# Patient Record
Sex: FEMALE | Race: WHITE | NOT HISPANIC OR LATINO | Employment: OTHER | URBAN - METROPOLITAN AREA
[De-identification: names, ages, dates, MRNs, and addresses within clinical notes are randomized per-mention and may not be internally consistent; named-entity substitution may affect disease eponyms.]

---

## 2017-01-09 ENCOUNTER — OFFICE VISIT (OUTPATIENT)
Dept: PODIATRY | Facility: CLINIC | Age: 82
End: 2017-01-09
Payer: MEDICARE

## 2017-01-09 VITALS — HEIGHT: 65 IN | WEIGHT: 175.69 LBS | BODY MASS INDEX: 29.27 KG/M2

## 2017-01-09 DIAGNOSIS — M25.571 CHRONIC PAIN OF BOTH ANKLES: ICD-10-CM

## 2017-01-09 DIAGNOSIS — G89.29 CHRONIC PAIN OF BOTH ANKLES: ICD-10-CM

## 2017-01-09 DIAGNOSIS — M25.572 CHRONIC PAIN OF BOTH ANKLES: ICD-10-CM

## 2017-01-09 DIAGNOSIS — M19.079 ARTHRITIS OF FOOT: Primary | ICD-10-CM

## 2017-01-09 DIAGNOSIS — M79.673 FOOT ARCH PAIN, UNSPECIFIED LATERALITY: ICD-10-CM

## 2017-01-09 PROCEDURE — 1125F AMNT PAIN NOTED PAIN PRSNT: CPT | Mod: S$GLB,,, | Performed by: PODIATRIST

## 2017-01-09 PROCEDURE — 99999 PR PBB SHADOW E&M-NEW PATIENT-LVL III: CPT | Mod: PBBFAC,,, | Performed by: PODIATRIST

## 2017-01-09 PROCEDURE — 1159F MED LIST DOCD IN RCRD: CPT | Mod: S$GLB,,, | Performed by: PODIATRIST

## 2017-01-09 PROCEDURE — 99203 OFFICE O/P NEW LOW 30 MIN: CPT | Mod: S$GLB,,, | Performed by: PODIATRIST

## 2017-01-09 PROCEDURE — 1157F ADVNC CARE PLAN IN RCRD: CPT | Mod: S$GLB,,, | Performed by: PODIATRIST

## 2017-01-09 PROCEDURE — 1160F RVW MEDS BY RX/DR IN RCRD: CPT | Mod: S$GLB,,, | Performed by: PODIATRIST

## 2017-01-09 RX ORDER — DICLOFENAC SODIUM 10 MG/G
2 GEL TOPICAL 4 TIMES DAILY
Qty: 100 G | Refills: 2 | Status: SHIPPED | OUTPATIENT
Start: 2017-01-09 | End: 2018-07-03 | Stop reason: ALTCHOICE

## 2017-01-09 RX ORDER — ZOLPIDEM TARTRATE 5 MG/1
5 TABLET ORAL NIGHTLY PRN
COMMUNITY
End: 2018-07-03 | Stop reason: ALTCHOICE

## 2017-01-09 RX ORDER — AMLODIPINE BESYLATE 5 MG/1
TABLET ORAL
COMMUNITY
Start: 2016-12-28 | End: 2018-07-03 | Stop reason: ALTCHOICE

## 2017-01-09 RX ORDER — ISOSORBIDE DINITRATE 30 MG/1
TABLET ORAL
COMMUNITY
Start: 2016-12-30 | End: 2020-02-26

## 2017-01-09 RX ORDER — ASPIRIN 81 MG/1
81 TABLET ORAL EVERY 12 HOURS
COMMUNITY

## 2017-01-09 RX ORDER — MECLIZINE HCL 12.5 MG 12.5 MG/1
TABLET ORAL
COMMUNITY
Start: 2017-01-03 | End: 2018-07-26

## 2017-01-09 NOTE — MR AVS SNAPSHOT
[facility]  [address] [place] [address]  Phone: [phone]                  Erich Encarnacion   2017 9:45 AM   Office Visit    Description:  Female : 10/2/1926   Provider:  Mark Hall DPM   Department:  [facility]           Reason for Visit     Foot Pain           Diagnoses this Visit        Comments    Arthritis of foot    -  Primary     Foot arch pain, unspecified laterality         Chronic pain of both ankles                To Do List           Future Appointments        Provider Department Dept Phone    2017 10:00 AM [facility] XR1 [facility] [phone]    2017 10:00 AM Mark Hall DPM [facility] [phone]      Goals (5 Years of Data)     None      Follow-Up and Disposition     Return in about 1 month (around 2017).    Follow-up and Disposition History       These Medications        Disp Refills Start End    diclofenac sodium 1 % Gel 100 g 2 2017     Apply 2 g topically 4 (four) times daily. - Topical    Pharmacy: [address] [place] [address] Ph #: [phone]         [facility]     [facility] On Call Nurse Care Line -  Assistance  Registered nurses in the [facility] On Call Center provide clinical advisement, health education, appointment booking, and other advisory services.  Call for this free service at [phone].             Medications           Message regarding Medications     Verify the changes and/or additions to your medication regime listed below are the same as discussed with your clinician today.  If any of these changes or additions are incorrect, please notify your healthcare provider.        START taking these NEW medications        Refills    diclofenac sodium 1 % Gel 2    Sig: Apply 2 g topically 4 (four) times daily.    Class: Normal    Route: Topical           Verify that the below list of medications is an accurate representation of the  "medications you are currently taking.  If none reported, the list may be blank. If incorrect, please contact your healthcare provider. Carry this list with you in case of emergency.           Current Medications     amlodipine (NORVASC) 5 MG tablet     aspirin (ECOTRIN) 81 MG EC tablet Take 81 mg by mouth every 12 (twelve) hours.    diclofenac sodium 1 % Gel Apply 2 g topically 4 (four) times daily.    isosorbide dinitrate (ISORDIL) 30 MG Tab     meclizine (ANTIVERT) 12.5 mg tablet     zolpidem (AMBIEN) 5 MG Tab Take 5 mg by mouth nightly as needed.           Clinical Reference Information           Vital Signs - Last Recorded  Most recent update: 1/9/2017  9:43 AM by Deana Sun MA    Ht Wt BMI          5' 5" (1.651 m) 79.7 kg (175 lb 11.3 oz) 29.24 kg/m2        Allergies as of 1/9/2017     No Known Allergies      Immunizations Administered on Date of Encounter - 1/9/2017     None      Orders Placed During Today's Visit     Future Labs/Procedures Expected by Expires    X-Ray Ankle Complete Bilateral  1/9/2017 1/9/2018    X-Ray Foot Complete Bilateral  1/9/2017 1/10/2018      "

## 2017-01-09 NOTE — PROGRESS NOTES
Subjective:      Patient ID: Erich Encarnacion is a 89 y.o. female.    Chief Complaint: Foot Pain    Deep aching, occasionally sharp pain both feet/ankles all over.  Gradual onset, worsening over past several weeks, aggravated by increased weight bearing, shoe gear, pressure.  No previous medical treatment.  OTC pain med not helping.  Denies trauma and surgery both feet.  Wearing athletic shoes.      Review of Systems   Constitution: Negative for chills, diaphoresis, fever, malaise/fatigue and night sweats.   Cardiovascular: Negative for claudication, cyanosis, leg swelling and syncope.   Skin: Negative for color change, dry skin, rash, suspicious lesions and unusual hair distribution.   Musculoskeletal: Positive for arthritis and joint pain. Negative for falls, joint swelling, muscle cramps, muscle weakness and stiffness.   Gastrointestinal: Negative for constipation, diarrhea, nausea and vomiting.   Neurological: Negative for brief paralysis, disturbances in coordination, focal weakness, numbness, paresthesias, sensory change and tremors.           Objective:      Physical Exam   Constitutional: She appears well-developed and well-nourished. She is cooperative. No distress.   Cardiovascular:   Pulses:       Popliteal pulses are 2+ on the right side, and 2+ on the left side.        Dorsalis pedis pulses are 1+ on the right side, and 1+ on the left side.        Posterior tibial pulses are 1+ on the right side, and 1+ on the left side.   Capillary refill 3 seconds all toes/distal feet, all toes/both feet warm to touch.      Negative lymphadenopathy bilateral popliteal fossa and tarsal tunnel.      Negavie lower extremity edema bilateral.     Musculoskeletal:        Right ankle: Normal. She exhibits normal range of motion, no swelling, no ecchymosis, no deformity, no laceration and normal pulse. Achilles tendon normal. Achilles tendon exhibits no pain, no defect and normal Ramirez's test results.   Generalized pain to  weight bearing particularly after rest without focal pain to palpation or nwb range of motion both feet which are without deformity or loss of function.    Otherwise, Normal angle, base, station of gait. All ten toes without clubbing, cyanosis, or signs of ischemia.  No pain to palpation bilateral lower extremities.  Range of motion, stability, muscle strength, and muscle tone normal bilateral feet and legs.     Lymphadenopathy: No inguinal adenopathy noted on the right or left side.   Negative lymphadenopathy bilateral popliteal fossa and tarsal tunnel.   Neurological: She is alert. She has normal strength. She displays no atrophy and no tremor. No sensory deficit. She exhibits normal muscle tone. She displays no seizure activity. Gait normal.   Reflex Scores:       Patellar reflexes are 2+ on the right side and 2+ on the left side.       Achilles reflexes are 2+ on the right side and 2+ on the left side.  Negative tinel sign to percussion sural, superficial peroneal, deep peroneal, saphenous, and posterior tibial nerves right and left ankles and feet.      Negative moulder sign/click bilateral all intermetatarsal spaces.     Skin: Skin is warm, dry and intact. No abrasion, no bruising, no burn, no ecchymosis, no laceration, no lesion and no rash noted. She is not diaphoretic. No cyanosis or erythema. No pallor. Nails show no clubbing.     Skin is normal age and health appropriate color, turgor, texture, and temperature bilateral lower extremities without ulceration, hyperpigmentation, discoloration, masses nodules or cords palpated.  No ecchymosis, erythema, edema, or cardinal signs of infection bilateral lower extremities.               Assessment:       Encounter Diagnoses   Name Primary?    Arthritis of foot Yes    Foot arch pain, unspecified laterality     Chronic pain of both ankles          Plan:       Erich was seen today for foot pain.    Diagnoses and all orders for this visit:    Arthritis of foot  -      X-Ray Foot Complete Bilateral; Future  -     X-Ray Ankle Complete Bilateral; Future    Foot arch pain, unspecified laterality  -     X-Ray Foot Complete Bilateral; Future  -     X-Ray Ankle Complete Bilateral; Future    Chronic pain of both ankles  -     X-Ray Foot Complete Bilateral; Future  -     X-Ray Ankle Complete Bilateral; Future    Other orders  -     diclofenac sodium 1 % Gel; Apply 2 g topically 4 (four) times daily.      I counseled the patient on her conditions, their implications and medical management.        Patient will stretch the tendo achilles complex three times daily as demonstrated in the office.  Literature was dispensed illustrating proper stretching technique.    Patient will obtain over the counter arch supports and wear them in shoes whenever possible.  Athletic shoes intended for walking or running are usually best.    The patient was advised that NSAID-type medications have two very important potential side effects: gastrointestinal irritation including hemorrhage and renal injuries. She was asked to take the medication with food and to stop if she experiences any GI upset. I asked her to call for vomiting, abdominal pain or black/bloody stools. The patient expresses understanding of these issues and questions were answered.    Discussed conservative treatment with shoes of adequate dimensions, material, and style to alleviate symptoms and delay or prevent surgical intervention.     Rx x-rays, voltaren gel.          No Follow-up on file.

## 2017-01-23 ENCOUNTER — HOSPITAL ENCOUNTER (OUTPATIENT)
Dept: RADIOLOGY | Facility: CLINIC | Age: 82
Discharge: HOME | End: 2017-01-23
Attending: PODIATRIST
Payer: MEDICARE

## 2017-01-23 DIAGNOSIS — M25.571 CHRONIC PAIN OF BOTH ANKLES: ICD-10-CM

## 2017-01-23 DIAGNOSIS — G89.29 CHRONIC PAIN OF BOTH ANKLES: ICD-10-CM

## 2017-01-23 DIAGNOSIS — M79.673 FOOT ARCH PAIN, UNSPECIFIED LATERALITY: ICD-10-CM

## 2017-01-23 DIAGNOSIS — M19.079 ARTHRITIS OF FOOT: ICD-10-CM

## 2017-01-23 DIAGNOSIS — M25.572 CHRONIC PAIN OF BOTH ANKLES: ICD-10-CM

## 2017-01-23 PROCEDURE — 73610 X-RAY EXAM OF ANKLE: CPT | Mod: 26,LT,S$GLB, | Performed by: RADIOLOGY

## 2017-01-23 PROCEDURE — 73610 X-RAY EXAM OF ANKLE: CPT | Mod: 50,TC,PO

## 2017-01-23 PROCEDURE — 73630 X-RAY EXAM OF FOOT: CPT | Mod: 26,LT,, | Performed by: RADIOLOGY

## 2017-01-23 PROCEDURE — 73630 X-RAY EXAM OF FOOT: CPT | Mod: 26,RT,, | Performed by: RADIOLOGY

## 2017-01-23 PROCEDURE — 73610 X-RAY EXAM OF ANKLE: CPT | Mod: 26,RT,S$GLB, | Performed by: RADIOLOGY

## 2017-01-23 PROCEDURE — 73630 X-RAY EXAM OF FOOT: CPT | Mod: 50,TC,PO

## 2017-02-06 ENCOUNTER — OFFICE VISIT (OUTPATIENT)
Dept: PODIATRY | Facility: CLINIC | Age: 82
End: 2017-02-06
Payer: MEDICARE

## 2017-02-06 ENCOUNTER — TELEPHONE (OUTPATIENT)
Dept: PODIATRY | Facility: CLINIC | Age: 82
End: 2017-02-06

## 2017-02-06 VITALS — WEIGHT: 175.5 LBS | HEIGHT: 65 IN | BODY MASS INDEX: 29.24 KG/M2

## 2017-02-06 DIAGNOSIS — M79.672 FOOT PAIN, LEFT: Primary | ICD-10-CM

## 2017-02-06 DIAGNOSIS — G89.29 CHRONIC PAIN OF LEFT ANKLE: ICD-10-CM

## 2017-02-06 DIAGNOSIS — M25.572 CHRONIC PAIN OF LEFT ANKLE: ICD-10-CM

## 2017-02-06 PROCEDURE — 99999 PR PBB SHADOW E&M-EST. PATIENT-LVL III: CPT | Mod: PBBFAC,,, | Performed by: PODIATRIST

## 2017-02-06 PROCEDURE — 1157F ADVNC CARE PLAN IN RCRD: CPT | Mod: S$GLB,,, | Performed by: PODIATRIST

## 2017-02-06 PROCEDURE — 1125F AMNT PAIN NOTED PAIN PRSNT: CPT | Mod: S$GLB,,, | Performed by: PODIATRIST

## 2017-02-06 PROCEDURE — 99213 OFFICE O/P EST LOW 20 MIN: CPT | Mod: S$GLB,,, | Performed by: PODIATRIST

## 2017-02-06 PROCEDURE — 1160F RVW MEDS BY RX/DR IN RCRD: CPT | Mod: S$GLB,,, | Performed by: PODIATRIST

## 2017-02-06 PROCEDURE — 1159F MED LIST DOCD IN RCRD: CPT | Mod: S$GLB,,, | Performed by: PODIATRIST

## 2017-02-06 RX ORDER — LIDOCAINE HYDROCHLORIDE 20 MG/ML
JELLY TOPICAL
Qty: 30 ML | Refills: 2 | Status: SHIPPED | OUTPATIENT
Start: 2017-02-06 | End: 2020-02-26

## 2017-02-06 NOTE — MR AVS SNAPSHOT
[facility]  [address] [place] [address]  Phone: [phone]                  Erich Encarnacion   2017 10:00 AM   Office Visit    Description:  Female : 10/2/1926   Provider:  Mark Hall DPM   Department:  [facility]           Reason for Visit     Foot Pain           Diagnoses this Visit        Comments    Foot pain, left    -  Primary     Chronic pain of left ankle                To Do List           Goals (5 Years of Data)     None      Follow-Up and Disposition     Return in about 6 weeks (around 3/20/2017) for left foot/aknle pain.       These Medications        Disp Refills Start End    lidocaine HCL 2% (XYLOCAINE) 2 % jelly 30 mL 2 2017     Apply topically as needed. - Topical (Top)    Pharmacy: [address] [place] Ph #: [phone]         [facility] On Call     [facility] On Call Nurse [facility] Line -  Assistance  Registered nurses in the [facility] On Call Center provide clinical advisement, health education, appointment booking, and other advisory services.  Call for this free service at [phone].             Medications           Message regarding Medications     Verify the changes and/or additions to your medication regime listed below are the same as discussed with your clinician today.  If any of these changes or additions are incorrect, please notify your healthcare provider.        START taking these NEW medications        Refills    lidocaine HCL 2% (XYLOCAINE) 2 % jelly 2    Sig: Apply topically as needed.    Class: Normal    Route: Topical (Top)           Verify that the below list of medications is an accurate representation of the medications you are currently taking.  If none reported, the list may be blank. If incorrect, please contact your healthcare provider. Carry this list with you in case of emergency.           Current Medications     amlodipine (NORVASC) 5 MG tablet     aspirin  "(ECOTRIN) 81 MG EC tablet Take 81 mg by mouth every 12 (twelve) hours.    diclofenac sodium 1 % Gel Apply 2 g topically 4 (four) times daily.    isosorbide dinitrate (ISORDIL) 30 MG Tab     meclizine (ANTIVERT) 12.5 mg tablet     zolpidem (AMBIEN) 5 MG Tab Take 5 mg by mouth nightly as needed.    lidocaine HCL 2% (XYLOCAINE) 2 % jelly Apply topically as needed.           Clinical Reference Information           Your Vitals Were     Height Weight BMI          5' 5" (1.651 m) 79.6 kg (175 lb 7.8 oz) 29.2 kg/m2        Allergies as of 2/6/2017     No Known Allergies      Immunizations Administered on Date of Encounter - 2/6/2017     None      Orders Placed During Today's Visit      Normal Orders This Visit    Ambulatory consult to Physical Therapy     ORTHOTIC DEVICE (DME)       Language Assistance Services     ATTENTION: Language assistance services are available, free of charge. Please call [phone].      ATENCIÓN: Si habla deb, tiene a redding disposición servicios gratuitos de asistencia lingüística. Llame al [phone].     MANDY Ý: N?u b?n nói Ti?ng Vi?t, có các d?ch v? h? tr? ngôn ng? mi?n phí dành cho b?n. G?i s? [phone].         [facility] complies with applicable Federal civil rights laws and does not discriminate on the basis of race, color, national origin, age, disability, or sex.        "

## 2017-02-06 NOTE — TELEPHONE ENCOUNTER
----- Message from Meme Mcdonald sent at 2/6/2017 10:28 AM CST -----  Contact: [facility]   Needs to [facility]   .  [address]  [address]  [address] [facility] [place] [address]  Phone: [phone] Fax: [phone]

## 2017-02-06 NOTE — PROGRESS NOTES
Subjective:      Patient ID: Erich Encarnacion is a 89 y.o. female.    Chief Complaint: Foot Pain (bilateral;right worse)    Deep aching, occasionally sharp pain both feet/ankles all over. No improvement from topical nsaid gel, stretches, shoe and activity selection.  X-rays show mild osteopenia, arthritic changes without acute injury.    Review of Systems   Constitution: Negative for chills, diaphoresis, fever, malaise/fatigue and night sweats.   Cardiovascular: Negative for claudication, cyanosis, leg swelling and syncope.   Skin: Negative for color change, dry skin, rash, suspicious lesions and unusual hair distribution.   Musculoskeletal: Positive for arthritis and joint pain. Negative for falls, joint swelling, muscle cramps, muscle weakness and stiffness.   Gastrointestinal: Negative for constipation, diarrhea, nausea and vomiting.   Neurological: Negative for brief paralysis, disturbances in coordination, focal weakness, numbness, paresthesias, sensory change and tremors.           Objective:      Physical Exam   Constitutional: She appears well-developed and well-nourished. She is cooperative. No distress.   Cardiovascular:   Pulses:       Popliteal pulses are 2+ on the right side, and 2+ on the left side.        Dorsalis pedis pulses are 1+ on the right side, and 1+ on the left side.        Posterior tibial pulses are 1+ on the right side, and 1+ on the left side.   Capillary refill 3 seconds all toes/distal feet, all toes/both feet warm to touch.      Negative lymphadenopathy bilateral popliteal fossa and tarsal tunnel.      Negavie lower extremity edema bilateral.     Musculoskeletal:        Right ankle: Normal. She exhibits normal range of motion, no swelling, no ecchymosis, no deformity, no laceration and normal pulse. Achilles tendon normal. Achilles tendon exhibits no pain, no defect and normal Ramirez's test results.   Generalized pain to weight bearing particularly after rest without focal pain to  palpation or nwb range of motion both feet which are without deformity or loss of function.    Otherwise, Normal angle, base, station of gait. All ten toes without clubbing, cyanosis, or signs of ischemia.  No pain to palpation bilateral lower extremities.  Range of motion, stability, muscle strength, and muscle tone normal bilateral feet and legs.     Lymphadenopathy: No inguinal adenopathy noted on the right or left side.   Negative lymphadenopathy bilateral popliteal fossa and tarsal tunnel.   Neurological: She is alert. She has normal strength. She displays no atrophy and no tremor. No sensory deficit. She exhibits normal muscle tone. She displays no seizure activity. Gait normal.   Reflex Scores:       Patellar reflexes are 2+ on the right side and 2+ on the left side.       Achilles reflexes are 2+ on the right side and 2+ on the left side.  Negative tinel sign to percussion sural, superficial peroneal, deep peroneal, saphenous, and posterior tibial nerves right and left ankles and feet.      Negative moulder sign/click bilateral all intermetatarsal spaces.     Skin: Skin is warm, dry and intact. No abrasion, no bruising, no burn, no ecchymosis, no laceration, no lesion and no rash noted. She is not diaphoretic. No cyanosis or erythema. No pallor. Nails show no clubbing.     Skin is normal age and health appropriate color, turgor, texture, and temperature bilateral lower extremities without ulceration, hyperpigmentation, discoloration, masses nodules or cords palpated.  No ecchymosis, erythema, edema, or cardinal signs of infection bilateral lower extremities.               Assessment:       Encounter Diagnoses   Name Primary?    Foot pain, left Yes    Chronic pain of left ankle          Plan:       Erich was seen today for foot pain.    Diagnoses and all orders for this visit:    Foot pain, left  -     Ambulatory consult to Physical Therapy  -     ORTHOTIC DEVICE (DME)    Chronic pain of left ankle  -      Ambulatory consult to Physical Therapy  -     ORTHOTIC DEVICE (DME)    Other orders  -     lidocaine HCL 2% (XYLOCAINE) 2 % jelly; Apply topically as needed.      I counseled the patient on her conditions, their implications and medical management.    Continue stretches, nsaid gel, careful selection of shoes and activity.    Rx lidocaine gel, PT, custom orthotics.    Possible MRI next visit if not improved.        Return in about 6 weeks (around 3/20/2017) for left foot/aknle pain.

## 2017-03-01 ENCOUNTER — HOSPITAL ENCOUNTER (EMERGENCY)
Facility: HOSPITAL | Age: 82
Discharge: HOME | End: 2017-03-01
Attending: EMERGENCY MEDICINE
Payer: MEDICARE

## 2017-03-01 VITALS
SYSTOLIC BLOOD PRESSURE: 134 MMHG | DIASTOLIC BLOOD PRESSURE: 70 MMHG | BODY MASS INDEX: 29.16 KG/M2 | RESPIRATION RATE: 18 BRPM | TEMPERATURE: 98 F | WEIGHT: 175 LBS | HEIGHT: 65 IN | OXYGEN SATURATION: 95 % | HEART RATE: 80 BPM

## 2017-03-01 DIAGNOSIS — J20.9 ACUTE BRONCHITIS, UNSPECIFIED ORGANISM: Primary | ICD-10-CM

## 2017-03-01 DIAGNOSIS — J40 BRONCHITIS: ICD-10-CM

## 2017-03-01 DIAGNOSIS — R05.9 COUGH: ICD-10-CM

## 2017-03-01 LAB
BILIRUB UR QL STRIP: NEGATIVE
CLARITY UR: CLEAR
COLOR UR: YELLOW
FLUAV AG SPEC QL IA: NEGATIVE
FLUBV AG SPEC QL IA: NEGATIVE
GLUCOSE UR QL STRIP: NEGATIVE
HGB UR QL STRIP: NEGATIVE
KETONES UR QL STRIP: NEGATIVE
LEUKOCYTE ESTERASE UR QL STRIP: NEGATIVE
NITRITE UR QL STRIP: NEGATIVE
PH UR STRIP: 6 [PH] (ref 5–8)
PROT UR QL STRIP: NEGATIVE
SP GR UR STRIP: <=1.005 (ref 1–1.03)
SPECIMEN SOURCE: NORMAL
URN SPEC COLLECT METH UR: ABNORMAL
UROBILINOGEN UR STRIP-ACNC: NEGATIVE EU/DL

## 2017-03-01 PROCEDURE — 63600175 PHARM REV CODE 636 W HCPCS: Performed by: EMERGENCY MEDICINE

## 2017-03-01 PROCEDURE — 25000003 PHARM REV CODE 250: Performed by: EMERGENCY MEDICINE

## 2017-03-01 PROCEDURE — 81003 URINALYSIS AUTO W/O SCOPE: CPT

## 2017-03-01 PROCEDURE — 99284 EMERGENCY DEPT VISIT MOD MDM: CPT | Mod: 25

## 2017-03-01 PROCEDURE — 87400 INFLUENZA A/B EACH AG IA: CPT | Mod: 59

## 2017-03-01 PROCEDURE — 96361 HYDRATE IV INFUSION ADD-ON: CPT

## 2017-03-01 PROCEDURE — 96374 THER/PROPH/DIAG INJ IV PUSH: CPT

## 2017-03-01 RX ORDER — IBUPROFEN 600 MG/1
600 TABLET ORAL ONCE
Status: COMPLETED | OUTPATIENT
Start: 2017-03-01 | End: 2017-03-01

## 2017-03-01 RX ORDER — SODIUM CHLORIDE 9 MG/ML
1000 INJECTION, SOLUTION INTRAVENOUS
Status: COMPLETED | OUTPATIENT
Start: 2017-03-01 | End: 2017-03-01

## 2017-03-01 RX ORDER — ONDANSETRON 2 MG/ML
4 INJECTION INTRAMUSCULAR; INTRAVENOUS
Status: COMPLETED | OUTPATIENT
Start: 2017-03-01 | End: 2017-03-01

## 2017-03-01 RX ORDER — NAPROXEN SODIUM 220 MG
220 TABLET ORAL 2 TIMES DAILY WITH MEALS
Qty: 30 TABLET | Refills: 0 | Status: SHIPPED | OUTPATIENT
Start: 2017-03-01 | End: 2018-07-03 | Stop reason: ALTCHOICE

## 2017-03-01 RX ORDER — ALBUTEROL SULFATE 90 UG/1
2 AEROSOL, METERED RESPIRATORY (INHALATION) EVERY 4 HOURS PRN
Qty: 1 INHALER | Refills: 1 | Status: SHIPPED | OUTPATIENT
Start: 2017-03-01 | End: 2018-07-03 | Stop reason: ALTCHOICE

## 2017-03-01 RX ADMIN — SODIUM CHLORIDE 1000 ML: 0.9 INJECTION, SOLUTION INTRAVENOUS at 01:03

## 2017-03-01 RX ADMIN — ONDANSETRON 4 MG: 2 INJECTION INTRAMUSCULAR; INTRAVENOUS at 01:03

## 2017-03-01 RX ADMIN — IBUPROFEN 600 MG: 600 TABLET, FILM COATED ORAL at 01:03

## 2017-03-01 NOTE — ED AVS SNAPSHOT
[facility] [address]  [place] [address]               Erich Encarnacion   3/1/2017 11:55 AM   ED    Description:  Female : 10/2/1926   Department:  [facility]           Your Care was Coordinated By:     Provider Role From To    Anshul Ramos MD Attending Provider 17 1211 --      Reason for Visit     General Illness           Diagnoses this Visit        Comments    Acute bronchitis, unspecified organism    -  Primary     Cough         Bronchitis           ED Disposition     ED Disposition Condition Comment    Discharge             To Do List           Follow-up Information     Follow up with Se Rios MD In 1 week(s).    Specialty:  Family Medicine    Contact information:    [address] [facility] [address]  [phone]         These Medications        Disp Refills Start End    naproxen sodium (ANAPROX) 220 MG tablet 30 tablet 0 3/1/2017     Take 1 tablet (220 mg total) by mouth 2 (two) times daily with meals. - Oral    Pharmacy: [email or web address] Ph #: [phone]       albuterol 90 mcg/actuation inhaler 1 Inhaler 1 3/1/2017     Inhale 2 puffs into the lungs every 4 (four) hours as needed for Wheezing. Rescue - Inhalation    Pharmacy: [email or web address] Ph #: [phone]         [facility]     [facility] -  Assistance  Registered nurses in the [facility] provide clinical advisement, health education, appointment booking, and other advisory services.  Call for this free service at [phone].             Medications           Message regarding Medications     Verify the changes and/or additions to your medication regime listed below are the same as discussed with your clinician today.  If any of these changes or additions are incorrect, please notify your  healthcare provider.        START taking these NEW medications        Refills    naproxen sodium (ANAPROX) 220 MG tablet 0    Sig: Take 1 tablet (220 mg total) by mouth 2 (two) times daily with meals.    Class: Print    Route: Oral    albuterol 90 mcg/actuation inhaler 1    Sig: Inhale 2 puffs into the lungs every 4 (four) hours as needed for Wheezing. Rescue    Class: Print    Route: Inhalation      These medications were administered today        Dose Freq    0.9%  NaCl infusion 1,000 mL ED 1 Time    Sig: Inject 1,000 mLs into the vein ED 1 Time.    Class: Normal    Route: Intravenous    ondansetron injection 4 mg 4 mg ED 1 Time    Sig: Inject 4 mg into the vein ED 1 Time.    Class: Normal    Route: Intravenous    ibuprofen tablet 600 mg 600 mg Once    Sig: Take 1 tablet (600 mg total) by mouth once.    Class: Normal    Route: Oral           Verify that the below list of medications is an accurate representation of the medications you are currently taking.  If none reported, the list may be blank. If incorrect, please contact your healthcare provider. Carry this list with you in case of emergency.           Current Medications     amlodipine (NORVASC) 5 MG tablet     aspirin (ECOTRIN) 81 MG EC tablet Take 81 mg by mouth every 12 (twelve) hours.    diclofenac sodium 1 % Gel Apply 2 g topically 4 (four) times daily.    isosorbide dinitrate (ISORDIL) 30 MG Tab     lidocaine HCL 2% (XYLOCAINE) 2 % jelly Apply topically as needed.    meclizine (ANTIVERT) 12.5 mg tablet     zolpidem (AMBIEN) 5 MG Tab Take 5 mg by mouth nightly as needed.    albuterol 90 mcg/actuation inhaler Inhale 2 puffs into the lungs every 4 (four) hours as needed for Wheezing. Rescue    naproxen sodium (ANAPROX) 220 MG tablet Take 1 tablet (220 mg total) by mouth 2 (two) times daily with meals.           Clinical Reference Information           Your Vitals Were     BP                   139/68           Allergies as of 3/1/2017     No Known  Allergies      Immunizations Administered on Date of Encounter - 3/1/2017     None      ED Micro, Lab, POCT     Start Ordered       Status Ordering Provider    03/01/17 1240 03/01/17 1240  Urinalysis  STAT      Final result     03/01/17 1240 03/01/17 1240  Influenza antigen Nasal Swab  STAT      Final result       ED Imaging Orders     Start Ordered       Status Ordering Provider    03/01/17 1240 03/01/17 1240  X-Ray Chest PA And Lateral  1 time imaging      Final result         Discharge Instructions           Acute Bronchitis  Your healthcare provider has told you that you have acute bronchitis. Bronchitis is infection or inflammation of the bronchial tubes (airways in the lungs). Normally, air moves easily in and out of the airways. Bronchitis narrows the airways, making it harder for air to flow in and out of the lungs. This causes symptoms such as shortness of breath, coughing, and wheezing. Bronchitis can be acute or chronic. Acute means the condition comes on quickly and goes away in a short time. Chronic means a condition lasts a long time and often comes back. Read on to learn more about acute bronchitis.    What causes acute bronchitis?  Acute bronchitis almost always starts as a viral respiratory infection, such as a cold or the flu. Certain factors make it more likely for a cold or flu to turn into bronchitis. These include being very young or very old or having a heart or lung problem. Cigarette smoking also makes bronchitis more likely.  When bronchitis develops, the airways become swollen. The airways may also become infected with bacteria. This is known as a secondary infection.  Diagnosing acute bronchitis  Your healthcare provider will examine you and ask about your symptoms and health history. You may also have a sputum culture to test the fluid in your lungs. Chest X-rays may be done to look for infection in the lungs.  Treating acute bronchitis  Bronchitis usually clears up as the cold or  flu goes away. You can help feel better faster by doing the following:  · Take medicine as directed. You may be told to take ibuprofen or other over-the-counter medicines. These help relieve inflammation in your bronchial tubes. Your doctor may prescribe an inhaler to help open up the bronchial tubes. Most of the time, acute bronchitis is caused by a viral infection. Antibiotics are usually not prescribed for viral infections.  · Drink plenty of fluids, such as water, juice, or warm soup. Fluids loosen mucus so that you can cough it up. This helps you breathe more easily. Fluids also prevent dehydration.  · Make sure you get plenty of rest.  · Do not smoke. Do not allow anyone else to smoke in your home.  Recovery and follow-up  Follow up with your doctor as you are told. You will likely feel better in a week or two. But a dry cough can linger beyond that time. Let your doctor know if you still have symptoms (other than a dry cough) after 2 weeks. If youre prone to getting bronchial infections, let your doctor know. And take steps to protect yourself from future infections. These steps include stopping smoking and avoiding tobacco smoke, washing your hands often, and getting a yearly flu shot.  When to call the doctor  Call the doctor if you have any of the following:  · Fever of 100.4°F (38.0°C) higher  · Symptoms that get worse, or new symptoms  · Trouble breathing  · Symptoms that dont start to improve within a week, or within 3 days of taking antibiotics   Date Last Reviewed: 8/4/2014  © 3784-4464 [employer]. [address] [place] [address], [place] [address]. All rights reserved. This information is not intended as a substitute for professional medical care. Always follow your healthcare professional's instructions.          Bronchitis, Viral (Adult)    You have a viral bronchitis. Bronchitis is inflammation and swelling of the lining of the lungs. This is often caused by an infection. Symptoms include  a dry, hacking cough that is worse at night. The cough may bring up yellow-green mucus. You may also feel short of breath or wheeze. Other symptoms may include tiredness, chest discomfort, and chills.  Bronchitis that is caused by a virus is not treated with antibiotics. Instead, medicines may be given to help relieve symptoms. Symptoms can last up to 2 weeks, although the cough may last much longer.  This illness is contagious during the first few days and is spread through the air by coughing and sneezing, or by direct contact (touching the sick person and then touching your own eyes, nose, or mouth).  Most viral illnesses resolve within 10 to 14 days with rest and simple home remedies, although they may sometimes last for several weeks.  Home care  · If symptoms are severe, rest at home for the first 2 to 3 days. When you go back to your usual activities, don't let yourself get too tired.  · Do not smoke. Also avoid being exposed to secondhand smoke.  · You may use over-the-counter medicine to control fever or pain, unless another pain medicine was prescribed. (Note: If you have chronic liver or kidney disease or have ever had a stomach ulcer or gastrointestinal bleeding, talk with your healthcare provider before using these medicines. Also talk to your provider if you are taking medicine to prevent blood clots.) Aspirin should never be given to anyone younger than 18 years of age who is ill with a viral infection or fever. It may cause severe liver or brain damage.  · Your appetite may be poor, so a light diet is fine. Avoid dehydration by drinking 6 to 8 glasses of fluids per day (such as water, soft drinks, sports drinks, juices, tea, or soup). Extra fluids will help loosen secretions in the nose and lungs.  · Over-the-counter cough, cold, and sore-throat medicines will not shorten the length of the illness, but they may help to reduce symptoms. (Note: Do not use decongestants if you have high blood  pressure.)  Follow-up care  Follow up with your healthcare provider, or as advised. If you had an X-ray or ECG (electrocardiogram), a specialist will review it. You will be notified of any new findings that may affect your care.  Note: If you are age 65 or older, or if you have a chronic lung disease or condition that affects your immune system, or you smoke, talk to your healthcare provider about having pneumococcal vaccinations and a yearly influenza vaccination (flu shot).  When to seek medical advice  Call your healthcare provider right away if any of these occur:  · Fever of 100.4°F (38°C) or higher  · Coughing up increased amounts of colored sputum  · Weakness, drowsiness, headache, facial pain, ear pain, or a stiff neck  Call 911, or get immediate medical care  Contact emergency services right away if any of these occur:  · Coughing up blood  · Worsening weakness, drowsiness, headache, or stiff neck  · Trouble breathing, wheezing, or pain with breathing  Date Last Reviewed: 9/13/2015 © 2000-2016 [employer]. [address] [place] [address]. All rights reserved. This information is not intended as a substitute for professional medical care. Always follow your healthcare professional's instructions.          Smoking Cessation     If you would like to quit smoking:   You may be eligible for free services if you are a [place] resident and started smoking cigarettes before September 1, 1988.  Call the Smoking Cessation Trust (SCT) toll free at (195) 481-5543 or (137) 809-9398.   Call 6-800-QUIT-NOW if you do not meet the above criteria.             [facility] complies with applicable Federal civil rights laws and does not discriminate on the basis of race, color, national origin, age, disability, or sex.        Language Assistance Services     ATTENTION: Language assistance services are available, free of charge. Please call [phone].      ATENCIÓN: Alex cristina  español, tiene a redding disposición servicios gratuitos de asistencia lingüística. Llame al [phone].     MANDY Ý: N?u b?n nói Ti?ng Vi?t, có các d?ch v? h? tr? ngôn ng? mi?n phí dành cho b?n. G?i s? [phone].

## 2017-03-01 NOTE — ED PROVIDER NOTES
Encounter Date: 3/1/2017    SCRIBE #1 NOTE: I, Padmini Garcia, am scribing for, and in the presence of, Dr. Ramos.       History     Chief Complaint   Patient presents with    General Illness     Chest congestion, chills.  Onset of symptoms Monday.     Review of patient's allergies indicates:  No Known Allergies  HPI Comments: 3/1/2017  12:26 PM     Chief Complaint: Cough    The patient is a 89 y.o. female with no pmhx who presents with gradual onset of productive cough with clear sputum that has been constant for 2 days. Associated symptoms include chills, sore throat, congestion and burning pain in chest and throat. Pt has been taking mucinex without any relief. Denies any runny nose, fever, n/v/d, abdominal pain, dysuria or urinary frequency. Pt states she has no appetite and has not been eating. No sick contacts. No shx.    The history is provided by the patient.     No past medical history on file.  No past surgical history on file.  No family history on file.  Social History   Substance Use Topics    Smoking status: Former Smoker     Packs/day: 1.00     Years: 20.00    Smokeless tobacco: Not on file    Alcohol use No     Review of Systems   Constitutional: Positive for appetite change (decreased) and chills. Negative for fever.   HENT: Positive for congestion and sore throat. Negative for rhinorrhea.    Respiratory: Positive for cough. Negative for shortness of breath.    Cardiovascular: Negative for chest pain.   Gastrointestinal: Negative for abdominal pain, diarrhea, nausea and vomiting.   Genitourinary: Negative for dysuria and frequency.   Musculoskeletal: Negative for back pain and myalgias.   Skin: Negative for rash.   Neurological: Negative for weakness and numbness.   Hematological: Does not bruise/bleed easily.   All other systems reviewed and are negative.      Physical Exam   Initial Vitals   BP Pulse Resp Temp SpO2   03/01/17 1114 03/01/17 1114 03/01/17 1114 03/01/17 1114 03/01/17 1114    104/64 105 20 98.2 °F (36.8 °C) 94 %     Physical Exam    Nursing note and vitals reviewed.  Constitutional: No distress.   HENT:   Head: Normocephalic and atraumatic.   Mouth/Throat: Oropharynx is clear and moist and mucous membranes are normal. No oropharyngeal exudate, posterior oropharyngeal edema or posterior oropharyngeal erythema.   Fluid behind left TM - no injection. Right TM is normal.   Eyes: EOM are normal. Pupils are equal, round, and reactive to light.   Neck: Normal range of motion. Neck supple.   Cardiovascular: Normal rate, regular rhythm, normal heart sounds and intact distal pulses. Exam reveals no gallop and no friction rub.    No murmur heard.  Pulmonary/Chest: Breath sounds normal. She has no wheezes. She has no rhonchi. She has no rales.   Upper airway congestion with coughing.   Abdominal: Soft. She exhibits no distension. There is no tenderness.   Musculoskeletal: Normal range of motion. She exhibits no edema (no lower extremity pedal edema).   Neurological: She is alert and oriented to person, place, and time. She has normal strength.   Skin: Skin is dry. No rash noted. No erythema.   Psychiatric: She has a normal mood and affect.         ED Course   Procedures  Labs Reviewed - No data to display          Medical Decision Making:   Initial Assessment:   The patient was interviewed and examined and found to be in no acute distress.  She does not appear acutely toxic.  History and physical examination most resemble her progression with a transient viral illness inducing bronchitis.  Urinalysis, influenza swab, and chest x-ray were obtained to rule out evidence of focal bacterial or viral infection.  These are found be unremarkable.   Differential Diagnosis:   DDX includes, but are not limited to, pneumonia, CNS infection, gastroneuritis, pharyngitis, pneumothorax, urinary tract infection, dehydration  ED Management:  On reassessment, after fluids, patient reported she was feeling much  better and I do think she is currently stable for discharge.  She is asked to follow-up with her doctor within the next few days to assess for symptom resolution progression to otherwise return to the emergency department for any new, concerning, or worsening symptoms.  Patient agreeable with this plan for follow-up and she was discharged in stable condition with her daughter as a .            Scribe Attestation:   Scribe #1: I performed the above scribed service and the documentation accurately describes the services I performed. I attest to the accuracy of the note.    Attending Attestation:           Physician Attestation for Scribe:  Physician Attestation Statement for Scribe #1: I, Dr. Ramos, reviewed documentation, as scribed by Padmini Garcia in my presence, and it is both accurate and complete.                 ED Course     Clinical Impression:   The primary encounter diagnosis was Acute bronchitis, unspecified organism. Diagnoses of Cough and Bronchitis were also pertinent to this visit.      Disposition:   Disposition: Discharged  Condition: Stable       Anshul Ramos MD  03/01/17 1934

## 2017-03-01 NOTE — DISCHARGE INSTRUCTIONS
Acute Bronchitis  Your healthcare provider has told you that you have acute bronchitis. Bronchitis is infection or inflammation of the bronchial tubes (airways in the lungs). Normally, air moves easily in and out of the airways. Bronchitis narrows the airways, making it harder for air to flow in and out of the lungs. This causes symptoms such as shortness of breath, coughing, and wheezing. Bronchitis can be acute or chronic. Acute means the condition comes on quickly and goes away in a short time. Chronic means a condition lasts a long time and often comes back. Read on to learn more about acute bronchitis.    What causes acute bronchitis?  Acute bronchitis almost always starts as a viral respiratory infection, such as a cold or the flu. Certain factors make it more likely for a cold or flu to turn into bronchitis. These include being very young or very old or having a heart or lung problem. Cigarette smoking also makes bronchitis more likely.  When bronchitis develops, the airways become swollen. The airways may also become infected with bacteria. This is known as a secondary infection.  Diagnosing acute bronchitis  Your healthcare provider will examine you and ask about your symptoms and health history. You may also have a sputum culture to test the fluid in your lungs. Chest X-rays may be done to look for infection in the lungs.  Treating acute bronchitis  Bronchitis usually clears up as the cold or flu goes away. You can help feel better faster by doing the following:  · Take medicine as directed. You may be told to take ibuprofen or other over-the-counter medicines. These help relieve inflammation in your bronchial tubes. Your doctor may prescribe an inhaler to help open up the bronchial tubes. Most of the time, acute bronchitis is caused by a viral infection. Antibiotics are usually not prescribed for viral infections.  · Drink plenty of fluids, such as water, juice, or warm soup. Fluids loosen mucus  so that you can cough it up. This helps you breathe more easily. Fluids also prevent dehydration.  · Make sure you get plenty of rest.  · Do not smoke. Do not allow anyone else to smoke in your home.  Recovery and follow-up  Follow up with your doctor as you are told. You will likely feel better in a week or two. But a dry cough can linger beyond that time. Let your doctor know if you still have symptoms (other than a dry cough) after 2 weeks. If youre prone to getting bronchial infections, let your doctor know. And take steps to protect yourself from future infections. These steps include stopping smoking and avoiding tobacco smoke, washing your hands often, and getting a yearly flu shot.  When to call the doctor  Call the doctor if you have any of the following:  · Fever of 100.4°F (38.0°C) higher  · Symptoms that get worse, or new symptoms  · Trouble breathing  · Symptoms that dont start to improve within a week, or within 3 days of taking antibiotics   Date Last Reviewed: 8/4/2014  © 2539-9825 [employer]. [address] [place] [address]. All rights reserved. This information is not intended as a substitute for professional medical care. Always follow your healthcare professional's instructions.          Bronchitis, Viral (Adult)    You have a viral bronchitis. Bronchitis is inflammation and swelling of the lining of the lungs. This is often caused by an infection. Symptoms include a dry, hacking cough that is worse at night. The cough may bring up yellow-green mucus. You may also feel short of breath or wheeze. Other symptoms may include tiredness, chest discomfort, and chills.  Bronchitis that is caused by a virus is not treated with antibiotics. Instead, medicines may be given to help relieve symptoms. Symptoms can last up to 2 weeks, although the cough may last much longer.  This illness is contagious during the first few days and is spread through the air by coughing and sneezing,  or by direct contact (touching the sick person and then touching your own eyes, nose, or mouth).  Most viral illnesses resolve within 10 to 14 days with rest and simple home remedies, although they may sometimes last for several weeks.  Home care  · If symptoms are severe, rest at home for the first 2 to 3 days. When you go back to your usual activities, don't let yourself get too tired.  · Do not smoke. Also avoid being exposed to secondhand smoke.  · You may use over-the-counter medicine to control fever or pain, unless another pain medicine was prescribed. (Note: If you have chronic liver or kidney disease or have ever had a stomach ulcer or gastrointestinal bleeding, talk with your healthcare provider before using these medicines. Also talk to your provider if you are taking medicine to prevent blood clots.) Aspirin should never be given to anyone younger than 18 years of age who is ill with a viral infection or fever. It may cause severe liver or brain damage.  · Your appetite may be poor, so a light diet is fine. Avoid dehydration by drinking 6 to 8 glasses of fluids per day (such as water, soft drinks, sports drinks, juices, tea, or soup). Extra fluids will help loosen secretions in the nose and lungs.  · Over-the-counter cough, cold, and sore-throat medicines will not shorten the length of the illness, but they may help to reduce symptoms. (Note: Do not use decongestants if you have high blood pressure.)  Follow-up care  Follow up with your healthcare provider, or as advised. If you had an X-ray or ECG (electrocardiogram), a specialist will review it. You will be notified of any new findings that may affect your care.  Note: If you are age 65 or older, or if you have a chronic lung disease or condition that affects your immune system, or you smoke, talk to your healthcare provider about having pneumococcal vaccinations and a yearly influenza vaccination (flu shot).  When to seek medical advice  Call your  healthcare provider right away if any of these occur:  · Fever of 100.4°F (38°C) or higher  · Coughing up increased amounts of colored sputum  · Weakness, drowsiness, headache, facial pain, ear pain, or a stiff neck  Call 911, or get immediate medical care  Contact emergency services right away if any of these occur:  · Coughing up blood  · Worsening weakness, drowsiness, headache, or stiff neck  · Trouble breathing, wheezing, or pain with breathing  Date Last Reviewed: 9/13/2015 © 2000-2016 [employer]. [address] [place] [address]. All rights reserved. This information is not intended as a substitute for professional medical care. Always follow your healthcare professional's instructions.

## 2017-03-22 ENCOUNTER — OFFICE VISIT (OUTPATIENT)
Dept: PODIATRY | Facility: CLINIC | Age: 82
End: 2017-03-22
Payer: MEDICARE

## 2017-03-22 VITALS — BODY MASS INDEX: 28.57 KG/M2 | WEIGHT: 171.5 LBS | HEIGHT: 65 IN

## 2017-03-22 DIAGNOSIS — M19.079 ARTHRITIS OF FOOT: ICD-10-CM

## 2017-03-22 DIAGNOSIS — G89.29 CHRONIC PAIN OF LEFT ANKLE: ICD-10-CM

## 2017-03-22 DIAGNOSIS — M25.572 CHRONIC PAIN OF LEFT ANKLE: ICD-10-CM

## 2017-03-22 DIAGNOSIS — M79.672 FOOT PAIN, LEFT: Primary | ICD-10-CM

## 2017-03-22 PROCEDURE — 1159F MED LIST DOCD IN RCRD: CPT | Mod: S$GLB,,, | Performed by: PODIATRIST

## 2017-03-22 PROCEDURE — 99999 PR PBB SHADOW E&M-EST. PATIENT-LVL III: CPT | Mod: PBBFAC,,, | Performed by: PODIATRIST

## 2017-03-22 PROCEDURE — 1125F AMNT PAIN NOTED PAIN PRSNT: CPT | Mod: S$GLB,,, | Performed by: PODIATRIST

## 2017-03-22 PROCEDURE — 99212 OFFICE O/P EST SF 10 MIN: CPT | Mod: S$GLB,,, | Performed by: PODIATRIST

## 2017-03-22 PROCEDURE — 1157F ADVNC CARE PLAN IN RCRD: CPT | Mod: S$GLB,,, | Performed by: PODIATRIST

## 2017-03-22 PROCEDURE — 1160F RVW MEDS BY RX/DR IN RCRD: CPT | Mod: S$GLB,,, | Performed by: PODIATRIST

## 2017-03-22 NOTE — MR AVS SNAPSHOT
[facility]  [address] [place] [address]  Phone: [phone]                  Erich Encarnacion   3/22/2017 8:30 AM   Office Visit    Description:  Female : 10/2/1926   Provider:  Mark Hall DPM   Department:  [facility]           Reason for Visit     Foot Pain           Diagnoses this Visit        Comments    Foot pain, left    -  Primary     Chronic pain of left ankle         Arthritis of foot                To Do List           Goals (5 Years of Data)     None      Follow-Up and Disposition     Return in about 6 weeks (around 5/3/2017) for arthritis chronic foot/ankle pain bilateral.    Follow-up and Disposition History      [facility]     [facility] On Call Nurse Care Line -  Assistance  Registered nurses in the [facility] On Call Center provide clinical advisement, health education, appointment booking, and other advisory services.  Call for this free service at [phone].             Medications           Message regarding Medications     Verify the changes and/or additions to your medication regime listed below are the same as discussed with your clinician today.  If any of these changes or additions are incorrect, please notify your healthcare provider.             Verify that the below list of medications is an accurate representation of the medications you are currently taking.  If none reported, the list may be blank. If incorrect, please contact your healthcare provider. Carry this list with you in case of emergency.           Current Medications     albuterol 90 mcg/actuation inhaler Inhale 2 puffs into the lungs every 4 (four) hours as needed for Wheezing. Rescue    amlodipine (NORVASC) 5 MG tablet     aspirin (ECOTRIN) 81 MG EC tablet Take 81 mg by mouth every 12 (twelve) hours.    diclofenac sodium 1 % Gel Apply 2 g topically 4 (four) times daily.    isosorbide dinitrate (ISORDIL) 30 MG Tab     lidocaine HCL 2% (XYLOCAINE) 2 % jelly Apply topically as  "needed.    meclizine (ANTIVERT) 12.5 mg tablet     naproxen sodium (ANAPROX) 220 MG tablet Take 1 tablet (220 mg total) by mouth 2 (two) times daily with meals.    zolpidem (AMBIEN) 5 MG Tab Take 5 mg by mouth nightly as needed.           Clinical Reference Information           Your Vitals Were     Height Weight BMI          5' 5" (1.651 m) 77.8 kg (171 lb 8.3 oz) 28.54 kg/m2        Allergies as of 3/22/2017     No Known Allergies      Immunizations Administered on Date of Encounter - 3/22/2017     None      Orders Placed During Today's Visit      Normal Orders This Visit    Ambulatory consult to Physical Therapy     ORTHOTIC DEVICE (DME)       [facility] Sign-Up     Activating your [facility] account is as easy as 1-2-3!     1) Visit [email or web address], select Sign Up Now, enter this activation code and your date of birth, then select Next.  [email or web address]  Expires: 5/6/2017  8:49 AM      2) Create a username and password to use when you visit [facility] in the future and select a security question in case you lose your password and select Next.    3) Enter your e-mail address and click Sign Up!    Additional Information  If you have questions, please e-mail [email or web address] or call [phone] to talk to our [facility] staff. Remember, [facility] is NOT to be used for urgent needs. For medical emergencies, dial 911.         Language Assistance Services     ATTENTION: Language assistance services are available, free of charge. Please call [phone].      ATENCIÓN: Si habla español, tiene a redding disposición servicios gratuitos de asistencia lingüística. Llame al [phone].     MANDY Ý: N?u b?n nói Ti?ng Vi?t, có các d?ch v? h? tr? ngôn ng? mi?n phí dành cho b?n. G?i s? [phone].         [facility] complies with applicable Federal civil rights laws and does not discriminate on the basis of race, color, national origin, age, disability, or sex.        "

## 2017-03-22 NOTE — PROGRESS NOTES
Subjective:      Patient ID: Erich Encarnacion is a 89 y.o. female.    Chief Complaint: Foot Pain (kavitha fot pain )    Deep aching, occasionally sharp pain both feet/ankles all over. No improvement from topical nsaid gel, stretches, shoe and activity selection.  X-rays show mild osteopenia, arthritic changes without acute injury.      Patient not able to try inserts/PT since last visit due to hospitalization.  Lidocaine gel helps symptoms nightly.    Review of Systems   Constitution: Negative for chills, diaphoresis, fever, malaise/fatigue and night sweats.   Cardiovascular: Negative for claudication, cyanosis, leg swelling and syncope.   Skin: Negative for color change, dry skin, rash, suspicious lesions and unusual hair distribution.   Musculoskeletal: Positive for arthritis and joint pain. Negative for falls, joint swelling, muscle cramps, muscle weakness and stiffness.   Gastrointestinal: Negative for constipation, diarrhea, nausea and vomiting.   Neurological: Negative for brief paralysis, disturbances in coordination, focal weakness, numbness, paresthesias, sensory change and tremors.           Objective:      Physical Exam   Constitutional: She appears well-developed and well-nourished. She is cooperative. No distress.   Cardiovascular:   Pulses:       Popliteal pulses are 2+ on the right side, and 2+ on the left side.        Dorsalis pedis pulses are 1+ on the right side, and 1+ on the left side.        Posterior tibial pulses are 1+ on the right side, and 1+ on the left side.   Capillary refill 3 seconds all toes/distal feet, all toes/both feet warm to touch.      Negative lymphadenopathy bilateral popliteal fossa and tarsal tunnel.      Negavie lower extremity edema bilateral.     Musculoskeletal:        Right ankle: Normal. She exhibits normal range of motion, no swelling, no ecchymosis, no deformity, no laceration and normal pulse. Achilles tendon normal. Achilles tendon exhibits no pain, no defect and  normal Ramirez's test results.   Generalized pain to weight bearing particularly after rest without focal pain to palpation or nwb range of motion both feet which are without deformity or loss of function.    Otherwise, Normal angle, base, station of gait. All ten toes without clubbing, cyanosis, or signs of ischemia.  No pain to palpation bilateral lower extremities.  Range of motion, stability, muscle strength, and muscle tone normal bilateral feet and legs.     Lymphadenopathy:   Negative lymphadenopathy bilateral popliteal fossa and tarsal tunnel.   Neurological: She is alert. She has normal strength. She displays no atrophy and no tremor. No sensory deficit. She exhibits normal muscle tone. She displays no seizure activity. Gait normal.   Reflex Scores:       Patellar reflexes are 2+ on the right side and 2+ on the left side.       Achilles reflexes are 2+ on the right side and 2+ on the left side.  Negative tinel sign to percussion sural, superficial peroneal, deep peroneal, saphenous, and posterior tibial nerves right and left ankles and feet.      Negative moulder sign/click bilateral all intermetatarsal spaces.     Skin: Skin is warm, dry and intact. No abrasion, no bruising, no burn, no ecchymosis, no laceration, no lesion and no rash noted. She is not diaphoretic. No cyanosis or erythema. No pallor. Nails show no clubbing.     Skin is normal age and health appropriate color, turgor, texture, and temperature bilateral lower extremities without ulceration, hyperpigmentation, discoloration, masses nodules or cords palpated.  No ecchymosis, erythema, edema, or cardinal signs of infection bilateral lower extremities.               Assessment:       Encounter Diagnoses   Name Primary?    Foot pain, left Yes    Chronic pain of left ankle     Arthritis of foot          Plan:       Erich was seen today for foot pain.    Diagnoses and all orders for this visit:    Foot pain, left  -     Ambulatory consult to  Physical Therapy  -     ORTHOTIC DEVICE (DME)    Chronic pain of left ankle  -     Ambulatory consult to Physical Therapy  -     ORTHOTIC DEVICE (DME)    Arthritis of foot  -     Ambulatory consult to Physical Therapy  -     ORTHOTIC DEVICE (DME)      I counseled the patient on her conditions, their implications and medical management.    Continue stretches, nsaid gel, lidocaine gel, careful selection of shoes and activity.    Rx PT, custom orthotics.            Return in about 6 weeks (around 5/3/2017) for arthritis chronic foot/ankle pain bilateral.

## 2017-06-22 ENCOUNTER — TELEPHONE (OUTPATIENT)
Dept: PODIATRY | Facility: CLINIC | Age: 82
End: 2017-06-22

## 2017-06-22 NOTE — TELEPHONE ENCOUNTER
Progress notes faxed over. Confirmed with   Zaira at [facility] physical therapy.

## 2017-06-22 NOTE — TELEPHONE ENCOUNTER
----- Message from Zoe Thomas sent at 6/20/2017  8:49 AM CDT -----  Contact: [facility]  Sent over progress note on 6/5 and 6/13 for Dr Hall to sign and it hasn't been faxed back yet.  Please call back at  fax .

## 2017-08-14 ENCOUNTER — OFFICE VISIT (OUTPATIENT)
Dept: PODIATRY | Facility: CLINIC | Age: 82
End: 2017-08-14
Payer: MEDICARE

## 2017-08-14 VITALS — WEIGHT: 171.31 LBS | BODY MASS INDEX: 28.54 KG/M2 | HEIGHT: 65 IN

## 2017-08-14 DIAGNOSIS — L60.0 INGROWN NAIL: Primary | ICD-10-CM

## 2017-08-14 DIAGNOSIS — M79.674 TOE PAIN, RIGHT: ICD-10-CM

## 2017-08-14 DIAGNOSIS — L03.031 PARONYCHIA, TOE, RIGHT: ICD-10-CM

## 2017-08-14 PROCEDURE — 17999 UNLISTD PX SKN MUC MEMB SUBQ: CPT | Mod: CSM,,, | Performed by: PODIATRIST

## 2017-08-14 PROCEDURE — 1159F MED LIST DOCD IN RCRD: CPT | Mod: S$GLB,,, | Performed by: PODIATRIST

## 2017-08-14 PROCEDURE — 99213 OFFICE O/P EST LOW 20 MIN: CPT | Mod: S$GLB,,, | Performed by: PODIATRIST

## 2017-08-14 PROCEDURE — 1125F AMNT PAIN NOTED PAIN PRSNT: CPT | Mod: S$GLB,,, | Performed by: PODIATRIST

## 2017-08-14 PROCEDURE — 3008F BODY MASS INDEX DOCD: CPT | Mod: S$GLB,,, | Performed by: PODIATRIST

## 2017-08-14 PROCEDURE — 99999 PR PBB SHADOW E&M-EST. PATIENT-LVL III: CPT | Mod: PBBFAC,,, | Performed by: PODIATRIST

## 2017-08-14 RX ORDER — TOBRAMYCIN 3 MG/ML
SOLUTION/ DROPS OPHTHALMIC
Qty: 5 ML | Refills: 3 | Status: SHIPPED | OUTPATIENT
Start: 2017-08-14 | End: 2018-07-26

## 2017-08-14 RX ORDER — HYDROCODONE BITARTRATE AND ACETAMINOPHEN 5; 325 MG/1; MG/1
1 TABLET ORAL EVERY 6 HOURS PRN
COMMUNITY
End: 2018-07-03 | Stop reason: ALTCHOICE

## 2017-08-14 RX ORDER — CEPHALEXIN 500 MG/1
500 CAPSULE ORAL 4 TIMES DAILY
COMMUNITY
End: 2018-07-03 | Stop reason: ALTCHOICE

## 2017-08-14 NOTE — PROGRESS NOTES
Reviewed resident note, exam and procedures were performed under my direct supervision.  Agree with note and care.  Discrepancies discussed.      noncovered foot care:    With the patient's permission, I debrided right hallux and 2nd toenails with a sterile nipper and curette, removing all offending nail and debris.  Patient tolerated the procedure well and related significant relief.    Rx tobramycin dropsbid and baid and dressings all times changing daily until completely healed.

## 2017-08-14 NOTE — PROGRESS NOTES
Subjective:      Patient ID: Erich Encarnacion is a 89 y.o. female.    Chief Complaint: Ingrown Toenail (right great toe)    Patient presents with right foot pain with ingrown nail to the right great toe. She presented to the ED a week ago with concern with the toe, x-rays negative and the ED physician sent her home on Keflex and norco to follow up here today. She relates the pain is better with the norco and keflex, however can be severe at times. She is seeking further treatment options.     Review of Systems   Constitution: Negative for chills, diaphoresis, fever, malaise/fatigue and night sweats.   Cardiovascular: Negative for claudication, cyanosis, leg swelling and syncope.   Skin: Negative for color change, dry skin, rash, suspicious lesions and unusual hair distribution.   Musculoskeletal: Positive for arthritis and joint pain. Negative for falls, joint swelling, muscle cramps, muscle weakness and stiffness.   Gastrointestinal: Negative for constipation, diarrhea, nausea and vomiting.   Neurological: Negative for brief paralysis, disturbances in coordination, focal weakness, numbness, paresthesias, sensory change and tremors.           Objective:      Physical Exam   Constitutional: She appears well-developed and well-nourished. She is cooperative. No distress.   Cardiovascular:   Pulses:       Popliteal pulses are 2+ on the right side, and 2+ on the left side.        Dorsalis pedis pulses are 1+ on the right side, and 1+ on the left side.        Posterior tibial pulses are 1+ on the right side, and 1+ on the left side.   Capillary refill 3 seconds all toes/distal feet, all toes/both feet warm to touch.      Negative lymphadenopathy bilateral popliteal fossa and tarsal tunnel.      Negavie lower extremity edema bilateral.     Musculoskeletal:        Right ankle: Normal. She exhibits normal range of motion, no swelling, no ecchymosis, no deformity, no laceration and normal pulse. Achilles tendon normal.  Achilles tendon exhibits no pain, no defect and normal Ramirez's test results.   No focal pain to palpation or nwb range of motion both feet which are without deformity or loss of function.    Otherwise, Normal angle, base, station of gait. All ten toes without clubbing, cyanosis, or signs of ischemia.  No pain to palpation bilateral lower extremities.  Range of motion, stability, muscle strength, and muscle tone normal bilateral feet and legs.     Lymphadenopathy:   Negative lymphadenopathy bilateral popliteal fossa and tarsal tunnel.   Neurological: She is alert. She has normal strength. She displays no atrophy and no tremor. No sensory deficit. She exhibits normal muscle tone. She displays no seizure activity. Gait normal.   Reflex Scores:       Patellar reflexes are 2+ on the right side and 2+ on the left side.       Achilles reflexes are 2+ on the right side and 2+ on the left side.  Negative tinel sign to percussion sural, superficial peroneal, deep peroneal, saphenous, and posterior tibial nerves right and left ankles and feet.      Negative moulder sign/click bilateral all intermetatarsal spaces.     Skin: Skin is warm, dry and intact. No abrasion, no bruising, no burn, no ecchymosis, no laceration, no lesion and no rash noted. She is not diaphoretic. No cyanosis or erythema. No pallor. Nails show no clubbing.   Both hallux nail margins of right foot with ingrown nail plate. No surrounding erythema and minimal edema is noted there is no granuloma formation noted. No malodor. There was a small amount of purulent drainage noted with debridement to the lateral border of the nail.    Otherwise skin is normal age and health appropriate color, turgor, texture, and temperature bilateral lower extremities without ulceration, hyperpigmentation, discoloration, masses nodules or cords palpated.  No ecchymosis, erythema, edema, or cardinal signs of infection bilateral lower extremities.               Assessment:        Encounter Diagnoses   Name Primary?    Ingrown nail Yes    Toe pain, right     Paronychia, toe, right          Plan:       Erich was seen today for ingrown toenail.    Diagnoses and all orders for this visit:    Ingrown nail    Toe pain, right    Paronychia, toe, right    Other orders  -     tobramycin sulfate 0.3% (TOBREX) 0.3 % ophthalmic solution; 1-2 drops topically twice daily to affected area left big toe.      I counseled the patient on her conditions, their implications and medical management.    Utilizing sterile toenail clippers I aggressively debrided the offending nail borders right hallux both borders approximately 3 mm from its edges and carried the nail plate incision down at an angle in order to wedge out the offending cryptotic portion of the nail plate. The offending border was then removed in total. There was a small amount of purulent drainage noted with debridement to the right lateral border.  No blood was drawn. Patient tolerated the procedure well and related significant relief.     Discussed with patient that if she continues to have ingrown nail to that toe in the future can consider doing a procedure to remove the cryptotic portion permanently.      Continue with antibiotics as prescribes, tobrex drops to be applied topically BID as well.     Return if symptoms worsen or fail to improve.    Ravin Larson, ANTHONY PGY-3

## 2018-04-12 ENCOUNTER — HOSPITAL ENCOUNTER (OUTPATIENT)
Dept: RADIOLOGY | Facility: CLINIC | Age: 83
Discharge: HOME | End: 2018-04-12
Attending: PODIATRIST
Payer: MEDICARE

## 2018-04-12 ENCOUNTER — OFFICE VISIT (OUTPATIENT)
Dept: PODIATRY | Facility: CLINIC | Age: 83
End: 2018-04-12
Payer: MEDICARE

## 2018-04-12 VITALS — WEIGHT: 171.94 LBS | HEIGHT: 60 IN | BODY MASS INDEX: 33.76 KG/M2

## 2018-04-12 DIAGNOSIS — M77.9 CAPSULITIS: ICD-10-CM

## 2018-04-12 DIAGNOSIS — M79.672 FOOT PAIN, BILATERAL: ICD-10-CM

## 2018-04-12 DIAGNOSIS — M79.671 FOOT PAIN, BILATERAL: ICD-10-CM

## 2018-04-12 DIAGNOSIS — M19.079 ARTHRITIS OF FOOT: ICD-10-CM

## 2018-04-12 DIAGNOSIS — M19.079 ARTHRITIS OF FOOT: Primary | ICD-10-CM

## 2018-04-12 PROCEDURE — 73630 X-RAY EXAM OF FOOT: CPT | Mod: 26,LT,S$GLB, | Performed by: RADIOLOGY

## 2018-04-12 PROCEDURE — 99213 OFFICE O/P EST LOW 20 MIN: CPT | Mod: S$GLB,,, | Performed by: PODIATRIST

## 2018-04-12 PROCEDURE — 73630 X-RAY EXAM OF FOOT: CPT | Mod: 26,RT,S$GLB, | Performed by: RADIOLOGY

## 2018-04-12 PROCEDURE — 99999 PR PBB SHADOW E&M-EST. PATIENT-LVL III: CPT | Mod: PBBFAC,,, | Performed by: PODIATRIST

## 2018-04-12 PROCEDURE — 73630 X-RAY EXAM OF FOOT: CPT | Mod: 50,TC,FY,PO

## 2018-04-12 NOTE — PROGRESS NOTES
Subjective:      Patient ID: Erich Encarnacion is a 89 y.o. female.    Chief Complaint: Foot Pain (bilateral)    Deep aching sometimes burning pain in arches and beneath the forefoot right and left.  Gradual onset, worsening over past several months, aggravated by increased weight bearing, shoe gear, pressure.  Custom orthotics have helped in the past.  OTC pain med not helping.  Denies trauma, signs of infection, and surgery both feet.    Review of Systems   Constitution: Negative for chills, diaphoresis, fever, malaise/fatigue and night sweats.   Cardiovascular: Negative for claudication, cyanosis, leg swelling and syncope.   Skin: Negative for color change, dry skin, nail changes, rash, suspicious lesions and unusual hair distribution.   Musculoskeletal: Positive for joint pain. Negative for falls, joint swelling, muscle cramps, muscle weakness and stiffness.   Gastrointestinal: Negative for constipation, diarrhea, nausea and vomiting.   Neurological: Negative for brief paralysis, disturbances in coordination, focal weakness, numbness, paresthesias, sensory change and tremors.           Objective:      Physical Exam   Constitutional: She appears well-developed and well-nourished. She is cooperative. No distress.   Cardiovascular:   Pulses:       Popliteal pulses are 2+ on the right side, and 2+ on the left side.        Dorsalis pedis pulses are 1+ on the right side, and 1+ on the left side.        Posterior tibial pulses are 1+ on the right side, and 1+ on the left side.   Capillary refill 3 seconds all toes/distal feet, all toes/both feet warm to touch.      Negative lymphadenopathy bilateral popliteal fossa and tarsal tunnel.      Negavie lower extremity edema bilateral.     Musculoskeletal:        Right ankle: Normal. She exhibits normal range of motion, no swelling, no ecchymosis, no deformity, no laceration and normal pulse. Achilles tendon normal. Achilles tendon exhibits no pain, no defect and normal  Ramirez's test results.   Generalized pain to weight bearing in the arches and deep inside metatarsal phalangeal joint areas both feet particularly after rest without focal pain to palpation or nwb range of motion both feet which are without deformity or loss of function.    Otherwise, Normal angle, base, station of gait. All ten toes without clubbing, cyanosis, or signs of ischemia.  No pain to palpation bilateral lower extremities.  Range of motion, stability, muscle strength, and muscle tone normal bilateral feet and legs.     Lymphadenopathy:   Negative lymphadenopathy bilateral popliteal fossa and tarsal tunnel.   Neurological: She is alert. She has normal strength. She displays no atrophy and no tremor. No sensory deficit. She exhibits normal muscle tone. She displays no seizure activity. Gait normal.   Reflex Scores:       Patellar reflexes are 2+ on the right side and 2+ on the left side.       Achilles reflexes are 2+ on the right side and 2+ on the left side.  Negative tinel sign to percussion sural, superficial peroneal, deep peroneal, saphenous, and posterior tibial nerves right and left ankles and feet.      Negative moulder sign/click bilateral all intermetatarsal spaces.     Skin: Skin is warm, dry and intact. No abrasion, no bruising, no burn, no ecchymosis, no laceration, no lesion and no rash noted. She is not diaphoretic. No cyanosis or erythema. No pallor. Nails show no clubbing.     Skin is normal age and health appropriate color, turgor, texture, and temperature bilateral lower extremities without ulceration, hyperpigmentation, discoloration, masses nodules or cords palpated.  No ecchymosis, erythema, edema, or cardinal signs of infection bilateral lower extremities.               Assessment:       Encounter Diagnoses   Name Primary?    Arthritis of foot Yes    Capsulitis     Foot pain, bilateral          Plan:       Erich was seen today for foot pain.    Diagnoses and all orders for this  visit:    Arthritis of foot  -     X-Ray Foot Complete Bilateral; Future    Capsulitis  -     X-Ray Foot Complete Bilateral; Future    Foot pain, bilateral  -     X-Ray Foot Complete Bilateral; Future      I counseled the patient on her conditions, their implications and medical management.        Patient will stretch the tendo achilles complex three times daily as demonstrated in the office.  Literature was dispensed illustrating proper stretching technique.    Patient will obtain over the counter arch supports and wear them in shoes whenever possible.  Athletic shoes intended for walking or running are usually best.    The patient was advised that NSAID-type medications have two very important potential side effects: gastrointestinal irritation including hemorrhage and renal injuries. She was asked to take the medication with food and to stop if she experiences any GI upset. I asked her to call for vomiting, abdominal pain or black/bloody stools. The patient expresses understanding of these issues and questions were answered.    Discussed conservative treatment with shoes of adequate dimensions, material, and style to alleviate symptoms and delay or prevent surgical intervention.    Rx xrays, custom orthotics.          No Follow-up on file.

## 2018-05-14 RX ORDER — CETIRIZINE HYDROCHLORIDE 10 MG/1
10 TABLET ORAL DAILY
COMMUNITY
End: 2018-07-03 | Stop reason: ALTCHOICE

## 2018-05-14 RX ORDER — MAGNESIUM 250 MG
1 TABLET ORAL DAILY
COMMUNITY

## 2018-05-14 RX ORDER — LEVOTHYROXINE SODIUM 50 UG/1
0.5 TABLET ORAL DAILY
COMMUNITY
Start: 2018-01-26 | End: 2018-07-03 | Stop reason: ALTCHOICE

## 2018-05-15 ENCOUNTER — OFFICE VISIT (OUTPATIENT)
Dept: ORTHOPEDICS | Facility: CLINIC | Age: 83
End: 2018-05-15
Payer: MEDICARE

## 2018-05-15 VITALS — BODY MASS INDEX: 33.57 KG/M2 | WEIGHT: 171 LBS | HEIGHT: 60 IN

## 2018-05-15 DIAGNOSIS — M19.072 PRIMARY OSTEOARTHRITIS OF LEFT ANKLE: Primary | ICD-10-CM

## 2018-05-15 PROCEDURE — 99214 OFFICE O/P EST MOD 30 MIN: CPT | Mod: 25,,, | Performed by: ORTHOPAEDIC SURGERY

## 2018-05-15 PROCEDURE — 20605 DRAIN/INJ JOINT/BURSA W/O US: CPT | Mod: LT,,, | Performed by: ORTHOPAEDIC SURGERY

## 2018-05-15 RX ORDER — TRIAMCINOLONE ACETONIDE 40 MG/ML
40 INJECTION, SUSPENSION INTRA-ARTICULAR; INTRAMUSCULAR
Status: DISCONTINUED | OUTPATIENT
Start: 2018-05-15 | End: 2018-05-15 | Stop reason: HOSPADM

## 2018-05-15 RX ADMIN — TRIAMCINOLONE ACETONIDE 40 MG: 40 INJECTION, SUSPENSION INTRA-ARTICULAR; INTRAMUSCULAR at 02:05

## 2018-05-15 NOTE — PROGRESS NOTES
Subjective:       Chief Complaint    Chief Complaint   Patient presents with    Initial Visit     Bilateral Foot pain for about 1 year. Patient had xrays on 04/12/2018, Showed Multiple hammertoe deformities are present bilaterally.  Moderate dorsal and plantar heel spurs are present bilaterally.  The bones are osteopenic.  Vascular calcifications are noted. She states there has been no injury.        ALBERTO Encarnacion is a 89 y.o.  female who presents With left ankle pain. Greater than one year      Past History  History reviewed. No pertinent past medical history.  Past Surgical History:   Procedure Laterality Date    APPENDECTOMY      BACK SURGERY  1975    HYSTERECTOMY       Social History     Social History    Marital status:      Spouse name: N/A    Number of children: N/A    Years of education: N/A     Occupational History    Not on file.     Social History Main Topics    Smoking status: Former Smoker     Packs/day: 1.00     Years: 20.00    Smokeless tobacco: Never Used    Alcohol use No    Drug use: No    Sexual activity: Not on file     Other Topics Concern    Not on file     Social History Narrative    No narrative on file         Medications  Current Outpatient Prescriptions   Medication Sig    albuterol 90 mcg/actuation inhaler Inhale 2 puffs into the lungs every 4 (four) hours as needed for Wheezing. Rescue    amlodipine (NORVASC) 5 MG tablet     aspirin (ECOTRIN) 81 MG EC tablet Take 81 mg by mouth every 12 (twelve) hours.    aspirin-acetaminophen-caffeine 250-250-65 mg (EXCEDRIN MIGRAINE) 250-250-65 mg per tablet Take 1 tablet by mouth every 6 (six) hours as needed.    cephALEXin (KEFLEX) 500 MG capsule Take 500 mg by mouth 4 (four) times daily.    cetirizine (ZYRTEC) 10 MG tablet Take 10 mg by mouth once daily.    diclofenac sodium 1 % Gel Apply 2 g topically 4 (four) times daily.    hydrocodone-acetaminophen 5-325mg (NORCO) 5-325 mg per tablet Take 1 tablet by mouth  every 6 (six) hours as needed for Pain.    isosorbide dinitrate (ISORDIL) 30 MG Tab     levothyroxine (SYNTHROID) 50 MCG tablet Take 0.5 tablets by mouth once daily.    lidocaine HCL 2% (XYLOCAINE) 2 % jelly Apply topically as needed.    magnesium 250 mg Tab Take 1 tablet by mouth once daily.    meclizine (ANTIVERT) 12.5 mg tablet     naproxen sodium (ANAPROX) 220 MG tablet Take 1 tablet (220 mg total) by mouth 2 (two) times daily with meals.    tobramycin sulfate 0.3% (TOBREX) 0.3 % ophthalmic solution 1-2 drops topically twice daily to affected area left big toe.    vitamin A-vitamin C-vit E-min Tab Take 1 tablet by mouth once daily.    zolpidem (AMBIEN) 5 MG Tab Take 5 mg by mouth nightly as needed.     No current facility-administered medications for this visit.        Allergies  Review of patient's allergies indicates:  No Known Allergies      Review of Systems     Constitutional: Negative    HENT: Negative  Eyes: Negative  Respiratory: Negative  Cardiovascular: Negative  Musculoskeletal: HPI  Skin: Negative  Neurological: Negative  Hematological: Negative  Endocrine: Negative      Physical Exam    There were no vitals filed for this visit.  Physical Examination:Left ankle exam reveals swelling and tenderness anteromedially and some discomfort across the front of the ankle as well. All tendons appear to be working. Range of motion comparable to the opposite side.     Skin-  General appearance -  well appearing, and in no distress  Mental status - awake  Neck - supple  Chest -  symmetric air entry  Heart - normal rate   Abdomen - soft      Assessment/Plan   Primary osteoarthritis of left ankle  -     Intermediate Joint Aspiration/Injection        Left ankle joint injected with steroid.    This note was dictated using voice recognition software and may contain grammatical errors.

## 2018-05-15 NOTE — PROCEDURES
Intermediate Joint Aspiration/Injection  Date/Time: 5/15/2018 2:29 PM  Performed by: AMBROSE REYNOLDS JR  Authorized by: AMBROSE REYNOLDS JR     Consent Done?:  Yes (Verbal)  Indications:  Pain and joint swelling  Site marked: The procedure site was marked    Timeout: Prior to procedure the correct patient, procedure, and site was verified      Location:  Ankle  Site:  L ankle (joint)  Prep: Patient was prepped and draped in usual sterile fashion    Ultrasonic Guidance for needle placement: No  Needle size:  25 G  Approach:  Anterolateral  Medications:  40 mg triamcinolone acetonide 40 mg/mL  Patient tolerance:  Patient tolerated the procedure well with no immediate complications

## 2018-07-03 ENCOUNTER — OFFICE VISIT (OUTPATIENT)
Dept: ORTHOPEDICS | Facility: CLINIC | Age: 83
End: 2018-07-03
Payer: MEDICARE

## 2018-07-03 VITALS
DIASTOLIC BLOOD PRESSURE: 81 MMHG | HEIGHT: 60 IN | HEART RATE: 92 BPM | SYSTOLIC BLOOD PRESSURE: 143 MMHG | BODY MASS INDEX: 33.57 KG/M2 | WEIGHT: 171 LBS

## 2018-07-03 DIAGNOSIS — M76.822 POSTERIOR TIBIAL TENDINITIS, LEFT: Primary | ICD-10-CM

## 2018-07-03 PROCEDURE — 99213 OFFICE O/P EST LOW 20 MIN: CPT | Mod: ,,, | Performed by: ORTHOPAEDIC SURGERY

## 2018-07-03 RX ORDER — ZOLPIDEM TARTRATE 10 MG/1
10 TABLET ORAL NIGHTLY
Status: ON HOLD | COMMUNITY
Start: 2018-05-30 | End: 2021-10-20 | Stop reason: SDUPTHER

## 2018-07-11 NOTE — PROGRESS NOTES
Subjective:       Chief Complaint    Chief Complaint   Patient presents with    Left Ankle - Pain, Follow-up     Left ankle joint injected with steroid on 05/15/25096. Lasted for 3 weeks.  Patient states that the inner part of the left ankle is now hurting.        ALBERTO Encarnacion is a 89 y.o.  female who presents Pain on the medial aspect of her foot. Appears to have posterior tibial tendinitis.      Past History  History reviewed. No pertinent past medical history.  Past Surgical History:   Procedure Laterality Date    APPENDECTOMY      BACK SURGERY  1975    HYSTERECTOMY       Social History     Social History    Marital status:      Spouse name: N/A    Number of children: N/A    Years of education: N/A     Occupational History    Not on file.     Social History Main Topics    Smoking status: Former Smoker     Packs/day: 1.00     Years: 20.00    Smokeless tobacco: Never Used    Alcohol use No    Drug use: No    Sexual activity: Not on file     Other Topics Concern    Not on file     Social History Narrative    No narrative on file         Medications  Current Outpatient Prescriptions   Medication Sig    aspirin (ECOTRIN) 81 MG EC tablet Take 81 mg by mouth every 12 (twelve) hours.    aspirin-acetaminophen-caffeine 250-250-65 mg (EXCEDRIN MIGRAINE) 250-250-65 mg per tablet Take 1 tablet by mouth every 6 (six) hours as needed.    isosorbide dinitrate (ISORDIL) 30 MG Tab     lidocaine HCL 2% (XYLOCAINE) 2 % jelly Apply topically as needed.    magnesium 250 mg Tab Take 1 tablet by mouth once daily.    meclizine (ANTIVERT) 12.5 mg tablet     tobramycin sulfate 0.3% (TOBREX) 0.3 % ophthalmic solution 1-2 drops topically twice daily to affected area left big toe.    vitamin A-vitamin C-vit E-min Tab Take 1 tablet by mouth once daily.    zolpidem (AMBIEN) 10 mg Tab      No current facility-administered medications for this visit.        Allergies  Review of patient's allergies  indicates:  No Known Allergies      Review of Systems     Constitutional: Negative    HENT: Negative  Eyes: Negative  Respiratory: Negative  Cardiovascular: Negative  Musculoskeletal: HPI  Skin: Negative  Neurological: Negative  Hematological: Negative  Endocrine: Negative      Physical Exam    Vitals:    07/03/18 1346   BP: (!) 143/81   Pulse: 92     Physical Examination:Left ankle has reasonably good range of motion with tenderness over the posterior tibial tendon. There is no redness. Resisted inversion and plantar flexion is painful. The pain is localized over the posterior tibial tendon region.     Skin-clear  General appearance -  well appearing, and in no distress  Mental status - awake  Neck - supple  Chest -  symmetric air entry  Heart - normal rate   Abdomen - soft      Assessment/Plan   Posterior tibial tendinitis, left      Fracture boot recommended from [facility]. Follow-up in 6 weeks      This note was dictated using voice recognition software and may contain grammatical errors.

## 2018-07-26 ENCOUNTER — OFFICE VISIT (OUTPATIENT)
Dept: DERMATOLOGY | Facility: CLINIC | Age: 83
End: 2018-07-26
Payer: MEDICARE

## 2018-07-26 VITALS — BODY MASS INDEX: 33.57 KG/M2 | HEIGHT: 60 IN | WEIGHT: 171 LBS

## 2018-07-26 DIAGNOSIS — L82.1 SEBORRHEIC KERATOSES: ICD-10-CM

## 2018-07-26 DIAGNOSIS — D18.01 CHERRY ANGIOMA: ICD-10-CM

## 2018-07-26 DIAGNOSIS — L57.0 ACTINIC KERATOSES: ICD-10-CM

## 2018-07-26 DIAGNOSIS — D48.9 NEOPLASM OF UNCERTAIN BEHAVIOR: Primary | ICD-10-CM

## 2018-07-26 DIAGNOSIS — L81.4 SOLAR LENTIGO: ICD-10-CM

## 2018-07-26 PROCEDURE — 11100 PR BIOPSY OF SKIN LESION: CPT | Mod: 59,S$GLB,, | Performed by: DERMATOLOGY

## 2018-07-26 PROCEDURE — 88305 TISSUE EXAM BY PATHOLOGIST: CPT | Mod: 59 | Performed by: PATHOLOGY

## 2018-07-26 PROCEDURE — 99999 PR PBB SHADOW E&M-EST. PATIENT-LVL III: CPT | Mod: PBBFAC,,, | Performed by: DERMATOLOGY

## 2018-07-26 PROCEDURE — 99202 OFFICE O/P NEW SF 15 MIN: CPT | Mod: 25,S$GLB,, | Performed by: DERMATOLOGY

## 2018-07-26 PROCEDURE — 17000 DESTRUCT PREMALG LESION: CPT | Mod: S$GLB,,, | Performed by: DERMATOLOGY

## 2018-07-26 PROCEDURE — 17003 DESTRUCT PREMALG LES 2-14: CPT | Mod: S$GLB,,, | Performed by: DERMATOLOGY

## 2018-07-26 PROCEDURE — 11101 PR BIOPSY, EACH ADDED LESION: CPT | Mod: S$GLB,,, | Performed by: DERMATOLOGY

## 2018-07-26 RX ORDER — BUSPIRONE HYDROCHLORIDE 10 MG/1
10 TABLET ORAL 3 TIMES DAILY
Status: ON HOLD | COMMUNITY
End: 2021-10-17

## 2018-07-26 RX ORDER — LEVOTHYROXINE SODIUM 50 UG/1
50 TABLET ORAL DAILY
COMMUNITY
End: 2019-05-22

## 2018-07-26 RX ORDER — CEPHRADINE 500 MG
CAPSULE ORAL
Status: ON HOLD | COMMUNITY
End: 2021-10-17

## 2018-07-26 NOTE — PROGRESS NOTES
Subjective:       Patient ID:  Erich Encarnacion is a 89 y.o. female who presents for   Chief Complaint   Patient presents with    Lesion     nose    Spot     L arm     Initial visit  No h/o skin cancer  Lesion non healing on nose, keeps picking at it        Spot  - Initial  Affected locations: left arm  Duration: months.  Signs / symptoms: redness  Severity: mild  Timing: constant  Aggravated by: nothing  Relieving factors/Treatments tried: nothing    Lesion  - Initial  Affected locations: nose  Duration: years.  Signs / symptoms: dryness  Severity: mild  Timing: constant  Aggravated by: nothing  Treatments tried: face cream.  Improvement on treatment: no relief        Review of Systems   Constitutional: Negative for fever, chills, weight loss, weight gain, fatigue, night sweats and malaise.   Skin: Negative for daily sunscreen use, activity-related sunscreen use (avoid sun exposure) and wears hat.   Hematologic/Lymphatic: Does not bruise/bleed easily.        Objective:    Physical Exam   Constitutional: She appears well-developed and well-nourished. No distress.   Neurological: She is alert and oriented to person, place, and time. She is not disoriented.   Psychiatric: She has a normal mood and affect.   Skin:   Areas Examined (abnormalities noted in diagram):   Scalp / Hair Palpated and Inspected  Head / Face Inspection Performed  Neck Inspection Performed  Chest / Axilla Inspection Performed  Abdomen Inspection Performed  Back Inspection Performed  RUE Inspected  LUE Inspection Performed  Nails and Digits Inspection Performed                   Diagram Legend     Erythematous scaling macule/papule c/w actinic keratosis       Vascular papule c/w angioma      Pigmented verrucoid papule/plaque c/w seborrheic keratosis      Yellow umbilicated papule c/w sebaceous hyperplasia      Irregularly shaped tan macule c/w lentigo     1-2 mm smooth white papules consistent with Milia      Movable subcutaneous cyst with  punctum c/w epidermal inclusion cyst      Subcutaneous movable cyst c/w pilar cyst      Firm pink to brown papule c/w dermatofibroma      Pedunculated fleshy papule(s) c/w skin tag(s)      Evenly pigmented macule c/w junctional nevus     Mildly variegated pigmented, slightly irregular-bordered macule c/w mildly atypical nevus      Flesh colored to evenly pigmented papule c/w intradermal nevus       Pink pearly papule/plaque c/w basal cell carcinoma      Erythematous hyperkeratotic cursted plaque c/w SCC      Surgical scar with no sign of skin cancer recurrence      Open and closed comedones      Inflammatory papules and pustules      Verrucoid papule consistent consistent with wart     Erythematous eczematous patches and plaques     Dystrophic onycholytic nail with subungual debris c/w onychomycosis     Umbilicated papule    Erythematous-base heme-crusted tan verrucoid plaque consistent with inflamed seborrheic keratosis     Erythematous Silvery Scaling Plaque c/w Psoriasis     See annotation              Assessment / Plan:      Pathology Orders:     Normal Orders This Visit    Tissue Specimen To Pathology, Dermatology     Questions:    Directional Terms:  Other(comment)    Clinical information:  1/2- R distal dorsal nose, hyperkeratotic pink papule, r/o SCC 2/2 left forearm, pink plaque, sup BCC vs other    Specific Site:  1/2- R distal dorsal nose, 2/2 left forearm,        Neoplasm of uncertain behavior  -     Tissue Specimen To Pathology, Dermatology  Shave biopsy procedure note:    Shave biopsy performed after verbal consent including risk of infection, scar, recurrence, need for additional treatment of site. Area prepped with alcohol, anesthetized with approximately 1.0cc of 1% lidocaine with epinephrine. Lesional tissue shaved with razor blade. Hemostasis achieved with application of aluminum chloride followed by hyfrecation. No complications. Dressing applied. Wound care explained.    Actinic  keratoses  Cryosurgery Procedure Note    Verbal consent from the patient is obtained and the patient is aware of the precancerous quality and need for treatment of these lesions. Liquid nitrogen cryosurgery is applied to the 2 actinic keratoses, as detailed in the physical exam, to produce a freeze injury. The patient is aware that blisters may form and is instructed on wound care with gentle cleansing and use of vaseline ointment to keep moist until healed. The patient is supplied a handout on cryosurgery and is instructed to call if lesions do not completely resolve.    Seborrheic keratoses  These are benign inherited growths without a malignant potential. Reassurance given to patient. No treatment is necessary.     Solar lentigo  This is a benign hyperpigmented sun induced lesion. Daily sun protection will reduce the number of new lesions. Treatment of these benign lesions are considered cosmetic.    Cherry angioma  This is a benign vascular lesion. Reassurance given. No treatment required.     Patient instructed in importance in daily sun protection of at least spf 30. Sun avoidance and topical protection discussed.   Recommend Elta MD (physician office) COTZ sensitive (available online) for daily use on face and neck.  Patient encouraged to wear hat for all outdoor exposure.   Also discussed sun protective clothing.               Follow-up in about 1 year (around 7/26/2019).

## 2018-07-26 NOTE — PATIENT INSTRUCTIONS
Shave Biopsy Wound Care    Your doctor has performed a shave biopsy today.  A band aid and vaseline ointment has been placed over the site.  This should remain in place for 24 hours.  It is recommended that you keep the area dry for the first 24 hours.  After 24 hours, you may remove the band aid and wash the area with warm soap and water and apply Vaseline jelly.  Many patients prefer to use Neosporin or Bacitracin ointment.  This is acceptable; however, know that you can develop an allergy to this medication even if you have used it safely for years.  It is important to keep the area moist.  Letting it dry out and get air slows healing time, and will worsen the scar.  Band aid is optional after first 24 hours.      If you notice increasing redness, tenderness, pain, or yellow drainage at the biopsy site, please notify your doctor.  These are signs of an infection.    If your biopsy site is bleeding, apply firm pressure for 15 minutes straight.  Repeat for another 15 minutes, if it is still bleeding.   If the surgical site continues to bleed, then please contact your doctor.       [facility]  [facility]  [address]  [place] [address]  Dept: [phone]       CRYOSURGERY      Your doctor has used a method called cryosurgery to treat your skin condition. Cryosurgery refers to the use of very cold substances to treat a variety of skin conditions such as warts, pre-skin cancers, molluscum contagiosum, sun spots, and several benign growths. The substance we use in cryosurgery is liquid nitrogen and is so cold (-195 degrees Celsius) that is burns when administered.     Following treatment in the office, the skin may immediately burn and become red. You may find the area around the lesion is affected as well. It is sometimes necessary to treat not only the lesion, but a small area of the surrounding normal skin to achieve a good response.     A blister, and even a blood filled blister,  may form after treatment.   This is a normal response. If the blister is painful, it is acceptable to sterilize a needle and with rubbing alcohol and gently pop the blister. It is important that you gently wash the area with soap and warm water as the blister fluid may contain wart virus if a wart was treated. Do no remove the roof of the blister.     The area treated can take anywhere from 1-3 weeks to heal. Healing time depends on the kind skin lesion treated, the location, and how aggressively the lesion was treated. It is recommended that the areas treated are covered with Vaseline or bacitracin ointment and a band-aid. If a band-aid is not practical, just ointment applied several times per day will do. Keeping these areas moist will speed the healing time.    Treatment with liquid nitrogen can leave a scar. In dark skin, it may be a light or dark scar, in light skin it may be a white or pink scar. These will generally fade with time, but may never go away completely.     If you have any concerns after your treatment, please feel free to call the office.         [facility]  [facility]  [address] [place] [address]  Dept: [phone]

## 2018-08-06 ENCOUNTER — TELEPHONE (OUTPATIENT)
Dept: DERMATOLOGY | Facility: CLINIC | Age: 83
End: 2018-08-06

## 2018-08-07 ENCOUNTER — TELEPHONE (OUTPATIENT)
Dept: DERMATOLOGY | Facility: CLINIC | Age: 83
End: 2018-08-07

## 2018-08-07 DIAGNOSIS — D09.9 SQUAMOUS CELL CARCINOMA IN SITU: Primary | ICD-10-CM

## 2018-08-07 RX ORDER — IMIQUIMOD 12.5 MG/.25G
CREAM TOPICAL
Qty: 24 PACKET | Refills: 0 | Status: SHIPPED | OUTPATIENT
Start: 2018-08-08 | End: 2018-10-09

## 2018-08-07 NOTE — TELEPHONE ENCOUNTER
Spoke with patient's daughter in regards to the imiquimoid cream, wanted to discuss with mother and they have agreed to start this.    Patient is using the [facility] on [address]

## 2018-08-07 NOTE — TELEPHONE ENCOUNTER
----- Message from Morgan Ga sent at 8/7/2018  8:23 AM CDT -----  Type: Needs Medical Advice    Who Called:  Patient  Best Call Back Number: [phone]  Additional Information: Calling in regards to medication Imiquiod

## 2018-08-08 NOTE — TELEPHONE ENCOUNTER
Called to patient to see if had questions regarding new medication as had previously written instructions during results. Patient stated could not get meds from [facility] as the instructions were unclear.    Call placed to [facility] (conflicting instructions escribed to include 3x's a week and 5 x's week.  Per result note and instructions clarified to apply 5 x's week at bedtime.     Follow up appt scheduled for 8 weeks from today. For follow up.

## 2018-08-15 ENCOUNTER — OFFICE VISIT (OUTPATIENT)
Dept: ORTHOPEDICS | Facility: CLINIC | Age: 83
End: 2018-08-15
Payer: MEDICARE

## 2018-08-15 VITALS — BODY MASS INDEX: 33.57 KG/M2 | WEIGHT: 171 LBS | HEIGHT: 60 IN

## 2018-08-15 DIAGNOSIS — M17.11 PRIMARY OSTEOARTHRITIS OF RIGHT KNEE: Primary | ICD-10-CM

## 2018-08-15 PROCEDURE — 99213 OFFICE O/P EST LOW 20 MIN: CPT | Mod: ,,, | Performed by: ORTHOPAEDIC SURGERY

## 2018-08-15 PROCEDURE — 73560 X-RAY EXAM OF KNEE 1 OR 2: CPT | Mod: FY,RT,, | Performed by: ORTHOPAEDIC SURGERY

## 2018-08-15 PROCEDURE — 73565 X-RAY EXAM OF KNEES: CPT | Mod: FY,,, | Performed by: ORTHOPAEDIC SURGERY

## 2018-08-15 NOTE — PROGRESS NOTES
Subjective:       Chief Complaint    Chief Complaint   Patient presents with    Foot Pain     Pt states that after her last appointment which was 7/3/18, approximately 8 days later, she fell in bathroom, hitting the floor and injuring L foot, ankle, and bilateral knees. Rates pain as 8/10 at this time. Utilizing NSAIDS for pain control. Minimally effective. Has not had any imaging done since injury.        ALBERTO Encarnacion is a 89 y.o.  female who presents Complaints of bilateral knee pain, worse on the right. She has complaints of swelling in the lower extremities and dizziness. She's been referred to neurology and ENT and vascular surgeon for these complaints.      Past History  History reviewed. No pertinent past medical history.  Past Surgical History:   Procedure Laterality Date    APPENDECTOMY      BACK SURGERY  1975    HYSTERECTOMY       Social History     Socioeconomic History    Marital status:      Spouse name: Not on file    Number of children: Not on file    Years of education: Not on file    Highest education level: Not on file   Social Needs    Financial resource strain: Not on file    Food insecurity - worry: Not on file    Food insecurity - inability: Not on file    Transportation needs - medical: Not on file    Transportation needs - non-medical: Not on file   Occupational History    Not on file   Tobacco Use    Smoking status: Former Smoker     Packs/day: 1.00     Years: 20.00     Pack years: 20.00    Smokeless tobacco: Never Used   Substance and Sexual Activity    Alcohol use: No    Drug use: No    Sexual activity: Not on file   Other Topics Concern    Are you pregnant or think you may be? Not Asked    Breast-feeding Not Asked   Social History Narrative    Not on file         Medications  Current Outpatient Medications   Medication Sig    aspirin (ECOTRIN) 81 MG EC tablet Take 81 mg by mouth every 12 (twelve) hours.    aspirin-acetaminophen-caffeine 250-250-65  mg (EXCEDRIN MIGRAINE) 250-250-65 mg per tablet Take 1 tablet by mouth.    busPIRone (BUSPAR) 10 MG tablet Take 10 mg by mouth 3 (three) times daily.    cholecalciferol, vitamin D3, 10,000 unit Cap Take by mouth.    imiquimod (ALDARA) 5 % cream Apply topically 3 (three) times a week. Apply a thin film to nose and forearm 5 times weekly at bedtime for 6 weeks (Patient taking differently: Apply topically. Apply a thin film to nose and forearm 5 times weekly at bedtime for 6 weeks)    isosorbide dinitrate (ISORDIL) 30 MG Tab     levothyroxine (SYNTHROID) 50 MCG tablet Take 50 mcg by mouth once daily.    lidocaine HCL 2% (XYLOCAINE) 2 % jelly Apply topically as needed.    magnesium 250 mg Tab Take 1 tablet by mouth once daily.    vit A/vit C/vit E/zinc/copper (PRESERVISION AREDS ORAL) Take by mouth.    zolpidem (AMBIEN) 10 mg Tab      No current facility-administered medications for this visit.        Allergies  Review of patient's allergies indicates:  No Known Allergies      Review of Systems     Constitutional: Negative    HENT: Negative  Eyes: Negative  Respiratory: Negative  Cardiovascular: Negative  Musculoskeletal: HPI  Skin: Negative  Neurological: Negative  Hematological: Negative  Endocrine: Negative      Physical Exam    There were no vitals filed for this visit.  Physical Examination: right knee range of motion 0-110. Tender medial joint line, patellofemoral crepitance. Mild varus.     Skin- clear  General appearance -  well appearing, and in no distress  Mental status - awake  Neck - supple  Chest -  symmetric air entry  Heart - normal rate   Abdomen - soft      Assessment/Plan   Primary osteoarthritis of right knee  -     X-Ray Knee 3 View Right      She is referred to the vascular surgeon, Dr. Godwin to a nephrologist of her choice. We've offered her Supartz injections bilaterally. She will advise.      This note was dictated using voice recognition software and may contain grammatical  errors.

## 2018-08-21 ENCOUNTER — TELEPHONE (OUTPATIENT)
Dept: DERMATOLOGY | Facility: CLINIC | Age: 83
End: 2018-08-21

## 2018-08-21 NOTE — TELEPHONE ENCOUNTER
Spoke to pt's daughter. Informed robust reaction is expected when treating with Imiquimod cream. Ok to take a break, then resume to 3xs a week. Verbalized understanding. Pt scheduled for f/u 10/2/2018.

## 2018-08-21 NOTE — TELEPHONE ENCOUNTER
----- Message from Marlin Burgos sent at 8/21/2018  4:08 PM CDT -----  Contact: Daughter Court   Daughter Court need to speak with a nurse regarding patient wounds looking horrible. Please call back at  [phone].

## 2018-09-04 ENCOUNTER — TELEPHONE (OUTPATIENT)
Dept: OTOLARYNGOLOGY | Facility: CLINIC | Age: 83
End: 2018-09-04

## 2018-09-13 ENCOUNTER — TELEPHONE (OUTPATIENT)
Dept: AUDIOLOGY | Facility: CLINIC | Age: 83
End: 2018-09-13

## 2018-09-13 NOTE — TELEPHONE ENCOUNTER
----- Message from Joi Carrillo sent at 9/13/2018 11:31 AM CDT -----  Contact: Daughter  Type:  Same Day Appointment Request    Caller is requesting a same day appointment.  Caller declined first available appointment listed below.      Name of Caller: Court Pringle   When is the first available appointment?  9/24/18  Symptoms:  Vertigo  Best Call Back Number: [phone]  Additional Information:

## 2018-10-09 ENCOUNTER — OFFICE VISIT (OUTPATIENT)
Dept: DERMATOLOGY | Facility: CLINIC | Age: 83
End: 2018-10-09
Payer: MEDICARE

## 2018-10-09 DIAGNOSIS — L72.0 MILIA: ICD-10-CM

## 2018-10-09 DIAGNOSIS — D09.9 SQUAMOUS CELL CARCINOMA IN SITU: Primary | ICD-10-CM

## 2018-10-09 PROCEDURE — 1101F PT FALLS ASSESS-DOCD LE1/YR: CPT | Mod: CPTII,,, | Performed by: DERMATOLOGY

## 2018-10-09 PROCEDURE — 99212 OFFICE O/P EST SF 10 MIN: CPT | Mod: PBBFAC,PO | Performed by: DERMATOLOGY

## 2018-10-09 PROCEDURE — 99212 OFFICE O/P EST SF 10 MIN: CPT | Mod: S$PBB,,, | Performed by: DERMATOLOGY

## 2018-10-09 PROCEDURE — 99999 PR PBB SHADOW E&M-EST. PATIENT-LVL II: CPT | Mod: PBBFAC,,, | Performed by: DERMATOLOGY

## 2018-10-09 NOTE — PROGRESS NOTES
Subjective:       Patient ID:  Erich Encarnacion is a 89 y.o. female who presents for   Chief Complaint   Patient presents with    Follow-up     aldara cream     Initial visit on 7/26/2018  bx of lesion on R idstal dorsal nose and L forearm, SCCIS, treated with imiquimod with robust reaction  Healed nicely  New c/o bump on left cheek, deep, picks at lesion        Review of Systems   Constitutional: Negative for fever, chills and fatigue.   Skin: Negative for daily sunscreen use, activity-related sunscreen use and wears hat.   Hematologic/Lymphatic: Does not bruise/bleed easily.        Objective:    Physical Exam   Constitutional: She appears well-developed and well-nourished. No distress.   Neurological: She is alert and oriented to person, place, and time. She is not disoriented.   Psychiatric: She has a normal mood and affect.   Skin:   Areas Examined (abnormalities noted in diagram):   Head / Face Inspection Performed  LUE Inspection Performed                   Diagram Legend     Erythematous scaling macule/papule c/w actinic keratosis       Vascular papule c/w angioma      Pigmented verrucoid papule/plaque c/w seborrheic keratosis      Yellow umbilicated papule c/w sebaceous hyperplasia      Irregularly shaped tan macule c/w lentigo     1-2 mm smooth white papules consistent with Milia      Movable subcutaneous cyst with punctum c/w epidermal inclusion cyst      Subcutaneous movable cyst c/w pilar cyst      Firm pink to brown papule c/w dermatofibroma      Pedunculated fleshy papule(s) c/w skin tag(s)      Evenly pigmented macule c/w junctional nevus     Mildly variegated pigmented, slightly irregular-bordered macule c/w mildly atypical nevus      Flesh colored to evenly pigmented papule c/w intradermal nevus       Pink pearly papule/plaque c/w basal cell carcinoma      Erythematous hyperkeratotic cursted plaque c/w SCC      Surgical scar with no sign of skin cancer recurrence      Open and closed comedones       Inflammatory papules and pustules      Verrucoid papule consistent consistent with wart     Erythematous eczematous patches and plaques     Dystrophic onycholytic nail with subungual debris c/w onychomycosis     Umbilicated papule    Erythematous-base heme-crusted tan verrucoid plaque consistent with inflamed seborrheic keratosis     Erythematous Silvery Scaling Plaque c/w Psoriasis     See annotation      Assessment / Plan:        Squamous cell carcinoma in situ x 2  Post imiquimod with clinical resolution    Milia  Left cheek, reassurance             Follow-up in about 1 year (around 10/9/2019), or if symptoms worsen or fail to improve.

## 2018-10-19 ENCOUNTER — TELEPHONE (OUTPATIENT)
Dept: ORTHOPEDICS | Facility: CLINIC | Age: 83
End: 2018-10-19

## 2018-10-25 NOTE — TELEPHONE ENCOUNTER
[facility] PATIENT -     Approval received.  Auth# [id number] good 11/2/18-12/2/18.    Please schedule RIGHT KNEE SUPARTZ VISITS.  Thanks

## 2018-10-26 NOTE — TELEPHONE ENCOUNTER
Left message to call back for scheduling for Right Knee Supartz injections. Auth# [id number] good 11/2/18-12/2/18.

## 2018-11-13 ENCOUNTER — OFFICE VISIT (OUTPATIENT)
Dept: ORTHOPEDICS | Facility: CLINIC | Age: 83
End: 2018-11-13
Payer: MEDICARE

## 2018-11-13 VITALS — WEIGHT: 171 LBS | HEIGHT: 60 IN | BODY MASS INDEX: 33.57 KG/M2

## 2018-11-13 DIAGNOSIS — M17.11 PRIMARY OSTEOARTHRITIS OF RIGHT KNEE: Primary | ICD-10-CM

## 2018-11-13 PROCEDURE — 20610 DRAIN/INJ JOINT/BURSA W/O US: CPT | Mod: RT,,, | Performed by: ORTHOPAEDIC SURGERY

## 2018-11-13 PROCEDURE — 99499 UNLISTED E&M SERVICE: CPT | Mod: ,,, | Performed by: ORTHOPAEDIC SURGERY

## 2018-11-20 ENCOUNTER — OFFICE VISIT (OUTPATIENT)
Dept: ORTHOPEDICS | Facility: CLINIC | Age: 83
End: 2018-11-20
Payer: MEDICARE

## 2018-11-20 VITALS — BODY MASS INDEX: 33.57 KG/M2 | WEIGHT: 171 LBS | HEIGHT: 60 IN

## 2018-11-20 DIAGNOSIS — M17.11 PRIMARY OSTEOARTHRITIS OF RIGHT KNEE: Primary | ICD-10-CM

## 2018-11-20 PROCEDURE — 20610 DRAIN/INJ JOINT/BURSA W/O US: CPT | Mod: RT,,, | Performed by: ORTHOPAEDIC SURGERY

## 2018-11-20 PROCEDURE — 99499 UNLISTED E&M SERVICE: CPT | Mod: ,,, | Performed by: ORTHOPAEDIC SURGERY

## 2018-11-20 NOTE — PROCEDURES
Large Joint Aspiration/Injection: R knee  Date/Time: 11/20/2018 3:24 PM  Performed by: Mir Lara Jr., MD  Authorized by: Mir Lara Jr., MD     Consent Done?:  Yes (Verbal)  Indications:  Pain  Procedure site marked: Yes    Timeout: Prior to procedure the correct patient, procedure, and site was verified      Location:  Knee  Site:  R knee (Joint)  Prep: Patient was prepped and draped in usual sterile fashion    Ultrasonic Guidance for needle placement: No  Needle size:  22 G  Approach:  Lateral  Medications:  25 mg sodium hyaluronate (supartz) 10 mg/mL  Patient tolerance:  Patient tolerated the procedure well with no immediate complications

## 2018-11-24 NOTE — PROCEDURES
Large Joint Aspiration/Injection: R knee  Date/Time: 11/13/2018 5:44 PM  Performed by: Mir Lara Jr., MD  Authorized by: Mir Lara Jr., MD     Consent Done?:  Yes (Verbal)  Indications:  Pain  Procedure site marked: Yes    Timeout: Prior to procedure the correct patient, procedure, and site was verified      Location:  Knee  Site:  R knee (joint)  Prep: Patient was prepped and draped in usual sterile fashion    Ultrasonic Guidance for needle placement: No  Needle size:  22 G  Approach:  Lateral  Medications:  25 mg sodium hyaluronate (supartz) 10 mg/mL  Patient tolerance:  Patient tolerated the procedure well with no immediate complications

## 2018-11-27 ENCOUNTER — OFFICE VISIT (OUTPATIENT)
Dept: ORTHOPEDICS | Facility: CLINIC | Age: 83
End: 2018-11-27
Payer: MEDICARE

## 2018-11-27 VITALS — WEIGHT: 171 LBS | BODY MASS INDEX: 33.57 KG/M2 | HEIGHT: 60 IN

## 2018-11-27 DIAGNOSIS — M17.11 PRIMARY OSTEOARTHRITIS OF RIGHT KNEE: Primary | ICD-10-CM

## 2018-11-27 PROCEDURE — 20610 DRAIN/INJ JOINT/BURSA W/O US: CPT | Mod: RT,,, | Performed by: ORTHOPAEDIC SURGERY

## 2018-11-27 PROCEDURE — 99499 UNLISTED E&M SERVICE: CPT | Mod: ,,, | Performed by: ORTHOPAEDIC SURGERY

## 2018-11-27 NOTE — PROCEDURES
Large Joint Aspiration/Injection: R knee  Date/Time: 11/27/2018 3:06 PM  Performed by: Mir Lara Jr., MD  Authorized by: Mir Lara Jr., MD     Consent Done?:  Yes (Verbal)  Indications:  Pain  Procedure site marked: Yes    Timeout: Prior to procedure the correct patient, procedure, and site was verified      Location:  Knee  Site:  R knee (joint)  Prep: Patient was prepped and draped in usual sterile fashion    Ultrasonic Guidance for needle placement: No  Needle size:  22 G  Approach:  Lateral  Medications:  25 mg sodium hyaluronate (supartz) 10 mg/mL  Patient tolerance:  Patient tolerated the procedure well with no immediate complications

## 2019-02-11 ENCOUNTER — OFFICE VISIT (OUTPATIENT)
Dept: ORTHOPEDICS | Facility: CLINIC | Age: 84
End: 2019-02-11
Payer: MEDICARE

## 2019-02-11 VITALS — HEIGHT: 60 IN | BODY MASS INDEX: 33.57 KG/M2 | WEIGHT: 171 LBS

## 2019-02-11 DIAGNOSIS — M17.11 PRIMARY OSTEOARTHRITIS OF RIGHT KNEE: Primary | ICD-10-CM

## 2019-02-11 PROCEDURE — 1101F PT FALLS ASSESS-DOCD LE1/YR: CPT | Mod: ,,, | Performed by: ORTHOPAEDIC SURGERY

## 2019-02-11 PROCEDURE — 73560 X-RAY EXAM OF KNEE 1 OR 2: CPT | Mod: FY,RT,, | Performed by: ORTHOPAEDIC SURGERY

## 2019-02-11 PROCEDURE — 1101F PR PT FALLS ASSESS DOC 0-1 FALLS W/OUT INJ PAST YR: ICD-10-PCS | Mod: ,,, | Performed by: ORTHOPAEDIC SURGERY

## 2019-02-11 PROCEDURE — 20610 LARGE JOINT ASPIRATION/INJECTION: R KNEE: ICD-10-PCS | Mod: RT,,, | Performed by: ORTHOPAEDIC SURGERY

## 2019-02-11 PROCEDURE — 20610 DRAIN/INJ JOINT/BURSA W/O US: CPT | Mod: RT,,, | Performed by: ORTHOPAEDIC SURGERY

## 2019-02-11 PROCEDURE — 99213 PR OFFICE/OUTPT VISIT, EST, LEVL III, 20-29 MIN: ICD-10-PCS | Mod: 25,,, | Performed by: ORTHOPAEDIC SURGERY

## 2019-02-11 PROCEDURE — 99213 OFFICE O/P EST LOW 20 MIN: CPT | Mod: 25,,, | Performed by: ORTHOPAEDIC SURGERY

## 2019-02-11 PROCEDURE — 73560 PR  X-RAY KNEE 1 OR 2 VIEW: ICD-10-PCS | Mod: FY,RT,, | Performed by: ORTHOPAEDIC SURGERY

## 2019-02-11 RX ORDER — MECLIZINE HCL 12.5 MG 12.5 MG/1
12.5 TABLET ORAL DAILY PRN
COMMUNITY
Start: 2019-01-16

## 2019-02-11 RX ORDER — TROSPIUM CHLORIDE 20 MG/1
TABLET, FILM COATED ORAL
COMMUNITY
Start: 2019-01-17 | End: 2019-05-22

## 2019-02-11 RX ORDER — DICLOFENAC SODIUM 10 MG/G
2 GEL TOPICAL
COMMUNITY
Start: 2018-12-27 | End: 2019-05-22

## 2019-02-11 RX ORDER — TRIAMCINOLONE ACETONIDE 40 MG/ML
40 INJECTION, SUSPENSION INTRA-ARTICULAR; INTRAMUSCULAR
Status: DISCONTINUED | OUTPATIENT
Start: 2019-02-11 | End: 2019-02-11 | Stop reason: HOSPADM

## 2019-02-11 RX ADMIN — TRIAMCINOLONE ACETONIDE 40 MG: 40 INJECTION, SUSPENSION INTRA-ARTICULAR; INTRAMUSCULAR at 01:02

## 2019-02-11 NOTE — PROGRESS NOTES
History reviewed. No pertinent past medical history.    Past Surgical History:   Procedure Laterality Date    APPENDECTOMY      BACK SURGERY  1975    HYSTERECTOMY         Current Outpatient Medications   Medication Sig    aspirin (ECOTRIN) 81 MG EC tablet Take 81 mg by mouth every 12 (twelve) hours.    aspirin-acetaminophen-caffeine 250-250-65 mg (EXCEDRIN MIGRAINE) 250-250-65 mg per tablet Take 1 tablet by mouth.    busPIRone (BUSPAR) 10 MG tablet Take 10 mg by mouth 3 (three) times daily.    cholecalciferol, vitamin D3, 10,000 unit Cap Take by mouth.    diclofenac sodium (VOLTAREN) 1 % Gel Apply 2 g topically.    isosorbide dinitrate (ISORDIL) 30 MG Tab     levothyroxine (SYNTHROID) 50 MCG tablet Take 50 mcg by mouth once daily.    lidocaine HCL 2% (XYLOCAINE) 2 % jelly Apply topically as needed.    magnesium 250 mg Tab Take 1 tablet by mouth once daily.    meclizine (ANTIVERT) 12.5 mg tablet     trospium (SANCTURA) 20 mg Tab tablet     vit A/vit C/vit E/zinc/copper (PRESERVISION AREDS ORAL) Take by mouth.    zolpidem (AMBIEN) 10 mg Tab      No current facility-administered medications for this visit.        Review of patient's allergies indicates:  No Known Allergies    Family History   Problem Relation Age of Onset    Melanoma Neg Hx     Psoriasis Neg Hx     Lupus Neg Hx     Eczema Neg Hx        Social History     Socioeconomic History    Marital status:      Spouse name: Not on file    Number of children: Not on file    Years of education: Not on file    Highest education level: Not on file   Social Needs    Financial resource strain: Not on file    Food insecurity - worry: Not on file    Food insecurity - inability: Not on file    Transportation needs - medical: Not on file    Transportation needs - non-medical: Not on file   Occupational History    Not on file   Tobacco Use    Smoking status: Former Smoker     Packs/day: 1.00     Years: 20.00     Pack years: 20.00     Smokeless tobacco: Never Used   Substance and Sexual Activity    Alcohol use: No    Drug use: No    Sexual activity: Not on file   Other Topics Concern    Are you pregnant or think you may be? Not Asked    Breast-feeding Not Asked   Social History Narrative    Not on file       Chief Complaint:   Chief Complaint   Patient presents with    Right Knee - Pain, Swelling     Patient has seen dr. Lara in the past had Supartz inj to the right knee completed on 11/27/2018. She states they did not help the pain. She is here today with pain and swelling in the medial compartment of the right knee.        Consulting Physician: No ref. provider found    History of Present Illness:    This is a 89 y.o. year old female who complains of atient is here for follow-up of her right knee she's had Supartz injections in the past she has had no relief recently and has had increased pain and swelling      ROS    Examination:    Vital Signs:    Vitals:    02/11/19 1258 02/11/19 1301   Weight: 77.6 kg (171 lb) 77.6 kg (171 lb)   Height: 5' (1.524 m) 5' (1.524 m)   PainSc:    8   PainLoc:  Knee       This a well-developed, well nourished patient in no acute distress.    Alert and oriented and cooperative to examination.       Physical Exam: right knee-patient is ambulating with antalgic gaitpatient has a slight effusion of the right knee she has some medial joint line pain she has no instability    Imaging:  AP and lateral x-ray of the right knee show some mild degenerative changes patient does have a pretty good joint space     Assessment: ynovitis early arthritis right knee    Plan:  We'll aspirate the knee injected with Kenalog patient does not appear to be a good candidate for total knee replaced      DISCLAIMER: This note may have been dictated using voice recognition software and may contain grammatical errors.     NOTE: Consult report sent to referring provider via [facility].

## 2019-02-11 NOTE — PROCEDURES
Large Joint Aspiration/Injection: R knee  Date/Time: 2/11/2019 1:25 PM  Performed by: Kenneth Nevarez MD  Authorized by: Kenneth Neavrez MD     Consent Done?:  Yes (Verbal)  Indications:  Pain and joint swelling  Procedure site marked: Yes    Timeout: Prior to procedure the correct patient, procedure, and site was verified      Location:  Knee  Site:  R knee  Prep: Patient was prepped and draped in usual sterile fashion    Needle size:  22 G  Approach:  Lateral  Medications:  40 mg triamcinolone acetonide 40 mg/mL  Patient tolerance:  Patient tolerated the procedure well with no immediate complications

## 2019-05-07 ENCOUNTER — TELEPHONE (OUTPATIENT)
Dept: ORTHOPEDICS | Facility: CLINIC | Age: 84
End: 2019-05-07

## 2019-05-09 ENCOUNTER — TELEPHONE (OUTPATIENT)
Dept: ORTHOPEDICS | Facility: CLINIC | Age: 84
End: 2019-05-09

## 2019-05-17 NOTE — TELEPHONE ENCOUNTER
Please Schedule Patient for Right Knee Supartz Will Buy and Bill    Auth # [id number]       Valid 05/10/2019-06/28/2019

## 2019-05-22 ENCOUNTER — OFFICE VISIT (OUTPATIENT)
Dept: ORTHOPEDICS | Facility: CLINIC | Age: 84
End: 2019-05-22
Payer: MEDICARE

## 2019-05-22 ENCOUNTER — TELEPHONE (OUTPATIENT)
Dept: ORTHOPEDICS | Facility: CLINIC | Age: 84
End: 2019-05-22

## 2019-05-22 VITALS — HEART RATE: 81 BPM | WEIGHT: 171 LBS | BODY MASS INDEX: 33.57 KG/M2 | HEIGHT: 60 IN

## 2019-05-22 DIAGNOSIS — M17.11 PRIMARY OSTEOARTHRITIS OF RIGHT KNEE: Primary | ICD-10-CM

## 2019-05-22 PROCEDURE — 20610 DRAIN/INJ JOINT/BURSA W/O US: CPT | Mod: RT,,, | Performed by: ORTHOPAEDIC SURGERY

## 2019-05-22 PROCEDURE — 99499 NO LOS: ICD-10-PCS | Mod: ,,, | Performed by: ORTHOPAEDIC SURGERY

## 2019-05-22 PROCEDURE — 20610 LARGE JOINT ASPIRATION/INJECTION: R KNEE: ICD-10-PCS | Mod: RT,,, | Performed by: ORTHOPAEDIC SURGERY

## 2019-05-22 PROCEDURE — 99499 UNLISTED E&M SERVICE: CPT | Mod: ,,, | Performed by: ORTHOPAEDIC SURGERY

## 2019-05-22 RX ORDER — TOLTERODINE 4 MG/1
CAPSULE, EXTENDED RELEASE ORAL
Refills: 5 | Status: ON HOLD | COMMUNITY
Start: 2019-04-11 | End: 2021-10-17

## 2019-05-22 RX ORDER — AMLODIPINE BESYLATE 2.5 MG/1
TABLET ORAL
Refills: 0 | COMMUNITY
Start: 2019-05-03 | End: 2020-02-26

## 2019-05-22 RX ORDER — TOPIRAMATE 25 MG/1
25 TABLET ORAL DAILY
Refills: 0 | COMMUNITY
Start: 2019-03-28

## 2019-05-22 RX ORDER — LEVOTHYROXINE SODIUM 25 UG/1
TABLET ORAL
Refills: 0 | COMMUNITY
Start: 2019-04-18 | End: 2020-02-26

## 2019-05-22 NOTE — PROGRESS NOTES
History reviewed. No pertinent past medical history.    Past Surgical History:   Procedure Laterality Date    APPENDECTOMY      BACK SURGERY  1975    HYSTERECTOMY         Current Outpatient Medications   Medication Sig    amLODIPine (NORVASC) 2.5 MG tablet     aspirin (ECOTRIN) 81 MG EC tablet Take 81 mg by mouth every 12 (twelve) hours.    aspirin-acetaminophen-caffeine 250-250-65 mg (EXCEDRIN MIGRAINE) 250-250-65 mg per tablet Take 1 tablet by mouth.    busPIRone (BUSPAR) 10 MG tablet Take 10 mg by mouth 3 (three) times daily.    cholecalciferol, vitamin D3, 10,000 unit Cap Take by mouth.    isosorbide dinitrate (ISORDIL) 30 MG Tab     levothyroxine (SYNTHROID) 25 MCG tablet     lidocaine HCL 2% (XYLOCAINE) 2 % jelly Apply topically as needed.    magnesium 250 mg Tab Take 1 tablet by mouth once daily.    meclizine (ANTIVERT) 12.5 mg tablet     tolterodine (DETROL LA) 4 MG 24 hr capsule     topiramate (TOPAMAX) 25 MG tablet     vit A/vit C/vit E/zinc/copper (PRESERVISION AREDS ORAL) Take by mouth.    zolpidem (AMBIEN) 10 mg Tab      No current facility-administered medications for this visit.        Review of patient's allergies indicates:  No Known Allergies    Family History   Problem Relation Age of Onset    Melanoma Neg Hx     Psoriasis Neg Hx     Lupus Neg Hx     Eczema Neg Hx        Social History     Socioeconomic History    Marital status:      Spouse name: Not on file    Number of children: Not on file    Years of education: Not on file    Highest education level: Not on file   Occupational History    Not on file   Social Needs    Financial resource strain: Not on file    Food insecurity:     Worry: Not on file     Inability: Not on file    Transportation needs:     Medical: Not on file     Non-medical: Not on file   Tobacco Use    Smoking status: Former Smoker     Packs/day: 1.00     Years: 20.00     Pack years: 20.00    Smokeless tobacco: Never Used   Substance and  Sexual Activity    Alcohol use: No    Drug use: No    Sexual activity: Not on file   Lifestyle    Physical activity:     Days per week: Not on file     Minutes per session: Not on file    Stress: Not on file   Relationships    Social connections:     Talks on phone: Not on file     Gets together: Not on file     Attends Caodaism service: Not on file     Active member of club or organization: Not on file     Attends meetings of clubs or organizations: Not on file     Relationship status: Not on file   Other Topics Concern    Are you pregnant or think you may be? Not Asked    Breast-feeding Not Asked   Social History Narrative    Not on file       Chief Complaint:   Chief Complaint   Patient presents with    Right Knee - Injections, Follow-up     Supartz #1 Right Knee       Consulting Physician: No ref. provider found    History of Present Illness:    This is a 89 y.o. year old female who complains of Patient with a history of osteoarthritis of the right knee is here for her Supartz injections of the right kneeatient also has a history of chronic left knee pain has been seen for osteoarthritis in the past patient has had x-rays performed of her left knee in August 2018 which showed severe osteoarthritis of left knee      ROS    Examination:    Vital Signs:    Vitals:    05/22/19 1045   Pulse: 81   Weight: 77.6 kg (171 lb)   Height: 5' (1.524 m)   PainSc:   8   PainLoc: Knee       This a well-developed, well nourished patient in no acute distress.    Alert and oriented and cooperative to examination.       Physical Exam: right knee-o effusion or cellulitis    Left knee-patient has a mild effusion and some crepitance and pain on range of motion  Imaging:  No x-rays today previous x-rays in August 2018 show severe osteoarthritis of left kne    Assessment: steoarthritis of the right knee    Plan:  Will inject the Supartz today will schedule her for Supartz injections to both knees on return      DISCLAIMER:  This note may have been dictated using voice recognition software and may contain grammatical errors.     NOTE: Consult report sent to referring provider via [employer].

## 2019-05-22 NOTE — PROCEDURES
Large Joint Aspiration/Injection: R knee  Date/Time: 5/22/2019 11:10 AM  Performed by: Kenneth Nevarez MD  Authorized by: Kenneth Nevarez MD     Consent Done?:  Yes (Verbal)  Indications:  Pain and joint swelling  Procedure site marked: Yes    Timeout: Prior to procedure the correct patient, procedure, and site was verified      Location:  Knee  Site:  R knee  Prep: Patient was prepped and draped in usual sterile fashion    Needle size:  22 G  Approach:  Lateral  Medications:  25 mg sodium hyaluronate (supartz) 10 mg/mL  Patient tolerance:  Patient tolerated the procedure well with no immediate complications

## 2019-05-29 ENCOUNTER — OFFICE VISIT (OUTPATIENT)
Dept: ORTHOPEDICS | Facility: CLINIC | Age: 84
End: 2019-05-29
Payer: MEDICARE

## 2019-05-29 VITALS — BODY MASS INDEX: 33.57 KG/M2 | HEIGHT: 60 IN | WEIGHT: 171 LBS

## 2019-05-29 DIAGNOSIS — M17.11 PRIMARY OSTEOARTHRITIS OF RIGHT KNEE: Primary | ICD-10-CM

## 2019-05-29 PROCEDURE — 20610 LARGE JOINT ASPIRATION/INJECTION: R KNEE: ICD-10-PCS | Mod: RT,,, | Performed by: ORTHOPAEDIC SURGERY

## 2019-05-29 PROCEDURE — 99499 UNLISTED E&M SERVICE: CPT | Mod: ,,, | Performed by: ORTHOPAEDIC SURGERY

## 2019-05-29 PROCEDURE — 20610 DRAIN/INJ JOINT/BURSA W/O US: CPT | Mod: RT,,, | Performed by: ORTHOPAEDIC SURGERY

## 2019-05-29 PROCEDURE — 99499 NO LOS: ICD-10-PCS | Mod: ,,, | Performed by: ORTHOPAEDIC SURGERY

## 2019-05-29 NOTE — PROGRESS NOTES
History reviewed. No pertinent past medical history.    Past Surgical History:   Procedure Laterality Date    APPENDECTOMY      BACK SURGERY  1975    HYSTERECTOMY         Current Outpatient Medications   Medication Sig    amLODIPine (NORVASC) 2.5 MG tablet     aspirin (ECOTRIN) 81 MG EC tablet Take 81 mg by mouth every 12 (twelve) hours.    aspirin-acetaminophen-caffeine 250-250-65 mg (EXCEDRIN MIGRAINE) 250-250-65 mg per tablet Take 1 tablet by mouth.    busPIRone (BUSPAR) 10 MG tablet Take 10 mg by mouth 3 (three) times daily.    cholecalciferol, vitamin D3, 10,000 unit Cap Take by mouth.    isosorbide dinitrate (ISORDIL) 30 MG Tab     levothyroxine (SYNTHROID) 25 MCG tablet     lidocaine HCL 2% (XYLOCAINE) 2 % jelly Apply topically as needed.    magnesium 250 mg Tab Take 1 tablet by mouth once daily.    meclizine (ANTIVERT) 12.5 mg tablet     tolterodine (DETROL LA) 4 MG 24 hr capsule     topiramate (TOPAMAX) 25 MG tablet     vit A/vit C/vit E/zinc/copper (PRESERVISION AREDS ORAL) Take by mouth.    zolpidem (AMBIEN) 10 mg Tab      No current facility-administered medications for this visit.        Review of patient's allergies indicates:  No Known Allergies    Family History   Problem Relation Age of Onset    Melanoma Neg Hx     Psoriasis Neg Hx     Lupus Neg Hx     Eczema Neg Hx        Social History     Socioeconomic History    Marital status:      Spouse name: Not on file    Number of children: Not on file    Years of education: Not on file    Highest education level: Not on file   Occupational History    Not on file   Social Needs    Financial resource strain: Not on file    Food insecurity:     Worry: Not on file     Inability: Not on file    Transportation needs:     Medical: Not on file     Non-medical: Not on file   Tobacco Use    Smoking status: Former Smoker     Packs/day: 1.00     Years: 20.00     Pack years: 20.00    Smokeless tobacco: Never Used   Substance and  Sexual Activity    Alcohol use: No    Drug use: No    Sexual activity: Not on file   Lifestyle    Physical activity:     Days per week: Not on file     Minutes per session: Not on file    Stress: Not on file   Relationships    Social connections:     Talks on phone: Not on file     Gets together: Not on file     Attends Gnosticism service: Not on file     Active member of club or organization: Not on file     Attends meetings of clubs or organizations: Not on file     Relationship status: Not on file   Other Topics Concern    Are you pregnant or think you may be? Not Asked    Breast-feeding Not Asked   Social History Narrative    Not on file       Chief Complaint:   Chief Complaint   Patient presents with    Right Knee - Injections     #2 Supartz right knee       Consulting Physician: No ref. provider found    History of Present Illness:    This is a 89 y.o. year old female who complains of patient here for her second Supartz injection to the right knee for osteoarthritis      ROS    Examination:    Vital Signs:    Vitals:    05/29/19 1419   Weight: 77.6 kg (171 lb)   Height: 5' (1.524 m)   PainSc:   8       This a well-developed, well nourished patient in no acute distress.    Alert and oriented and cooperative to examination.       Physical Exam: right knee-no effusion or cellulitis    Imaging:  No x-ray today     Assessment: osteoarthritis right knee    Plan:  Will inject, to Supartz      DISCLAIMER: This note may have been dictated using voice recognition software and may contain grammatical errors.     NOTE: Consult report sent to referring provider via [employer] [facility].

## 2019-05-29 NOTE — PROCEDURES
Large Joint Aspiration/Injection: R knee  Date/Time: 5/29/2019 2:41 PM  Performed by: Kenneth Nevarez MD  Authorized by: Kenneth Nevarez MD     Consent Done?:  Yes (Verbal)  Indications:  Pain and joint swelling  Procedure site marked: Yes    Timeout: Prior to procedure the correct patient, procedure, and site was verified      Location:  Knee  Site:  R knee  Prep: Patient was prepped and draped in usual sterile fashion    Needle size:  22 G  Approach:  Lateral  Medications:  25 mg sodium hyaluronate (supartz) 10 mg/mL  Patient tolerance:  Patient tolerated the procedure well with no immediate complications

## 2019-06-05 ENCOUNTER — OFFICE VISIT (OUTPATIENT)
Dept: ORTHOPEDICS | Facility: CLINIC | Age: 84
End: 2019-06-05
Payer: MEDICARE

## 2019-06-05 VITALS — BODY MASS INDEX: 33.57 KG/M2 | HEIGHT: 60 IN | WEIGHT: 171 LBS

## 2019-06-05 DIAGNOSIS — M17.11 PRIMARY OSTEOARTHRITIS OF RIGHT KNEE: Primary | ICD-10-CM

## 2019-06-05 DIAGNOSIS — M17.12 PRIMARY OSTEOARTHRITIS OF LEFT KNEE: ICD-10-CM

## 2019-06-05 PROCEDURE — 99499 UNLISTED E&M SERVICE: CPT | Mod: ,,, | Performed by: ORTHOPAEDIC SURGERY

## 2019-06-05 PROCEDURE — 20610 DRAIN/INJ JOINT/BURSA W/O US: CPT | Mod: 50,,, | Performed by: ORTHOPAEDIC SURGERY

## 2019-06-05 PROCEDURE — 20610 LARGE JOINT ASPIRATION/INJECTION: L KNEE, R KNEE: ICD-10-PCS | Mod: 50,,, | Performed by: ORTHOPAEDIC SURGERY

## 2019-06-05 PROCEDURE — 99499 NO LOS: ICD-10-PCS | Mod: ,,, | Performed by: ORTHOPAEDIC SURGERY

## 2019-06-05 NOTE — PROGRESS NOTES
History reviewed. No pertinent past medical history.    Past Surgical History:   Procedure Laterality Date    APPENDECTOMY      BACK SURGERY  1975    HYSTERECTOMY         Current Outpatient Medications   Medication Sig    amLODIPine (NORVASC) 2.5 MG tablet     aspirin (ECOTRIN) 81 MG EC tablet Take 81 mg by mouth every 12 (twelve) hours.    aspirin-acetaminophen-caffeine 250-250-65 mg (EXCEDRIN MIGRAINE) 250-250-65 mg per tablet Take 1 tablet by mouth.    busPIRone (BUSPAR) 10 MG tablet Take 10 mg by mouth 3 (three) times daily.    cholecalciferol, vitamin D3, 10,000 unit Cap Take by mouth.    isosorbide dinitrate (ISORDIL) 30 MG Tab     levothyroxine (SYNTHROID) 25 MCG tablet     lidocaine HCL 2% (XYLOCAINE) 2 % jelly Apply topically as needed.    magnesium 250 mg Tab Take 1 tablet by mouth once daily.    meclizine (ANTIVERT) 12.5 mg tablet     tolterodine (DETROL LA) 4 MG 24 hr capsule     topiramate (TOPAMAX) 25 MG tablet     vit A/vit C/vit E/zinc/copper (PRESERVISION AREDS ORAL) Take by mouth.    zolpidem (AMBIEN) 10 mg Tab      No current facility-administered medications for this visit.        Review of patient's allergies indicates:  No Known Allergies    Family History   Problem Relation Age of Onset    Melanoma Neg Hx     Psoriasis Neg Hx     Lupus Neg Hx     Eczema Neg Hx        Social History     Socioeconomic History    Marital status:      Spouse name: Not on file    Number of children: Not on file    Years of education: Not on file    Highest education level: Not on file   Occupational History    Not on file   Social Needs    Financial resource strain: Not on file    Food insecurity:     Worry: Not on file     Inability: Not on file    Transportation needs:     Medical: Not on file     Non-medical: Not on file   Tobacco Use    Smoking status: Former Smoker     Packs/day: 1.00     Years: 20.00     Pack years: 20.00    Smokeless tobacco: Never Used   Substance and  Sexual Activity    Alcohol use: No    Drug use: No    Sexual activity: Not on file   Lifestyle    Physical activity:     Days per week: Not on file     Minutes per session: Not on file    Stress: Not on file   Relationships    Social connections:     Talks on phone: Not on file     Gets together: Not on file     Attends Yarsani service: Not on file     Active member of club or organization: Not on file     Attends meetings of clubs or organizations: Not on file     Relationship status: Not on file   Other Topics Concern    Are you pregnant or think you may be? Not Asked    Breast-feeding Not Asked   Social History Narrative    Not on file       Chief Complaint:   Chief Complaint   Patient presents with    Right Knee - Injections     #3 Supartz right knee    Left Knee - Injections     #1 Supartz left knee       Consulting Physician: No ref. provider found    History of Present Illness:    This is a 89 y.o. year old female who complains of patient has a history of bilateral osteoarthritis of the knees she is here for Supartz injections to both knees      ROS    Examination:    Vital Signs:    Vitals:    06/05/19 1407   Weight: 77.6 kg (171 lb)   Height: 5' (1.524 m)   PainSc:   8       This a well-developed, well nourished patient in no acute distress.    Alert and oriented and cooperative to examination.       Physical Exam: left and right knees-no effusions or cellulitis    Imaging:  No x-rays today     Assessment: severe osteoarthritis left and right knees    Plan:  Will inject both knees are Supartz      DISCLAIMER: This note may have been dictated using voice recognition software and may contain grammatical errors.     NOTE: Consult report sent to referring provider via [employer].

## 2019-06-05 NOTE — PROCEDURES
Large Joint Aspiration/Injection: L knee, R knee  Date/Time: 6/5/2019 2:29 PM  Performed by: Kenneth Nevarez MD  Authorized by: Kenneth Nevarez MD     Consent Done?:  Yes (Verbal)  Indications:  Pain and joint swelling  Procedure site marked: Yes    Timeout: Prior to procedure the correct patient, procedure, and site was verified      Location:  Knee  Site:  L knee and R knee  Prep: Patient was prepped and draped in usual sterile fashion    Needle size:  22 G  Approach:  Lateral  Medications:  25 mg sodium hyaluronate (supartz) 10 mg/mL; 25 mg sodium hyaluronate (supartz) 10 mg/mL  Patient tolerance:  Patient tolerated the procedure well with no immediate complications

## 2019-06-12 ENCOUNTER — OFFICE VISIT (OUTPATIENT)
Dept: ORTHOPEDICS | Facility: CLINIC | Age: 84
End: 2019-06-12
Payer: MEDICARE

## 2019-06-12 VITALS — BODY MASS INDEX: 33.57 KG/M2 | HEIGHT: 60 IN | WEIGHT: 171 LBS | HEART RATE: 86 BPM

## 2019-06-12 DIAGNOSIS — M17.12 PRIMARY OSTEOARTHRITIS OF LEFT KNEE: Primary | ICD-10-CM

## 2019-06-12 PROCEDURE — 99499 NO LOS: ICD-10-PCS | Mod: ,,, | Performed by: ORTHOPAEDIC SURGERY

## 2019-06-12 PROCEDURE — 20610 DRAIN/INJ JOINT/BURSA W/O US: CPT | Mod: LT,,, | Performed by: ORTHOPAEDIC SURGERY

## 2019-06-12 PROCEDURE — 99499 UNLISTED E&M SERVICE: CPT | Mod: ,,, | Performed by: ORTHOPAEDIC SURGERY

## 2019-06-12 PROCEDURE — 20610 LARGE JOINT ASPIRATION/INJECTION: L KNEE: ICD-10-PCS | Mod: LT,,, | Performed by: ORTHOPAEDIC SURGERY

## 2019-06-12 NOTE — PROCEDURES
Large Joint Aspiration/Injection: L knee  Date/Time: 6/12/2019 2:27 PM  Performed by: Kenneth Nevarez MD  Authorized by: Kenneth Nevarez MD     Consent Done?:  Yes (Verbal)  Indications:  Pain and joint swelling  Procedure site marked: Yes    Timeout: Prior to procedure the correct patient, procedure, and site was verified      Location:  Knee  Site:  L knee  Prep: Patient was prepped and draped in usual sterile fashion    Needle size:  22 G  Approach:  Lateral  Medications:  25 mg sodium hyaluronate (supartz) 10 mg/mL  Patient tolerance:  Patient tolerated the procedure well with no immediate complications

## 2019-06-12 NOTE — PROGRESS NOTES
History reviewed. No pertinent past medical history.    Past Surgical History:   Procedure Laterality Date    APPENDECTOMY      BACK SURGERY  1975    HYSTERECTOMY         Current Outpatient Medications   Medication Sig    amLODIPine (NORVASC) 2.5 MG tablet     aspirin (ECOTRIN) 81 MG EC tablet Take 81 mg by mouth every 12 (twelve) hours.    aspirin-acetaminophen-caffeine 250-250-65 mg (EXCEDRIN MIGRAINE) 250-250-65 mg per tablet Take 1 tablet by mouth.    busPIRone (BUSPAR) 10 MG tablet Take 10 mg by mouth 3 (three) times daily.    cholecalciferol, vitamin D3, 10,000 unit Cap Take by mouth.    isosorbide dinitrate (ISORDIL) 30 MG Tab     levothyroxine (SYNTHROID) 25 MCG tablet     lidocaine HCL 2% (XYLOCAINE) 2 % jelly Apply topically as needed.    magnesium 250 mg Tab Take 1 tablet by mouth once daily.    meclizine (ANTIVERT) 12.5 mg tablet     tolterodine (DETROL LA) 4 MG 24 hr capsule     topiramate (TOPAMAX) 25 MG tablet     vit A/vit C/vit E/zinc/copper (PRESERVISION AREDS ORAL) Take by mouth.    zolpidem (AMBIEN) 10 mg Tab      No current facility-administered medications for this visit.        Review of patient's allergies indicates:  No Known Allergies    Family History   Problem Relation Age of Onset    Melanoma Neg Hx     Psoriasis Neg Hx     Lupus Neg Hx     Eczema Neg Hx        Social History     Socioeconomic History    Marital status:      Spouse name: Not on file    Number of children: Not on file    Years of education: Not on file    Highest education level: Not on file   Occupational History    Not on file   Social Needs    Financial resource strain: Not on file    Food insecurity:     Worry: Not on file     Inability: Not on file    Transportation needs:     Medical: Not on file     Non-medical: Not on file   Tobacco Use    Smoking status: Former Smoker     Packs/day: 1.00     Years: 20.00     Pack years: 20.00    Smokeless tobacco: Never Used   Substance and  Sexual Activity    Alcohol use: No    Drug use: No    Sexual activity: Not on file   Lifestyle    Physical activity:     Days per week: Not on file     Minutes per session: Not on file    Stress: Not on file   Relationships    Social connections:     Talks on phone: Not on file     Gets together: Not on file     Attends Denominational service: Not on file     Active member of club or organization: Not on file     Attends meetings of clubs or organizations: Not on file     Relationship status: Not on file   Other Topics Concern    Are you pregnant or think you may be? Not Asked    Breast-feeding Not Asked   Social History Narrative    Not on file       Chief Complaint:   Chief Complaint   Patient presents with    Left Knee - Pain, Injections     Supartz #2 Left Knee        Consulting Physician: No ref. provider found    History of Present Illness:    This is a 89 y.o. year old female who complains of atient returns today for #2 Supartz injections of the left knee      ROS    Examination:    Vital Signs:    Vitals:    06/12/19 1417   Pulse: 86   Weight: 77.6 kg (171 lb)   Height: 5' (1.524 m)   PainSc:   8   PainLoc: Knee       This a well-developed, well nourished patient in no acute distress.    Alert and oriented and cooperative to examination.       Physical Exam: left  Knee-no cellulitis or effusion    Imaging:  No x-ray     Assessment: steoarthritis of left knee    Plan:  Will inject Supartz the left knee      DISCLAIMER: This note may have been dictated using voice recognition software and may contain grammatical errors.     NOTE: Consult report sent to referring provider via [employer].

## 2019-06-19 ENCOUNTER — OFFICE VISIT (OUTPATIENT)
Dept: ORTHOPEDICS | Facility: CLINIC | Age: 84
End: 2019-06-19
Payer: MEDICARE

## 2019-06-19 VITALS — HEIGHT: 60 IN | WEIGHT: 171 LBS | BODY MASS INDEX: 33.57 KG/M2

## 2019-06-19 DIAGNOSIS — M17.12 PRIMARY OSTEOARTHRITIS OF LEFT KNEE: Primary | ICD-10-CM

## 2019-06-19 PROCEDURE — 99499 UNLISTED E&M SERVICE: CPT | Mod: ,,, | Performed by: ORTHOPAEDIC SURGERY

## 2019-06-19 PROCEDURE — 20610 LARGE JOINT ASPIRATION/INJECTION: L KNEE: ICD-10-PCS | Mod: LT,,, | Performed by: ORTHOPAEDIC SURGERY

## 2019-06-19 PROCEDURE — 20610 DRAIN/INJ JOINT/BURSA W/O US: CPT | Mod: LT,,, | Performed by: ORTHOPAEDIC SURGERY

## 2019-06-19 PROCEDURE — 99499 NO LOS: ICD-10-PCS | Mod: ,,, | Performed by: ORTHOPAEDIC SURGERY

## 2019-06-19 NOTE — PROGRESS NOTES
History reviewed. No pertinent past medical history.    Past Surgical History:   Procedure Laterality Date    APPENDECTOMY      BACK SURGERY  1975    HYSTERECTOMY         Current Outpatient Medications   Medication Sig    amLODIPine (NORVASC) 2.5 MG tablet     aspirin (ECOTRIN) 81 MG EC tablet Take 81 mg by mouth every 12 (twelve) hours.    aspirin-acetaminophen-caffeine 250-250-65 mg (EXCEDRIN MIGRAINE) 250-250-65 mg per tablet Take 1 tablet by mouth.    busPIRone (BUSPAR) 10 MG tablet Take 10 mg by mouth 3 (three) times daily.    cholecalciferol, vitamin D3, 10,000 unit Cap Take by mouth.    isosorbide dinitrate (ISORDIL) 30 MG Tab     levothyroxine (SYNTHROID) 25 MCG tablet     lidocaine HCL 2% (XYLOCAINE) 2 % jelly Apply topically as needed.    magnesium 250 mg Tab Take 1 tablet by mouth once daily.    meclizine (ANTIVERT) 12.5 mg tablet     tolterodine (DETROL LA) 4 MG 24 hr capsule     topiramate (TOPAMAX) 25 MG tablet     vit A/vit C/vit E/zinc/copper (PRESERVISION AREDS ORAL) Take by mouth.    zolpidem (AMBIEN) 10 mg Tab      No current facility-administered medications for this visit.        Review of patient's allergies indicates:  No Known Allergies    Family History   Problem Relation Age of Onset    Melanoma Neg Hx     Psoriasis Neg Hx     Lupus Neg Hx     Eczema Neg Hx        Social History     Socioeconomic History    Marital status:      Spouse name: Not on file    Number of children: Not on file    Years of education: Not on file    Highest education level: Not on file   Occupational History    Not on file   Social Needs    Financial resource strain: Not on file    Food insecurity:     Worry: Not on file     Inability: Not on file    Transportation needs:     Medical: Not on file     Non-medical: Not on file   Tobacco Use    Smoking status: Former Smoker     Packs/day: 1.00     Years: 20.00     Pack years: 20.00    Smokeless tobacco: Never Used   Substance and  Sexual Activity    Alcohol use: No    Drug use: No    Sexual activity: Not on file   Lifestyle    Physical activity:     Days per week: Not on file     Minutes per session: Not on file    Stress: Not on file   Relationships    Social connections:     Talks on phone: Not on file     Gets together: Not on file     Attends Confucianism service: Not on file     Active member of club or organization: Not on file     Attends meetings of clubs or organizations: Not on file     Relationship status: Not on file   Other Topics Concern    Are you pregnant or think you may be? Not Asked    Breast-feeding Not Asked   Social History Narrative    Not on file       Chief Complaint:   Chief Complaint   Patient presents with    Left Knee - Injections     Supartz #3 Left Knee        Consulting Physician: No ref. provider found    History of Present Illness:    This is a 89 y.o. year old female who complains of Patient has a history of osteoarthritis left knee is here for Supartz injections of the left knee      ROS    Examination:    Vital Signs:    Vitals:    06/19/19 1448   Weight: 77.6 kg (171 lb)   Height: 5' (1.524 m)   PainSc:   8   PainLoc: Knee       This a well-developed, well nourished patient in no acute distress.    Alert and oriented and cooperative to examination.       Physical Exam: left knee-no effusion or cellulitis    Imaging:   No x-rays today     Assessment: osteoarthritis left knee    Plan:  Inject #3 Supartz      DISCLAIMER: This note may have been dictated using voice recognition software and may contain grammatical errors.     NOTE: Consult report sent to referring provider via [employer].

## 2019-06-19 NOTE — PROCEDURES
Large Joint Aspiration/Injection: L knee  Date/Time: 6/19/2019 2:57 PM  Performed by: Kenneth Nevarez MD  Authorized by: Kenneth Nevarez MD     Consent Done?:  Yes (Verbal)  Indications:  Pain and joint swelling  Procedure site marked: Yes    Timeout: Prior to procedure the correct patient, procedure, and site was verified      Location:  Knee  Site:  L knee  Prep: Patient was prepped and draped in usual sterile fashion    Needle size:  22 G  Approach:  Lateral  Medications:  25 mg sodium hyaluronate (supartz) 10 mg/mL  Patient tolerance:  Patient tolerated the procedure well with no immediate complications

## 2019-08-20 DIAGNOSIS — M17.11 PRIMARY OSTEOARTHRITIS OF RIGHT KNEE: Primary | ICD-10-CM

## 2019-08-21 ENCOUNTER — HOSPITAL ENCOUNTER (OUTPATIENT)
Dept: RADIOLOGY | Facility: HOSPITAL | Age: 84
Discharge: HOME | End: 2019-08-21
Attending: ORTHOPAEDIC SURGERY
Payer: MEDICARE

## 2019-08-21 ENCOUNTER — OFFICE VISIT (OUTPATIENT)
Dept: ORTHOPEDICS | Facility: CLINIC | Age: 84
End: 2019-08-21
Payer: MEDICARE

## 2019-08-21 VITALS
HEART RATE: 80 BPM | HEIGHT: 60 IN | WEIGHT: 171 LBS | BODY MASS INDEX: 33.57 KG/M2 | DIASTOLIC BLOOD PRESSURE: 67 MMHG | SYSTOLIC BLOOD PRESSURE: 140 MMHG

## 2019-08-21 DIAGNOSIS — M17.11 PRIMARY OSTEOARTHRITIS OF RIGHT KNEE: ICD-10-CM

## 2019-08-21 DIAGNOSIS — M17.11 PRIMARY OSTEOARTHRITIS OF RIGHT KNEE: Primary | ICD-10-CM

## 2019-08-21 PROCEDURE — 99999 PR PBB SHADOW E&M-EST. PATIENT-LVL III: CPT | Mod: PBBFAC,,, | Performed by: ORTHOPAEDIC SURGERY

## 2019-08-21 PROCEDURE — 20610 DRAIN/INJ JOINT/BURSA W/O US: CPT | Mod: RT,S$GLB,, | Performed by: ORTHOPAEDIC SURGERY

## 2019-08-21 PROCEDURE — 1101F PT FALLS ASSESS-DOCD LE1/YR: CPT | Mod: CPTII,S$GLB,, | Performed by: ORTHOPAEDIC SURGERY

## 2019-08-21 PROCEDURE — 99213 OFFICE O/P EST LOW 20 MIN: CPT | Mod: 25,S$GLB,, | Performed by: ORTHOPAEDIC SURGERY

## 2019-08-21 PROCEDURE — 99213 PR OFFICE/OUTPT VISIT, EST, LEVL III, 20-29 MIN: ICD-10-PCS | Mod: 25,S$GLB,, | Performed by: ORTHOPAEDIC SURGERY

## 2019-08-21 PROCEDURE — 1101F PR PT FALLS ASSESS DOC 0-1 FALLS W/OUT INJ PAST YR: ICD-10-PCS | Mod: CPTII,S$GLB,, | Performed by: ORTHOPAEDIC SURGERY

## 2019-08-21 PROCEDURE — 73560 X-RAY EXAM OF KNEE 1 OR 2: CPT | Mod: TC,PN,RT

## 2019-08-21 PROCEDURE — 99999 PR PBB SHADOW E&M-EST. PATIENT-LVL III: ICD-10-PCS | Mod: PBBFAC,,, | Performed by: ORTHOPAEDIC SURGERY

## 2019-08-21 PROCEDURE — 20610 LARGE JOINT ASPIRATION/INJECTION: R KNEE: ICD-10-PCS | Mod: RT,S$GLB,, | Performed by: ORTHOPAEDIC SURGERY

## 2019-08-21 PROCEDURE — 73560 XR KNEE 1 OR 2 VIEW RIGHT: ICD-10-PCS | Mod: 26,RT,, | Performed by: RADIOLOGY

## 2019-08-21 PROCEDURE — 73560 X-RAY EXAM OF KNEE 1 OR 2: CPT | Mod: 26,RT,, | Performed by: RADIOLOGY

## 2019-08-21 RX ORDER — TRIAMCINOLONE ACETONIDE 40 MG/ML
40 INJECTION, SUSPENSION INTRA-ARTICULAR; INTRAMUSCULAR
Status: DISCONTINUED | OUTPATIENT
Start: 2019-08-21 | End: 2019-08-21 | Stop reason: HOSPADM

## 2019-08-21 RX ADMIN — TRIAMCINOLONE ACETONIDE 40 MG: 40 INJECTION, SUSPENSION INTRA-ARTICULAR; INTRAMUSCULAR at 11:08

## 2019-08-21 NOTE — PROGRESS NOTES
History reviewed. No pertinent past medical history.    Past Surgical History:   Procedure Laterality Date    APPENDECTOMY      BACK SURGERY  1975    HYSTERECTOMY         Current Outpatient Medications   Medication Sig    amLODIPine (NORVASC) 2.5 MG tablet     aspirin (ECOTRIN) 81 MG EC tablet Take 81 mg by mouth every 12 (twelve) hours.    aspirin-acetaminophen-caffeine 250-250-65 mg (EXCEDRIN MIGRAINE) 250-250-65 mg per tablet Take 1 tablet by mouth.    busPIRone (BUSPAR) 10 MG tablet Take 10 mg by mouth 3 (three) times daily.    cholecalciferol, vitamin D3, 10,000 unit Cap Take by mouth.    isosorbide dinitrate (ISORDIL) 30 MG Tab     levothyroxine (SYNTHROID) 25 MCG tablet     lidocaine HCL 2% (XYLOCAINE) 2 % jelly Apply topically as needed.    magnesium 250 mg Tab Take 1 tablet by mouth once daily.    meclizine (ANTIVERT) 12.5 mg tablet     tolterodine (DETROL LA) 4 MG 24 hr capsule     topiramate (TOPAMAX) 25 MG tablet     vit A/vit C/vit E/zinc/copper (PRESERVISION AREDS ORAL) Take by mouth.    zolpidem (AMBIEN) 10 mg Tab      No current facility-administered medications for this visit.        Review of patient's allergies indicates:  No Known Allergies    Family History   Problem Relation Age of Onset    Melanoma Neg Hx     Psoriasis Neg Hx     Lupus Neg Hx     Eczema Neg Hx        Social History     Socioeconomic History    Marital status:      Spouse name: Not on file    Number of children: Not on file    Years of education: Not on file    Highest education level: Not on file   Occupational History    Not on file   Social Needs    Financial resource strain: Not on file    Food insecurity:     Worry: Not on file     Inability: Not on file    Transportation needs:     Medical: Not on file     Non-medical: Not on file   Tobacco Use    Smoking status: Former Smoker     Packs/day: 1.00     Years: 20.00     Pack years: 20.00    Smokeless tobacco: Never Used   Substance and  Sexual Activity    Alcohol use: No    Drug use: No    Sexual activity: Not on file   Lifestyle    Physical activity:     Days per week: Not on file     Minutes per session: Not on file    Stress: Not on file   Relationships    Social connections:     Talks on phone: Not on file     Gets together: Not on file     Attends Jainism service: Not on file     Active member of club or organization: Not on file     Attends meetings of clubs or organizations: Not on file     Relationship status: Not on file   Other Topics Concern    Are you pregnant or think you may be? Not Asked    Breast-feeding Not Asked   Social History Narrative    Not on file       Chief Complaint:   Chief Complaint   Patient presents with    Right Knee - Pain     Right knee pain. Hx primary osteo. R. Knee / completed supartz inj. 3/3 on 6/19/19 right knee, no relief. Pt. Reports pl 10/10. Pain worse on left side of knee cap. Increased pain w/ mvmnt & getting up.        Consulting Physician: Self, Aaareferral    History of Present Illness:    This is a 89 y.o. year old female who complains of patient has a history of osteoarthritis of the right knee she has had Supartz injections about 2 months ago with no relief she states her pain is 10/10      ROS    Examination:    Vital Signs:    Vitals:    08/21/19 1055   BP: (!) 140/67   Pulse: 80   Weight: 77.6 kg (171 lb)   Height: 5' (1.524 m)   PainSc: 10-Worst pain ever   PainLoc: Knee       This a well-developed, well nourished patient in no acute distress.    Alert and oriented and cooperative to examination.       Physical Exam:  Right knee-patient has mostly medial knee pain she has a slight effusion has no warmth or cellulitis    Imaging:  Her x-rays of the right knee shows moderate arthritic changes throughout the knee no acute fractures     Assessment:  Osteoarthritis of the right knee    Plan:  Patient did not have a good response with the Supartz will go ahead and give her and Kenalog  injection today patient is not a good surgical candidate for knee replacement      DISCLAIMER: This note may have been dictated using voice recognition software and may contain grammatical errors.     NOTE: Consult report sent to referring provider via [email or web address] E[employer].

## 2019-08-21 NOTE — PROCEDURES
Large Joint Aspiration/Injection: R knee  Date/Time: 8/21/2019 11:12 AM  Performed by: Kenneth Nevarez MD  Authorized by: Kenneth Nevarez MD     Consent Done?:  Yes (Verbal)  Indications:  Pain and joint swelling  Procedure site marked: Yes    Timeout: Prior to procedure the correct patient, procedure, and site was verified    Anesthesia    Anesthetic: lidocaine 1% without epinephrine    Location:  Knee  Site:  R knee  Prep: Patient was prepped and draped in usual sterile fashion    Needle size:  22 G  Approach:  Lateral  Medications:  40 mg triamcinolone acetonide 40 mg/mL  Patient tolerance:  Patient tolerated the procedure well with no immediate complications

## 2019-12-05 DIAGNOSIS — G57.61 LESION OF RIGHT PLANTAR NERVE: Primary | ICD-10-CM

## 2020-01-08 ENCOUNTER — HOSPITAL ENCOUNTER (OUTPATIENT)
Dept: RADIOLOGY | Facility: HOSPITAL | Age: 85
Discharge: HOME | End: 2020-01-08
Attending: ORTHOPAEDIC SURGERY
Payer: MEDICARE

## 2020-01-08 DIAGNOSIS — G57.61 LESION OF RIGHT PLANTAR NERVE: ICD-10-CM

## 2020-01-08 PROCEDURE — 73718 MRI LOWER EXTREMITY W/O DYE: CPT | Mod: TC,PO,RT

## 2020-01-24 ENCOUNTER — HOSPITAL ENCOUNTER (EMERGENCY)
Facility: HOSPITAL | Age: 85
Discharge: HOME | End: 2020-01-24
Attending: EMERGENCY MEDICINE
Payer: MEDICARE

## 2020-01-24 ENCOUNTER — CLINICAL SUPPORT (OUTPATIENT)
Dept: URGENT CARE | Facility: CLINIC | Age: 85
End: 2020-01-24
Payer: MEDICARE

## 2020-01-24 VITALS
TEMPERATURE: 99 F | WEIGHT: 168 LBS | OXYGEN SATURATION: 97 % | SYSTOLIC BLOOD PRESSURE: 153 MMHG | RESPIRATION RATE: 18 BRPM | HEART RATE: 77 BPM | DIASTOLIC BLOOD PRESSURE: 73 MMHG | HEIGHT: 64 IN | BODY MASS INDEX: 28.68 KG/M2

## 2020-01-24 VITALS
OXYGEN SATURATION: 98 % | WEIGHT: 168 LBS | DIASTOLIC BLOOD PRESSURE: 78 MMHG | RESPIRATION RATE: 28 BRPM | TEMPERATURE: 98 F | BODY MASS INDEX: 32.81 KG/M2 | SYSTOLIC BLOOD PRESSURE: 144 MMHG | HEART RATE: 74 BPM

## 2020-01-24 DIAGNOSIS — R52 PAIN: ICD-10-CM

## 2020-01-24 DIAGNOSIS — M54.9 BACK PAIN, UNSPECIFIED BACK LOCATION, UNSPECIFIED BACK PAIN LATERALITY, UNSPECIFIED CHRONICITY: ICD-10-CM
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
## 2020-01-24 DIAGNOSIS — M54.2 ACUTE NECK PAIN: Primary | ICD-10-CM
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
## 2020-01-24 DIAGNOSIS — R07.9 CHEST PAIN: ICD-10-CM

## 2020-01-24 DIAGNOSIS — S09.8XXA BLUNT HEAD TRAUMA, INITIAL ENCOUNTER: ICD-10-CM

## 2020-01-24 PROCEDURE — 99204 PR OFFICE/OUTPT VISIT, NEW, LEVL IV, 45-59 MIN: ICD-10-PCS | Mod: S$GLB,,, | Performed by: NURSE PRACTITIONER

## 2020-01-24 PROCEDURE — 99204 OFFICE O/P NEW MOD 45 MIN: CPT | Mod: S$GLB,,, | Performed by: NURSE PRACTITIONER

## 2020-01-24 PROCEDURE — 99284 EMERGENCY DEPT VISIT MOD MDM: CPT | Mod: 25

## 2020-01-24 RX ORDER — ASPIRIN 325 MG
325 TABLET ORAL
Status: DISCONTINUED | OUTPATIENT
Start: 2020-01-24 | End: 2020-01-24 | Stop reason: HOSPADM

## 2020-01-24 RX ORDER — METHOCARBAMOL 500 MG/1
500 TABLET, FILM COATED ORAL 3 TIMES DAILY
Qty: 15 TABLET | Refills: 0 | Status: SHIPPED | OUTPATIENT
Start: 2020-01-24 | End: 2020-01-29

## 2020-01-24 NOTE — PROGRESS NOTES
Subjective:       Patient ID: Erich Encarnacion is a 89 y.o. female.    Vitals:  weight is 76.2 kg (168 lb). Her temperature is 98.4 °F (36.9 °C). Her blood pressure is 144/78 (abnormal) and her pulse is 74. Her respiration is 28 (abnormal) and oxygen saturation is 98%.     Chief Complaint: Fall    Neck Pain: Michelle complains of neck pain, shoulder pain, and back pain. Reports that she fell early this morning 0500. Explains that she woke up to go to the bathroom, walked to the bathroom and fell landing on her back and hit her head. Is unsure of losing consciousness with the head injury. Does not recall tripping or slipping; unsure of cause of fall. Lives alone. Got up independently by lifting self up with arms and knees utilizing the sink for support. Guarded posture and complains of bilateral shoulder pain right greater than the left; rates 9 out of 10 intense constant aching pain that worsens with upper extremity movement. Lives alone, daughter explains that she should use assistive device to walk but patient refuses. Denies any recent medication changes. Admits that she forgets to take her pills sometimes; does not use a pill organizer. No recent illness. Onset of symptoms 8 hours ago, unchanged since that time. Current symptoms are stiffness and pain in the head, shoulders, neck, and back. Patient denies numbness and paresthesia. Denies chest pain. Denies headache.Denies use of heat/cold application; no use of OTC ibuprofen or Tylenol. Has an appointment with primary care provider next month.     Fall   Incident onset: last night  Fall occurred: of the [facility]  She fell from a height of 1 to 2 ft. She landed on hard floor. There was no blood loss. The point of impact was the head, left shoulder and right shoulder (back ). The pain is present in the head (back and bilateral shoulders ). The pain is at a severity of 9/10. The pain is moderate. Pertinent negatives include no abdominal pain, bowel incontinence,  fever, hematuria, nausea, numbness or vomiting. She has tried nothing for the symptoms. The treatment provided no relief.       Constitution: Negative for activity change, appetite change, chills, fatigue and fever.   Eyes:        Denies visual changes   Gastrointestinal: Negative for abdominal pain, nausea, vomiting and bowel incontinence.   Genitourinary: Negative for dysuria, urgency, bladder incontinence and hematuria.   Musculoskeletal: Positive for pain, trauma and back pain. Negative for muscle cramps and history of spine disorder.   Skin: Positive for bruising. Negative for color change, rash, wound, abrasion, laceration and erythema.        Bruise to occiput   Neurological: Negative for coordination disturbances, disorientation, altered mental status, numbness and tingling.   Psychiatric/Behavioral: Negative for altered mental status, disorientation, confusion and agitation.       Objective:      Physical Exam   Constitutional: She is oriented to person, place, and time. She appears well-developed and well-nourished. She is cooperative.  Non-toxic appearance. She does not appear ill. No distress.   HENT:   Head: Normocephalic and atraumatic. Head is without abrasion, without contusion and without laceration.   Right Ear: Hearing, tympanic membrane, external ear and ear canal normal. No hemotympanum.   Left Ear: Hearing, tympanic membrane, external ear and ear canal normal. No hemotympanum.   Nose: Nose normal. No mucosal edema, rhinorrhea or nasal deformity. No epistaxis. Right sinus exhibits no maxillary sinus tenderness and no frontal sinus tenderness. Left sinus exhibits no maxillary sinus tenderness and no frontal sinus tenderness.   Mouth/Throat: Uvula is midline, oropharynx is clear and moist and mucous membranes are normal. No trismus in the jaw. Normal dentition. No uvula swelling. No posterior oropharyngeal erythema.   Eyes: Pupils are equal, round, and reactive to light. Conjunctivae, EOM and lids  are normal. Right eye exhibits no discharge. Left eye exhibits no discharge. No scleral icterus.   Neck: Trachea normal, normal range of motion, full passive range of motion without pain and phonation normal. Neck supple. No spinous process tenderness and no muscular tenderness present. No neck rigidity. No tracheal deviation present.   Cardiovascular: Normal rate, regular rhythm, normal heart sounds, intact distal pulses and normal pulses.   Pulmonary/Chest: Effort normal. No respiratory distress. She has no wheezes. She exhibits no tenderness.   Abdominal: Soft. Normal appearance and bowel sounds are normal. She exhibits no distension, no pulsatile midline mass and no mass. There is no tenderness.   Musculoskeletal: Normal range of motion. She exhibits tenderness. She exhibits no edema or deformity.   Tenderness with palpation of cervical and thoracic spine. Passive ROM of neck limited related to pain. Muscle spasms to trapezius.     Neurological: She is alert and oriented to person, place, and time. She has normal strength. No cranial nerve deficit or sensory deficit. She exhibits normal muscle tone. She displays no seizure activity. Coordination normal. GCS eye subscore is 4. GCS verbal subscore is 5. GCS motor subscore is 6.   Skin: Skin is warm, dry, intact, not diaphoretic and not pale. Capillary refill takes less than 2 seconds. abrasion, burn, bruising, erythema and ecchymosis  Psychiatric: She has a normal mood and affect. Her speech is normal and behavior is normal. Judgment and thought content normal. Cognition and memory are normal.   Nursing note and vitals reviewed.        Assessment:       1. Acute neck pain    2. Back pain, unspecified back location, unspecified back pain laterality, unspecified chronicity    3. Fall, initial encounter    4. Blunt head trauma, initial encounter        Plan:    Discussed that the head injury needs further imaging. Patient and patient's daughter verbalized  understanding. Daughter requests to drive patient to [facility] for further evaluation.      Acute neck pain    Back pain, unspecified back location, unspecified back pain laterality, unspecified chronicity    Fall, initial encounter    Blunt head trauma, initial encounter

## 2020-01-24 NOTE — ED NOTES
Admit to ER 23, C/o falling while going to bathroom last night. C/o pain to back of head and back, between shoulder blades

## 2020-01-25 NOTE — DISCHARGE INSTRUCTIONS
Please note, we have offered to complete a much broader evaluation including laboratory evaluation, EKG, and we would consider further diagnostic imaging, but at this time, as your initial imaging is negative we understand that he would like to leave against medical advice.  Should you decide to return or have any worsening symptoms please come back so that we can complete a more comprehensive evaluation and identify any occult injury or illness

## 2020-01-25 NOTE — ED PROVIDER NOTES
Encounter Date: 1/24/2020       History     Chief Complaint   Patient presents with    Fall     HPI     Seen and evaluated.  Presented after a fall.  Patient reports that she retaining goes to the bathroom multiple times at night secondary to having to void.  During 1 of those episodes she had a mechanical fall in the hallway.  She reports bilateral shoulder pain, as well as neck pain and thoracic spine pain. This was an acute exacerbation, symptoms are moderate persistent ongoing.      Review of patient's allergies indicates:  No Known Allergies  Past Medical History:   Diagnosis Date    Macula lutea degeneration      Past Surgical History:   Procedure Laterality Date    APPENDECTOMY      BACK SURGERY  1975    HYSTERECTOMY       Family History   Problem Relation Age of Onset    Melanoma Neg Hx     Psoriasis Neg Hx     Lupus Neg Hx     Eczema Neg Hx      Social History     Tobacco Use    Smoking status: Former Smoker     Packs/day: 1.00     Years: 20.00     Pack years: 20.00    Smokeless tobacco: Never Used   Substance Use Topics    Alcohol use: No    Drug use: No     Review of Systems   Constitutional: Negative for fever.   Genitourinary: Positive for frequency.   Musculoskeletal: Positive for arthralgias and myalgias.   Neurological: Negative for syncope and weakness.   All other systems reviewed and are negative.      Physical Exam     Initial Vitals [01/24/20 1451]   BP Pulse Resp Temp SpO2   (!) 190/86 72 20 98.1 °F (36.7 °C) 96 %      MAP       --         Physical Exam    Nursing note and vitals reviewed.  Constitutional: Vital signs are normal. She appears well-developed and well-nourished.   HENT:   Head: Normocephalic.   small discoloration to occiput   Neck: Neck supple.   Tender to palpation   Cardiovascular: Normal rate and regular rhythm.   Abdominal: Soft. Normal appearance.   Musculoskeletal: Normal range of motion.   Neurological: She is alert and oriented to person, place, and time.    Skin: Skin is warm and dry.   Psychiatric: She has a normal mood and affect.         ED Course   Procedures  Labs Reviewed   CBC W/ AUTO DIFFERENTIAL   COMPREHENSIVE METABOLIC PANEL   TROPONIN I   B-TYPE NATRIURETIC PEPTIDE          Imaging Results          X-Ray Thoracic Spine AP Lateral (Final result)  Result time 01/24/20 17:14:23    Final result by Cyrus Bejarano MD (01/24/20 17:14:23)                 Impression:      1. Mild multilevel thoracic degenerative disc disease.  No acute findings.      Electronically signed by: Cyrus Bejarano MD  Date:    01/24/2020  Time:    17:14             Narrative:    EXAMINATION:  XR THORACIC SPINE AP LATERAL    CLINICAL HISTORY:  .  Pain, unspecified, fall    COMPARISON:  None.    FINDINGS:  Three views demonstrate no evidence of thoracic fracture or subluxation.  No osseous destructive lesion is identified.  There is mild multilevel ventral osteophyte formation consistent with degenerative disc disease.                               X-Ray Shoulder Trauma Bilateral (Final result)  Result time 01/24/20 17:13:14    Final result by Cyrus Bejarano MD (01/24/20 17:13:14)                 Impression:      1. Chronic findings as described.  No acute radiographic abnormalities are identified.      Electronically signed by: Cyrus Bejarano MD  Date:    01/24/2020  Time:    17:13             Narrative:    EXAMINATION:  XR SHOULDER TRAUMA 3 VIEW BILATERAL    CLINICAL HISTORY:  Fall injury,    COMPARISON:  None.    FINDINGS:  Three views of both shoulders are submitted.    On the right, there is no evidence of fracture or dislocation.  No osseous destructive lesion is identified.  Minor arthritic changes noted at the acromioclavicular joint.    On the left, there is mild flattening of the superolateral aspect of the humeral head, however this is similar in appearance to March 2019 chest radiograph.  No definite acute fracture or dislocation is identified.  There is mild  arthritic change at the acromioclavicular joint.                               CT Cervical Spine Without Contrast (Final result)  Result time 01/24/20 16:44:58    Final result by Mehnaz Lin MD (01/24/20 16:44:58)                 Impression:      Degenerative changes of the cervical spine without evidence of fracture      Electronically signed by: Mehnaz Lin MD  Date:    01/24/2020  Time:    16:44             Narrative:      CMS MANDATED QUALITY DATA - CT RADIATION - 436    All CT scans at this facility utilize dose modulation, iterative reconstruction, and/or weight based dosing when appropriate to reduce radiation dose to as low as reasonably achievable.    EXAMINATION:  CT CERVICAL SPINE WITHOUT CONTRAST    CLINICAL HISTORY:  pain after fall;    TECHNIQUE:  Cervical spine CT without IV contrast obtained with coronal and sagittal reformations.    COMPARISON:  04/24/2015    FINDINGS:  The cervical spine is in satisfactory alignment.  The vertebral bodies are of normal height.  There is disc space narrowing at C5-6 and C6-7.  There is no fracture or subluxation.  The facet joints are aligned.  The odontoid process is intact and lateral masses of C1 are symmetrical.    There is multilevel foraminal narrowing secondary to facet hypertrophy.  There is no central canal stenosis.  There is no epidural fluid collection.  The paraspinous soft tissues are normal.  The lung apices are clear.                               CT Head Without Contrast (Final result)  Result time 01/24/20 16:42:39    Final result by Mehnaz Lin MD (01/24/20 16:42:39)                 Impression:      1. No acute intracranial abnormality.    2. Chronic small vessel ischemic changes.      Electronically signed by: Mehnaz Lin MD  Date:    01/24/2020  Time:    16:42             Narrative:      CMS MANDATED QUALITY DATA - CT RADIATION - 436    All CT scans at this facility utilize dose modulation, iterative reconstruction,  and/or weight based dosing when appropriate to reduce radiation dose to as low as reasonably achievable.    EXAMINATION:  CT HEAD WITHOUT CONTRAST    CLINICAL HISTORY:  fall;    TECHNIQUE:  Head CT without IV contrast. All CT scans at this facility use dose modulation, iterative reconstruction, and/or weight based dosing when appropriate to reduce radiation dose to as low as reasonably achievable.    COMPARISON:  03/29/2019    FINDINGS:  No acute intracranial hemorrhage, midline shift, or mass effect.    Diffuse cerebral and cerebellar atrophy is evident. Mild periventricular and subcortical white matter low-attenuation suggests chronic small vessel ischemic changes.    The ventricles and cisterns are maintained.    Calvarium is intact, and visualized sinuses are clear.                                 Medical Decision Making:   Initial Assessment:   Seen and evaluated.  Initially offered larger workup, the patient was adamant she did not want laboratory evaluation.  She also refused ECG.  Radiographs of the shoulders and spine and head were negative.  Provided with muscle relaxer.  Told patient she does have radiographic evidence of arthritis.  Ultimately encouraged patient to follow up should assistance be persistent.                                 Clinical Impression:       ICD-10-CM ICD-9-CM   1. Fall, initial encounter W19.XXXA E888.9   2. Pain R52 780.96   3. Chest pain R07.9 786.50                             Armando Gabriel Jr., MD  01/24/20 1948

## 2020-02-26 ENCOUNTER — OFFICE VISIT (OUTPATIENT)
Dept: PODIATRY | Facility: CLINIC | Age: 85
End: 2020-02-26
Payer: MEDICARE

## 2020-02-26 VITALS
SYSTOLIC BLOOD PRESSURE: 134 MMHG | HEIGHT: 64 IN | DIASTOLIC BLOOD PRESSURE: 88 MMHG | WEIGHT: 168 LBS | HEART RATE: 75 BPM | BODY MASS INDEX: 28.68 KG/M2

## 2020-02-26 DIAGNOSIS — M79.674 TOE PAIN, RIGHT: ICD-10-CM

## 2020-02-26 DIAGNOSIS — L03.031 CELLULITIS OF TOE OF RIGHT FOOT: ICD-10-CM

## 2020-02-26 DIAGNOSIS — L60.0 INGROWN TOENAIL OF RIGHT FOOT: Primary | ICD-10-CM

## 2020-02-26 PROCEDURE — 1159F MED LIST DOCD IN RCRD: CPT | Mod: S$GLB,,, | Performed by: PODIATRIST

## 2020-02-26 PROCEDURE — 99203 OFFICE O/P NEW LOW 30 MIN: CPT | Mod: 25,S$GLB,, | Performed by: PODIATRIST

## 2020-02-26 PROCEDURE — 1125F AMNT PAIN NOTED PAIN PRSNT: CPT | Mod: S$GLB,,, | Performed by: PODIATRIST

## 2020-02-26 PROCEDURE — 1101F PT FALLS ASSESS-DOCD LE1/YR: CPT | Mod: S$GLB,,, | Performed by: PODIATRIST

## 2020-02-26 PROCEDURE — 1159F PR MEDICATION LIST DOCUMENTED IN MEDICAL RECORD: ICD-10-PCS | Mod: S$GLB,,, | Performed by: PODIATRIST

## 2020-02-26 PROCEDURE — 99203 PR OFFICE/OUTPT VISIT, NEW, LEVL III, 30-44 MIN: ICD-10-PCS | Mod: 25,S$GLB,, | Performed by: PODIATRIST

## 2020-02-26 PROCEDURE — 1101F PR PT FALLS ASSESS DOC 0-1 FALLS W/OUT INJ PAST YR: ICD-10-PCS | Mod: S$GLB,,, | Performed by: PODIATRIST

## 2020-02-26 PROCEDURE — 11730 AVULSION NAIL PLATE SIMPLE 1: CPT | Mod: T5,S$GLB,, | Performed by: PODIATRIST

## 2020-02-26 PROCEDURE — 11730 NAIL REMOVAL: ICD-10-PCS | Mod: T5,S$GLB,, | Performed by: PODIATRIST

## 2020-02-26 PROCEDURE — 1125F PR PAIN SEVERITY QUANTIFIED, PAIN PRESENT: ICD-10-PCS | Mod: S$GLB,,, | Performed by: PODIATRIST

## 2020-02-26 RX ORDER — AMOXICILLIN 500 MG/1
500 CAPSULE ORAL EVERY 12 HOURS
Qty: 14 CAPSULE | Refills: 0 | Status: SHIPPED | OUTPATIENT
Start: 2020-02-26 | End: 2020-03-04

## 2020-02-26 RX ORDER — AMLODIPINE BESYLATE 2.5 MG/1
2.5 TABLET ORAL DAILY
COMMUNITY
Start: 2019-07-19

## 2020-02-26 RX ORDER — ISOSORBIDE DINITRATE 30 MG/1
30 TABLET ORAL DAILY
COMMUNITY

## 2020-02-26 RX ORDER — LEVOTHYROXINE SODIUM 25 UG/1
25 TABLET ORAL EVERY MORNING
COMMUNITY

## 2020-02-26 RX ORDER — OXYBUTYNIN CHLORIDE 10 MG/1
TABLET, EXTENDED RELEASE ORAL
Status: ON HOLD | COMMUNITY
Start: 2020-02-06 | End: 2021-10-17

## 2020-02-26 RX ORDER — TROSPIUM CHLORIDE 20 MG/1
TABLET, FILM COATED ORAL
Status: ON HOLD | COMMUNITY
Start: 2019-07-11 | End: 2021-10-17

## 2020-02-26 RX ORDER — LIDOCAINE HYDROCHLORIDE 20 MG/ML
1 JELLY TOPICAL DAILY PRN
Status: ON HOLD | COMMUNITY
Start: 2017-02-06 | End: 2021-10-20 | Stop reason: HOSPADM

## 2020-02-26 NOTE — PROGRESS NOTES
[address] [facility] [address] [place] [address]  Phone: [phone]   [phone]    Patient's PCP:Se Rios MD  Referring Provider: No ref. provider found    Subjective:      Chief Complaint:: Ingrown Toenail (right first toe lateral)    ALBERTO Encarnacion is a 89 y.o. female who presents with a complaint of  Ingrown toenail right first  lasting for a month. Onset of the symptoms was had pedicure done 1-15-20.  Current symptoms include pain and redness.  Aggravating factors are to touch closed toe shoe. Symptoms have gotten better , less redness.Treatment to date have included pedicure, neosporin, iodine. Patients rates pain 10/10 on pain scale..        Vitals:    02/26/20 1550   BP: 134/88   Pulse: 75     Shoe Size: 9-10    Past Surgical History:   Procedure Laterality Date    APPENDECTOMY      BACK SURGERY  1975    HYSTERECTOMY       Past Medical History:   Diagnosis Date    Macula lutea degeneration      Family History   Problem Relation Age of Onset    Melanoma Neg Hx     Psoriasis Neg Hx     Lupus Neg Hx     Eczema Neg Hx         Social History:   Marital Status:   Alcohol History:  reports that she does not drink alcohol.  Tobacco History:  reports that she has quit smoking. She has a 20.00 pack-year smoking history. She has never used smokeless tobacco.  Drug History:  reports that she does not use drugs.    Review of patient's allergies indicates:  No Known Allergies    Current Outpatient Medications   Medication Sig Dispense Refill    amLODIPine (NORVASC) 2.5 MG tablet 1 tablet      lidocaine HCL 2% (XYLOCAINE) 2 % jelly Apply topically daily as needed.      trospium (SANCTURA) 20 mg Tab tablet trospium 20 mg tablet      amoxicillin (AMOXIL) 500 MG capsule Take 1 capsule (500 mg total) by mouth every 12 (twelve) hours. for 7 days 14 capsule 0    aspirin (ECOTRIN) 81 MG EC tablet Take 81 mg by mouth every 12 (twelve) hours.      aspirin-acetaminophen-caffeine  250-250-65 mg (EXCEDRIN MIGRAINE) 250-250-65 mg per tablet Take 1 tablet by mouth.      busPIRone (BUSPAR) 10 MG tablet Take 10 mg by mouth 3 (three) times daily.      cholecalciferol, vitamin D3, 10,000 unit Cap Take by mouth.      isosorbide dinitrate (ISORDIL) 30 MG Tab 1 tablet      levothyroxine (SYNTHROID) 25 MCG tablet 1 tablet on an empty stomach in the morning      magnesium 250 mg Tab Take 1 tablet by mouth once daily.      meclizine (ANTIVERT) 12.5 mg tablet       oxybutynin (DITROPAN-XL) 10 MG 24 hr tablet TAKE 1 TABLET BY MOUTH ONCE DAILY FOR 90 DAYS      tolterodine (DETROL LA) 4 MG 24 hr capsule   5    topiramate (TOPAMAX) 25 MG tablet   0    vit A/vit C/vit E/zinc/copper (PRESERVISION AREDS ORAL) Take by mouth.      zolpidem (AMBIEN) 10 mg Tab        No current facility-administered medications for this visit.        Review of Systems      Objective:        Physical Exam:   Foot Exam  Physical Exam     Dermatological:  The toenail is incurvated, Lateral border of the Right hallux.    mild erythema noted to the affected area.     Absent paronychia.     Absent abscess.      Skin is normal age and health appropriate color, turgor, texture, and temperature bilateral lower extremities without ulceration, hyperpigmentation, discoloration, masses nodules or cords palpated.  No ecchymosis, erythema, edema, or cardinal signs of infection bilateral lower extremities.    Adequate joint range of motion without pain, limitation, nor crepitation Bilateral feet and ankle joints. Muscle strength is 5/5 in all groups bilaterally.     Protective sensation intact by 10 g monofilament all ten toes/both feet.  Pain sensation intact bilateral feet and legs.    DP and PT pulses 1/4 bilateral, capillary refill 3 seconds all toes/distal feet, all toes/both feet warm to touch.   Negavie lower extremity edema bilateral.  Imaging:            Assessment:       1. Ingrown toenail of right foot    2. Toe pain, right     3. Cellulitis of toe of right foot      Plan:   Ingrown toenail of right foot  -     amoxicillin (AMOXIL) 500 MG capsule; Take 1 capsule (500 mg total) by mouth every 12 (twelve) hours. for 7 days  Dispense: 14 capsule; Refill: 0    Toe pain, right  -     amoxicillin (AMOXIL) 500 MG capsule; Take 1 capsule (500 mg total) by mouth every 12 (twelve) hours. for 7 days  Dispense: 14 capsule; Refill: 0    Cellulitis of toe of right foot  -     amoxicillin (AMOXIL) 500 MG capsule; Take 1 capsule (500 mg total) by mouth every 12 (twelve) hours. for 7 days  Dispense: 14 capsule; Refill: 0    Other orders  -     Nail Removal      Follow up in about 2 weeks (around 3/11/2020) for f/up avulsion right 1st lateral border.    Nail Removal  Date/Time: 2/26/2020 3:20 PM  Performed by: Ana Hernandez DPM  Authorized by: Ana Hernandez DPM     Consent Done?:  Yes (Written)    Location:  Right foot  Location detail:  Right big toe  Anesthesia:  Local infiltration  Local anesthetic: lidocaine 1% without epinephrine  Preparation:  Skin prepped with alcohol    Amount removed:  Partial  Wedge excision of skin of nail fold: No    Nail bed sutured?: No    Nail matrix removed:  None  Removed nail replaced and anchored: No    Dressing applied:  4x4, antibiotic ointment and gauze roll  Patient tolerance:  Patient tolerated the procedure well with no immediate complications     Partial removal of right great toenail, lateral border.       Counseling:     I provided patient education verbally regarding:   Patient diagnosis, treatment options, as well as alternatives, risks, and benefits.     This note was created using Dragon voice recognition software that occasionally misinterpreted phrases or words.       Ingrown toenail treatment options of no treatment, avulsion of nail border under local with regrowth of nail, chemical matrixectomy for attempted permanent correction of the problem. Patient was educated about daily dressing changes,  soaks, and medications following removal of the nail.

## 2020-02-26 NOTE — PATIENT INSTRUCTIONS

## 2020-03-11 ENCOUNTER — OFFICE VISIT (OUTPATIENT)
Dept: PODIATRY | Facility: CLINIC | Age: 85
End: 2020-03-11
Payer: MEDICARE

## 2020-03-11 VITALS
HEIGHT: 64 IN | HEART RATE: 62 BPM | WEIGHT: 168 LBS | DIASTOLIC BLOOD PRESSURE: 89 MMHG | BODY MASS INDEX: 28.68 KG/M2 | SYSTOLIC BLOOD PRESSURE: 109 MMHG | RESPIRATION RATE: 16 BRPM

## 2020-03-11 DIAGNOSIS — M79.674 TOE PAIN, RIGHT: ICD-10-CM

## 2020-03-11 DIAGNOSIS — L60.0 INGROWN TOENAIL OF RIGHT FOOT: ICD-10-CM

## 2020-03-11 DIAGNOSIS — M79.671 FOOT PAIN, BILATERAL: Primary | ICD-10-CM

## 2020-03-11 DIAGNOSIS — M79.672 FOOT PAIN, BILATERAL: Primary | ICD-10-CM

## 2020-03-11 DIAGNOSIS — M19.079 ARTHRITIS OF FOOT: ICD-10-CM

## 2020-03-11 PROCEDURE — 99213 PR OFFICE/OUTPT VISIT, EST, LEVL III, 20-29 MIN: ICD-10-PCS | Mod: S$GLB,,, | Performed by: PODIATRIST

## 2020-03-11 PROCEDURE — 3288F PR FALLS RISK ASSESSMENT DOCUMENTED: ICD-10-PCS | Mod: S$GLB,,, | Performed by: PODIATRIST

## 2020-03-11 PROCEDURE — 3288F FALL RISK ASSESSMENT DOCD: CPT | Mod: S$GLB,,, | Performed by: PODIATRIST

## 2020-03-11 PROCEDURE — 1100F PTFALLS ASSESS-DOCD GE2>/YR: CPT | Mod: S$GLB,,, | Performed by: PODIATRIST

## 2020-03-11 PROCEDURE — 1100F PR PT FALLS ASSESS DOC 2+ FALLS/FALL W/INJURY/YR: ICD-10-PCS | Mod: S$GLB,,, | Performed by: PODIATRIST

## 2020-03-11 PROCEDURE — 99213 OFFICE O/P EST LOW 20 MIN: CPT | Mod: S$GLB,,, | Performed by: PODIATRIST

## 2020-03-11 PROCEDURE — 1159F PR MEDICATION LIST DOCUMENTED IN MEDICAL RECORD: ICD-10-PCS | Mod: S$GLB,,, | Performed by: PODIATRIST

## 2020-03-11 PROCEDURE — 1125F AMNT PAIN NOTED PAIN PRSNT: CPT | Mod: S$GLB,,, | Performed by: PODIATRIST

## 2020-03-11 PROCEDURE — 1159F MED LIST DOCD IN RCRD: CPT | Mod: S$GLB,,, | Performed by: PODIATRIST

## 2020-03-11 PROCEDURE — 1125F PR PAIN SEVERITY QUANTIFIED, PAIN PRESENT: ICD-10-PCS | Mod: S$GLB,,, | Performed by: PODIATRIST

## 2020-03-11 NOTE — PROGRESS NOTES
[address] [facility] [address]  [place] [address]  Phone: [phone]   [phone]    Patient's PCP:Se Rios MD  Referring Provider: No ref. provider found    Subjective:      Chief Complaint:: Follow-up (avulsion right first lateral border)    ALBERTO Encarnacion is a 89 y.o. female who returns to clinic today for follow-up of avulsion right first nail lateral border preformed 02/26/2020. Patients doing well rates pain 3/10 on pain scale. She is also interested in seeing if she qualifies for nail trimming to be covered by her insurance due to being legally blind and unable to clip them herself.    Vitals:    03/11/20 1043   BP: 109/89   Pulse: 62   Resp: 16     Shoe Size:     Past Surgical History:   Procedure Laterality Date    APPENDECTOMY      BACK SURGERY  1975    HYSTERECTOMY       Past Medical History:   Diagnosis Date    Macula lutea degeneration      Family History   Problem Relation Age of Onset    Melanoma Neg Hx     Psoriasis Neg Hx     Lupus Neg Hx     Eczema Neg Hx         Social History:   Marital Status:   Alcohol History:  reports that she does not drink alcohol.  Tobacco History:  reports that she has quit smoking. She has a 20.00 pack-year smoking history. She has never used smokeless tobacco.  Drug History:  reports that she does not use drugs.    Review of patient's allergies indicates:  No Known Allergies    Current Outpatient Medications   Medication Sig Dispense Refill    amLODIPine (NORVASC) 2.5 MG tablet 1 tablet      aspirin (ECOTRIN) 81 MG EC tablet Take 81 mg by mouth every 12 (twelve) hours.      aspirin-acetaminophen-caffeine 250-250-65 mg (EXCEDRIN MIGRAINE) 250-250-65 mg per tablet Take 1 tablet by mouth.      busPIRone (BUSPAR) 10 MG tablet Take 10 mg by mouth 3 (three) times daily.      cholecalciferol, vitamin D3, 10,000 unit Cap Take by mouth.      isosorbide dinitrate (ISORDIL) 30 MG Tab 1 tablet      levothyroxine (SYNTHROID) 25 MCG  tablet 1 tablet on an empty stomach in the morning      lidocaine HCL 2% (XYLOCAINE) 2 % jelly Apply topically daily as needed.      magnesium 250 mg Tab Take 1 tablet by mouth once daily.      meclizine (ANTIVERT) 12.5 mg tablet       oxybutynin (DITROPAN-XL) 10 MG 24 hr tablet TAKE 1 TABLET BY MOUTH ONCE DAILY FOR 90 DAYS      tolterodine (DETROL LA) 4 MG 24 hr capsule   5    topiramate (TOPAMAX) 25 MG tablet   0    trospium (SANCTURA) 20 mg Tab tablet trospium 20 mg tablet      vit A/vit C/vit E/zinc/copper (PRESERVISION AREDS ORAL) Take by mouth.      zolpidem (AMBIEN) 10 mg Tab        No current facility-administered medications for this visit.        Review of Systems      Objective:        Physical Exam:   Foot Exam  Physical Exam     Removal of previous Ingrown of right 1st toe lateral border healed.  No signs of infection.    Toenails 1st, 2nd, 3rd, 4th, 5th  bilateral are hypertrophic, dystrophic, discolored tanish brown with tan, gray crumbly subungual debris. Tender to distal nail plate pressure, without periungual skin abnormality of each.     Skin is normal age and health appropriate color, turgor, texture, and temperature bilateral lower extremities without ulceration, hyperpigmentation, discoloration, masses nodules or cords palpated.  No ecchymosis, erythema, edema, or cardinal signs of infection bilateral lower extremities.     Adequate joint range of motion without pain, limitation, nor crepitation Bilateral feet and ankle joints. Muscle strength is 5/5 in all groups bilaterally.     Protective sensation intact by 10 g monofilament all ten toes/both feet.  Pain sensation intact bilateral feet and legs.     DP and PT pulses 1/4 bilateral, capillary refill 3 seconds all toes/distal feet, all toes/both feet warm to touch.   Negavie lower extremity edema bilateral.  Imaging:            Assessment:       1. Foot pain, bilateral    2. Toe pain, right    3. Arthritis of foot    4. Ingrown  toenail of right foot      Plan:   Foot pain, bilateral    Toe pain, right    Arthritis of foot    Ingrown toenail of right foot      Follow up if symptoms worsen or fail to improve.    Procedures - None    Counseling:     I provided patient education verbally regarding:   Patient diagnosis, treatment options, as well as alternatives, risks, and benefits.     This note was created using Dragon voice recognition software that occasionally misinterpreted phrases or words.     Ingrown has healed.  Patient is allowed to continue to soak  feet in Epson salts.

## 2020-07-16 DIAGNOSIS — M25.562 CHRONIC PAIN OF BOTH KNEES: Primary | ICD-10-CM

## 2020-07-16 DIAGNOSIS — G89.29 CHRONIC PAIN OF BOTH KNEES: Primary | ICD-10-CM

## 2020-07-16 DIAGNOSIS — M25.561 CHRONIC PAIN OF BOTH KNEES: Primary | ICD-10-CM

## 2020-07-17 ENCOUNTER — OFFICE VISIT (OUTPATIENT)
Dept: ORTHOPEDICS | Facility: CLINIC | Age: 85
End: 2020-07-17
Payer: MEDICARE

## 2020-07-17 ENCOUNTER — HOSPITAL ENCOUNTER (OUTPATIENT)
Dept: RADIOLOGY | Facility: HOSPITAL | Age: 85
Discharge: HOME | End: 2020-07-17
Attending: ORTHOPAEDIC SURGERY
Payer: MEDICARE

## 2020-07-17 VITALS — HEIGHT: 64 IN | WEIGHT: 168 LBS | TEMPERATURE: 98 F | RESPIRATION RATE: 18 BRPM | BODY MASS INDEX: 28.68 KG/M2

## 2020-07-17 DIAGNOSIS — G89.29 CHRONIC PAIN OF BOTH KNEES: ICD-10-CM

## 2020-07-17 DIAGNOSIS — M25.562 CHRONIC PAIN OF BOTH KNEES: ICD-10-CM

## 2020-07-17 DIAGNOSIS — M25.561 CHRONIC PAIN OF BOTH KNEES: ICD-10-CM

## 2020-07-17 DIAGNOSIS — M17.11 PRIMARY OSTEOARTHRITIS OF RIGHT KNEE: ICD-10-CM

## 2020-07-17 DIAGNOSIS — M17.12 PRIMARY OSTEOARTHRITIS OF LEFT KNEE: Primary | ICD-10-CM

## 2020-07-17 PROCEDURE — 1125F PR PAIN SEVERITY QUANTIFIED, PAIN PRESENT: ICD-10-PCS | Mod: S$GLB,,, | Performed by: ORTHOPAEDIC SURGERY

## 2020-07-17 PROCEDURE — 3288F FALL RISK ASSESSMENT DOCD: CPT | Mod: CPTII,S$GLB,, | Performed by: ORTHOPAEDIC SURGERY

## 2020-07-17 PROCEDURE — 1125F AMNT PAIN NOTED PAIN PRSNT: CPT | Mod: S$GLB,,, | Performed by: ORTHOPAEDIC SURGERY

## 2020-07-17 PROCEDURE — 99213 OFFICE O/P EST LOW 20 MIN: CPT | Mod: 25,S$GLB,, | Performed by: ORTHOPAEDIC SURGERY

## 2020-07-17 PROCEDURE — 1159F MED LIST DOCD IN RCRD: CPT | Mod: S$GLB,,, | Performed by: ORTHOPAEDIC SURGERY

## 2020-07-17 PROCEDURE — 20610 LARGE JOINT ASPIRATION/INJECTION: BILATERAL KNEE: ICD-10-PCS | Mod: 50,S$GLB,, | Performed by: ORTHOPAEDIC SURGERY

## 2020-07-17 PROCEDURE — 1159F PR MEDICATION LIST DOCUMENTED IN MEDICAL RECORD: ICD-10-PCS | Mod: S$GLB,,, | Performed by: ORTHOPAEDIC SURGERY

## 2020-07-17 PROCEDURE — 73560 XR KNEE 1 OR 2 VIEW BILATERAL: ICD-10-PCS | Mod: 26,50,, | Performed by: RADIOLOGY

## 2020-07-17 PROCEDURE — 1100F PR PT FALLS ASSESS DOC 2+ FALLS/FALL W/INJURY/YR: ICD-10-PCS | Mod: CPTII,S$GLB,, | Performed by: ORTHOPAEDIC SURGERY

## 2020-07-17 PROCEDURE — 99999 PR PBB SHADOW E&M-EST. PATIENT-LVL III: CPT | Mod: PBBFAC,,, | Performed by: ORTHOPAEDIC SURGERY

## 2020-07-17 PROCEDURE — 20610 DRAIN/INJ JOINT/BURSA W/O US: CPT | Mod: 50,S$GLB,, | Performed by: ORTHOPAEDIC SURGERY

## 2020-07-17 PROCEDURE — 3288F PR FALLS RISK ASSESSMENT DOCUMENTED: ICD-10-PCS | Mod: CPTII,S$GLB,, | Performed by: ORTHOPAEDIC SURGERY

## 2020-07-17 PROCEDURE — 73560 X-RAY EXAM OF KNEE 1 OR 2: CPT | Mod: 26,50,, | Performed by: RADIOLOGY

## 2020-07-17 PROCEDURE — 99213 PR OFFICE/OUTPT VISIT, EST, LEVL III, 20-29 MIN: ICD-10-PCS | Mod: 25,S$GLB,, | Performed by: ORTHOPAEDIC SURGERY

## 2020-07-17 PROCEDURE — 73560 X-RAY EXAM OF KNEE 1 OR 2: CPT | Mod: TC,50,PN

## 2020-07-17 PROCEDURE — 1100F PTFALLS ASSESS-DOCD GE2>/YR: CPT | Mod: CPTII,S$GLB,, | Performed by: ORTHOPAEDIC SURGERY

## 2020-07-17 PROCEDURE — 99999 PR PBB SHADOW E&M-EST. PATIENT-LVL III: ICD-10-PCS | Mod: PBBFAC,,, | Performed by: ORTHOPAEDIC SURGERY

## 2020-07-17 RX ORDER — TRIAMCINOLONE ACETONIDE 40 MG/ML
60 INJECTION, SUSPENSION INTRA-ARTICULAR; INTRAMUSCULAR
Status: DISCONTINUED | OUTPATIENT
Start: 2020-07-17 | End: 2020-07-17 | Stop reason: HOSPADM

## 2020-07-17 RX ADMIN — TRIAMCINOLONE ACETONIDE 60 MG: 40 INJECTION, SUSPENSION INTRA-ARTICULAR; INTRAMUSCULAR at 11:07

## 2020-07-17 NOTE — PROCEDURES
Large Joint Aspiration/Injection: bilateral knee    Date/Time: 7/17/2020 11:15 AM  Performed by: Kenneth Nevarez MD  Authorized by: Kenneth Nevarez MD     Indications:  Pain  Local anesthetic:  Lidocaine 1% without epinephrine    Details:  Needle Size:  20 G  Approach:  Lateral  Location:  Knee  Laterality:  Bilateral  Site:  Bilateral knee  Medications (Right):  60 mg triamcinolone acetonide 40 mg/mL  Medications (Left):  60 mg triamcinolone acetonide 40 mg/mL

## 2020-07-17 NOTE — PROGRESS NOTES
7/17/2020    Past Medical History:   Diagnosis Date    Macula lutea degeneration        Past Surgical History:   Procedure Laterality Date    APPENDECTOMY      BACK SURGERY  1975    HYSTERECTOMY         Current Outpatient Medications   Medication Sig    amLODIPine (NORVASC) 2.5 MG tablet 1 tablet    aspirin (ECOTRIN) 81 MG EC tablet Take 81 mg by mouth every 12 (twelve) hours.    aspirin-acetaminophen-caffeine 250-250-65 mg (EXCEDRIN MIGRAINE) 250-250-65 mg per tablet Take 1 tablet by mouth.    busPIRone (BUSPAR) 10 MG tablet Take 10 mg by mouth 3 (three) times daily.    cholecalciferol, vitamin D3, 10,000 unit Cap Take by mouth.    isosorbide dinitrate (ISORDIL) 30 MG Tab 1 tablet    levothyroxine (SYNTHROID) 25 MCG tablet 1 tablet on an empty stomach in the morning    lidocaine HCL 2% (XYLOCAINE) 2 % jelly Apply topically daily as needed.    magnesium 250 mg Tab Take 1 tablet by mouth once daily.    meclizine (ANTIVERT) 12.5 mg tablet     oxybutynin (DITROPAN-XL) 10 MG 24 hr tablet TAKE 1 TABLET BY MOUTH ONCE DAILY FOR 90 DAYS    tolterodine (DETROL LA) 4 MG 24 hr capsule     topiramate (TOPAMAX) 25 MG tablet     trospium (SANCTURA) 20 mg Tab tablet trospium 20 mg tablet    vit A/vit C/vit E/zinc/copper (PRESERVISION AREDS ORAL) Take by mouth.    zolpidem (AMBIEN) 10 mg Tab      No current facility-administered medications for this visit.        Review of patient's allergies indicates:  No Known Allergies    Family History   Problem Relation Age of Onset    Melanoma Neg Hx     Psoriasis Neg Hx     Lupus Neg Hx     Eczema Neg Hx        Social History     Socioeconomic History    Marital status:      Spouse name: Not on file    Number of children: Not on file    Years of education: Not on file    Highest education level: Not on file   Occupational History    Not on file   Social Needs    Financial resource strain: Not on file    Food insecurity     Worry: Not on file      Inability: Not on file    Transportation needs     Medical: Not on file     Non-medical: Not on file   Tobacco Use    Smoking status: Former Smoker     Packs/day: 1.00     Years: 20.00     Pack years: 20.00    Smokeless tobacco: Never Used   Substance and Sexual Activity    Alcohol use: No    Drug use: No    Sexual activity: Not on file   Lifestyle    Physical activity     Days per week: Not on file     Minutes per session: Not on file    Stress: Not on file   Relationships    Social connections     Talks on phone: Not on file     Gets together: Not on file     Attends Confucianist service: Not on file     Active member of club or organization: Not on file     Attends meetings of clubs or organizations: Not on file     Relationship status: Not on file   Other Topics Concern    Are you pregnant or think you may be? Not Asked    Breast-feeding Not Asked   Social History Narrative    Not on file       Chief Complaint:   Chief Complaint   Patient presents with    Right Knee - Pain, Osteoarthritis, Follow-up     Kenalog Inj to right knee almost 1 yr ago on 08/21/19; effective. Did not have great results with Supartz. Right knee pain greater than left. Pain increased w/ ambulation. Reports falling about 9 mo ago on her back, which did not aggravate knee pain.     Left Knee - Pain, Osteoarthritis, Follow-up     Does report having pain in her left knee as well, which increased with ambulation. However, the majority of her pain is in her right knee today. Difficulty rising from sitting position to standing due to increased pain in her knees.          History of present illness:    This is a 89 y.o. year old female who complains of patient has a history of osteoarthritis of both knees she has had relief with injections about a year ago she has increasing pain she wants to have both of her knees injected    Review of Systems:    Constitution: Denies chills, fever, and sweats.  HENT: Denies headaches or blurry  "vision.  Cardiovascular: Denies chest pain or irregular heart beat.  Respiratory: Denies cough or shortness of breath.  Gastrointestinal: Denies abdominal pain, nausea, or vomiting.  Musculoskeletal:  Denies muscle cramps.  Neurological: Denies dizziness or focal weakness.  Psychiatric/Behavioral: Normal mental status.  Hematologic/Lymphatic: Denies bleeding problem or easy bruising/bleeding.  Skin: Denies rash or suspicious lesions.    Examination:    Vital Signs:    Vitals:    07/17/20 1055   Resp: 18   Temp: 97.7 °F (36.5 °C)   Weight: 76.2 kg (168 lb)   Height: 5' 4" (1.626 m)   PainSc:   9   PainLoc: Knee       Body mass index is 28.84 kg/m².    This a well-developed, well nourished patient in no acute distress.    Alert and oriented x 3 and cooperative to examination.       Physical Exam:  Left knee-generalized discomfort on range of motion no effusion no warmth the cellulitis      Right knee-generalized discomfort on range of motion no effusion or warmth or cellulitis    Imaging:  X-rays show moderate degenerative arthritis of both knees no active lesions       Assessment: Primary osteoarthritis of left knee    Primary osteoarthritis of right knee        Plan:  We will inject both knees with Kenalog      DISCLAIMER: This note may have been dictated using voice recognition software and may contain grammatical errors.     NOTE: Consult report sent to referring provider via EPIC EMR.    "

## 2020-10-16 ENCOUNTER — HOSPITAL ENCOUNTER (OUTPATIENT)
Dept: RADIOLOGY | Facility: HOSPITAL | Age: 85
Discharge: HOME | End: 2020-10-16
Attending: NURSE PRACTITIONER
Payer: MEDICARE

## 2020-10-16 DIAGNOSIS — R61 SWEATING INCREASE: ICD-10-CM

## 2020-10-16 DIAGNOSIS — R61 SWEATING INCREASE: Primary | ICD-10-CM

## 2020-10-16 PROCEDURE — 71046 X-RAY EXAM CHEST 2 VIEWS: CPT | Mod: TC,PO
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
## 2020-11-02 ENCOUNTER — HOSPITAL ENCOUNTER (OUTPATIENT)
Dept: RADIOLOGY | Facility: HOSPITAL | Age: 85
Discharge: HOME | End: 2020-11-02
Attending: NURSE PRACTITIONER
Payer: MEDICARE
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
## 2020-11-02 PROCEDURE — 73030 X-RAY EXAM OF SHOULDER: CPT | Mod: TC,PO,LT

## 2020-11-02 PROCEDURE — 73502 X-RAY EXAM HIP UNI 2-3 VIEWS: CPT | Mod: TC,PO,LT

## 2020-12-18 ENCOUNTER — HOSPITAL ENCOUNTER (OUTPATIENT)
Dept: RADIOLOGY | Facility: HOSPITAL | Age: 85
Discharge: HOME | End: 2020-12-18
Attending: ORTHOPAEDIC SURGERY
Payer: MEDICARE

## 2020-12-18 ENCOUNTER — OFFICE VISIT (OUTPATIENT)
Dept: ORTHOPEDICS | Facility: CLINIC | Age: 85
End: 2020-12-18
Payer: MEDICARE

## 2020-12-18 VITALS — BODY MASS INDEX: 28.68 KG/M2 | WEIGHT: 168 LBS | HEIGHT: 64 IN

## 2020-12-18 DIAGNOSIS — M17.12 PRIMARY OSTEOARTHRITIS OF LEFT KNEE: Primary | ICD-10-CM

## 2020-12-18 DIAGNOSIS — M17.11 PRIMARY OSTEOARTHRITIS OF RIGHT KNEE: ICD-10-CM

## 2020-12-18 DIAGNOSIS — M17.12 PRIMARY OSTEOARTHRITIS OF LEFT KNEE: ICD-10-CM

## 2020-12-18 DIAGNOSIS — M25.572 LEFT ANKLE PAIN, UNSPECIFIED CHRONICITY: Primary | ICD-10-CM

## 2020-12-18 DIAGNOSIS — M79.672 BILATERAL FOOT PAIN: ICD-10-CM

## 2020-12-18 DIAGNOSIS — M25.572 LEFT ANKLE PAIN, UNSPECIFIED CHRONICITY: ICD-10-CM

## 2020-12-18 DIAGNOSIS — M79.671 BILATERAL FOOT PAIN: ICD-10-CM

## 2020-12-18 PROCEDURE — 1159F MED LIST DOCD IN RCRD: CPT | Mod: S$GLB,,, | Performed by: ORTHOPAEDIC SURGERY

## 2020-12-18 PROCEDURE — 1159F PR MEDICATION LIST DOCUMENTED IN MEDICAL RECORD: ICD-10-PCS | Mod: S$GLB,,, | Performed by: ORTHOPAEDIC SURGERY

## 2020-12-18 PROCEDURE — 20610 DRAIN/INJ JOINT/BURSA W/O US: CPT | Mod: 50,S$GLB,, | Performed by: ORTHOPAEDIC SURGERY

## 2020-12-18 PROCEDURE — 3288F FALL RISK ASSESSMENT DOCD: CPT | Mod: CPTII,S$GLB,, | Performed by: ORTHOPAEDIC SURGERY

## 2020-12-18 PROCEDURE — 73560 X-RAY EXAM OF KNEE 1 OR 2: CPT | Mod: TC,50,PN

## 2020-12-18 PROCEDURE — 1125F PR PAIN SEVERITY QUANTIFIED, PAIN PRESENT: ICD-10-PCS | Mod: S$GLB,,, | Performed by: ORTHOPAEDIC SURGERY

## 2020-12-18 PROCEDURE — 1101F PR PT FALLS ASSESS DOC 0-1 FALLS W/OUT INJ PAST YR: ICD-10-PCS | Mod: CPTII,S$GLB,, | Performed by: ORTHOPAEDIC SURGERY

## 2020-12-18 PROCEDURE — 3288F PR FALLS RISK ASSESSMENT DOCUMENTED: ICD-10-PCS | Mod: CPTII,S$GLB,, | Performed by: ORTHOPAEDIC SURGERY

## 2020-12-18 PROCEDURE — 73610 X-RAY EXAM OF ANKLE: CPT | Mod: 26,LT,, | Performed by: RADIOLOGY

## 2020-12-18 PROCEDURE — 20610 LARGE JOINT ASPIRATION/INJECTION: BILATERAL KNEE: ICD-10-PCS | Mod: 50,S$GLB,, | Performed by: ORTHOPAEDIC SURGERY

## 2020-12-18 PROCEDURE — 99213 OFFICE O/P EST LOW 20 MIN: CPT | Mod: 25,S$GLB,, | Performed by: ORTHOPAEDIC SURGERY

## 2020-12-18 PROCEDURE — 73610 XR ANKLE COMPLETE 3 VIEW LEFT: ICD-10-PCS | Mod: 26,LT,, | Performed by: RADIOLOGY

## 2020-12-18 PROCEDURE — 73610 X-RAY EXAM OF ANKLE: CPT | Mod: TC,PN,LT

## 2020-12-18 PROCEDURE — 73630 X-RAY EXAM OF FOOT: CPT | Mod: TC,50,PN

## 2020-12-18 PROCEDURE — 99999 PR PBB SHADOW E&M-EST. PATIENT-LVL III: CPT | Mod: PBBFAC,,, | Performed by: ORTHOPAEDIC SURGERY

## 2020-12-18 PROCEDURE — 73630 XR FOOT COMPLETE 3 VIEW BILATERAL: ICD-10-PCS | Mod: 26,50,, | Performed by: RADIOLOGY

## 2020-12-18 PROCEDURE — 73560 XR KNEE 1 OR 2 VIEW BILATERAL: ICD-10-PCS | Mod: 26,50,, | Performed by: RADIOLOGY

## 2020-12-18 PROCEDURE — 99999 PR PBB SHADOW E&M-EST. PATIENT-LVL III: ICD-10-PCS | Mod: PBBFAC,,, | Performed by: ORTHOPAEDIC SURGERY

## 2020-12-18 PROCEDURE — 73630 X-RAY EXAM OF FOOT: CPT | Mod: 26,50,, | Performed by: RADIOLOGY

## 2020-12-18 PROCEDURE — 99213 PR OFFICE/OUTPT VISIT, EST, LEVL III, 20-29 MIN: ICD-10-PCS | Mod: 25,S$GLB,, | Performed by: ORTHOPAEDIC SURGERY

## 2020-12-18 PROCEDURE — 1125F AMNT PAIN NOTED PAIN PRSNT: CPT | Mod: S$GLB,,, | Performed by: ORTHOPAEDIC SURGERY

## 2020-12-18 PROCEDURE — 1101F PT FALLS ASSESS-DOCD LE1/YR: CPT | Mod: CPTII,S$GLB,, | Performed by: ORTHOPAEDIC SURGERY

## 2020-12-18 PROCEDURE — 73560 X-RAY EXAM OF KNEE 1 OR 2: CPT | Mod: 26,50,, | Performed by: RADIOLOGY

## 2020-12-18 RX ORDER — TRIAMCINOLONE ACETONIDE 40 MG/ML
60 INJECTION, SUSPENSION INTRA-ARTICULAR; INTRAMUSCULAR
Status: DISCONTINUED | OUTPATIENT
Start: 2020-12-18 | End: 2020-12-18 | Stop reason: HOSPADM

## 2020-12-18 RX ADMIN — TRIAMCINOLONE ACETONIDE 60 MG: 40 INJECTION, SUSPENSION INTRA-ARTICULAR; INTRAMUSCULAR at 12:12

## 2020-12-18 NOTE — PROGRESS NOTES
12/18/2020    Past Medical History:   Diagnosis Date    Macula lutea degeneration        Past Surgical History:   Procedure Laterality Date    APPENDECTOMY      BACK SURGERY  1975    HYSTERECTOMY         Current Outpatient Medications   Medication Sig    amLODIPine (NORVASC) 2.5 MG tablet 1 tablet    aspirin (ECOTRIN) 81 MG EC tablet Take 81 mg by mouth every 12 (twelve) hours.    aspirin-acetaminophen-caffeine 250-250-65 mg (EXCEDRIN MIGRAINE) 250-250-65 mg per tablet Take 1 tablet by mouth.    busPIRone (BUSPAR) 10 MG tablet Take 10 mg by mouth 3 (three) times daily.    cholecalciferol, vitamin D3, 10,000 unit Cap Take by mouth.    isosorbide dinitrate (ISORDIL) 30 MG Tab 1 tablet    levothyroxine (SYNTHROID) 25 MCG tablet 1 tablet on an empty stomach in the morning    lidocaine HCL 2% (XYLOCAINE) 2 % jelly Apply topically daily as needed.    magnesium 250 mg Tab Take 1 tablet by mouth once daily.    meclizine (ANTIVERT) 12.5 mg tablet     oxybutynin (DITROPAN-XL) 10 MG 24 hr tablet TAKE 1 TABLET BY MOUTH ONCE DAILY FOR 90 DAYS    tolterodine (DETROL LA) 4 MG 24 hr capsule     topiramate (TOPAMAX) 25 MG tablet     trospium (SANCTURA) 20 mg Tab tablet trospium 20 mg tablet    vit A/vit C/vit E/zinc/copper (PRESERVISION AREDS ORAL) Take by mouth.    zolpidem (AMBIEN) 10 mg Tab      No current facility-administered medications for this visit.        Review of patient's allergies indicates:  No Known Allergies    Family History   Problem Relation Age of Onset    Melanoma Neg Hx     Psoriasis Neg Hx     Lupus Neg Hx     Eczema Neg Hx        Social History     Socioeconomic History    Marital status:      Spouse name: Not on file    Number of children: Not on file    Years of education: Not on file    Highest education level: Not on file   Occupational History    Not on file   Social Needs    Financial resource strain: Not on file    Food insecurity     Worry: Not on file      Inability: Not on file    Transportation needs     Medical: Not on file     Non-medical: Not on file   Tobacco Use    Smoking status: Former Smoker     Packs/day: 1.00     Years: 20.00     Pack years: 20.00    Smokeless tobacco: Never Used   Substance and Sexual Activity    Alcohol use: No    Drug use: No    Sexual activity: Not on file   Lifestyle    Physical activity     Days per week: Not on file     Minutes per session: Not on file    Stress: Not on file   Relationships    Social connections     Talks on phone: Not on file     Gets together: Not on file     Attends Muslim service: Not on file     Active member of club or organization: Not on file     Attends meetings of clubs or organizations: Not on file     Relationship status: Not on file   Other Topics Concern    Are you pregnant or think you may be? Not Asked    Breast-feeding Not Asked   Social History Narrative    Not on file       Chief Complaint:   Chief Complaint   Patient presents with    Right Foot - Pain     Reports constant swelling and pain to heel of feet and medial ankles. Pt states she is flat footed, and believes this contributes to her pain. Chronic pain for years. Denies injuries or surgeries to knees or feet/ankle.     Right Knee - Pain     Reports chronic knee pain for years. PL 10/10 today. Pain increased when transitioning from sitting to standing position and ambulation. Kenalog injections effective for only about 2 weeks. Right knee pain > than left.      Left Knee - Pain    Left Ankle - Pain     Reports left medial ankle pain for about 1 year now. Pain increased with ambulation. Also reports swelling in ankle as well. She has not tried taking anything for pain relief, and denies any surgeries or injury to ankle in the past.          History of present illness:    This is a 89 y.o. year old female who complains of complains of bilateral knee pain left ankle pain and right heel pain no history of any injury    Review  "of Systems:    Constitution: Denies chills, fever, and sweats.  HENT: Denies headaches or blurry vision.  Cardiovascular: Denies chest pain or irregular heart beat.  Respiratory: Denies cough or shortness of breath.  Gastrointestinal: Denies abdominal pain, nausea, or vomiting.  Musculoskeletal:  Denies muscle cramps.  Neurological: Denies dizziness or focal weakness.  Psychiatric/Behavioral: Normal mental status.  Hematologic/Lymphatic: Denies bleeding problem or easy bruising/bleeding.  Skin: Denies rash or suspicious lesions.    Examination:    Vital Signs:    Vitals:    12/18/20 1234   Weight: 76.2 kg (168 lb)   Height: 5' 4" (1.626 m)   PainSc: 10-Worst pain ever   PainLoc: Knee       Body mass index is 28.84 kg/m².    This a well-developed, well nourished patient in no acute distress.    Alert and oriented x 3 and cooperative to examination.       Physical Exam:  Left knee-no effusion or warmth the cellulitis good range of motion      Right knee-slight effusion no warmth the cellulitis good range of motion generalized discomfort      Left ankle-no swelling or deformity good range of motion no crepitance on range of motion      Right heel-generalized pain mostly over the medial aspect of the heel no pain over the plantar aspect    Imaging:  X-rays of the left right knee show some mild degenerative changes no active acute processes x-rays of the left ankle show no significant evidence of any arthritis in the ankle no bony changes x-ray of the right heel shows a large bony spur posteriorly near the Achilles insertion no active lesions or fractures       Assessment: Primary osteoarthritis of left knee    Primary osteoarthritis of right knee        Plan:  We will inject both knees with Kenalog today      DISCLAIMER: This note may have been dictated using voice recognition software and may contain grammatical errors.     NOTE: Consult report sent to referring provider via EPIC EMR.    "

## 2020-12-18 NOTE — PROCEDURES
Large Joint Aspiration/Injection: bilateral knee    Date/Time: 12/18/2020 12:30 PM  Performed by: Kenneth Nevarez MD  Authorized by: Kenneth Nevarez MD     Indications:  Arthritis and pain  Local anesthetic:  Lidocaine 1% without epinephrine    Details:  Needle Size:  20 G  Approach:  Lateral  Location:  Knee  Laterality:  Bilateral  Site:  Bilateral knee  Medications (Right):  60 mg triamcinolone acetonide 40 mg/mL  Medications (Left):  60 mg triamcinolone acetonide 40 mg/mL

## 2021-03-23 ENCOUNTER — OFFICE VISIT (OUTPATIENT)
Dept: PODIATRY | Facility: CLINIC | Age: 86
End: 2021-03-23
Payer: MEDICARE

## 2021-03-23 VITALS
HEIGHT: 64 IN | DIASTOLIC BLOOD PRESSURE: 77 MMHG | WEIGHT: 177 LBS | SYSTOLIC BLOOD PRESSURE: 128 MMHG | BODY MASS INDEX: 30.22 KG/M2 | HEART RATE: 82 BPM

## 2021-03-23 DIAGNOSIS — M19.079 INFLAMMATION OF FOOT JOINT: ICD-10-CM

## 2021-03-23 DIAGNOSIS — M77.41 METATARSALGIA OF BOTH FEET: ICD-10-CM

## 2021-03-23 DIAGNOSIS — M19.079 ARTHRITIS OF FOOT: Primary | ICD-10-CM

## 2021-03-23 DIAGNOSIS — M77.42 METATARSALGIA OF BOTH FEET: ICD-10-CM

## 2021-03-23 DIAGNOSIS — M89.8X7 EXOSTOSIS OF BONE OF ANKLE: ICD-10-CM

## 2021-03-23 PROCEDURE — 1159F MED LIST DOCD IN RCRD: CPT | Mod: S$GLB,,, | Performed by: PODIATRIST

## 2021-03-23 PROCEDURE — 1101F PR PT FALLS ASSESS DOC 0-1 FALLS W/OUT INJ PAST YR: ICD-10-PCS | Mod: CPTII,S$GLB,, | Performed by: PODIATRIST

## 2021-03-23 PROCEDURE — 99214 PR OFFICE/OUTPT VISIT, EST, LEVL IV, 30-39 MIN: ICD-10-PCS | Mod: S$GLB,,, | Performed by: PODIATRIST

## 2021-03-23 PROCEDURE — 1101F PT FALLS ASSESS-DOCD LE1/YR: CPT | Mod: CPTII,S$GLB,, | Performed by: PODIATRIST

## 2021-03-23 PROCEDURE — 3288F FALL RISK ASSESSMENT DOCD: CPT | Mod: CPTII,S$GLB,, | Performed by: PODIATRIST

## 2021-03-23 PROCEDURE — 99214 OFFICE O/P EST MOD 30 MIN: CPT | Mod: S$GLB,,, | Performed by: PODIATRIST

## 2021-03-23 PROCEDURE — 1159F PR MEDICATION LIST DOCUMENTED IN MEDICAL RECORD: ICD-10-PCS | Mod: S$GLB,,, | Performed by: PODIATRIST

## 2021-03-23 PROCEDURE — 3288F PR FALLS RISK ASSESSMENT DOCUMENTED: ICD-10-PCS | Mod: CPTII,S$GLB,, | Performed by: PODIATRIST

## 2021-05-18 DIAGNOSIS — H05.20 OCULAR PROPTOSIS: Primary | ICD-10-CM

## 2021-05-24 ENCOUNTER — HOSPITAL ENCOUNTER (OUTPATIENT)
Dept: RADIOLOGY | Facility: HOSPITAL | Age: 86
Discharge: HOME | End: 2021-05-24
Attending: OPTOMETRIST
Payer: MEDICARE

## 2021-05-24 DIAGNOSIS — H05.20 OCULAR PROPTOSIS: ICD-10-CM

## 2021-05-24 PROCEDURE — 70480 CT ORBIT/EAR/FOSSA W/O DYE: CPT | Mod: TC,PO

## 2021-06-30 DIAGNOSIS — M25.532 LEFT WRIST PAIN: Primary | ICD-10-CM

## 2021-07-01 ENCOUNTER — OFFICE VISIT (OUTPATIENT)
Dept: ORTHOPEDICS | Facility: CLINIC | Age: 86
End: 2021-07-01
Payer: MEDICARE

## 2021-07-01 ENCOUNTER — HOSPITAL ENCOUNTER (OUTPATIENT)
Dept: RADIOLOGY | Facility: HOSPITAL | Age: 86
Discharge: HOME | End: 2021-07-01
Attending: ORTHOPAEDIC SURGERY
Payer: MEDICARE

## 2021-07-01 DIAGNOSIS — M65.4 DE QUERVAIN'S TENOSYNOVITIS, LEFT: Primary | ICD-10-CM

## 2021-07-01 DIAGNOSIS — M25.532 LEFT WRIST PAIN: ICD-10-CM

## 2021-07-01 PROCEDURE — 73110 XR WRIST COMPLETE 3 VIEWS LEFT: ICD-10-PCS | Mod: 26,LT,, | Performed by: RADIOLOGY

## 2021-07-01 PROCEDURE — 3288F FALL RISK ASSESSMENT DOCD: CPT | Mod: CPTII,S$GLB,, | Performed by: ORTHOPAEDIC SURGERY

## 2021-07-01 PROCEDURE — 3288F PR FALLS RISK ASSESSMENT DOCUMENTED: ICD-10-PCS | Mod: CPTII,S$GLB,, | Performed by: ORTHOPAEDIC SURGERY

## 2021-07-01 PROCEDURE — 1159F PR MEDICATION LIST DOCUMENTED IN MEDICAL RECORD: ICD-10-PCS | Mod: S$GLB,,, | Performed by: ORTHOPAEDIC SURGERY

## 2021-07-01 PROCEDURE — 1100F PTFALLS ASSESS-DOCD GE2>/YR: CPT | Mod: CPTII,S$GLB,, | Performed by: ORTHOPAEDIC SURGERY

## 2021-07-01 PROCEDURE — 73110 X-RAY EXAM OF WRIST: CPT | Mod: 26,LT,, | Performed by: RADIOLOGY

## 2021-07-01 PROCEDURE — 99999 PR PBB SHADOW E&M-EST. PATIENT-LVL III: ICD-10-PCS | Mod: PBBFAC,,, | Performed by: ORTHOPAEDIC SURGERY

## 2021-07-01 PROCEDURE — 73110 X-RAY EXAM OF WRIST: CPT | Mod: TC,PN,LT

## 2021-07-01 PROCEDURE — 1125F AMNT PAIN NOTED PAIN PRSNT: CPT | Mod: S$GLB,,, | Performed by: ORTHOPAEDIC SURGERY

## 2021-07-01 PROCEDURE — 99203 OFFICE O/P NEW LOW 30 MIN: CPT | Mod: 25,S$GLB,, | Performed by: ORTHOPAEDIC SURGERY

## 2021-07-01 PROCEDURE — 1159F MED LIST DOCD IN RCRD: CPT | Mod: S$GLB,,, | Performed by: ORTHOPAEDIC SURGERY

## 2021-07-01 PROCEDURE — 20550 NJX 1 TENDON SHEATH/LIGAMENT: CPT | Mod: LT,S$GLB,, | Performed by: ORTHOPAEDIC SURGERY

## 2021-07-01 PROCEDURE — 99999 PR PBB SHADOW E&M-EST. PATIENT-LVL III: CPT | Mod: PBBFAC,,, | Performed by: ORTHOPAEDIC SURGERY

## 2021-07-01 PROCEDURE — 1125F PR PAIN SEVERITY QUANTIFIED, PAIN PRESENT: ICD-10-PCS | Mod: S$GLB,,, | Performed by: ORTHOPAEDIC SURGERY

## 2021-07-01 PROCEDURE — 1100F PR PT FALLS ASSESS DOC 2+ FALLS/FALL W/INJURY/YR: ICD-10-PCS | Mod: CPTII,S$GLB,, | Performed by: ORTHOPAEDIC SURGERY

## 2021-07-01 PROCEDURE — 99203 PR OFFICE/OUTPT VISIT, NEW, LEVL III, 30-44 MIN: ICD-10-PCS | Mod: 25,S$GLB,, | Performed by: ORTHOPAEDIC SURGERY

## 2021-07-01 PROCEDURE — 20550 TENDON SHEATH: ICD-10-PCS | Mod: LT,S$GLB,, | Performed by: ORTHOPAEDIC SURGERY

## 2021-07-01 RX ORDER — TRIAMCINOLONE ACETONIDE 40 MG/ML
40 INJECTION, SUSPENSION INTRA-ARTICULAR; INTRAMUSCULAR
Status: DISCONTINUED | OUTPATIENT
Start: 2021-07-01 | End: 2021-07-01 | Stop reason: HOSPADM

## 2021-07-01 RX ADMIN — TRIAMCINOLONE ACETONIDE 40 MG: 40 INJECTION, SUSPENSION INTRA-ARTICULAR; INTRAMUSCULAR at 08:07

## 2021-10-17 ENCOUNTER — HOSPITAL ENCOUNTER (INPATIENT)
Facility: HOSPITAL | Age: 86
LOS: 3 days | Discharge: HOME HEALTH | DRG: 690 | End: 2021-10-20
Attending: EMERGENCY MEDICINE | Admitting: HOSPITALIST
Payer: MEDICARE

## 2021-10-17 DIAGNOSIS — B02.8 HERPES ZOSTER WITH COMPLICATION: ICD-10-CM

## 2021-10-17 DIAGNOSIS — I48.91 NEW ONSET A-FIB: ICD-10-CM

## 2021-10-17 DIAGNOSIS — R79.89 ELEVATED TROPONIN: ICD-10-CM

## 2021-10-17 DIAGNOSIS — R53.81 DEBILITY: ICD-10-CM

## 2021-10-17 DIAGNOSIS — R53.1 WEAKNESS: ICD-10-CM

## 2021-10-17 DIAGNOSIS — R07.9 CHEST PAIN: ICD-10-CM

## 2021-10-17 DIAGNOSIS — N30.01 ACUTE CYSTITIS WITH HEMATURIA: Primary | ICD-10-CM

## 2021-10-17 PROBLEM — E87.6 HYPOKALEMIA: Status: ACTIVE | Noted: 2021-10-17

## 2021-10-17 PROBLEM — G43.909 MIGRAINE: Status: ACTIVE | Noted: 2021-10-17

## 2021-10-17 PROBLEM — I10 ESSENTIAL HYPERTENSION: Status: ACTIVE | Noted: 2021-10-17

## 2021-10-17 PROBLEM — H35.30 MACULAR DEGENERATION: Status: ACTIVE | Noted: 2021-10-17

## 2021-10-17 PROBLEM — B02.9 HERPES ZOSTER WITHOUT COMPLICATION: Status: ACTIVE | Noted: 2021-10-17

## 2021-10-17 PROBLEM — R40.0 DROWSINESS: Status: ACTIVE | Noted: 2021-10-17

## 2021-10-17 PROBLEM — N28.89 LEFT KIDNEY MASS: Status: ACTIVE | Noted: 2021-10-17

## 2021-10-17 PROBLEM — H54.8 LEGALLY BLIND: Status: ACTIVE | Noted: 2021-10-17

## 2021-10-17 PROBLEM — R54 ADVANCED AGE: Status: ACTIVE | Noted: 2021-10-17

## 2021-10-17 LAB
ALBUMIN SERPL BCP-MCNC: 3.7 G/DL (ref 3.5–5.2)
ALP SERPL-CCNC: 67 U/L (ref 55–135)
ALT SERPL W/O P-5'-P-CCNC: 14 U/L (ref 10–44)
ANION GAP SERPL CALC-SCNC: 8 MMOL/L (ref 8–16)
AST SERPL-CCNC: 23 U/L (ref 10–40)
BACTERIA #/AREA URNS HPF: ABNORMAL /HPF
BASOPHILS # BLD AUTO: 0.02 K/UL (ref 0–0.2)
BASOPHILS NFR BLD: 0.3 % (ref 0–1.9)
BILIRUB SERPL-MCNC: 1.1 MG/DL (ref 0.1–1)
BILIRUB UR QL STRIP: NEGATIVE
BNP SERPL-MCNC: 309 PG/ML (ref 0–99)
BUN SERPL-MCNC: 16 MG/DL (ref 10–30)
CALCIUM SERPL-MCNC: 8.7 MG/DL (ref 8.7–10.5)
CHLORIDE SERPL-SCNC: 106 MMOL/L (ref 95–110)
CK SERPL-CCNC: 173 U/L (ref 20–180)
CLARITY UR: ABNORMAL
CO2 SERPL-SCNC: 20 MMOL/L (ref 23–29)
COLOR UR: YELLOW
CREAT SERPL-MCNC: 0.8 MG/DL (ref 0.5–1.4)
CREAT SERPL-MCNC: 0.8 MG/DL (ref 0.5–1.4)
DIFFERENTIAL METHOD: ABNORMAL
EOSINOPHIL # BLD AUTO: 0 K/UL (ref 0–0.5)
EOSINOPHIL NFR BLD: 0 % (ref 0–8)
ERYTHROCYTE [DISTWIDTH] IN BLOOD BY AUTOMATED COUNT: 13.2 % (ref 11.5–14.5)
EST. GFR  (AFRICAN AMERICAN): >60 ML/MIN/1.73 M^2
EST. GFR  (NON AFRICAN AMERICAN): >60 ML/MIN/1.73 M^2
GLUCOSE SERPL-MCNC: 150 MG/DL (ref 70–110)
GLUCOSE UR QL STRIP: NEGATIVE
HCT VFR BLD AUTO: 36.4 % (ref 37–48.5)
HGB BLD-MCNC: 12.6 G/DL (ref 12–16)
HGB UR QL STRIP: ABNORMAL
HYALINE CASTS #/AREA URNS LPF: 9 /LPF
IMM GRANULOCYTES # BLD AUTO: 0.08 K/UL (ref 0–0.04)
IMM GRANULOCYTES NFR BLD AUTO: 1.1 % (ref 0–0.5)
KETONES UR QL STRIP: ABNORMAL
LACTATE SERPL-SCNC: 1 MMOL/L (ref 0.5–1.9)
LEUKOCYTE ESTERASE UR QL STRIP: ABNORMAL
LIPASE SERPL-CCNC: 28 U/L (ref 4–60)
LYMPHOCYTES # BLD AUTO: 0.5 K/UL (ref 1–4.8)
LYMPHOCYTES NFR BLD: 6.8 % (ref 18–48)
MAGNESIUM SERPL-MCNC: 2 MG/DL (ref 1.6–2.6)
MCH RBC QN AUTO: 29.9 PG (ref 27–31)
MCHC RBC AUTO-ENTMCNC: 34.6 G/DL (ref 32–36)
MCV RBC AUTO: 86 FL (ref 82–98)
MICROSCOPIC COMMENT: ABNORMAL
MONOCYTES # BLD AUTO: 0.3 K/UL (ref 0.3–1)
MONOCYTES NFR BLD: 4.1 % (ref 4–15)
NEUTROPHILS # BLD AUTO: 6.2 K/UL (ref 1.8–7.7)
NEUTROPHILS NFR BLD: 87.7 % (ref 38–73)
NITRITE UR QL STRIP: POSITIVE
NRBC BLD-RTO: 0 /100 WBC
PH UR STRIP: 8 [PH] (ref 5–8)
PHOSPHATE SERPL-MCNC: 2.2 MG/DL (ref 2.7–4.5)
PLATELET # BLD AUTO: 136 K/UL (ref 150–450)
PMV BLD AUTO: 10.1 FL (ref 9.2–12.9)
POTASSIUM SERPL-SCNC: 3 MMOL/L (ref 3.5–5.1)
PROCALCITONIN SERPL IA-MCNC: <0.05 NG/ML (ref 0–0.5)
PROT SERPL-MCNC: 6.9 G/DL (ref 6–8.4)
PROT UR QL STRIP: ABNORMAL
RBC # BLD AUTO: 4.22 M/UL (ref 4–5.4)
RBC #/AREA URNS HPF: 2 /HPF (ref 0–4)
SAMPLE: NORMAL
SARS-COV-2 RDRP RESP QL NAA+PROBE: NEGATIVE
SODIUM SERPL-SCNC: 134 MMOL/L (ref 136–145)
SP GR UR STRIP: 1.01 (ref 1–1.03)
SQUAMOUS #/AREA URNS HPF: 2 /HPF
TROPONIN I SERPL DL<=0.01 NG/ML-MCNC: 0.04 NG/ML
URN SPEC COLLECT METH UR: ABNORMAL
UROBILINOGEN UR STRIP-ACNC: NEGATIVE EU/DL
WBC # BLD AUTO: 7.06 K/UL (ref 3.9–12.7)
WBC #/AREA URNS HPF: 1 /HPF (ref 0–5)

## 2021-10-17 PROCEDURE — 85025 COMPLETE CBC W/AUTO DIFF WBC: CPT | Performed by: NURSE PRACTITIONER

## 2021-10-17 PROCEDURE — 84100 ASSAY OF PHOSPHORUS: CPT | Performed by: NURSE PRACTITIONER

## 2021-10-17 PROCEDURE — 93010 EKG 12-LEAD: ICD-10-PCS | Mod: ,,, | Performed by: INTERNAL MEDICINE

## 2021-10-17 PROCEDURE — 83880 ASSAY OF NATRIURETIC PEPTIDE: CPT | Performed by: NURSE PRACTITIONER

## 2021-10-17 PROCEDURE — 93005 ELECTROCARDIOGRAM TRACING: CPT | Performed by: INTERNAL MEDICINE

## 2021-10-17 PROCEDURE — 80053 COMPREHEN METABOLIC PANEL: CPT | Performed by: NURSE PRACTITIONER

## 2021-10-17 PROCEDURE — 25500020 PHARM REV CODE 255: Performed by: EMERGENCY MEDICINE

## 2021-10-17 PROCEDURE — 36415 COLL VENOUS BLD VENIPUNCTURE: CPT | Performed by: NURSE PRACTITIONER

## 2021-10-17 PROCEDURE — 87086 URINE CULTURE/COLONY COUNT: CPT | Performed by: NURSE PRACTITIONER

## 2021-10-17 PROCEDURE — 99285 EMERGENCY DEPT VISIT HI MDM: CPT | Mod: 25

## 2021-10-17 PROCEDURE — 84484 ASSAY OF TROPONIN QUANT: CPT | Performed by: NURSE PRACTITIONER

## 2021-10-17 PROCEDURE — U0002 COVID-19 LAB TEST NON-CDC: HCPCS | Performed by: NURSE PRACTITIONER

## 2021-10-17 PROCEDURE — 25000003 PHARM REV CODE 250: Performed by: HOSPITALIST

## 2021-10-17 PROCEDURE — 12000002 HC ACUTE/MED SURGE SEMI-PRIVATE ROOM

## 2021-10-17 PROCEDURE — 63600175 PHARM REV CODE 636 W HCPCS: Performed by: HOSPITALIST

## 2021-10-17 PROCEDURE — 84145 PROCALCITONIN (PCT): CPT | Performed by: NURSE PRACTITIONER

## 2021-10-17 PROCEDURE — 63600175 PHARM REV CODE 636 W HCPCS: Performed by: NURSE PRACTITIONER

## 2021-10-17 PROCEDURE — 83690 ASSAY OF LIPASE: CPT | Performed by: NURSE PRACTITIONER

## 2021-10-17 PROCEDURE — 87040 BLOOD CULTURE FOR BACTERIA: CPT | Performed by: NURSE PRACTITIONER

## 2021-10-17 PROCEDURE — 87186 SC STD MICRODIL/AGAR DIL: CPT | Performed by: NURSE PRACTITIONER

## 2021-10-17 PROCEDURE — 93010 ELECTROCARDIOGRAM REPORT: CPT | Mod: ,,, | Performed by: INTERNAL MEDICINE

## 2021-10-17 PROCEDURE — 83735 ASSAY OF MAGNESIUM: CPT | Performed by: NURSE PRACTITIONER

## 2021-10-17 PROCEDURE — 83605 ASSAY OF LACTIC ACID: CPT | Performed by: NURSE PRACTITIONER

## 2021-10-17 PROCEDURE — 81001 URINALYSIS AUTO W/SCOPE: CPT | Performed by: NURSE PRACTITIONER

## 2021-10-17 PROCEDURE — 25000003 PHARM REV CODE 250: Performed by: NURSE PRACTITIONER

## 2021-10-17 PROCEDURE — 82550 ASSAY OF CK (CPK): CPT | Performed by: NURSE PRACTITIONER

## 2021-10-17 PROCEDURE — 87077 CULTURE AEROBIC IDENTIFY: CPT | Performed by: NURSE PRACTITIONER

## 2021-10-17 RX ORDER — ONDANSETRON 2 MG/ML
4 INJECTION INTRAMUSCULAR; INTRAVENOUS EVERY 8 HOURS PRN
Status: DISCONTINUED | OUTPATIENT
Start: 2021-10-17 | End: 2021-10-20 | Stop reason: HOSPADM

## 2021-10-17 RX ORDER — ASPIRIN 81 MG/1
81 TABLET ORAL EVERY 12 HOURS
Status: DISCONTINUED | OUTPATIENT
Start: 2021-10-17 | End: 2021-10-20 | Stop reason: HOSPADM

## 2021-10-17 RX ORDER — POTASSIUM CHLORIDE 7.45 MG/ML
20 INJECTION INTRAVENOUS
Status: DISCONTINUED | OUTPATIENT
Start: 2021-10-17 | End: 2021-10-20 | Stop reason: HOSPADM

## 2021-10-17 RX ORDER — ASPIRIN 325 MG
325 TABLET ORAL
Status: COMPLETED | OUTPATIENT
Start: 2021-10-17 | End: 2021-10-17

## 2021-10-17 RX ORDER — NALOXONE HCL 0.4 MG/ML
0.02 VIAL (ML) INJECTION
Status: DISCONTINUED | OUTPATIENT
Start: 2021-10-17 | End: 2021-10-20 | Stop reason: HOSPADM

## 2021-10-17 RX ORDER — ACETAMINOPHEN 500 MG
1000 TABLET ORAL
Status: COMPLETED | OUTPATIENT
Start: 2021-10-17 | End: 2021-10-17

## 2021-10-17 RX ORDER — ACETAMINOPHEN 325 MG/1
650 TABLET ORAL EVERY 4 HOURS PRN
Status: DISCONTINUED | OUTPATIENT
Start: 2021-10-17 | End: 2021-10-20 | Stop reason: HOSPADM

## 2021-10-17 RX ORDER — MAGNESIUM SULFATE HEPTAHYDRATE 40 MG/ML
4 INJECTION, SOLUTION INTRAVENOUS
Status: DISCONTINUED | OUTPATIENT
Start: 2021-10-17 | End: 2021-10-20 | Stop reason: HOSPADM

## 2021-10-17 RX ORDER — POTASSIUM CHLORIDE 20 MEQ/1
40 TABLET, EXTENDED RELEASE ORAL
Status: DISCONTINUED | OUTPATIENT
Start: 2021-10-17 | End: 2021-10-20 | Stop reason: HOSPADM

## 2021-10-17 RX ORDER — POLYETHYLENE GLYCOL 3350 17 G/17G
17 POWDER, FOR SOLUTION ORAL DAILY
Status: DISCONTINUED | OUTPATIENT
Start: 2021-10-18 | End: 2021-10-20 | Stop reason: HOSPADM

## 2021-10-17 RX ORDER — LEVOTHYROXINE SODIUM 25 UG/1
25 TABLET ORAL EVERY MORNING
Status: DISCONTINUED | OUTPATIENT
Start: 2021-10-18 | End: 2021-10-20 | Stop reason: HOSPADM

## 2021-10-17 RX ORDER — HYDROCODONE BITARTRATE AND ACETAMINOPHEN 5; 325 MG/1; MG/1
1 TABLET ORAL EVERY 6 HOURS PRN
Status: DISCONTINUED | OUTPATIENT
Start: 2021-10-17 | End: 2021-10-19

## 2021-10-17 RX ORDER — POTASSIUM CHLORIDE 20 MEQ/1
20 TABLET, EXTENDED RELEASE ORAL
Status: DISCONTINUED | OUTPATIENT
Start: 2021-10-17 | End: 2021-10-20 | Stop reason: HOSPADM

## 2021-10-17 RX ORDER — MAGNESIUM SULFATE HEPTAHYDRATE 40 MG/ML
2 INJECTION, SOLUTION INTRAVENOUS
Status: DISCONTINUED | OUTPATIENT
Start: 2021-10-17 | End: 2021-10-20 | Stop reason: HOSPADM

## 2021-10-17 RX ORDER — ISOSORBIDE DINITRATE 10 MG/1
30 TABLET ORAL DAILY
Status: DISCONTINUED | OUTPATIENT
Start: 2021-10-18 | End: 2021-10-20 | Stop reason: HOSPADM

## 2021-10-17 RX ORDER — TOPIRAMATE 25 MG/1
25 TABLET ORAL DAILY
Status: DISCONTINUED | OUTPATIENT
Start: 2021-10-18 | End: 2021-10-20 | Stop reason: HOSPADM

## 2021-10-17 RX ORDER — POTASSIUM CHLORIDE 7.45 MG/ML
40 INJECTION INTRAVENOUS
Status: DISCONTINUED | OUTPATIENT
Start: 2021-10-17 | End: 2021-10-20 | Stop reason: HOSPADM

## 2021-10-17 RX ORDER — LANOLIN ALCOHOL/MO/W.PET/CERES
800 CREAM (GRAM) TOPICAL
Status: DISCONTINUED | OUTPATIENT
Start: 2021-10-17 | End: 2021-10-20 | Stop reason: HOSPADM

## 2021-10-17 RX ORDER — IBUPROFEN 200 MG
16 TABLET ORAL
Status: DISCONTINUED | OUTPATIENT
Start: 2021-10-17 | End: 2021-10-19

## 2021-10-17 RX ORDER — GABAPENTIN 100 MG/1
100 CAPSULE ORAL 2 TIMES DAILY
Status: ON HOLD | COMMUNITY
End: 2021-10-17

## 2021-10-17 RX ORDER — SODIUM CHLORIDE 9 MG/ML
INJECTION, SOLUTION INTRAVENOUS CONTINUOUS
Status: DISCONTINUED | OUTPATIENT
Start: 2021-10-17 | End: 2021-10-19

## 2021-10-17 RX ORDER — IBUPROFEN 200 MG
24 TABLET ORAL
Status: DISCONTINUED | OUTPATIENT
Start: 2021-10-17 | End: 2021-10-19

## 2021-10-17 RX ORDER — MAGNESIUM SULFATE 1 G/100ML
1 INJECTION INTRAVENOUS
Status: DISCONTINUED | OUTPATIENT
Start: 2021-10-17 | End: 2021-10-20 | Stop reason: HOSPADM

## 2021-10-17 RX ORDER — AMLODIPINE BESYLATE 2.5 MG/1
2.5 TABLET ORAL DAILY
Status: DISCONTINUED | OUTPATIENT
Start: 2021-10-18 | End: 2021-10-20 | Stop reason: HOSPADM

## 2021-10-17 RX ORDER — GLUCAGON 1 MG
1 KIT INJECTION
Status: DISCONTINUED | OUTPATIENT
Start: 2021-10-17 | End: 2021-10-19

## 2021-10-17 RX ORDER — ENOXAPARIN SODIUM 100 MG/ML
40 INJECTION SUBCUTANEOUS EVERY 24 HOURS
Status: DISCONTINUED | OUTPATIENT
Start: 2021-10-17 | End: 2021-10-20 | Stop reason: HOSPADM

## 2021-10-17 RX ORDER — SODIUM CHLORIDE 0.9 % (FLUSH) 0.9 %
10 SYRINGE (ML) INJECTION EVERY 12 HOURS PRN
Status: DISCONTINUED | OUTPATIENT
Start: 2021-10-17 | End: 2021-10-20 | Stop reason: HOSPADM

## 2021-10-17 RX ORDER — ACYCLOVIR 400 MG/1
400 TABLET ORAL 2 TIMES DAILY
Status: ON HOLD | COMMUNITY
End: 2021-10-17

## 2021-10-17 RX ORDER — TALC
6 POWDER (GRAM) TOPICAL NIGHTLY PRN
Status: DISCONTINUED | OUTPATIENT
Start: 2021-10-17 | End: 2021-10-19

## 2021-10-17 RX ADMIN — ENOXAPARIN SODIUM 40 MG: 40 INJECTION SUBCUTANEOUS at 09:10

## 2021-10-17 RX ADMIN — SODIUM CHLORIDE: 0.9 INJECTION, SOLUTION INTRAVENOUS at 03:10

## 2021-10-17 RX ADMIN — MELATONIN 6 MG: at 10:10

## 2021-10-17 RX ADMIN — POTASSIUM BICARBONATE 50 MEQ: 977.5 TABLET, EFFERVESCENT ORAL at 09:10

## 2021-10-17 RX ADMIN — IOHEXOL 100 ML: 350 INJECTION, SOLUTION INTRAVENOUS at 04:10

## 2021-10-17 RX ADMIN — CEFTRIAXONE 1 G: 1 INJECTION, SOLUTION INTRAVENOUS at 04:10

## 2021-10-17 RX ADMIN — ASPIRIN 81 MG: 81 TABLET, COATED ORAL at 09:10

## 2021-10-17 RX ADMIN — ASPIRIN 325 MG ORAL TABLET 325 MG: 325 PILL ORAL at 04:10

## 2021-10-17 RX ADMIN — ACETAMINOPHEN 1000 MG: 500 TABLET, FILM COATED ORAL at 03:10

## 2021-10-18 ENCOUNTER — CLINICAL SUPPORT (OUTPATIENT)
Dept: CARDIOLOGY | Facility: HOSPITAL | Age: 86
DRG: 690 | End: 2021-10-18
Attending: HOSPITALIST
Payer: MEDICARE

## 2021-10-18 VITALS — WEIGHT: 174 LBS | HEIGHT: 65 IN | BODY MASS INDEX: 28.99 KG/M2

## 2021-10-18 LAB
ALBUMIN SERPL BCP-MCNC: 3.2 G/DL (ref 3.5–5.2)
ALP SERPL-CCNC: 51 U/L (ref 55–135)
ALT SERPL W/O P-5'-P-CCNC: 18 U/L (ref 10–44)
ANION GAP SERPL CALC-SCNC: 8 MMOL/L (ref 8–16)
AORTIC ROOT ANNULUS: 3.46 CM
AORTIC VALVE CUSP SEPERATION: 2.11 CM
AST SERPL-CCNC: 30 U/L (ref 10–40)
AV INDEX (PROSTH): 0.47
AV MEAN GRADIENT: 8 MMHG
AV PEAK GRADIENT: 12 MMHG
AV VALVE AREA: 1.51 CM2
AV VELOCITY RATIO: 45.84
BASOPHILS # BLD AUTO: 0.02 K/UL (ref 0–0.2)
BASOPHILS NFR BLD: 0.3 % (ref 0–1.9)
BILIRUB SERPL-MCNC: 1 MG/DL (ref 0.1–1)
BSA FOR ECHO PROCEDURE: 1.9 M2
BUN SERPL-MCNC: 12 MG/DL (ref 10–30)
CALCIUM SERPL-MCNC: 8 MG/DL (ref 8.7–10.5)
CHLORIDE SERPL-SCNC: 103 MMOL/L (ref 95–110)
CO2 SERPL-SCNC: 21 MMOL/L (ref 23–29)
CREAT SERPL-MCNC: 0.8 MG/DL (ref 0.5–1.4)
CV ECHO LV RWT: 0.53 CM
DIFFERENTIAL METHOD: ABNORMAL
DOP CALC AO PEAK VEL: 1.76 M/S
DOP CALC AO VTI: 31.43 CM
DOP CALC LVOT AREA: 3.2 CM2
DOP CALC LVOT DIAMETER: 2.03 CM
DOP CALC LVOT PEAK VEL: 80.67 M/S
DOP CALC LVOT STROKE VOLUME: 47.59 CM3
DOP CALCLVOT PEAK VEL VTI: 14.71 CM
E WAVE DECELERATION TIME: 288.53 MSEC
E/A RATIO: 0.58
E/E' RATIO: 13.6 M/S
ECHO LV POSTERIOR WALL: 1.14 CM (ref 0.6–1.1)
EJECTION FRACTION: 65 %
EOSINOPHIL # BLD AUTO: 0 K/UL (ref 0–0.5)
EOSINOPHIL NFR BLD: 0.2 % (ref 0–8)
ERYTHROCYTE [DISTWIDTH] IN BLOOD BY AUTOMATED COUNT: 13.2 % (ref 11.5–14.5)
EST. GFR  (AFRICAN AMERICAN): >60 ML/MIN/1.73 M^2
EST. GFR  (NON AFRICAN AMERICAN): >60 ML/MIN/1.73 M^2
FRACTIONAL SHORTENING: 28 % (ref 28–44)
GLUCOSE SERPL-MCNC: 167 MG/DL (ref 70–110)
HCT VFR BLD AUTO: 36.5 % (ref 37–48.5)
HGB BLD-MCNC: 12.6 G/DL (ref 12–16)
IMM GRANULOCYTES # BLD AUTO: 0.13 K/UL (ref 0–0.04)
IMM GRANULOCYTES NFR BLD AUTO: 2.1 % (ref 0–0.5)
INTERVENTRICULAR SEPTUM: 1.31 CM (ref 0.6–1.1)
IVRT: 145.04 MSEC
LEFT INTERNAL DIMENSION IN SYSTOLE: 3.11 CM (ref 2.1–4)
LEFT VENTRICLE MASS INDEX: 103 G/M2
LEFT VENTRICULAR INTERNAL DIMENSION IN DIASTOLE: 4.32 CM (ref 3.5–6)
LEFT VENTRICULAR MASS: 191.68 G
LV LATERAL E/E' RATIO: 13.6 M/S
LV SEPTAL E/E' RATIO: 13.6 M/S
LYMPHOCYTES # BLD AUTO: 0.5 K/UL (ref 1–4.8)
LYMPHOCYTES NFR BLD: 8.8 % (ref 18–48)
MAGNESIUM SERPL-MCNC: 1.8 MG/DL (ref 1.6–2.6)
MCH RBC QN AUTO: 29.8 PG (ref 27–31)
MCHC RBC AUTO-ENTMCNC: 34.5 G/DL (ref 32–36)
MCV RBC AUTO: 86 FL (ref 82–98)
MONOCYTES # BLD AUTO: 0.2 K/UL (ref 0.3–1)
MONOCYTES NFR BLD: 2.8 % (ref 4–15)
MV PEAK A VEL: 1.17 M/S
MV PEAK E VEL: 0.68 M/S
NEUTROPHILS # BLD AUTO: 5.2 K/UL (ref 1.8–7.7)
NEUTROPHILS NFR BLD: 85.8 % (ref 38–73)
NRBC BLD-RTO: 0 /100 WBC
PHOSPHATE SERPL-MCNC: 1.7 MG/DL (ref 2.7–4.5)
PISA TR MAX VEL: 2.29 M/S
PLATELET # BLD AUTO: 125 K/UL (ref 150–450)
PMV BLD AUTO: 10.5 FL (ref 9.2–12.9)
POTASSIUM SERPL-SCNC: 2.8 MMOL/L (ref 3.5–5.1)
POTASSIUM SERPL-SCNC: 3.6 MMOL/L (ref 3.5–5.1)
PROCALCITONIN SERPL IA-MCNC: <0.05 NG/ML (ref 0–0.5)
PROT SERPL-MCNC: 6 G/DL (ref 6–8.4)
PV PEAK VELOCITY: 113.67 CM/S
RBC # BLD AUTO: 4.23 M/UL (ref 4–5.4)
RIGHT VENTRICULAR END-DIASTOLIC DIMENSION: 365 CM
SODIUM SERPL-SCNC: 132 MMOL/L (ref 136–145)
TDI LATERAL: 0.05 M/S
TDI SEPTAL: 0.05 M/S
TDI: 0.05 M/S
TR MAX PG: 21 MMHG
TROPONIN I SERPL DL<=0.01 NG/ML-MCNC: 0.07 NG/ML
WBC # BLD AUTO: 6.11 K/UL (ref 3.9–12.7)

## 2021-10-18 PROCEDURE — 84484 ASSAY OF TROPONIN QUANT: CPT | Performed by: HOSPITALIST

## 2021-10-18 PROCEDURE — 63600175 PHARM REV CODE 636 W HCPCS: Performed by: HOSPITALIST

## 2021-10-18 PROCEDURE — 85025 COMPLETE CBC W/AUTO DIFF WBC: CPT | Performed by: HOSPITALIST

## 2021-10-18 PROCEDURE — 12000002 HC ACUTE/MED SURGE SEMI-PRIVATE ROOM

## 2021-10-18 PROCEDURE — 84145 PROCALCITONIN (PCT): CPT | Performed by: HOSPITALIST

## 2021-10-18 PROCEDURE — 94761 N-INVAS EAR/PLS OXIMETRY MLT: CPT

## 2021-10-18 PROCEDURE — 27000221 HC OXYGEN, UP TO 24 HOURS

## 2021-10-18 PROCEDURE — 93306 TTE W/DOPPLER COMPLETE: CPT

## 2021-10-18 PROCEDURE — 83735 ASSAY OF MAGNESIUM: CPT | Performed by: HOSPITALIST

## 2021-10-18 PROCEDURE — 84132 ASSAY OF SERUM POTASSIUM: CPT | Performed by: STUDENT IN AN ORGANIZED HEALTH CARE EDUCATION/TRAINING PROGRAM

## 2021-10-18 PROCEDURE — 84100 ASSAY OF PHOSPHORUS: CPT | Performed by: HOSPITALIST

## 2021-10-18 PROCEDURE — 25000003 PHARM REV CODE 250: Performed by: HOSPITALIST

## 2021-10-18 PROCEDURE — 99900031 HC PATIENT EDUCATION (STAT)

## 2021-10-18 PROCEDURE — 99900035 HC TECH TIME PER 15 MIN (STAT)

## 2021-10-18 PROCEDURE — 93306 TTE W/DOPPLER COMPLETE: CPT | Mod: 26,,, | Performed by: INTERNAL MEDICINE

## 2021-10-18 PROCEDURE — 93306 ECHO (CUPID ONLY): ICD-10-PCS | Mod: 26,,, | Performed by: INTERNAL MEDICINE

## 2021-10-18 PROCEDURE — 25000003 PHARM REV CODE 250: Performed by: STUDENT IN AN ORGANIZED HEALTH CARE EDUCATION/TRAINING PROGRAM

## 2021-10-18 PROCEDURE — 36415 COLL VENOUS BLD VENIPUNCTURE: CPT | Performed by: HOSPITALIST

## 2021-10-18 PROCEDURE — 36415 COLL VENOUS BLD VENIPUNCTURE: CPT | Performed by: STUDENT IN AN ORGANIZED HEALTH CARE EDUCATION/TRAINING PROGRAM

## 2021-10-18 PROCEDURE — 80053 COMPREHEN METABOLIC PANEL: CPT | Performed by: HOSPITALIST

## 2021-10-18 RX ORDER — HYDRALAZINE HYDROCHLORIDE 20 MG/ML
10 INJECTION INTRAMUSCULAR; INTRAVENOUS EVERY 6 HOURS PRN
Status: DISCONTINUED | OUTPATIENT
Start: 2021-10-18 | End: 2021-10-18

## 2021-10-18 RX ORDER — HYDRALAZINE HYDROCHLORIDE 25 MG/1
50 TABLET, FILM COATED ORAL EVERY 8 HOURS
Status: DISCONTINUED | OUTPATIENT
Start: 2021-10-18 | End: 2021-10-20 | Stop reason: HOSPADM

## 2021-10-18 RX ORDER — LABETALOL HYDROCHLORIDE 5 MG/ML
10 INJECTION, SOLUTION INTRAVENOUS EVERY 4 HOURS PRN
Status: DISCONTINUED | OUTPATIENT
Start: 2021-10-18 | End: 2021-10-20 | Stop reason: HOSPADM

## 2021-10-18 RX ADMIN — AMLODIPINE BESYLATE 2.5 MG: 2.5 TABLET ORAL at 08:10

## 2021-10-18 RX ADMIN — ISOSORBIDE DINITRATE 30 MG: 10 TABLET ORAL at 08:10

## 2021-10-18 RX ADMIN — ASPIRIN 81 MG: 81 TABLET, COATED ORAL at 09:10

## 2021-10-18 RX ADMIN — HYDRALAZINE HYDROCHLORIDE 50 MG: 25 TABLET, FILM COATED ORAL at 09:10

## 2021-10-18 RX ADMIN — LEVOTHYROXINE SODIUM 25 MCG: 25 TABLET ORAL at 07:10

## 2021-10-18 RX ADMIN — ENOXAPARIN SODIUM 40 MG: 40 INJECTION SUBCUTANEOUS at 04:10

## 2021-10-18 RX ADMIN — SODIUM CHLORIDE: 0.9 INJECTION, SOLUTION INTRAVENOUS at 12:10

## 2021-10-18 RX ADMIN — POLYETHYLENE GLYCOL 3350 17 G: 17 POWDER, FOR SOLUTION ORAL at 08:10

## 2021-10-18 RX ADMIN — TOPIRAMATE 25 MG: 25 TABLET, FILM COATED ORAL at 08:10

## 2021-10-18 RX ADMIN — CEFTRIAXONE SODIUM 1 G: 1 INJECTION, POWDER, FOR SOLUTION INTRAMUSCULAR; INTRAVENOUS at 04:10

## 2021-10-18 RX ADMIN — HYDRALAZINE HYDROCHLORIDE 50 MG: 25 TABLET, FILM COATED ORAL at 11:10

## 2021-10-18 RX ADMIN — ASPIRIN 81 MG: 81 TABLET, COATED ORAL at 08:10

## 2021-10-18 RX ADMIN — SODIUM CHLORIDE: 0.9 INJECTION, SOLUTION INTRAVENOUS at 09:10

## 2021-10-18 RX ADMIN — POTASSIUM CHLORIDE 40 MEQ: 1500 TABLET, EXTENDED RELEASE ORAL at 07:10

## 2021-10-19 PROBLEM — R53.81 DEBILITY: Status: ACTIVE | Noted: 2021-10-19

## 2021-10-19 LAB
ALBUMIN SERPL BCP-MCNC: 3 G/DL (ref 3.5–5.2)
ALP SERPL-CCNC: 56 U/L (ref 55–135)
ALT SERPL W/O P-5'-P-CCNC: 19 U/L (ref 10–44)
ANION GAP SERPL CALC-SCNC: 10 MMOL/L (ref 8–16)
AST SERPL-CCNC: 31 U/L (ref 10–40)
BACTERIA UR CULT: ABNORMAL
BASOPHILS NFR BLD: 0 % (ref 0–1.9)
BILIRUB SERPL-MCNC: 0.7 MG/DL (ref 0.1–1)
BUN SERPL-MCNC: 13 MG/DL (ref 10–30)
CALCIUM SERPL-MCNC: 8.1 MG/DL (ref 8.7–10.5)
CHLORIDE SERPL-SCNC: 103 MMOL/L (ref 95–110)
CO2 SERPL-SCNC: 17 MMOL/L (ref 23–29)
CREAT SERPL-MCNC: 0.8 MG/DL (ref 0.5–1.4)
DIFFERENTIAL METHOD: ABNORMAL
EOSINOPHIL NFR BLD: 0 % (ref 0–8)
ERYTHROCYTE [DISTWIDTH] IN BLOOD BY AUTOMATED COUNT: 13.2 % (ref 11.5–14.5)
EST. GFR  (AFRICAN AMERICAN): >60 ML/MIN/1.73 M^2
EST. GFR  (NON AFRICAN AMERICAN): >60 ML/MIN/1.73 M^2
GLUCOSE SERPL-MCNC: 184 MG/DL (ref 70–110)
HCT VFR BLD AUTO: 36.8 % (ref 37–48.5)
HGB BLD-MCNC: 12.9 G/DL (ref 12–16)
IMM GRANULOCYTES # BLD AUTO: ABNORMAL K/UL (ref 0–0.04)
IMM GRANULOCYTES NFR BLD AUTO: ABNORMAL % (ref 0–0.5)
LYMPHOCYTES NFR BLD: 11 % (ref 18–48)
MAGNESIUM SERPL-MCNC: 1.8 MG/DL (ref 1.6–2.6)
MCH RBC QN AUTO: 30.1 PG (ref 27–31)
MCHC RBC AUTO-ENTMCNC: 35.1 G/DL (ref 32–36)
MCV RBC AUTO: 86 FL (ref 82–98)
METAMYELOCYTES NFR BLD MANUAL: 1 %
MONOCYTES NFR BLD: 1 % (ref 4–15)
NEUTROPHILS NFR BLD: 68 % (ref 38–73)
NEUTS BAND NFR BLD MANUAL: 19 %
NRBC BLD-RTO: 0 /100 WBC
PHOSPHATE SERPL-MCNC: 2 MG/DL (ref 2.7–4.5)
PLATELET # BLD AUTO: 113 K/UL (ref 150–450)
PLATELET BLD QL SMEAR: ABNORMAL
PMV BLD AUTO: 11.2 FL (ref 9.2–12.9)
POIKILOCYTOSIS BLD QL SMEAR: SLIGHT
POLYCHROMASIA BLD QL SMEAR: ABNORMAL
POTASSIUM SERPL-SCNC: 2.6 MMOL/L (ref 3.5–5.1)
POTASSIUM SERPL-SCNC: 3.6 MMOL/L (ref 3.5–5.1)
PROT SERPL-MCNC: 5.7 G/DL (ref 6–8.4)
RBC # BLD AUTO: 4.29 M/UL (ref 4–5.4)
SODIUM SERPL-SCNC: 130 MMOL/L (ref 136–145)
WBC # BLD AUTO: 6.96 K/UL (ref 3.9–12.7)
WBC TOXIC VACUOLES BLD QL SMEAR: PRESENT

## 2021-10-19 PROCEDURE — 36415 COLL VENOUS BLD VENIPUNCTURE: CPT | Performed by: HOSPITALIST

## 2021-10-19 PROCEDURE — 83735 ASSAY OF MAGNESIUM: CPT | Performed by: HOSPITALIST

## 2021-10-19 PROCEDURE — 85007 BL SMEAR W/DIFF WBC COUNT: CPT | Performed by: HOSPITALIST

## 2021-10-19 PROCEDURE — 12000002 HC ACUTE/MED SURGE SEMI-PRIVATE ROOM

## 2021-10-19 PROCEDURE — 63600175 PHARM REV CODE 636 W HCPCS: Performed by: INTERNAL MEDICINE

## 2021-10-19 PROCEDURE — 25000003 PHARM REV CODE 250: Performed by: INTERNAL MEDICINE

## 2021-10-19 PROCEDURE — 97535 SELF CARE MNGMENT TRAINING: CPT

## 2021-10-19 PROCEDURE — 85027 COMPLETE CBC AUTOMATED: CPT | Performed by: HOSPITALIST

## 2021-10-19 PROCEDURE — 84132 ASSAY OF SERUM POTASSIUM: CPT | Performed by: NURSE PRACTITIONER

## 2021-10-19 PROCEDURE — 80053 COMPREHEN METABOLIC PANEL: CPT | Performed by: HOSPITALIST

## 2021-10-19 PROCEDURE — 97530 THERAPEUTIC ACTIVITIES: CPT

## 2021-10-19 PROCEDURE — 25000003 PHARM REV CODE 250: Performed by: STUDENT IN AN ORGANIZED HEALTH CARE EDUCATION/TRAINING PROGRAM

## 2021-10-19 PROCEDURE — 63600175 PHARM REV CODE 636 W HCPCS: Performed by: HOSPITALIST

## 2021-10-19 PROCEDURE — 84100 ASSAY OF PHOSPHORUS: CPT | Performed by: HOSPITALIST

## 2021-10-19 PROCEDURE — 25000003 PHARM REV CODE 250: Performed by: HOSPITALIST

## 2021-10-19 PROCEDURE — 36415 COLL VENOUS BLD VENIPUNCTURE: CPT | Performed by: NURSE PRACTITIONER

## 2021-10-19 PROCEDURE — 97166 OT EVAL MOD COMPLEX 45 MIN: CPT

## 2021-10-19 RX ORDER — POTASSIUM CHLORIDE 20 MEQ/1
20 TABLET, EXTENDED RELEASE ORAL 2 TIMES DAILY
Status: DISCONTINUED | OUTPATIENT
Start: 2021-10-19 | End: 2021-10-20 | Stop reason: HOSPADM

## 2021-10-19 RX ORDER — FAMOTIDINE 20 MG/1
20 TABLET, FILM COATED ORAL DAILY
Status: DISCONTINUED | OUTPATIENT
Start: 2021-10-19 | End: 2021-10-20 | Stop reason: HOSPADM

## 2021-10-19 RX ORDER — NITROFURANTOIN 25; 75 MG/1; MG/1
100 CAPSULE ORAL EVERY 12 HOURS
Status: DISCONTINUED | OUTPATIENT
Start: 2021-10-19 | End: 2021-10-20 | Stop reason: HOSPADM

## 2021-10-19 RX ADMIN — HYDRALAZINE HYDROCHLORIDE 50 MG: 25 TABLET, FILM COATED ORAL at 05:10

## 2021-10-19 RX ADMIN — FAMOTIDINE 20 MG: 20 TABLET ORAL at 01:10

## 2021-10-19 RX ADMIN — ASPIRIN 81 MG: 81 TABLET, COATED ORAL at 08:10

## 2021-10-19 RX ADMIN — POTASSIUM CHLORIDE 40 MEQ: 1500 TABLET, EXTENDED RELEASE ORAL at 06:10

## 2021-10-19 RX ADMIN — LACTOBACILLUS TAB 4 TABLET: TAB at 04:10

## 2021-10-19 RX ADMIN — LEVOTHYROXINE SODIUM 25 MCG: 25 TABLET ORAL at 07:10

## 2021-10-19 RX ADMIN — TOPIRAMATE 25 MG: 25 TABLET, FILM COATED ORAL at 08:10

## 2021-10-19 RX ADMIN — NITROFURANTOIN (MONOHYDRATE/MACROCRYSTALS) 100 MG: 75; 25 CAPSULE ORAL at 08:10

## 2021-10-19 RX ADMIN — NITROFURANTOIN (MONOHYDRATE/MACROCRYSTALS) 100 MG: 75; 25 CAPSULE ORAL at 11:10

## 2021-10-19 RX ADMIN — LABETALOL HYDROCHLORIDE 10 MG: 5 INJECTION, SOLUTION INTRAVENOUS at 04:10

## 2021-10-19 RX ADMIN — POLYETHYLENE GLYCOL 3350 17 G: 17 POWDER, FOR SOLUTION ORAL at 08:10

## 2021-10-19 RX ADMIN — AMLODIPINE BESYLATE 2.5 MG: 2.5 TABLET ORAL at 08:10

## 2021-10-19 RX ADMIN — HYDRALAZINE HYDROCHLORIDE 50 MG: 25 TABLET, FILM COATED ORAL at 10:10

## 2021-10-19 RX ADMIN — PIPERACILLIN AND TAZOBACTAM 3.38 G: 3; .375 INJECTION, POWDER, LYOPHILIZED, FOR SOLUTION INTRAVENOUS; PARENTERAL at 09:10

## 2021-10-19 RX ADMIN — HYDRALAZINE HYDROCHLORIDE 50 MG: 25 TABLET, FILM COATED ORAL at 01:10

## 2021-10-19 RX ADMIN — SODIUM CHLORIDE: 0.9 INJECTION, SOLUTION INTRAVENOUS at 05:10

## 2021-10-19 RX ADMIN — ISOSORBIDE DINITRATE 30 MG: 10 TABLET ORAL at 08:10

## 2021-10-19 RX ADMIN — ENOXAPARIN SODIUM 40 MG: 40 INJECTION SUBCUTANEOUS at 04:10

## 2021-10-19 RX ADMIN — POTASSIUM CHLORIDE 20 MEQ: 1500 TABLET, EXTENDED RELEASE ORAL at 08:10

## 2021-10-20 VITALS
SYSTOLIC BLOOD PRESSURE: 143 MMHG | BODY MASS INDEX: 29.12 KG/M2 | HEART RATE: 98 BPM | HEIGHT: 65 IN | TEMPERATURE: 98 F | OXYGEN SATURATION: 92 % | RESPIRATION RATE: 18 BRPM | WEIGHT: 174.81 LBS | DIASTOLIC BLOOD PRESSURE: 72 MMHG

## 2021-10-20 PROBLEM — R79.89 ELEVATED TROPONIN: Status: RESOLVED | Noted: 2021-10-17 | Resolved: 2021-10-20

## 2021-10-20 PROBLEM — E87.6 HYPOKALEMIA: Status: RESOLVED | Noted: 2021-10-17 | Resolved: 2021-10-20

## 2021-10-20 PROBLEM — N30.01 ACUTE CYSTITIS WITH HEMATURIA: Status: RESOLVED | Noted: 2021-10-17 | Resolved: 2021-10-20

## 2021-10-20 PROBLEM — R53.81 DEBILITY: Status: RESOLVED | Noted: 2021-10-19 | Resolved: 2021-10-20

## 2021-10-20 LAB
ALBUMIN SERPL BCP-MCNC: 3 G/DL (ref 3.5–5.2)
ALP SERPL-CCNC: 54 U/L (ref 55–135)
ALT SERPL W/O P-5'-P-CCNC: 22 U/L (ref 10–44)
ANION GAP SERPL CALC-SCNC: 9 MMOL/L (ref 8–16)
AST SERPL-CCNC: 35 U/L (ref 10–40)
BASOPHILS # BLD AUTO: 0.02 K/UL (ref 0–0.2)
BASOPHILS NFR BLD: 0.4 % (ref 0–1.9)
BILIRUB SERPL-MCNC: 0.8 MG/DL (ref 0.1–1)
BUN SERPL-MCNC: 20 MG/DL (ref 10–30)
CALCIUM SERPL-MCNC: 8.6 MG/DL (ref 8.7–10.5)
CHLORIDE SERPL-SCNC: 106 MMOL/L (ref 95–110)
CO2 SERPL-SCNC: 20 MMOL/L (ref 23–29)
CREAT SERPL-MCNC: 0.9 MG/DL (ref 0.5–1.4)
DIFFERENTIAL METHOD: ABNORMAL
EOSINOPHIL # BLD AUTO: 0 K/UL (ref 0–0.5)
EOSINOPHIL NFR BLD: 0 % (ref 0–8)
ERYTHROCYTE [DISTWIDTH] IN BLOOD BY AUTOMATED COUNT: 13.6 % (ref 11.5–14.5)
EST. GFR  (AFRICAN AMERICAN): >60 ML/MIN/1.73 M^2
EST. GFR  (NON AFRICAN AMERICAN): 54.5 ML/MIN/1.73 M^2
GLUCOSE SERPL-MCNC: 144 MG/DL (ref 70–110)
HCT VFR BLD AUTO: 35.8 % (ref 37–48.5)
HGB BLD-MCNC: 12.5 G/DL (ref 12–16)
IMM GRANULOCYTES # BLD AUTO: 0.11 K/UL (ref 0–0.04)
IMM GRANULOCYTES NFR BLD AUTO: 2.2 % (ref 0–0.5)
LYMPHOCYTES # BLD AUTO: 0.4 K/UL (ref 1–4.8)
LYMPHOCYTES NFR BLD: 8.7 % (ref 18–48)
MCH RBC QN AUTO: 29.6 PG (ref 27–31)
MCHC RBC AUTO-ENTMCNC: 34.9 G/DL (ref 32–36)
MCV RBC AUTO: 85 FL (ref 82–98)
MONOCYTES # BLD AUTO: 0.2 K/UL (ref 0.3–1)
MONOCYTES NFR BLD: 3.4 % (ref 4–15)
NEUTROPHILS # BLD AUTO: 4.2 K/UL (ref 1.8–7.7)
NEUTROPHILS NFR BLD: 85.3 % (ref 38–73)
NRBC BLD-RTO: 0 /100 WBC
PLATELET # BLD AUTO: 115 K/UL (ref 150–450)
PMV BLD AUTO: 11.3 FL (ref 9.2–12.9)
POTASSIUM SERPL-SCNC: 3.3 MMOL/L (ref 3.5–5.1)
PROT SERPL-MCNC: 6 G/DL (ref 6–8.4)
RBC # BLD AUTO: 4.22 M/UL (ref 4–5.4)
SODIUM SERPL-SCNC: 135 MMOL/L (ref 136–145)
WBC # BLD AUTO: 4.96 K/UL (ref 3.9–12.7)

## 2021-10-20 PROCEDURE — 80053 COMPREHEN METABOLIC PANEL: CPT | Performed by: HOSPITALIST

## 2021-10-20 PROCEDURE — 92610 EVALUATE SWALLOWING FUNCTION: CPT

## 2021-10-20 PROCEDURE — 97116 GAIT TRAINING THERAPY: CPT

## 2021-10-20 PROCEDURE — 85025 COMPLETE CBC W/AUTO DIFF WBC: CPT | Performed by: HOSPITALIST

## 2021-10-20 PROCEDURE — 25000003 PHARM REV CODE 250: Performed by: STUDENT IN AN ORGANIZED HEALTH CARE EDUCATION/TRAINING PROGRAM

## 2021-10-20 PROCEDURE — 25000003 PHARM REV CODE 250: Performed by: HOSPITALIST

## 2021-10-20 PROCEDURE — 97161 PT EVAL LOW COMPLEX 20 MIN: CPT

## 2021-10-20 PROCEDURE — 25000003 PHARM REV CODE 250: Performed by: INTERNAL MEDICINE

## 2021-10-20 PROCEDURE — 36415 COLL VENOUS BLD VENIPUNCTURE: CPT | Performed by: HOSPITALIST

## 2021-10-20 PROCEDURE — 97535 SELF CARE MNGMENT TRAINING: CPT

## 2021-10-20 RX ORDER — ZOLPIDEM TARTRATE 10 MG/1
10 TABLET ORAL NIGHTLY PRN
Start: 2021-10-20

## 2021-10-20 RX ORDER — NITROFURANTOIN 25; 75 MG/1; MG/1
100 CAPSULE ORAL EVERY 12 HOURS
Qty: 14 CAPSULE | Refills: 0 | Status: SHIPPED | OUTPATIENT
Start: 2021-10-20 | End: 2021-10-27

## 2021-10-20 RX ADMIN — TOPIRAMATE 25 MG: 25 TABLET, FILM COATED ORAL at 08:10

## 2021-10-20 RX ADMIN — HYDRALAZINE HYDROCHLORIDE 50 MG: 25 TABLET, FILM COATED ORAL at 05:10

## 2021-10-20 RX ADMIN — NITROFURANTOIN (MONOHYDRATE/MACROCRYSTALS) 100 MG: 75; 25 CAPSULE ORAL at 08:10

## 2021-10-20 RX ADMIN — FAMOTIDINE 20 MG: 20 TABLET ORAL at 09:10

## 2021-10-20 RX ADMIN — LACTOBACILLUS TAB 4 TABLET: TAB at 12:10

## 2021-10-20 RX ADMIN — ISOSORBIDE DINITRATE 30 MG: 10 TABLET ORAL at 08:10

## 2021-10-20 RX ADMIN — HYDRALAZINE HYDROCHLORIDE 50 MG: 25 TABLET, FILM COATED ORAL at 02:10

## 2021-10-20 RX ADMIN — LEVOTHYROXINE SODIUM 25 MCG: 25 TABLET ORAL at 07:10

## 2021-10-20 RX ADMIN — POLYETHYLENE GLYCOL 3350 17 G: 17 POWDER, FOR SOLUTION ORAL at 08:10

## 2021-10-20 RX ADMIN — AMLODIPINE BESYLATE 2.5 MG: 2.5 TABLET ORAL at 08:10

## 2021-10-20 RX ADMIN — LACTOBACILLUS TAB 4 TABLET: TAB at 08:10

## 2021-10-20 RX ADMIN — ASPIRIN 81 MG: 81 TABLET, COATED ORAL at 08:10

## 2021-10-20 RX ADMIN — POTASSIUM CHLORIDE 20 MEQ: 1500 TABLET, EXTENDED RELEASE ORAL at 08:10

## 2021-10-22 LAB
BACTERIA BLD CULT: NORMAL
BACTERIA BLD CULT: NORMAL

## 2021-10-29 ENCOUNTER — HOSPITAL ENCOUNTER (EMERGENCY)
Facility: HOSPITAL | Age: 86
Discharge: HOME | End: 2021-10-29
Attending: EMERGENCY MEDICINE
Payer: MEDICARE

## 2021-10-29 VITALS
WEIGHT: 170 LBS | SYSTOLIC BLOOD PRESSURE: 127 MMHG | RESPIRATION RATE: 18 BRPM | HEIGHT: 65 IN | TEMPERATURE: 98 F | HEART RATE: 78 BPM | OXYGEN SATURATION: 96 % | BODY MASS INDEX: 28.32 KG/M2 | DIASTOLIC BLOOD PRESSURE: 70 MMHG

## 2021-10-29 DIAGNOSIS — G44.319 ACUTE POST-TRAUMATIC HEADACHE, NOT INTRACTABLE: ICD-10-CM

## 2021-10-29 DIAGNOSIS — M54.2 NECK PAIN: Primary | ICD-10-CM

## 2021-10-29 PROCEDURE — 25000003 PHARM REV CODE 250: Performed by: EMERGENCY MEDICINE

## 2021-10-29 PROCEDURE — 99285 EMERGENCY DEPT VISIT HI MDM: CPT | Mod: 25

## 2021-10-29 RX ORDER — METHOCARBAMOL 500 MG/1
1000 TABLET, FILM COATED ORAL 4 TIMES DAILY
Qty: 40 TABLET | Refills: 0 | Status: SHIPPED | OUTPATIENT
Start: 2021-10-29 | End: 2021-11-03

## 2021-10-29 RX ORDER — METHOCARBAMOL 500 MG/1
1000 TABLET, FILM COATED ORAL
Status: COMPLETED | OUTPATIENT
Start: 2021-10-29 | End: 2021-10-29

## 2021-10-29 RX ORDER — IPRATROPIUM BROMIDE AND ALBUTEROL SULFATE 2.5; .5 MG/3ML; MG/3ML
3 SOLUTION RESPIRATORY (INHALATION) EVERY 4 HOURS PRN
COMMUNITY

## 2021-10-29 RX ORDER — POLYETHYLENE GLYCOL 3350 17 G/17G
17 POWDER, FOR SOLUTION ORAL DAILY
COMMUNITY

## 2021-10-29 RX ORDER — HYDROCODONE BITARTRATE AND ACETAMINOPHEN 5; 325 MG/1; MG/1
1 TABLET ORAL
Status: COMPLETED | OUTPATIENT
Start: 2021-10-29 | End: 2021-10-29

## 2021-10-29 RX ORDER — HYDROCODONE BITARTRATE AND ACETAMINOPHEN 5; 325 MG/1; MG/1
1 TABLET ORAL EVERY 4 HOURS PRN
Qty: 18 TABLET | Refills: 0 | Status: SHIPPED | OUTPATIENT
Start: 2021-10-29

## 2021-10-29 RX ORDER — NITROFURANTOIN 25; 75 MG/1; MG/1
CAPSULE ORAL
COMMUNITY
Start: 2021-10-21 | End: 2024-01-29

## 2021-10-29 RX ADMIN — METHOCARBAMOL 1000 MG: 500 TABLET ORAL at 08:10

## 2021-10-29 RX ADMIN — HYDROCODONE BITARTRATE AND ACETAMINOPHEN 1 TABLET: 5; 325 TABLET ORAL at 08:10

## 2021-11-29 ENCOUNTER — HOSPITAL ENCOUNTER (EMERGENCY)
Facility: HOSPITAL | Age: 86
Discharge: HOME | End: 2021-11-29
Attending: EMERGENCY MEDICINE
Payer: MEDICARE

## 2021-11-29 VITALS
TEMPERATURE: 98 F | OXYGEN SATURATION: 97 % | WEIGHT: 165.38 LBS | RESPIRATION RATE: 18 BRPM | HEART RATE: 84 BPM | DIASTOLIC BLOOD PRESSURE: 79 MMHG | BODY MASS INDEX: 27.51 KG/M2 | SYSTOLIC BLOOD PRESSURE: 165 MMHG

## 2021-11-29 DIAGNOSIS — S61.315A LACERATION OF LEFT RING FINGER WITHOUT FOREIGN BODY WITH DAMAGE TO NAIL, INITIAL ENCOUNTER: ICD-10-CM

## 2021-11-29 DIAGNOSIS — S67.10XA CRUSHING INJURY OF DISTAL FINGER, INITIAL ENCOUNTER: Primary | ICD-10-CM

## 2021-11-29 LAB
BACTERIA #/AREA URNS HPF: ABNORMAL /HPF
BILIRUB UR QL STRIP: NEGATIVE
CLARITY UR: ABNORMAL
COLOR UR: COLORLESS
GLUCOSE UR QL STRIP: NEGATIVE
HGB UR QL STRIP: NEGATIVE
HYALINE CASTS #/AREA URNS LPF: 3 /LPF
KETONES UR QL STRIP: NEGATIVE
LEUKOCYTE ESTERASE UR QL STRIP: ABNORMAL
MICROSCOPIC COMMENT: ABNORMAL
NITRITE UR QL STRIP: POSITIVE
PH UR STRIP: 7 [PH] (ref 5–8)
PROT UR QL STRIP: NEGATIVE
RBC #/AREA URNS HPF: 0 /HPF (ref 0–4)
SP GR UR STRIP: 1 (ref 1–1.03)
SQUAMOUS #/AREA URNS HPF: 3 /HPF
URN SPEC COLLECT METH UR: ABNORMAL
UROBILINOGEN UR STRIP-ACNC: NEGATIVE EU/DL
WBC #/AREA URNS HPF: 14 /HPF (ref 0–5)

## 2021-11-29 PROCEDURE — 90714 TD VACC NO PRESV 7 YRS+ IM: CPT | Performed by: EMERGENCY MEDICINE

## 2021-11-29 PROCEDURE — 87186 SC STD MICRODIL/AGAR DIL: CPT | Performed by: EMERGENCY MEDICINE

## 2021-11-29 PROCEDURE — 12001 RPR S/N/AX/GEN/TRNK 2.5CM/<: CPT

## 2021-11-29 PROCEDURE — 90471 IMMUNIZATION ADMIN: CPT | Performed by: EMERGENCY MEDICINE

## 2021-11-29 PROCEDURE — 87086 URINE CULTURE/COLONY COUNT: CPT | Performed by: EMERGENCY MEDICINE

## 2021-11-29 PROCEDURE — 81001 URINALYSIS AUTO W/SCOPE: CPT | Performed by: EMERGENCY MEDICINE

## 2021-11-29 PROCEDURE — 87077 CULTURE AEROBIC IDENTIFY: CPT | Performed by: EMERGENCY MEDICINE

## 2021-11-29 PROCEDURE — 63600175 PHARM REV CODE 636 W HCPCS: Performed by: EMERGENCY MEDICINE

## 2021-11-29 PROCEDURE — 99284 EMERGENCY DEPT VISIT MOD MDM: CPT

## 2021-11-29 PROCEDURE — 25000003 PHARM REV CODE 250: Performed by: EMERGENCY MEDICINE

## 2021-11-29 RX ORDER — CYPROHEPTADINE HYDROCHLORIDE 4 MG/1
TABLET ORAL
COMMUNITY
Start: 2021-11-22

## 2021-11-29 RX ORDER — CEPHALEXIN 500 MG/1
500 CAPSULE ORAL EVERY 8 HOURS
Qty: 21 CAPSULE | Refills: 0 | Status: SHIPPED | OUTPATIENT
Start: 2021-11-29 | End: 2021-12-06

## 2021-11-29 RX ORDER — LIDOCAINE HYDROCHLORIDE 10 MG/ML
5 INJECTION, SOLUTION EPIDURAL; INFILTRATION; INTRACAUDAL; PERINEURAL
Status: COMPLETED | OUTPATIENT
Start: 2021-11-29 | End: 2021-11-29

## 2021-11-29 RX ORDER — CIPROFLOXACIN 500 MG/1
TABLET ORAL
COMMUNITY
Start: 2021-11-22 | End: 2021-12-02

## 2021-11-29 RX ADMIN — TETANUS AND DIPHTHERIA TOXOIDS ADSORBED 0.5 ML: 2; 2 INJECTION INTRAMUSCULAR at 07:11

## 2021-11-29 RX ADMIN — LIDOCAINE HYDROCHLORIDE 50 MG: 10 INJECTION, SOLUTION EPIDURAL; INFILTRATION; INTRACAUDAL; PERINEURAL at 07:11

## 2021-12-01 LAB — BACTERIA UR CULT: ABNORMAL

## 2021-12-08 ENCOUNTER — HOSPITAL ENCOUNTER (EMERGENCY)
Facility: HOSPITAL | Age: 86
Discharge: HOME | End: 2021-12-08
Attending: EMERGENCY MEDICINE
Payer: MEDICARE

## 2021-12-08 VITALS
BODY MASS INDEX: 26.99 KG/M2 | HEIGHT: 65 IN | DIASTOLIC BLOOD PRESSURE: 69 MMHG | RESPIRATION RATE: 16 BRPM | HEART RATE: 87 BPM | WEIGHT: 162 LBS | SYSTOLIC BLOOD PRESSURE: 123 MMHG | OXYGEN SATURATION: 95 % | TEMPERATURE: 98 F

## 2021-12-08 DIAGNOSIS — R06.89 DIFFICULTY BREATHING: Primary | ICD-10-CM

## 2021-12-08 PROCEDURE — 99283 EMERGENCY DEPT VISIT LOW MDM: CPT

## 2021-12-10 ENCOUNTER — HOSPITAL ENCOUNTER (OUTPATIENT)
Dept: RADIOLOGY | Facility: HOSPITAL | Age: 86
Discharge: HOME | End: 2021-12-10
Attending: NURSE PRACTITIONER
Payer: MEDICARE

## 2021-12-10 DIAGNOSIS — S49.92XA INJURY OF LEFT SHOULDER, INITIAL ENCOUNTER: ICD-10-CM

## 2021-12-10 DIAGNOSIS — R09.89 CHEST CONGESTION: ICD-10-CM

## 2021-12-10 DIAGNOSIS — R22.32 LUMP OF SKIN OF UPPER EXTREMITY, LEFT: ICD-10-CM

## 2021-12-10 DIAGNOSIS — S49.92XA INJURY OF LEFT SHOULDER, INITIAL ENCOUNTER: Primary | ICD-10-CM

## 2021-12-10 PROCEDURE — 76882 US LMTD JT/FCL EVL NVASC XTR: CPT | Mod: TC,LT

## 2021-12-10 PROCEDURE — 71046 X-RAY EXAM CHEST 2 VIEWS: CPT | Mod: TC

## 2021-12-14 ENCOUNTER — HOSPITAL ENCOUNTER (OUTPATIENT)
Dept: RADIOLOGY | Facility: HOSPITAL | Age: 86
Discharge: HOME | End: 2021-12-14
Attending: NURSE PRACTITIONER
Payer: MEDICARE

## 2021-12-14 DIAGNOSIS — R22.30 LOCALIZED SWELLING ON HAND: ICD-10-CM

## 2021-12-14 DIAGNOSIS — S60.222D: ICD-10-CM

## 2021-12-14 DIAGNOSIS — S56.416A: Primary | ICD-10-CM

## 2021-12-14 DIAGNOSIS — S56.416A: ICD-10-CM

## 2021-12-14 PROCEDURE — 73140 X-RAY EXAM OF FINGER(S): CPT | Mod: TC,PO,LT,59

## 2021-12-14 PROCEDURE — 73130 X-RAY EXAM OF HAND: CPT | Mod: TC,PO,LT

## 2021-12-29 ENCOUNTER — HOSPITAL ENCOUNTER (OUTPATIENT)
Dept: RADIOLOGY | Facility: HOSPITAL | Age: 86
Discharge: HOME | End: 2021-12-29
Attending: PHYSICIAN ASSISTANT
Payer: MEDICARE

## 2021-12-29 ENCOUNTER — OFFICE VISIT (OUTPATIENT)
Dept: ORTHOPEDICS | Facility: CLINIC | Age: 86
End: 2021-12-29
Payer: MEDICARE

## 2021-12-29 VITALS — HEIGHT: 65 IN | WEIGHT: 162 LBS | BODY MASS INDEX: 26.99 KG/M2

## 2021-12-29 DIAGNOSIS — S62.639A CLOSED FRACTURE OF TUFT OF DISTAL PHALANX OF FINGER: Primary | ICD-10-CM

## 2021-12-29 DIAGNOSIS — T14.8XXA FRACTURE: ICD-10-CM

## 2021-12-29 DIAGNOSIS — T14.8XXA FRACTURE: Primary | ICD-10-CM

## 2021-12-29 PROCEDURE — 99999 PR PBB SHADOW E&M-EST. PATIENT-LVL III: CPT | Mod: PBBFAC,,, | Performed by: PHYSICIAN ASSISTANT

## 2021-12-29 PROCEDURE — 1100F PTFALLS ASSESS-DOCD GE2>/YR: CPT | Mod: CPTII,S$GLB,, | Performed by: PHYSICIAN ASSISTANT

## 2021-12-29 PROCEDURE — 1159F MED LIST DOCD IN RCRD: CPT | Mod: CPTII,S$GLB,, | Performed by: PHYSICIAN ASSISTANT

## 2021-12-29 PROCEDURE — 99999 PR PBB SHADOW E&M-EST. PATIENT-LVL III: ICD-10-PCS | Mod: PBBFAC,,, | Performed by: PHYSICIAN ASSISTANT

## 2021-12-29 PROCEDURE — 99213 PR OFFICE/OUTPT VISIT, EST, LEVL III, 20-29 MIN: ICD-10-PCS | Mod: S$GLB,,, | Performed by: PHYSICIAN ASSISTANT

## 2021-12-29 PROCEDURE — 99213 OFFICE O/P EST LOW 20 MIN: CPT | Mod: S$GLB,,, | Performed by: PHYSICIAN ASSISTANT

## 2021-12-29 PROCEDURE — 1100F PR PT FALLS ASSESS DOC 2+ FALLS/FALL W/INJURY/YR: ICD-10-PCS | Mod: CPTII,S$GLB,, | Performed by: PHYSICIAN ASSISTANT

## 2021-12-29 PROCEDURE — 3288F FALL RISK ASSESSMENT DOCD: CPT | Mod: CPTII,S$GLB,, | Performed by: PHYSICIAN ASSISTANT

## 2021-12-29 PROCEDURE — 73140 XR FINGER 2 OR MORE VIEWS LEFT: ICD-10-PCS | Mod: 26,LT,, | Performed by: RADIOLOGY

## 2021-12-29 PROCEDURE — 1159F PR MEDICATION LIST DOCUMENTED IN MEDICAL RECORD: ICD-10-PCS | Mod: CPTII,S$GLB,, | Performed by: PHYSICIAN ASSISTANT

## 2021-12-29 PROCEDURE — 73140 X-RAY EXAM OF FINGER(S): CPT | Mod: 26,LT,, | Performed by: RADIOLOGY

## 2021-12-29 PROCEDURE — 73140 X-RAY EXAM OF FINGER(S): CPT | Mod: TC,PN,LT

## 2021-12-29 PROCEDURE — 3288F PR FALLS RISK ASSESSMENT DOCUMENTED: ICD-10-PCS | Mod: CPTII,S$GLB,, | Performed by: PHYSICIAN ASSISTANT

## 2021-12-29 PROCEDURE — 1125F AMNT PAIN NOTED PAIN PRSNT: CPT | Mod: CPTII,S$GLB,, | Performed by: PHYSICIAN ASSISTANT

## 2021-12-29 PROCEDURE — 1125F PR PAIN SEVERITY QUANTIFIED, PAIN PRESENT: ICD-10-PCS | Mod: CPTII,S$GLB,, | Performed by: PHYSICIAN ASSISTANT

## 2022-02-09 ENCOUNTER — HOSPITAL ENCOUNTER (OUTPATIENT)
Dept: RADIOLOGY | Facility: HOSPITAL | Age: 87
Discharge: HOME | End: 2022-02-09
Attending: ORTHOPAEDIC SURGERY
Payer: MEDICARE

## 2022-02-09 ENCOUNTER — OFFICE VISIT (OUTPATIENT)
Dept: ORTHOPEDICS | Facility: CLINIC | Age: 87
End: 2022-02-09
Payer: MEDICARE

## 2022-02-09 VITALS — BODY MASS INDEX: 27 KG/M2 | WEIGHT: 162.06 LBS | HEIGHT: 65 IN

## 2022-02-09 DIAGNOSIS — M25.561 PAIN IN BOTH KNEES, UNSPECIFIED CHRONICITY: ICD-10-CM

## 2022-02-09 DIAGNOSIS — M25.562 PAIN IN BOTH KNEES, UNSPECIFIED CHRONICITY: Primary | ICD-10-CM

## 2022-02-09 DIAGNOSIS — M25.561 PAIN IN BOTH KNEES, UNSPECIFIED CHRONICITY: Primary | ICD-10-CM

## 2022-02-09 DIAGNOSIS — G89.29 CHRONIC BILATERAL LOW BACK PAIN WITHOUT SCIATICA: ICD-10-CM

## 2022-02-09 DIAGNOSIS — M25.562 PAIN IN BOTH KNEES, UNSPECIFIED CHRONICITY: ICD-10-CM

## 2022-02-09 DIAGNOSIS — M17.12 PRIMARY OSTEOARTHRITIS OF LEFT KNEE: Primary | ICD-10-CM

## 2022-02-09 DIAGNOSIS — M54.50 CHRONIC BILATERAL LOW BACK PAIN WITHOUT SCIATICA: ICD-10-CM

## 2022-02-09 DIAGNOSIS — M17.11 PRIMARY OSTEOARTHRITIS OF RIGHT KNEE: ICD-10-CM

## 2022-02-09 PROCEDURE — 99213 OFFICE O/P EST LOW 20 MIN: CPT | Mod: S$GLB,,, | Performed by: ORTHOPAEDIC SURGERY

## 2022-02-09 PROCEDURE — 3288F PR FALLS RISK ASSESSMENT DOCUMENTED: ICD-10-PCS | Mod: CPTII,S$GLB,, | Performed by: ORTHOPAEDIC SURGERY

## 2022-02-09 PROCEDURE — 73560 XR KNEE 1 OR 2 VIEW BILATERAL: ICD-10-PCS | Mod: 26,50,, | Performed by: RADIOLOGY

## 2022-02-09 PROCEDURE — 99213 PR OFFICE/OUTPT VISIT, EST, LEVL III, 20-29 MIN: ICD-10-PCS | Mod: S$GLB,,, | Performed by: ORTHOPAEDIC SURGERY

## 2022-02-09 PROCEDURE — 3288F FALL RISK ASSESSMENT DOCD: CPT | Mod: CPTII,S$GLB,, | Performed by: ORTHOPAEDIC SURGERY

## 2022-02-09 PROCEDURE — 1101F PT FALLS ASSESS-DOCD LE1/YR: CPT | Mod: CPTII,S$GLB,, | Performed by: ORTHOPAEDIC SURGERY

## 2022-02-09 PROCEDURE — 1125F AMNT PAIN NOTED PAIN PRSNT: CPT | Mod: CPTII,S$GLB,, | Performed by: ORTHOPAEDIC SURGERY

## 2022-02-09 PROCEDURE — 1159F PR MEDICATION LIST DOCUMENTED IN MEDICAL RECORD: ICD-10-PCS | Mod: CPTII,S$GLB,, | Performed by: ORTHOPAEDIC SURGERY

## 2022-02-09 PROCEDURE — 73560 X-RAY EXAM OF KNEE 1 OR 2: CPT | Mod: 26,50,, | Performed by: RADIOLOGY

## 2022-02-09 PROCEDURE — 73560 X-RAY EXAM OF KNEE 1 OR 2: CPT | Mod: TC,50,PN

## 2022-02-09 PROCEDURE — 99999 PR PBB SHADOW E&M-EST. PATIENT-LVL III: CPT | Mod: PBBFAC,,, | Performed by: ORTHOPAEDIC SURGERY

## 2022-02-09 PROCEDURE — 1159F MED LIST DOCD IN RCRD: CPT | Mod: CPTII,S$GLB,, | Performed by: ORTHOPAEDIC SURGERY

## 2022-02-09 PROCEDURE — 99999 PR PBB SHADOW E&M-EST. PATIENT-LVL III: ICD-10-PCS | Mod: PBBFAC,,, | Performed by: ORTHOPAEDIC SURGERY

## 2022-02-09 PROCEDURE — 1101F PR PT FALLS ASSESS DOC 0-1 FALLS W/OUT INJ PAST YR: ICD-10-PCS | Mod: CPTII,S$GLB,, | Performed by: ORTHOPAEDIC SURGERY

## 2022-02-09 PROCEDURE — 1125F PR PAIN SEVERITY QUANTIFIED, PAIN PRESENT: ICD-10-PCS | Mod: CPTII,S$GLB,, | Performed by: ORTHOPAEDIC SURGERY

## 2022-02-09 NOTE — PROGRESS NOTES
2/9/2022    Past Medical History:   Diagnosis Date    Macula lutea degeneration        Past Surgical History:   Procedure Laterality Date    APPENDECTOMY      BACK SURGERY  1975    HYSTERECTOMY         Current Outpatient Medications   Medication Sig    albuterol-ipratropium (DUO-NEB) 2.5 mg-0.5 mg/3 mL nebulizer solution Take 3 mLs by nebulization every 4 (four) hours as needed. rescue    amLODIPine (NORVASC) 2.5 MG tablet Take 2.5 mg by mouth once daily.     aspirin (ECOTRIN) 81 MG EC tablet Take 81 mg by mouth every 12 (twelve) hours.    cyproheptadine (PERIACTIN) 4 mg tablet 1 tablet    HYDROcodone-acetaminophen (NORCO) 5-325 mg per tablet Take 1 tablet by mouth every 4 (four) hours as needed for Pain.    isosorbide dinitrate (ISORDIL) 30 MG Tab Take 30 mg by mouth once daily.     levothyroxine (SYNTHROID) 25 MCG tablet Take 25 mcg by mouth every morning.     magnesium 250 mg Tab Take 1 tablet by mouth once daily.    meclizine (ANTIVERT) 12.5 mg tablet Take 12.5 mg by mouth daily as needed.     nitrofurantoin, macrocrystal-monohydrate, (MACROBID) 100 MG capsule 1 capsule at bedtime with food    polyethylene glycol (GLYCOLAX) 17 gram/dose powder Take 17 g by mouth Daily.    topiramate (TOPAMAX) 25 MG tablet Take 25 mg by mouth once daily.     vit A/vit C/vit E/zinc/copper (PRESERVISION AREDS ORAL) Take 1 tablet by mouth once daily.     zolpidem (AMBIEN) 10 mg Tab Take 1 tablet (10 mg total) by mouth nightly as needed (insomnia).     No current facility-administered medications for this visit.       Review of patient's allergies indicates:  No Known Allergies    Family History   Problem Relation Age of Onset    Melanoma Neg Hx     Psoriasis Neg Hx     Lupus Neg Hx     Eczema Neg Hx        Social History     Socioeconomic History    Marital status:    Tobacco Use    Smoking status: Former Smoker     Packs/day: 1.00     Years: 20.00     Pack years: 20.00    Smokeless tobacco: Never  "Used   Substance and Sexual Activity    Alcohol use: No    Drug use: No       Chief Complaint:   Chief Complaint   Patient presents with    Left Knee - Pain, Osteoarthritis     Primary OA of Bilateral knees. Kenalog injections to bilateral knees on 12/18/2020.       Right Knee - Pain, Osteoarthritis     Primary OA of Bilateral knees. Kenalog injections to bilateral knees on 12/18/2020.            History of present illness:    This is a 89 y.o. year old female who complains of patient is being seen today for complaints of lower back buttocks pain and pain radiating into both thighs posteriorly    Review of Systems:    Constitution: Denies chills, fever, and sweats.  HENT: Denies headaches or blurry vision.  Cardiovascular: Denies chest pain or irregular heart beat.  Respiratory: Denies cough or shortness of breath.  Gastrointestinal: Denies abdominal pain, nausea, or vomiting.  Musculoskeletal:  Denies muscle cramps.  Neurological: Denies dizziness or focal weakness.  Psychiatric/Behavioral: Normal mental status.  Hematologic/Lymphatic: Denies bleeding problem or easy bruising/bleeding.  Skin: Denies rash or suspicious lesions.    Examination:    Vital Signs:    Vitals:    02/09/22 1020   Weight: 73.5 kg (162 lb 0.6 oz)   Height: 5' 5" (1.651 m)   PainSc:   9   PainLoc: Knee       Body mass index is 26.96 kg/m².    This a well-developed, well nourished patient in no acute distress.    Alert and oriented x 3 and cooperative to examination.       Physical Exam:  Left knee-no effusions good range of motion no pain all of her pain appears to be posterior in the popliteal area patient has no edema in the leg negative Homans        Right knee-no effusions good range of motion no pain anteriorly pain in the posterior popliteal area she has no edema has negative Homans in both legs         Back-patient has mostly lower back pain buttocks pain posterior thigh pain pain when she stands up her straight leg raise is " negative bilaterally she is grossly neurologically intact          Imaging:  Patient has some mild to moderate chronic arthritic changes of both knees no active lesions       Assessment: Primary osteoarthritis of left knee    Primary osteoarthritis of right knee        Plan:  I am going to send the patient to physical therapy for conditioning of her lower back and legs she has some minor degenerative changes in her left and right knees      DISCLAIMER: This note may have been dictated using voice recognition software and may contain grammatical errors.     NOTE: Consult report sent to referring provider via [employer] EMR.

## 2022-06-02 DIAGNOSIS — M25.572 LEFT ANKLE PAIN, UNSPECIFIED CHRONICITY: Primary | ICD-10-CM

## 2022-06-03 ENCOUNTER — HOSPITAL ENCOUNTER (OUTPATIENT)
Dept: RADIOLOGY | Facility: HOSPITAL | Age: 87
Discharge: HOME | End: 2022-06-03
Attending: ORTHOPAEDIC SURGERY
Payer: MEDICARE

## 2022-06-03 ENCOUNTER — OFFICE VISIT (OUTPATIENT)
Dept: ORTHOPEDICS | Facility: CLINIC | Age: 87
End: 2022-06-03
Payer: MEDICARE

## 2022-06-03 VITALS — WEIGHT: 162.06 LBS | HEIGHT: 65 IN | BODY MASS INDEX: 27 KG/M2

## 2022-06-03 DIAGNOSIS — M25.572 LEFT ANKLE PAIN, UNSPECIFIED CHRONICITY: ICD-10-CM

## 2022-06-03 DIAGNOSIS — S93.492A HIGH ANKLE SPRAIN OF LEFT LOWER EXTREMITY, INITIAL ENCOUNTER: Primary | ICD-10-CM

## 2022-06-03 DIAGNOSIS — M65.4 DE QUERVAIN'S TENOSYNOVITIS, LEFT: ICD-10-CM

## 2022-06-03 PROCEDURE — 20605 INTERMEDIATE JOINT ASPIRATION/INJECTION: L RADIOCARPAL: ICD-10-PCS | Mod: LT,S$GLB,, | Performed by: ORTHOPAEDIC SURGERY

## 2022-06-03 PROCEDURE — 1125F AMNT PAIN NOTED PAIN PRSNT: CPT | Mod: CPTII,S$GLB,, | Performed by: ORTHOPAEDIC SURGERY

## 2022-06-03 PROCEDURE — 1125F PR PAIN SEVERITY QUANTIFIED, PAIN PRESENT: ICD-10-PCS | Mod: CPTII,S$GLB,, | Performed by: ORTHOPAEDIC SURGERY

## 2022-06-03 PROCEDURE — 3288F FALL RISK ASSESSMENT DOCD: CPT | Mod: CPTII,S$GLB,, | Performed by: ORTHOPAEDIC SURGERY

## 2022-06-03 PROCEDURE — 3288F PR FALLS RISK ASSESSMENT DOCUMENTED: ICD-10-PCS | Mod: CPTII,S$GLB,, | Performed by: ORTHOPAEDIC SURGERY

## 2022-06-03 PROCEDURE — 73610 XR ANKLE COMPLETE 3 VIEW LEFT: ICD-10-PCS | Mod: 26,LT,, | Performed by: RADIOLOGY

## 2022-06-03 PROCEDURE — 73610 X-RAY EXAM OF ANKLE: CPT | Mod: TC,PN,LT

## 2022-06-03 PROCEDURE — 73610 X-RAY EXAM OF ANKLE: CPT | Mod: 26,LT,, | Performed by: RADIOLOGY

## 2022-06-03 PROCEDURE — 99213 PR OFFICE/OUTPT VISIT, EST, LEVL III, 20-29 MIN: ICD-10-PCS | Mod: 25,S$GLB,, | Performed by: ORTHOPAEDIC SURGERY

## 2022-06-03 PROCEDURE — 99999 PR PBB SHADOW E&M-EST. PATIENT-LVL III: ICD-10-PCS | Mod: PBBFAC,,, | Performed by: ORTHOPAEDIC SURGERY

## 2022-06-03 PROCEDURE — 1101F PR PT FALLS ASSESS DOC 0-1 FALLS W/OUT INJ PAST YR: ICD-10-PCS | Mod: CPTII,S$GLB,, | Performed by: ORTHOPAEDIC SURGERY

## 2022-06-03 PROCEDURE — 1159F MED LIST DOCD IN RCRD: CPT | Mod: CPTII,S$GLB,, | Performed by: ORTHOPAEDIC SURGERY

## 2022-06-03 PROCEDURE — 20605 DRAIN/INJ JOINT/BURSA W/O US: CPT | Mod: LT,S$GLB,, | Performed by: ORTHOPAEDIC SURGERY

## 2022-06-03 PROCEDURE — 99999 PR PBB SHADOW E&M-EST. PATIENT-LVL III: CPT | Mod: PBBFAC,,, | Performed by: ORTHOPAEDIC SURGERY

## 2022-06-03 PROCEDURE — 1101F PT FALLS ASSESS-DOCD LE1/YR: CPT | Mod: CPTII,S$GLB,, | Performed by: ORTHOPAEDIC SURGERY

## 2022-06-03 PROCEDURE — 1159F PR MEDICATION LIST DOCUMENTED IN MEDICAL RECORD: ICD-10-PCS | Mod: CPTII,S$GLB,, | Performed by: ORTHOPAEDIC SURGERY

## 2022-06-03 PROCEDURE — 99213 OFFICE O/P EST LOW 20 MIN: CPT | Mod: 25,S$GLB,, | Performed by: ORTHOPAEDIC SURGERY

## 2022-06-03 RX ORDER — TRIAMCINOLONE ACETONIDE 40 MG/ML
40 INJECTION, SUSPENSION INTRA-ARTICULAR; INTRAMUSCULAR
Status: SHIPPED | OUTPATIENT
Start: 2022-06-03

## 2022-06-03 RX ADMIN — TRIAMCINOLONE ACETONIDE 40 MG: 40 INJECTION, SUSPENSION INTRA-ARTICULAR; INTRAMUSCULAR at 10:06

## 2022-06-03 NOTE — PROGRESS NOTES
6/3/2022    Past Medical History:   Diagnosis Date    Macula lutea degeneration        Past Surgical History:   Procedure Laterality Date    APPENDECTOMY      BACK SURGERY  1975    HYSTERECTOMY         Current Outpatient Medications   Medication Sig    albuterol-ipratropium (DUO-NEB) 2.5 mg-0.5 mg/3 mL nebulizer solution Take 3 mLs by nebulization every 4 (four) hours as needed. rescue    amLODIPine (NORVASC) 2.5 MG tablet Take 2.5 mg by mouth once daily.     aspirin (ECOTRIN) 81 MG EC tablet Take 81 mg by mouth every 12 (twelve) hours.    cyproheptadine (PERIACTIN) 4 mg tablet 1 tablet    HYDROcodone-acetaminophen (NORCO) 5-325 mg per tablet Take 1 tablet by mouth every 4 (four) hours as needed for Pain.    isosorbide dinitrate (ISORDIL) 30 MG Tab Take 30 mg by mouth once daily.     levothyroxine (SYNTHROID) 25 MCG tablet Take 25 mcg by mouth every morning.     magnesium 250 mg Tab Take 1 tablet by mouth once daily.    meclizine (ANTIVERT) 12.5 mg tablet Take 12.5 mg by mouth daily as needed.     nitrofurantoin, macrocrystal-monohydrate, (MACROBID) 100 MG capsule 1 capsule at bedtime with food    polyethylene glycol (GLYCOLAX) 17 gram/dose powder Take 17 g by mouth Daily.    topiramate (TOPAMAX) 25 MG tablet Take 25 mg by mouth once daily.     vit A/vit C/vit E/zinc/copper (PRESERVISION AREDS ORAL) Take 1 tablet by mouth once daily.     zolpidem (AMBIEN) 10 mg Tab Take 1 tablet (10 mg total) by mouth nightly as needed (insomnia).     No current facility-administered medications for this visit.       Review of patient's allergies indicates:  No Known Allergies    Family History   Problem Relation Age of Onset    Melanoma Neg Hx     Psoriasis Neg Hx     Lupus Neg Hx     Eczema Neg Hx        Social History     Socioeconomic History    Marital status:    Tobacco Use    Smoking status: Former Smoker     Packs/day: 1.00     Years: 20.00     Pack years: 20.00    Smokeless tobacco: Never  "Used   Substance and Sexual Activity    Alcohol use: No    Drug use: No       Chief Complaint:   Chief Complaint   Patient presents with    Left Ankle - Pain, Follow-up     DOI: Around 05/13 3 weeks ago patient had a fall injuring her Left ankle when going on the patio fell down a steep step down from the house to the porch. She states she has pain and swelling since then.          History of present illness:    This is a 89 y.o. year old female who complains of patient is being seen today complaining of left ankle pain proximally 3 weeks ago she had a fall injuring her left ankle fell in the past he go on a steep step.  Patient has also been complaining of left wrist pain she had no recent fall    Review of Systems:    Constitution: Denies chills, fever, and sweats.  HENT: Denies headaches or blurry vision.  Cardiovascular: Denies chest pain or irregular heart beat.  Respiratory: Denies cough or shortness of breath.  Gastrointestinal: Denies abdominal pain, nausea, or vomiting.  Musculoskeletal:  Denies muscle cramps.  Neurological: Denies dizziness or focal weakness.  Psychiatric/Behavioral: Normal mental status.  Hematologic/Lymphatic: Denies bleeding problem or easy bruising/bleeding.  Skin: Denies rash or suspicious lesions.    Examination:    Vital Signs:    Vitals:    06/03/22 1054   Weight: 73.5 kg (162 lb 0.6 oz)   Height: 5' 5" (1.651 m)   PainSc:   9   PainLoc: Ankle       Body mass index is 26.96 kg/m².    This a well-developed, well nourished patient in no acute distress.    Alert and oriented x 3 and cooperative to examination.       Physical Exam:  Left ankle-patient has some mild swelling in the lower ankle no bruising or contusions or deformity she moves the ankle well she has tenderness in the syndesmotic area of the distal tibia and fibula        Left wrist-patient has a positive Finkelstein test over the radial styloid area    Imaging:  X-ray of the left ankle show some mild degenerative " change ankle no evidence of any significant soft tissue swelling no widening of the mortise       Assessment: High ankle sprain of left lower extremity, initial encounter    De Quervain's tenosynovitis, left        Plan:  I am going to go ahead and treat her in a midcalf walking boot will get elastic support to her lower leg and ankle      DISCLAIMER: This note may have been dictated using voice recognition software and may contain grammatical errors.     NOTE: Consult report sent to referring provider via [employer] EMR.

## 2022-06-03 NOTE — PROCEDURES
Intermediate Joint Aspiration/Injection: L radiocarpal    Date/Time: 6/3/2022 10:30 AM  Performed by: Kenneth Nevarez MD  Authorized by: Kenneth Nevarez MD     Indications:  Pain    Location:  Wrist  Site:  L radiocarpal  Needle size:  20 G  Approach:  Dorsal  Medications:  40 mg triamcinolone acetonide 40 mg/mL

## 2022-06-30 DIAGNOSIS — M17.11 PRIMARY OSTEOARTHRITIS OF RIGHT KNEE: Primary | ICD-10-CM

## 2022-07-08 ENCOUNTER — OFFICE VISIT (OUTPATIENT)
Dept: ORTHOPEDICS | Facility: CLINIC | Age: 87
End: 2022-07-08
Payer: MEDICARE

## 2022-07-08 VITALS — WEIGHT: 162.06 LBS | BODY MASS INDEX: 27 KG/M2 | HEIGHT: 65 IN

## 2022-07-08 DIAGNOSIS — M25.572 LEFT ANKLE PAIN, UNSPECIFIED CHRONICITY: ICD-10-CM

## 2022-07-08 DIAGNOSIS — S93.492A HIGH ANKLE SPRAIN OF LEFT LOWER EXTREMITY, INITIAL ENCOUNTER: ICD-10-CM

## 2022-07-08 DIAGNOSIS — M65.4 DE QUERVAIN'S TENOSYNOVITIS, LEFT: Primary | ICD-10-CM

## 2022-07-08 PROCEDURE — 1159F PR MEDICATION LIST DOCUMENTED IN MEDICAL RECORD: ICD-10-PCS | Mod: CPTII,S$GLB,, | Performed by: ORTHOPAEDIC SURGERY

## 2022-07-08 PROCEDURE — 1159F MED LIST DOCD IN RCRD: CPT | Mod: CPTII,S$GLB,, | Performed by: ORTHOPAEDIC SURGERY

## 2022-07-08 PROCEDURE — 99999 PR PBB SHADOW E&M-EST. PATIENT-LVL III: CPT | Mod: PBBFAC,,, | Performed by: ORTHOPAEDIC SURGERY

## 2022-07-08 PROCEDURE — 99213 PR OFFICE/OUTPT VISIT, EST, LEVL III, 20-29 MIN: ICD-10-PCS | Mod: S$GLB,,, | Performed by: ORTHOPAEDIC SURGERY

## 2022-07-08 PROCEDURE — 99999 PR PBB SHADOW E&M-EST. PATIENT-LVL III: ICD-10-PCS | Mod: PBBFAC,,, | Performed by: ORTHOPAEDIC SURGERY

## 2022-07-08 PROCEDURE — 1126F PR PAIN SEVERITY QUANTIFIED, NO PAIN PRESENT: ICD-10-PCS | Mod: CPTII,S$GLB,, | Performed by: ORTHOPAEDIC SURGERY

## 2022-07-08 PROCEDURE — 1126F AMNT PAIN NOTED NONE PRSNT: CPT | Mod: CPTII,S$GLB,, | Performed by: ORTHOPAEDIC SURGERY

## 2022-07-08 PROCEDURE — 1101F PR PT FALLS ASSESS DOC 0-1 FALLS W/OUT INJ PAST YR: ICD-10-PCS | Mod: CPTII,S$GLB,, | Performed by: ORTHOPAEDIC SURGERY

## 2022-07-08 PROCEDURE — 3288F PR FALLS RISK ASSESSMENT DOCUMENTED: ICD-10-PCS | Mod: CPTII,S$GLB,, | Performed by: ORTHOPAEDIC SURGERY

## 2022-07-08 PROCEDURE — 1101F PT FALLS ASSESS-DOCD LE1/YR: CPT | Mod: CPTII,S$GLB,, | Performed by: ORTHOPAEDIC SURGERY

## 2022-07-08 PROCEDURE — 99213 OFFICE O/P EST LOW 20 MIN: CPT | Mod: S$GLB,,, | Performed by: ORTHOPAEDIC SURGERY

## 2022-07-08 PROCEDURE — 3288F FALL RISK ASSESSMENT DOCD: CPT | Mod: CPTII,S$GLB,, | Performed by: ORTHOPAEDIC SURGERY

## 2022-07-08 NOTE — PROGRESS NOTES
7/8/2022    Past Medical History:   Diagnosis Date    Macula lutea degeneration        Past Surgical History:   Procedure Laterality Date    APPENDECTOMY      BACK SURGERY  1975    HYSTERECTOMY         Current Outpatient Medications   Medication Sig    albuterol-ipratropium (DUO-NEB) 2.5 mg-0.5 mg/3 mL nebulizer solution Take 3 mLs by nebulization every 4 (four) hours as needed. rescue    amLODIPine (NORVASC) 2.5 MG tablet Take 2.5 mg by mouth once daily.     aspirin (ECOTRIN) 81 MG EC tablet Take 81 mg by mouth every 12 (twelve) hours.    cyproheptadine (PERIACTIN) 4 mg tablet 1 tablet    HYDROcodone-acetaminophen (NORCO) 5-325 mg per tablet Take 1 tablet by mouth every 4 (four) hours as needed for Pain.    isosorbide dinitrate (ISORDIL) 30 MG Tab Take 30 mg by mouth once daily.     levothyroxine (SYNTHROID) 25 MCG tablet Take 25 mcg by mouth every morning.     magnesium 250 mg Tab Take 1 tablet by mouth once daily.    meclizine (ANTIVERT) 12.5 mg tablet Take 12.5 mg by mouth daily as needed.     nitrofurantoin, macrocrystal-monohydrate, (MACROBID) 100 MG capsule 1 capsule at bedtime with food    polyethylene glycol (GLYCOLAX) 17 gram/dose powder Take 17 g by mouth Daily.    topiramate (TOPAMAX) 25 MG tablet Take 25 mg by mouth once daily.     vit A/vit C/vit E/zinc/copper (PRESERVISION AREDS ORAL) Take 1 tablet by mouth once daily.     zolpidem (AMBIEN) 10 mg Tab Take 1 tablet (10 mg total) by mouth nightly as needed (insomnia).     No current facility-administered medications for this visit.     Facility-Administered Medications Ordered in Other Visits   Medication    triamcinolone acetonide injection 40 mg       Review of patient's allergies indicates:  No Known Allergies    Family History   Problem Relation Age of Onset    Melanoma Neg Hx     Psoriasis Neg Hx     Lupus Neg Hx     Eczema Neg Hx        Social History     Socioeconomic History    Marital status:    Tobacco Use     "Smoking status: Former Smoker     Packs/day: 1.00     Years: 20.00     Pack years: 20.00    Smokeless tobacco: Never Used   Substance and Sexual Activity    Alcohol use: No    Drug use: No       Chief Complaint:   Chief Complaint   Patient presents with    Left Ankle - Follow-up     LOV 06/03/2022 -  I am going to go ahead and treat her in a midcalf walking boot will get elastic support to her lower leg and ankle.No pain in the ankle today either.     Left Wrist - Follow-up     LOV 06/03/2022 - Gave injection for DeQuervain's Left wrist.  Positive Finkelstein test over the radial styloid area. She is doing better no pain today.          History of present illness:    This is a 89 y.o. year old female who complains of patient was treated for left ankle sprain in a walking boot she is doing well she give her an injection for de Quervain tenosynovitis she is doing better having no pain today    Review of Systems:    Constitution: Denies chills, fever, and sweats.  HENT: Denies headaches or blurry vision.  Cardiovascular: Denies chest pain or irregular heart beat.  Respiratory: Denies cough or shortness of breath.  Gastrointestinal: Denies abdominal pain, nausea, or vomiting.  Musculoskeletal:  Denies muscle cramps.  Neurological: Denies dizziness or focal weakness.  Psychiatric/Behavioral: Normal mental status.  Hematologic/Lymphatic: Denies bleeding problem or easy bruising/bleeding.  Skin: Denies rash or suspicious lesions.    Examination:    Vital Signs:    Vitals:    07/08/22 1116   Weight: 73.5 kg (162 lb 0.6 oz)   Height: 5' 5" (1.651 m)   PainSc: 0-No pain   PainLoc: Wrist       Body mass index is 26.96 kg/m².    This a well-developed, well nourished patient in no acute distress.    Alert and oriented x 3 and cooperative to examination.       Physical Exam:  Left ankle-patient has no pain in the left ankle no swelling or deformity      Left wrist-patient has a negative Finkelstein test with no " pain    Imaging:  No x-rays available today       Assessment: De Quervain's tenosynovitis, left    Left ankle pain, unspecified chronicity    High ankle sprain of left lower extremity, initial encounter        Plan:  Patient appears to be doing well with her left ankle sprain and left de Quervain tenosynovitis patient states that she had a fall about 2 weeks ago injuring her left shoulder we do not have x-rays available today the patient was advised to be seen in the ER for x-rays can follow up with us next week if needed      DISCLAIMER: This note may have been dictated using voice recognition software and may contain grammatical errors.     NOTE: Consult report sent to referring provider via [employer] EMR.

## 2022-07-19 ENCOUNTER — TELEPHONE (OUTPATIENT)
Dept: ORTHOPEDICS | Facility: CLINIC | Age: 87
End: 2022-07-19
Payer: MEDICARE

## 2022-07-19 NOTE — TELEPHONE ENCOUNTER
----- Message from Meme Mcdonald MA sent at 7/19/2022 11:08 AM CDT -----  Contact: daughterrudi  Questions on Xray order  Call back

## 2022-07-22 DIAGNOSIS — M79.602 LEFT ARM PAIN: Primary | ICD-10-CM

## 2022-07-26 DIAGNOSIS — M25.512 LEFT SHOULDER PAIN: Primary | ICD-10-CM

## 2022-07-26 DIAGNOSIS — M25.512 ACUTE PAIN OF LEFT SHOULDER: Primary | ICD-10-CM

## 2022-08-08 DIAGNOSIS — M25.512 ACUTE PAIN OF LEFT SHOULDER: Primary | ICD-10-CM

## 2022-08-09 ENCOUNTER — HOSPITAL ENCOUNTER (OUTPATIENT)
Dept: RADIOLOGY | Facility: HOSPITAL | Age: 87
Discharge: HOME | End: 2022-08-09
Attending: ORTHOPAEDIC SURGERY
Payer: MEDICARE

## 2022-08-09 ENCOUNTER — OFFICE VISIT (OUTPATIENT)
Dept: ORTHOPEDICS | Facility: CLINIC | Age: 87
End: 2022-08-09
Payer: MEDICARE

## 2022-08-09 VITALS — WEIGHT: 162.06 LBS | HEIGHT: 65 IN | BODY MASS INDEX: 27 KG/M2

## 2022-08-09 DIAGNOSIS — M25.512 ACUTE PAIN OF LEFT SHOULDER: ICD-10-CM

## 2022-08-09 DIAGNOSIS — M75.52 BURSITIS OF LEFT SHOULDER: Primary | ICD-10-CM

## 2022-08-09 PROCEDURE — 99213 PR OFFICE/OUTPT VISIT, EST, LEVL III, 20-29 MIN: ICD-10-PCS | Mod: 25,S$GLB,, | Performed by: ORTHOPAEDIC SURGERY

## 2022-08-09 PROCEDURE — 1101F PR PT FALLS ASSESS DOC 0-1 FALLS W/OUT INJ PAST YR: ICD-10-PCS | Mod: CPTII,S$GLB,, | Performed by: ORTHOPAEDIC SURGERY

## 2022-08-09 PROCEDURE — 3288F FALL RISK ASSESSMENT DOCD: CPT | Mod: CPTII,S$GLB,, | Performed by: ORTHOPAEDIC SURGERY

## 2022-08-09 PROCEDURE — 20610 LARGE JOINT ASPIRATION/INJECTION: L SUBACROMIAL BURSA: ICD-10-PCS | Mod: LT,S$GLB,, | Performed by: ORTHOPAEDIC SURGERY

## 2022-08-09 PROCEDURE — 73030 X-RAY EXAM OF SHOULDER: CPT | Mod: TC,PN,LT

## 2022-08-09 PROCEDURE — 1159F PR MEDICATION LIST DOCUMENTED IN MEDICAL RECORD: ICD-10-PCS | Mod: CPTII,S$GLB,, | Performed by: ORTHOPAEDIC SURGERY

## 2022-08-09 PROCEDURE — 1101F PT FALLS ASSESS-DOCD LE1/YR: CPT | Mod: CPTII,S$GLB,, | Performed by: ORTHOPAEDIC SURGERY

## 2022-08-09 PROCEDURE — 20610 DRAIN/INJ JOINT/BURSA W/O US: CPT | Mod: LT,S$GLB,, | Performed by: ORTHOPAEDIC SURGERY

## 2022-08-09 PROCEDURE — 73030 XR SHOULDER COMPLETE 2 OR MORE VIEWS LEFT: ICD-10-PCS | Mod: 26,LT,, | Performed by: RADIOLOGY

## 2022-08-09 PROCEDURE — 1159F MED LIST DOCD IN RCRD: CPT | Mod: CPTII,S$GLB,, | Performed by: ORTHOPAEDIC SURGERY

## 2022-08-09 PROCEDURE — 3288F PR FALLS RISK ASSESSMENT DOCUMENTED: ICD-10-PCS | Mod: CPTII,S$GLB,, | Performed by: ORTHOPAEDIC SURGERY

## 2022-08-09 PROCEDURE — 1125F PR PAIN SEVERITY QUANTIFIED, PAIN PRESENT: ICD-10-PCS | Mod: CPTII,S$GLB,, | Performed by: ORTHOPAEDIC SURGERY

## 2022-08-09 PROCEDURE — 99999 PR PBB SHADOW E&M-EST. PATIENT-LVL III: ICD-10-PCS | Mod: PBBFAC,,, | Performed by: ORTHOPAEDIC SURGERY

## 2022-08-09 PROCEDURE — 99213 OFFICE O/P EST LOW 20 MIN: CPT | Mod: 25,S$GLB,, | Performed by: ORTHOPAEDIC SURGERY

## 2022-08-09 PROCEDURE — 99999 PR PBB SHADOW E&M-EST. PATIENT-LVL III: CPT | Mod: PBBFAC,,, | Performed by: ORTHOPAEDIC SURGERY

## 2022-08-09 PROCEDURE — 73030 X-RAY EXAM OF SHOULDER: CPT | Mod: 26,LT,, | Performed by: RADIOLOGY

## 2022-08-09 PROCEDURE — 1125F AMNT PAIN NOTED PAIN PRSNT: CPT | Mod: CPTII,S$GLB,, | Performed by: ORTHOPAEDIC SURGERY

## 2022-08-09 RX ORDER — TRIAMCINOLONE ACETONIDE 40 MG/ML
60 INJECTION, SUSPENSION INTRA-ARTICULAR; INTRAMUSCULAR
Status: DISCONTINUED | OUTPATIENT
Start: 2022-08-09 | End: 2022-08-09 | Stop reason: HOSPADM

## 2022-08-09 RX ADMIN — TRIAMCINOLONE ACETONIDE 60 MG: 40 INJECTION, SUSPENSION INTRA-ARTICULAR; INTRAMUSCULAR at 02:08

## 2022-08-09 NOTE — PROGRESS NOTES
8/9/2022    Past Medical History:   Diagnosis Date    Macula lutea degeneration        Past Surgical History:   Procedure Laterality Date    APPENDECTOMY      BACK SURGERY  1975    HYSTERECTOMY         Current Outpatient Medications   Medication Sig    albuterol-ipratropium (DUO-NEB) 2.5 mg-0.5 mg/3 mL nebulizer solution Take 3 mLs by nebulization every 4 (four) hours as needed. rescue    amLODIPine (NORVASC) 2.5 MG tablet Take 2.5 mg by mouth once daily.     aspirin (ECOTRIN) 81 MG EC tablet Take 81 mg by mouth every 12 (twelve) hours.    cyproheptadine (PERIACTIN) 4 mg tablet 1 tablet    HYDROcodone-acetaminophen (NORCO) 5-325 mg per tablet Take 1 tablet by mouth every 4 (four) hours as needed for Pain.    isosorbide dinitrate (ISORDIL) 30 MG Tab Take 30 mg by mouth once daily.     levothyroxine (SYNTHROID) 25 MCG tablet Take 25 mcg by mouth every morning.     magnesium 250 mg Tab Take 1 tablet by mouth once daily.    meclizine (ANTIVERT) 12.5 mg tablet Take 12.5 mg by mouth daily as needed.     nitrofurantoin, macrocrystal-monohydrate, (MACROBID) 100 MG capsule 1 capsule at bedtime with food    polyethylene glycol (GLYCOLAX) 17 gram/dose powder Take 17 g by mouth Daily.    topiramate (TOPAMAX) 25 MG tablet Take 25 mg by mouth once daily.     vit A/vit C/vit E/zinc/copper (PRESERVISION AREDS ORAL) Take 1 tablet by mouth once daily.     zolpidem (AMBIEN) 10 mg Tab Take 1 tablet (10 mg total) by mouth nightly as needed (insomnia).     No current facility-administered medications for this visit.     Facility-Administered Medications Ordered in Other Visits   Medication    triamcinolone acetonide injection 40 mg       Review of patient's allergies indicates:  No Known Allergies    Family History   Problem Relation Age of Onset    Melanoma Neg Hx     Psoriasis Neg Hx     Lupus Neg Hx     Eczema Neg Hx        Social History     Socioeconomic History    Marital status:    Tobacco Use     "Smoking status: Former Smoker     Packs/day: 1.00     Years: 20.00     Pack years: 20.00    Smokeless tobacco: Never Used   Substance and Sexual Activity    Alcohol use: No    Drug use: No       Chief Complaint:   Chief Complaint   Patient presents with    Left Shoulder - Pain     Left Shoulder Pain DOI: Around 05/13. She States her shoulder pain is getting much worse. She was seen after this fall for ankle pain, but not her shoulder.          History of present illness:    This is a 89 y.o. year old female who complains of patient has been complaining of left shoulder pain back in May of this year she had an injury she states she was seen after a fall for ankle pain but not her shoulder she relates an injury which she fell injuring her left shoulder back in May of 2020 to    Review of Systems:    Constitution: Denies chills, fever, and sweats.  HENT: Denies headaches or blurry vision.  Cardiovascular: Denies chest pain or irregular heart beat.  Respiratory: Denies cough or shortness of breath.  Gastrointestinal: Denies abdominal pain, nausea, or vomiting.  Musculoskeletal:  Denies muscle cramps.  Neurological: Denies dizziness or focal weakness.  Psychiatric/Behavioral: Normal mental status.  Hematologic/Lymphatic: Denies bleeding problem or easy bruising/bleeding.  Skin: Denies rash or suspicious lesions.    Examination:    Vital Signs:    Vitals:    08/09/22 1436   Weight: 73.5 kg (162 lb 0.6 oz)   Height: 5' 5" (1.651 m)   PainSc:   9   PainLoc: Shoulder       Body mass index is 26.96 kg/m².    This a well-developed, well nourished patient in no acute distress.    Alert and oriented x 3 and cooperative to examination.       Physical Exam:  Left shoulder-patient has tenderness to palpation over the lateral deltoid deltoid area she has some weak abduction strength she has no gross deformity I am able move her shoulder actively and passively fairly well    Imaging:  X-ray of the left shoulder shows no acute " fractures some mild nerve changes       Assessment: Bursitis of left shoulder        Plan:  We will go ahead and inject the lateral deltoid bursa with Kenalog today will give her some shoulder exercises she is to come back as needed      DISCLAIMER: This note may have been dictated using voice recognition software and may contain grammatical errors.     NOTE: Consult report sent to referring provider via [employer] EMR.

## 2022-08-09 NOTE — PROCEDURES
Large Joint Aspiration/Injection: L subacromial bursa    Date/Time: 8/9/2022 2:15 PM  Performed by: Kenneth Nevarez MD  Authorized by: Kenneth Nevarez MD     Indications:  Pain  Local anesthetic:  Lidocaine 1% without epinephrine    Details:  Needle Size:  20 G  Approach:  Lateral  Location:  Shoulder  Site:  L subacromial bursa  Medications:  60 mg triamcinolone acetonide 40 mg/mL

## 2022-08-10 ENCOUNTER — HOSPITAL ENCOUNTER (OUTPATIENT)
Dept: RADIOLOGY | Facility: HOSPITAL | Age: 87
Discharge: HOME | End: 2022-08-10
Attending: NURSE PRACTITIONER
Payer: MEDICARE

## 2022-08-10 DIAGNOSIS — S93.402A SPRAIN OF LEFT ANKLE: ICD-10-CM

## 2022-08-10 DIAGNOSIS — S93.402A SPRAIN OF LEFT ANKLE: Primary | ICD-10-CM

## 2022-08-10 PROCEDURE — 73610 X-RAY EXAM OF ANKLE: CPT | Mod: TC,PO,LT

## 2022-08-23 ENCOUNTER — OFFICE VISIT (OUTPATIENT)
Dept: DERMATOLOGY | Facility: CLINIC | Age: 87
End: 2022-08-23
Payer: MEDICARE

## 2022-08-23 DIAGNOSIS — D69.2 SOLAR PURPURA: Primary | ICD-10-CM

## 2022-08-23 DIAGNOSIS — L82.1 SEBORRHEIC KERATOSES: ICD-10-CM

## 2022-08-23 PROCEDURE — 3288F PR FALLS RISK ASSESSMENT DOCUMENTED: ICD-10-PCS | Mod: CPTII,S$GLB,, | Performed by: STUDENT IN AN ORGANIZED HEALTH CARE EDUCATION/TRAINING PROGRAM

## 2022-08-23 PROCEDURE — 3288F FALL RISK ASSESSMENT DOCD: CPT | Mod: CPTII,S$GLB,, | Performed by: STUDENT IN AN ORGANIZED HEALTH CARE EDUCATION/TRAINING PROGRAM

## 2022-08-23 PROCEDURE — 99999 PR PBB SHADOW E&M-EST. PATIENT-LVL II: ICD-10-PCS | Mod: PBBFAC,,, | Performed by: STUDENT IN AN ORGANIZED HEALTH CARE EDUCATION/TRAINING PROGRAM

## 2022-08-23 PROCEDURE — 99999 PR PBB SHADOW E&M-EST. PATIENT-LVL II: CPT | Mod: PBBFAC,,, | Performed by: STUDENT IN AN ORGANIZED HEALTH CARE EDUCATION/TRAINING PROGRAM

## 2022-08-23 PROCEDURE — 1160F RVW MEDS BY RX/DR IN RCRD: CPT | Mod: CPTII,S$GLB,, | Performed by: STUDENT IN AN ORGANIZED HEALTH CARE EDUCATION/TRAINING PROGRAM

## 2022-08-23 PROCEDURE — 99212 PR OFFICE/OUTPT VISIT, EST, LEVL II, 10-19 MIN: ICD-10-PCS | Mod: S$GLB,,, | Performed by: STUDENT IN AN ORGANIZED HEALTH CARE EDUCATION/TRAINING PROGRAM

## 2022-08-23 PROCEDURE — 1126F PR PAIN SEVERITY QUANTIFIED, NO PAIN PRESENT: ICD-10-PCS | Mod: CPTII,S$GLB,, | Performed by: STUDENT IN AN ORGANIZED HEALTH CARE EDUCATION/TRAINING PROGRAM

## 2022-08-23 PROCEDURE — 1159F MED LIST DOCD IN RCRD: CPT | Mod: CPTII,S$GLB,, | Performed by: STUDENT IN AN ORGANIZED HEALTH CARE EDUCATION/TRAINING PROGRAM

## 2022-08-23 PROCEDURE — 1101F PT FALLS ASSESS-DOCD LE1/YR: CPT | Mod: CPTII,S$GLB,, | Performed by: STUDENT IN AN ORGANIZED HEALTH CARE EDUCATION/TRAINING PROGRAM

## 2022-08-23 PROCEDURE — 1101F PR PT FALLS ASSESS DOC 0-1 FALLS W/OUT INJ PAST YR: ICD-10-PCS | Mod: CPTII,S$GLB,, | Performed by: STUDENT IN AN ORGANIZED HEALTH CARE EDUCATION/TRAINING PROGRAM

## 2022-08-23 PROCEDURE — 99212 OFFICE O/P EST SF 10 MIN: CPT | Mod: S$GLB,,, | Performed by: STUDENT IN AN ORGANIZED HEALTH CARE EDUCATION/TRAINING PROGRAM

## 2022-08-23 PROCEDURE — 1159F PR MEDICATION LIST DOCUMENTED IN MEDICAL RECORD: ICD-10-PCS | Mod: CPTII,S$GLB,, | Performed by: STUDENT IN AN ORGANIZED HEALTH CARE EDUCATION/TRAINING PROGRAM

## 2022-08-23 PROCEDURE — 1126F AMNT PAIN NOTED NONE PRSNT: CPT | Mod: CPTII,S$GLB,, | Performed by: STUDENT IN AN ORGANIZED HEALTH CARE EDUCATION/TRAINING PROGRAM

## 2022-08-23 PROCEDURE — 1160F PR REVIEW ALL MEDS BY PRESCRIBER/CLIN PHARMACIST DOCUMENTED: ICD-10-PCS | Mod: CPTII,S$GLB,, | Performed by: STUDENT IN AN ORGANIZED HEALTH CARE EDUCATION/TRAINING PROGRAM

## 2022-08-23 NOTE — PROGRESS NOTES
Subjective:       Patient ID:  Erich Encarnacion is a 89 y.o. female who presents for   Chief Complaint   Patient presents with    Spot     arms     LVO 10/9/18 [place]     Patient here today for spots on arms x 2 days  Patient states they do not hurt, she can just see them  Takes asa daily.  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   Respiratory: Negative for cough and shortness of breath.    Gastrointestinal: Negative for nausea and vomiting.   Hematologic/Lymphatic: Bruises/bleeds easily.        Objective:    Physical Exam   Constitutional: She appears well-developed and well-nourished.   Neurological: She is alert and oriented to person, place, and time.   Psychiatric: She has a normal mood and affect.   Skin:   Areas Examined (abnormalities noted in diagram):   Back Inspection Performed  RUE Inspected  LUE Inspection Performed              Diagram Legend     Erythematous scaling macule/papule c/w actinic keratosis       Vascular papule c/w angioma      Pigmented verrucoid papule/plaque c/w seborrheic keratosis      Yellow umbilicated papule c/w sebaceous hyperplasia      Irregularly shaped tan macule c/w lentigo     1-2 mm smooth white papules consistent with Milia      Movable subcutaneous cyst with punctum c/w epidermal inclusion cyst      Subcutaneous movable cyst c/w pilar cyst      Firm pink to brown papule c/w dermatofibroma      Pedunculated fleshy papule(s) c/w skin tag(s)      Evenly pigmented macule c/w junctional nevus     Mildly variegated pigmented, slightly irregular-bordered macule c/w mildly atypical nevus      Flesh colored to evenly pigmented papule c/w intradermal nevus       Pink pearly papule/plaque c/w basal cell carcinoma      Erythematous hyperkeratotic cursted plaque c/w SCC      Surgical scar with no sign of skin cancer  recurrence      Open and closed comedones      Inflammatory papules and pustules      Verrucoid papule consistent consistent with wart     Erythematous eczematous patches and plaques     Dystrophic onycholytic nail with subungual debris c/w onychomycosis     Umbilicated papule    Erythematous-base heme-crusted tan verrucoid plaque consistent with inflamed seborrheic keratosis     Erythematous Silvery Scaling Plaque c/w Psoriasis     See annotation      Assessment / Plan:        Solar purpura  -on asa, bilateral forearms, related to minor trauma, no pain, will heal over time, discussed etiology    Seborrheic keratoses  These are benign inherited growths without a malignant potential. Reassurance given to patient. No treatment is necessary.              No follow-ups on file.

## 2022-10-10 ENCOUNTER — HOSPITAL ENCOUNTER (OUTPATIENT)
Dept: RADIOLOGY | Facility: HOSPITAL | Age: 87
Discharge: HOME | End: 2022-10-10
Attending: NURSE PRACTITIONER
Payer: MEDICARE

## 2022-10-10 ENCOUNTER — TELEPHONE (OUTPATIENT)
Dept: ORTHOPEDICS | Facility: CLINIC | Age: 87
End: 2022-10-10
Payer: MEDICARE

## 2022-10-10 DIAGNOSIS — I13.10 HYPERTENSIVE HEART AND CHRONIC KIDNEY DISEASE WITHOUT HEART FAILURE, WITH STAGE 1 THROUGH STAGE 4 CHRONIC KIDNEY DISEASE, OR UNSPECIFIED CHRONIC KIDNEY DISEASE: ICD-10-CM

## 2022-10-10 DIAGNOSIS — R09.89 CHEST CONGESTION: ICD-10-CM

## 2022-10-10 DIAGNOSIS — S93.492A HIGH ANKLE SPRAIN OF LEFT LOWER EXTREMITY, INITIAL ENCOUNTER: ICD-10-CM

## 2022-10-10 DIAGNOSIS — J42 CHRONIC BRONCHITIS, UNSPECIFIED CHRONIC BRONCHITIS TYPE: Primary | ICD-10-CM

## 2022-10-10 DIAGNOSIS — M79.671 BILATERAL FOOT PAIN: Primary | ICD-10-CM

## 2022-10-10 DIAGNOSIS — M79.672 BILATERAL FOOT PAIN: Primary | ICD-10-CM

## 2022-10-10 DIAGNOSIS — J42 CHRONIC BRONCHITIS, UNSPECIFIED CHRONIC BRONCHITIS TYPE: ICD-10-CM

## 2022-10-10 PROCEDURE — 71046 X-RAY EXAM CHEST 2 VIEWS: CPT | Mod: TC,PO

## 2023-02-05 NOTE — TELEPHONE ENCOUNTER
----- Message from Jaqueline Guzman sent at 9/4/2018  8:17 AM CDT -----  Contact: Patient's daughter, Court Pringle  Type:  Sooner Apoointment Request    Caller is requesting a sooner appointment.  Caller declined first available appointment listed below.  Caller will not accept being placed on the waitlist and is requesting a message be sent to doctor.    Name of Caller:  Patient's daughter  When is the first available appointment?  9/4  Symptoms:  Severe vertigo  Best Call Back Number:    Additional Information:  Patient takes Meclinzine, so I could not schedule her for today, but needs to get in before next Tuesday.     Mr. Vivas is a 74 yo male with decompensated MENDEZ cirrhosis, recurrent ascites, CAD, HTN, DM, HLD and recent paracentesis and TIPS procedure per IF on 1/30.  Patient was discharged on 2/1 with instructions to take cipro for 10 days, as well as follow-up in 10 days with Dr. Wesley, follow up for liver doppler ultrasound in 10 days.  Since this time patient was doing well, until yesterday when wife noticed him being more confused prior to bed.  when he awoke this am, patient took his am meds (wife unsure if he took lactuluse) but he remained confused so wife brought him to ED.  Symptoms are constant and moderate in severity, no relieving or exacerbating factors, associated s/sx fatigue and lethargy.  In the ED, lab work notable for creatinine 1.4, K 5.2, bili 1.4, alt 141, ast 96; UDS negative, US with no signs of infection.  Chest xray with atelectasis, CT head negative for acute process; ammonia 80.  Patient given lactulose and will be admitted to observation for further treatment of hepatic encephalopathy.  Hepatology also consulted.      Code Status, Full  Surrogate Decision maker wife Jessica

## 2023-02-25 ENCOUNTER — HOSPITAL ENCOUNTER (INPATIENT)
Facility: HOSPITAL | Age: 88
LOS: 3 days | Discharge: HOME HEALTH | DRG: 871 | End: 2023-02-28
Attending: EMERGENCY MEDICINE | Admitting: STUDENT IN AN ORGANIZED HEALTH CARE EDUCATION/TRAINING PROGRAM
Payer: MEDICARE

## 2023-02-25 DIAGNOSIS — I10 HYPERTENSION, UNSPECIFIED TYPE: ICD-10-CM

## 2023-02-25 DIAGNOSIS — R00.0 TACHYCARDIA: ICD-10-CM

## 2023-02-25 DIAGNOSIS — R06.02 SOB (SHORTNESS OF BREATH): Primary | ICD-10-CM

## 2023-02-25 PROBLEM — A41.9 SEVERE SEPSIS: Status: ACTIVE | Noted: 2023-02-25

## 2023-02-25 PROBLEM — R65.20 SEVERE SEPSIS: Status: ACTIVE | Noted: 2023-02-25

## 2023-02-25 LAB
ADENOVIRUS: NOT DETECTED
ALBUMIN SERPL BCP-MCNC: 3.5 G/DL (ref 3.5–5.2)
ALP SERPL-CCNC: 74 U/L (ref 55–135)
ALT SERPL W/O P-5'-P-CCNC: 11 U/L (ref 10–44)
ANION GAP SERPL CALC-SCNC: 9 MMOL/L (ref 8–16)
AST SERPL-CCNC: 16 U/L (ref 10–40)
BASOPHILS # BLD AUTO: 0.06 K/UL (ref 0–0.2)
BASOPHILS NFR BLD: 0.4 % (ref 0–1.9)
BILIRUB SERPL-MCNC: 0.9 MG/DL (ref 0.1–1)
BILIRUB UR QL STRIP: NEGATIVE
BNP SERPL-MCNC: 122 PG/ML (ref 0–99)
BORDETELLA PARAPERTUSSIS (IS1001): NOT DETECTED
BORDETELLA PERTUSSIS (PTXP): NOT DETECTED
BUN SERPL-MCNC: 20 MG/DL (ref 10–30)
CALCIUM SERPL-MCNC: 8.8 MG/DL (ref 8.7–10.5)
CHLAMYDIA PNEUMONIAE: NOT DETECTED
CHLORIDE SERPL-SCNC: 101 MMOL/L (ref 95–110)
CLARITY UR: CLEAR
CO2 SERPL-SCNC: 23 MMOL/L (ref 23–29)
COLOR UR: YELLOW
CORONAVIRUS 229E, COMMON COLD VIRUS: NOT DETECTED
CORONAVIRUS HKU1, COMMON COLD VIRUS: NOT DETECTED
CORONAVIRUS NL63, COMMON COLD VIRUS: NOT DETECTED
CORONAVIRUS OC43, COMMON COLD VIRUS: NOT DETECTED
CREAT SERPL-MCNC: 0.9 MG/DL (ref 0.5–1.4)
DIFFERENTIAL METHOD: ABNORMAL
EOSINOPHIL # BLD AUTO: 0.7 K/UL (ref 0–0.5)
EOSINOPHIL NFR BLD: 4.1 % (ref 0–8)
ERYTHROCYTE [DISTWIDTH] IN BLOOD BY AUTOMATED COUNT: 13.7 % (ref 11.5–14.5)
EST. GFR  (NO RACE VARIABLE): 58.5 ML/MIN/1.73 M^2
FLUBV RNA NPH QL NAA+NON-PROBE: NOT DETECTED
GLUCOSE SERPL-MCNC: 158 MG/DL (ref 70–110)
GLUCOSE UR QL STRIP: NEGATIVE
HCT VFR BLD AUTO: 37 % (ref 37–48.5)
HGB BLD-MCNC: 12.6 G/DL (ref 12–16)
HGB UR QL STRIP: NEGATIVE
HPIV1 RNA NPH QL NAA+NON-PROBE: NOT DETECTED
HPIV2 RNA NPH QL NAA+NON-PROBE: NOT DETECTED
HPIV3 RNA NPH QL NAA+NON-PROBE: NOT DETECTED
HPIV4 RNA NPH QL NAA+NON-PROBE: NOT DETECTED
HUMAN METAPNEUMOVIRUS: NOT DETECTED
IMM GRANULOCYTES # BLD AUTO: 0.07 K/UL (ref 0–0.04)
IMM GRANULOCYTES NFR BLD AUTO: 0.4 % (ref 0–0.5)
INFLUENZA A (SUBTYPES H1,H1-2009,H3): NOT DETECTED
INFLUENZA A, MOLECULAR: NEGATIVE
INFLUENZA B, MOLECULAR: NEGATIVE
KETONES UR QL STRIP: ABNORMAL
LACTATE SERPL-SCNC: 1.3 MMOL/L (ref 0.5–1.9)
LACTATE SERPL-SCNC: 1.8 MMOL/L (ref 0.5–1.9)
LEUKOCYTE ESTERASE UR QL STRIP: NEGATIVE
LYMPHOCYTES # BLD AUTO: 2.7 K/UL (ref 1–4.8)
LYMPHOCYTES NFR BLD: 16.8 % (ref 18–48)
MCH RBC QN AUTO: 29.6 PG (ref 27–31)
MCHC RBC AUTO-ENTMCNC: 34.1 G/DL (ref 32–36)
MCV RBC AUTO: 87 FL (ref 82–98)
MONOCYTES # BLD AUTO: 0.8 K/UL (ref 0.3–1)
MONOCYTES NFR BLD: 4.8 % (ref 4–15)
MYCOPLASMA PNEUMONIAE: NOT DETECTED
NEUTROPHILS # BLD AUTO: 12 K/UL (ref 1.8–7.7)
NEUTROPHILS NFR BLD: 73.5 % (ref 38–73)
NITRITE UR QL STRIP: NEGATIVE
NRBC BLD-RTO: 0 /100 WBC
PH UR STRIP: 6 [PH] (ref 5–8)
PLATELET # BLD AUTO: 191 K/UL (ref 150–450)
PMV BLD AUTO: 10.9 FL (ref 9.2–12.9)
POTASSIUM SERPL-SCNC: 3.4 MMOL/L (ref 3.5–5.1)
PROCALCITONIN SERPL IA-MCNC: 0.15 NG/ML (ref 0–0.5)
PROCALCITONIN SERPL IA-MCNC: 1.13 NG/ML (ref 0–0.5)
PROT SERPL-MCNC: 6.8 G/DL (ref 6–8.4)
PROT UR QL STRIP: ABNORMAL
RBC # BLD AUTO: 4.26 M/UL (ref 4–5.4)
RESPIRATORY INFECTION PANEL SOURCE: NORMAL
RSV RNA NPH QL NAA+NON-PROBE: NOT DETECTED
RV+EV RNA NPH QL NAA+NON-PROBE: NOT DETECTED
SARS-COV-2 RDRP RESP QL NAA+PROBE: NEGATIVE
SARS-COV-2 RNA RESP QL NAA+PROBE: NOT DETECTED
SODIUM SERPL-SCNC: 133 MMOL/L (ref 136–145)
SP GR UR STRIP: 1.01 (ref 1–1.03)
SPECIMEN SOURCE: NORMAL
TROPONIN I SERPL HS-MCNC: 21.8 PG/ML (ref 0–14.9)
TROPONIN I SERPL HS-MCNC: 30.9 PG/ML (ref 0–14.9)
TROPONIN I SERPL HS-MCNC: 32 PG/ML (ref 0–14.9)
URN SPEC COLLECT METH UR: ABNORMAL
UROBILINOGEN UR STRIP-ACNC: NEGATIVE EU/DL
WBC # BLD AUTO: 16.27 K/UL (ref 3.9–12.7)

## 2023-02-25 PROCEDURE — 94640 AIRWAY INHALATION TREATMENT: CPT

## 2023-02-25 PROCEDURE — 83880 ASSAY OF NATRIURETIC PEPTIDE: CPT | Performed by: EMERGENCY MEDICINE

## 2023-02-25 PROCEDURE — 81003 URINALYSIS AUTO W/O SCOPE: CPT | Performed by: EMERGENCY MEDICINE

## 2023-02-25 PROCEDURE — 94660 CPAP INITIATION&MGMT: CPT

## 2023-02-25 PROCEDURE — 94761 N-INVAS EAR/PLS OXIMETRY MLT: CPT

## 2023-02-25 PROCEDURE — 97163 PT EVAL HIGH COMPLEX 45 MIN: CPT

## 2023-02-25 PROCEDURE — 25000242 PHARM REV CODE 250 ALT 637 W/ HCPCS: Performed by: EMERGENCY MEDICINE

## 2023-02-25 PROCEDURE — 80053 COMPREHEN METABOLIC PANEL: CPT | Performed by: EMERGENCY MEDICINE

## 2023-02-25 PROCEDURE — 99900031 HC PATIENT EDUCATION (STAT)

## 2023-02-25 PROCEDURE — 83605 ASSAY OF LACTIC ACID: CPT | Performed by: EMERGENCY MEDICINE

## 2023-02-25 PROCEDURE — 99900035 HC TECH TIME PER 15 MIN (STAT)

## 2023-02-25 PROCEDURE — 97116 GAIT TRAINING THERAPY: CPT

## 2023-02-25 PROCEDURE — 85025 COMPLETE CBC W/AUTO DIFF WBC: CPT | Performed by: EMERGENCY MEDICINE

## 2023-02-25 PROCEDURE — 93005 ELECTROCARDIOGRAM TRACING: CPT | Performed by: SPECIALIST

## 2023-02-25 PROCEDURE — 87798 DETECT AGENT NOS DNA AMP: CPT | Mod: 59 | Performed by: STUDENT IN AN ORGANIZED HEALTH CARE EDUCATION/TRAINING PROGRAM

## 2023-02-25 PROCEDURE — 63600175 PHARM REV CODE 636 W HCPCS: Performed by: STUDENT IN AN ORGANIZED HEALTH CARE EDUCATION/TRAINING PROGRAM

## 2023-02-25 PROCEDURE — 63600175 PHARM REV CODE 636 W HCPCS: Performed by: EMERGENCY MEDICINE

## 2023-02-25 PROCEDURE — 87040 BLOOD CULTURE FOR BACTERIA: CPT | Mod: 59 | Performed by: EMERGENCY MEDICINE

## 2023-02-25 PROCEDURE — 94799 UNLISTED PULMONARY SVC/PX: CPT

## 2023-02-25 PROCEDURE — 93010 ELECTROCARDIOGRAM REPORT: CPT | Mod: GW,,, | Performed by: SPECIALIST

## 2023-02-25 PROCEDURE — 25000003 PHARM REV CODE 250: Performed by: STUDENT IN AN ORGANIZED HEALTH CARE EDUCATION/TRAINING PROGRAM

## 2023-02-25 PROCEDURE — 27000221 HC OXYGEN, UP TO 24 HOURS

## 2023-02-25 PROCEDURE — 21000000 HC CCU ICU ROOM CHARGE

## 2023-02-25 PROCEDURE — 99291 CRITICAL CARE FIRST HOUR: CPT

## 2023-02-25 PROCEDURE — 36415 COLL VENOUS BLD VENIPUNCTURE: CPT | Performed by: STUDENT IN AN ORGANIZED HEALTH CARE EDUCATION/TRAINING PROGRAM

## 2023-02-25 PROCEDURE — U0002 COVID-19 LAB TEST NON-CDC: HCPCS | Performed by: EMERGENCY MEDICINE

## 2023-02-25 PROCEDURE — 96374 THER/PROPH/DIAG INJ IV PUSH: CPT

## 2023-02-25 PROCEDURE — 25000242 PHARM REV CODE 250 ALT 637 W/ HCPCS: Performed by: STUDENT IN AN ORGANIZED HEALTH CARE EDUCATION/TRAINING PROGRAM

## 2023-02-25 PROCEDURE — 87633 RESP VIRUS 12-25 TARGETS: CPT | Performed by: STUDENT IN AN ORGANIZED HEALTH CARE EDUCATION/TRAINING PROGRAM

## 2023-02-25 PROCEDURE — 84145 PROCALCITONIN (PCT): CPT | Performed by: EMERGENCY MEDICINE

## 2023-02-25 PROCEDURE — 63600175 PHARM REV CODE 636 W HCPCS: Mod: TB,JG | Performed by: EMERGENCY MEDICINE

## 2023-02-25 PROCEDURE — 93010 EKG 12-LEAD: ICD-10-PCS | Mod: GW,,, | Performed by: SPECIALIST

## 2023-02-25 PROCEDURE — 84145 PROCALCITONIN (PCT): CPT | Mod: 91 | Performed by: STUDENT IN AN ORGANIZED HEALTH CARE EDUCATION/TRAINING PROGRAM

## 2023-02-25 PROCEDURE — 63600175 PHARM REV CODE 636 W HCPCS: Mod: TB,JG | Performed by: STUDENT IN AN ORGANIZED HEALTH CARE EDUCATION/TRAINING PROGRAM

## 2023-02-25 PROCEDURE — 84484 ASSAY OF TROPONIN QUANT: CPT | Performed by: EMERGENCY MEDICINE

## 2023-02-25 PROCEDURE — 87798 DETECT AGENT NOS DNA AMP: CPT | Performed by: STUDENT IN AN ORGANIZED HEALTH CARE EDUCATION/TRAINING PROGRAM

## 2023-02-25 PROCEDURE — 87502 INFLUENZA DNA AMP PROBE: CPT | Performed by: EMERGENCY MEDICINE

## 2023-02-25 PROCEDURE — 84484 ASSAY OF TROPONIN QUANT: CPT | Mod: 91 | Performed by: STUDENT IN AN ORGANIZED HEALTH CARE EDUCATION/TRAINING PROGRAM

## 2023-02-25 PROCEDURE — 25000003 PHARM REV CODE 250: Performed by: EMERGENCY MEDICINE

## 2023-02-25 RX ORDER — GUAIFENESIN 100 MG/5ML
200 SOLUTION ORAL EVERY 6 HOURS PRN
Status: DISCONTINUED | OUTPATIENT
Start: 2023-02-25 | End: 2023-02-28 | Stop reason: HOSPADM

## 2023-02-25 RX ORDER — TRAMADOL HYDROCHLORIDE 50 MG/1
50 TABLET ORAL EVERY 6 HOURS PRN
Status: DISCONTINUED | OUTPATIENT
Start: 2023-02-25 | End: 2023-02-28 | Stop reason: HOSPADM

## 2023-02-25 RX ORDER — ISOSORBIDE DINITRATE 10 MG/1
30 TABLET ORAL DAILY
Status: DISCONTINUED | OUTPATIENT
Start: 2023-02-25 | End: 2023-02-28 | Stop reason: HOSPADM

## 2023-02-25 RX ORDER — TALC
6 POWDER (GRAM) TOPICAL NIGHTLY PRN
Status: DISCONTINUED | OUTPATIENT
Start: 2023-02-25 | End: 2023-02-28 | Stop reason: HOSPADM

## 2023-02-25 RX ORDER — IPRATROPIUM BROMIDE AND ALBUTEROL SULFATE 2.5; .5 MG/3ML; MG/3ML
3 SOLUTION RESPIRATORY (INHALATION)
Status: COMPLETED | OUTPATIENT
Start: 2023-02-25 | End: 2023-02-25

## 2023-02-25 RX ORDER — POLYETHYLENE GLYCOL 3350 17 G/17G
17 POWDER, FOR SOLUTION ORAL 2 TIMES DAILY PRN
Status: DISCONTINUED | OUTPATIENT
Start: 2023-02-25 | End: 2023-02-28 | Stop reason: HOSPADM

## 2023-02-25 RX ORDER — VANCOMYCIN HCL IN 5 % DEXTROSE 1G/250ML
1000 PLASTIC BAG, INJECTION (ML) INTRAVENOUS ONCE
Status: COMPLETED | OUTPATIENT
Start: 2023-02-25 | End: 2023-02-25

## 2023-02-25 RX ORDER — GUAIFENESIN 600 MG/1
600 TABLET, EXTENDED RELEASE ORAL 2 TIMES DAILY
Status: DISCONTINUED | OUTPATIENT
Start: 2023-02-25 | End: 2023-02-25

## 2023-02-25 RX ORDER — SODIUM CHLORIDE 0.9 % (FLUSH) 0.9 %
3 SYRINGE (ML) INJECTION EVERY 6 HOURS PRN
Status: DISCONTINUED | OUTPATIENT
Start: 2023-02-25 | End: 2023-02-28 | Stop reason: HOSPADM

## 2023-02-25 RX ORDER — VANCOMYCIN HCL IN 5 % DEXTROSE 1G/250ML
1000 PLASTIC BAG, INJECTION (ML) INTRAVENOUS
Status: DISCONTINUED | OUTPATIENT
Start: 2023-02-26 | End: 2023-02-26

## 2023-02-25 RX ORDER — SODIUM CHLORIDE, SODIUM LACTATE, POTASSIUM CHLORIDE, CALCIUM CHLORIDE 600; 310; 30; 20 MG/100ML; MG/100ML; MG/100ML; MG/100ML
1000 INJECTION, SOLUTION INTRAVENOUS
Status: COMPLETED | OUTPATIENT
Start: 2023-02-25 | End: 2023-02-25

## 2023-02-25 RX ORDER — CHLORHEXIDINE GLUCONATE ORAL RINSE 1.2 MG/ML
15 SOLUTION DENTAL 2 TIMES DAILY
Status: DISCONTINUED | OUTPATIENT
Start: 2023-02-25 | End: 2023-02-28 | Stop reason: HOSPADM

## 2023-02-25 RX ORDER — MUPIROCIN 20 MG/G
OINTMENT TOPICAL 2 TIMES DAILY
Status: DISCONTINUED | OUTPATIENT
Start: 2023-02-25 | End: 2023-02-28 | Stop reason: HOSPADM

## 2023-02-25 RX ORDER — PANTOPRAZOLE SODIUM 40 MG/1
40 TABLET, DELAYED RELEASE ORAL DAILY
Status: DISCONTINUED | OUTPATIENT
Start: 2023-02-25 | End: 2023-02-28 | Stop reason: HOSPADM

## 2023-02-25 RX ORDER — HYDROCODONE POLISTIREX AND CHLORPHENIRAMINE POLISTIREX 10; 8 MG/5ML; MG/5ML
5 SUSPENSION, EXTENDED RELEASE ORAL EVERY 12 HOURS PRN
Status: DISCONTINUED | OUTPATIENT
Start: 2023-02-25 | End: 2023-02-28 | Stop reason: HOSPADM

## 2023-02-25 RX ORDER — ASPIRIN 81 MG/1
81 TABLET ORAL DAILY
Status: DISCONTINUED | OUTPATIENT
Start: 2023-02-25 | End: 2023-02-28 | Stop reason: HOSPADM

## 2023-02-25 RX ORDER — LEVOTHYROXINE SODIUM 25 UG/1
25 TABLET ORAL EVERY MORNING
Status: DISCONTINUED | OUTPATIENT
Start: 2023-02-25 | End: 2023-02-28 | Stop reason: HOSPADM

## 2023-02-25 RX ORDER — ENOXAPARIN SODIUM 100 MG/ML
40 INJECTION SUBCUTANEOUS EVERY 24 HOURS
Status: DISCONTINUED | OUTPATIENT
Start: 2023-02-25 | End: 2023-02-26

## 2023-02-25 RX ORDER — ACETAMINOPHEN 500 MG
1000 TABLET ORAL
Status: DISCONTINUED | OUTPATIENT
Start: 2023-02-25 | End: 2023-02-25

## 2023-02-25 RX ORDER — MECLIZINE HCL 12.5 MG 12.5 MG/1
12.5 TABLET ORAL DAILY PRN
Status: DISCONTINUED | OUTPATIENT
Start: 2023-02-25 | End: 2023-02-28 | Stop reason: HOSPADM

## 2023-02-25 RX ORDER — ONDANSETRON 2 MG/ML
4 INJECTION INTRAMUSCULAR; INTRAVENOUS EVERY 8 HOURS PRN
Status: DISCONTINUED | OUTPATIENT
Start: 2023-02-25 | End: 2023-02-28 | Stop reason: HOSPADM

## 2023-02-25 RX ORDER — TOPIRAMATE 25 MG/1
25 TABLET ORAL DAILY
Status: DISCONTINUED | OUTPATIENT
Start: 2023-02-25 | End: 2023-02-28 | Stop reason: HOSPADM

## 2023-02-25 RX ORDER — ACETAMINOPHEN 10 MG/ML
1000 INJECTION, SOLUTION INTRAVENOUS ONCE
Status: COMPLETED | OUTPATIENT
Start: 2023-02-25 | End: 2023-02-25

## 2023-02-25 RX ORDER — AMLODIPINE BESYLATE 2.5 MG/1
2.5 TABLET ORAL DAILY
Status: DISCONTINUED | OUTPATIENT
Start: 2023-02-26 | End: 2023-02-28 | Stop reason: HOSPADM

## 2023-02-25 RX ORDER — IPRATROPIUM BROMIDE AND ALBUTEROL SULFATE 2.5; .5 MG/3ML; MG/3ML
3 SOLUTION RESPIRATORY (INHALATION)
Status: DISCONTINUED | OUTPATIENT
Start: 2023-02-25 | End: 2023-02-28 | Stop reason: HOSPADM

## 2023-02-25 RX ORDER — ACETAMINOPHEN 325 MG/1
650 TABLET ORAL EVERY 8 HOURS PRN
Status: DISCONTINUED | OUTPATIENT
Start: 2023-02-25 | End: 2023-02-28 | Stop reason: HOSPADM

## 2023-02-25 RX ORDER — MEROPENEM AND SODIUM CHLORIDE 1 G/50ML
1 INJECTION, SOLUTION INTRAVENOUS
Status: DISCONTINUED | OUTPATIENT
Start: 2023-02-25 | End: 2023-02-26

## 2023-02-25 RX ADMIN — VANCOMYCIN HYDROCHLORIDE 1000 MG: 1 INJECTION, POWDER, LYOPHILIZED, FOR SOLUTION INTRAVENOUS at 04:02

## 2023-02-25 RX ADMIN — IPRATROPIUM BROMIDE AND ALBUTEROL SULFATE 3 ML: 2.5; .5 SOLUTION RESPIRATORY (INHALATION) at 12:02

## 2023-02-25 RX ADMIN — ENOXAPARIN SODIUM 40 MG: 100 INJECTION SUBCUTANEOUS at 06:02

## 2023-02-25 RX ADMIN — MEROPENEM AND SODIUM CHLORIDE 1 G: 1 INJECTION, SOLUTION INTRAVENOUS at 06:02

## 2023-02-25 RX ADMIN — IPRATROPIUM BROMIDE AND ALBUTEROL SULFATE 3 ML: 2.5; .5 SOLUTION RESPIRATORY (INHALATION) at 02:02

## 2023-02-25 RX ADMIN — ISOSORBIDE DINITRATE 30 MG: 10 TABLET ORAL at 09:02

## 2023-02-25 RX ADMIN — TOPIRAMATE 25 MG: 25 TABLET, FILM COATED ORAL at 09:02

## 2023-02-25 RX ADMIN — SODIUM CHLORIDE, SODIUM LACTATE, POTASSIUM CHLORIDE, AND CALCIUM CHLORIDE 500 ML: .6; .31; .03; .02 INJECTION, SOLUTION INTRAVENOUS at 04:02

## 2023-02-25 RX ADMIN — SODIUM CHLORIDE, SODIUM LACTATE, POTASSIUM CHLORIDE, AND CALCIUM CHLORIDE 1000 ML: .6; .31; .03; .02 INJECTION, SOLUTION INTRAVENOUS at 04:02

## 2023-02-25 RX ADMIN — ASPIRIN 81 MG: 81 TABLET, COATED ORAL at 09:02

## 2023-02-25 RX ADMIN — LEVOTHYROXINE SODIUM 25 MCG: 0.03 TABLET ORAL at 09:02

## 2023-02-25 RX ADMIN — GUAIFENESIN 200 MG: 200 SOLUTION ORAL at 09:02

## 2023-02-25 RX ADMIN — IPRATROPIUM BROMIDE AND ALBUTEROL SULFATE 3 ML: 2.5; .5 SOLUTION RESPIRATORY (INHALATION) at 08:02

## 2023-02-25 RX ADMIN — PANTOPRAZOLE SODIUM 40 MG: 40 TABLET, DELAYED RELEASE ORAL at 09:02

## 2023-02-25 RX ADMIN — ACETAMINOPHEN 1000 MG: 10 INJECTION, SOLUTION INTRAVENOUS at 03:02

## 2023-02-25 RX ADMIN — TRAMADOL HYDROCHLORIDE 50 MG: 50 TABLET, COATED ORAL at 01:02

## 2023-02-25 RX ADMIN — MUPIROCIN 1 G: 20 OINTMENT TOPICAL at 08:02

## 2023-02-25 RX ADMIN — CHLORHEXIDINE GLUCONATE 15 ML: 1.2 RINSE ORAL at 08:02

## 2023-02-25 RX ADMIN — SODIUM CHLORIDE, SODIUM LACTATE, POTASSIUM CHLORIDE, AND CALCIUM CHLORIDE 1878 ML: .6; .31; .03; .02 INJECTION, SOLUTION INTRAVENOUS at 03:02

## 2023-02-25 NOTE — PLAN OF CARE
[facility]  Initial Discharge Assessment       Primary Care Provider: Se Rios MD    Admission Diagnosis: SOB (shortness of breath) [R06.02]    Admission Date: 2/25/2023  Expected Discharge Date:     CM met with patient at bedside.  Patient daughter Court present.  Patient and daughter verified information on face sheet.  No dialysis, no coumadin.  Daughter reported that the patient lives alone but that she the daughter live close by and goes to the pt home everyday.  The daughter reported that if the pt discharges home and not to rehab than she will transport her home upon discharge.        Discharge Barriers Identified: None    Payor: [employer] MEDICARE / Plan: [employer] 65 / Product Type: Medicare Advantage /     Extended Emergency Contact Information  Primary Emergency Contact: Court Pringle  Address: [address] [place] [address]  Home Phone: [phone]  Mobile Phone: [phone]  Relation: Daughter  Preferred language: English   needed? No    Discharge Plan A: Home  Discharge Plan B: Home with family      [facility] [address] [place] [address]  Phone: [phone] Fax: [phone]    [facility] [address] [place] [address] [address] [address]  Phone: [phone] Fax: [phone]      Initial Assessment (most recent)       Adult Discharge Assessment - 02/25/23 1106          Discharge Assessment    Assessment Type Discharge Planning Assessment     Confirmed/corrected address, phone number and insurance Yes     Source of Information patient;family     Does patient/caregiver understand observation status --   n/a    Communicated ARGELIA with patient/caregiver Date not available/Unable to determine     Reason For Admission Severe sepsis     People in Home alone      Facility Arrived From: home     Do you expect to return to your current living situation? Yes     Do you have help at home or someone to help you manage your care at home? No     Prior to hospitilization cognitive status: Unable to Assess     Current cognitive status: Alert/Oriented     Equipment Currently Used at Home rollator;nebulizer;other (see comments)   shower bench    Patient currently being followed by outpatient case management? No     Do you currently have service(s) that help you manage your care at home? No     Do you take prescription medications? Yes     Do you have prescription coverage? Yes     Coverage Payor:  [employer] [facility] MEDICARE - [employer] 65     Do you have any problems affording any of your prescribed medications? No     Is the patient taking medications as prescribed? yes     Who is going to help you get home at discharge? daughter     How do you get to doctors appointments? family or friend will provide     Are you on dialysis? No     Do you take coumadin? No     Discharge Plan A Home     Discharge Plan B Home with family     DME Needed Upon Discharge  none     Discharge Plan discussed with: Patient;Adult children     Discharge Barriers Identified None

## 2023-02-25 NOTE — ED PROVIDER NOTES
Encounter Date: 2/25/2023       History     Chief Complaint   Patient presents with    Shortness of Breath     Pt dx with UTI Monday.  Pt complaint of kidney pain and shortness of breath     89-year-old female past medical history significant of squamous cell carcinoma skin presents secondary to shortness of breath.  Patient arrived via EMS with concerns of shortness of breath and possible sepsis secondary to symptoms tachycardia with a fever.  Patient was recently diagnosed with UTI and states she is now having Bactrim kidney pain and shortness of breath.  She denies any chest pain, nausea or vomiting associated.  Patient is otherwise stable and has no other complaints.    Review of patient's allergies indicates:  No Known Allergies  Past Medical History:   Diagnosis Date    Macula lutea degeneration     Squamous cell carcinoma of skin      Past Surgical History:   Procedure Laterality Date    APPENDECTOMY      BACK SURGERY  1975    HYSTERECTOMY       Family History   Problem Relation Age of Onset    Melanoma Neg Hx     Psoriasis Neg Hx     Lupus Neg Hx     Eczema Neg Hx      Social History     Tobacco Use    Smoking status: Former     Packs/day: 1.00     Years: 20.00     Pack years: 20.00     Types: Cigarettes    Smokeless tobacco: Never   Substance Use Topics    Alcohol use: No    Drug use: No     Review of Systems   Constitutional:  Positive for chills.   Respiratory:  Positive for shortness of breath.    Musculoskeletal:  Positive for back pain.   All other systems reviewed and are negative.    Physical Exam     Initial Vitals [02/25/23 0204]   BP Pulse Resp Temp SpO2   (!) 204/98 (!) 113 (!) 32 99.1 °F (37.3 °C) (!) 92 %      MAP       --         Physical Exam    Nursing note and vitals reviewed.  Constitutional: She appears well-developed and well-nourished. She appears distressed.   HENT:   Head: Normocephalic and atraumatic.   Mouth/Throat: Oropharynx is clear and moist.   Eyes: Conjunctivae and EOM are  normal. Pupils are equal, round, and reactive to light.   Neck: No tracheal deviation present. No JVD present.   Normal range of motion.  Cardiovascular:  Regular rhythm, normal heart sounds and intact distal pulses.   Tachycardia present.   Exam reveals no gallop and no friction rub.       No murmur heard.  Pulmonary/Chest: She is in respiratory distress. She has wheezes. She exhibits no tenderness.   Abdominal: Abdomen is soft. Bowel sounds are normal. She exhibits no distension. There is no abdominal tenderness. There is no rebound and no guarding.   Musculoskeletal:         General: No tenderness or edema. Normal range of motion.      Cervical back: Normal range of motion.     Neurological: She is alert and oriented to person, place, and time. She has normal strength. No cranial nerve deficit or sensory deficit.   Skin: Skin is warm and dry. Capillary refill takes less than 2 seconds. No erythema.   Psychiatric: She has a normal mood and affect.       ED Course   Critical Care    Date/Time: 2/25/2023 3:32 AM  Performed by: Vel Shultz MD  Authorized by: Vel Shultz MD   Direct patient critical care time: 15 minutes  Additional history critical care time: 10 minutes  Ordering / reviewing critical care time: 5 minutes  Documentation critical care time: 10 minutes  Consulting other physicians critical care time: 5 minutes  Consult with family critical care time: 5 minutes  Total critical care time (exclusive of procedural time) : 50 minutes  Critical care was necessary to treat or prevent imminent or life-threatening deterioration of the following conditions: sepsis and respiratory failure.  Critical care was time spent personally by me on the following activities: development of treatment plan with patient or surrogate, discussions with consultants, evaluation of patient's response to treatment, examination of patient, obtaining history from patient or surrogate, ordering and performing treatments and  interventions, ordering and review of laboratory studies, ordering and review of radiographic studies, re-evaluation of patient's condition and review of old charts.      Labs Reviewed   CBC W/ AUTO DIFFERENTIAL - Abnormal; Notable for the following components:       Result Value    WBC 16.27 (*)     Gran # (ANC) 12.0 (*)     Immature Grans (Abs) 0.07 (*)     Eos # 0.7 (*)     Gran % 73.5 (*)     Lymph % 16.8 (*)     All other components within normal limits   COMPREHENSIVE METABOLIC PANEL - Abnormal; Notable for the following components:    Sodium 133 (*)     Potassium 3.4 (*)     Glucose 158 (*)     eGFR 58.5 (*)     All other components within normal limits   TROPONIN I HIGH SENSITIVITY - Abnormal; Notable for the following components:    Troponin I High Sensitivity 21.8 (*)     All other components within normal limits   CULTURE, BLOOD   CULTURE, BLOOD   LACTIC ACID, PLASMA   INFLUENZA A AND B ANTIGEN    Narrative:     Specimen Source->Nasopharyngeal Swab   PROCALCITONIN   SARS-COV-2 RNA AMPLIFICATION, QUAL   LACTIC ACID, PLASMA   URINALYSIS, REFLEX TO URINE CULTURE   B-TYPE NATRIURETIC PEPTIDE   URINALYSIS          Imaging Results              X-Ray Chest AP Portable (In process)                      Medications   meropenem-0.9% sodium chloride 1 g/50 mL IVPB (has no administration in time range)   vancomycin - pharmacy to dose (has no administration in time range)   vancomycin in dextrose 5 % 1 gram/250 mL IVPB 1,000 mg (has no administration in time range)   lactated ringers bolus 500 mL (has no administration in time range)   albuterol-ipratropium 2.5 mg-0.5 mg/3 mL nebulizer solution 3 mL (3 mLs Nebulization Given 2/25/23 0248)   acetaminophen 1,000 mg/100 mL (10 mg/mL) injection 1,000 mg (0 mg Intravenous Stopped 2/25/23 5127)     Medical Decision Making:   Initial Assessment:   [age] year female initial assessment in moderate distress secondary to shortness of breath.  Patient is alert and oriented  "x3, neurologically and neurovascularly intact with no focal deficits.  She is febrile, hypoxic and hypertensive at this time.  Differential Diagnosis:   Sepsis, UTI, pneumonia, CHF versus COPD exacerbation  Independently Interpreted Test(s):   I have ordered and independently interpreted X-rays - see prior notes.  I have ordered and independently interpreted EKG Reading(s) - see prior notes  Clinical Tests:   Lab Tests: Ordered and Reviewed  The following lab test(s) were unremarkable: CBC, CMP, Urinalysis, Troponin, Lactate and BNP  Radiological Study: Ordered and Reviewed  Medical Tests: Reviewed and Ordered  Sepsis Perfusion Assessment: "I attest a sepsis perfusion exam was performed within 6 hours of sepsis, severe sepsis, or septic shock presentation, following fluid resuscitation."  ED Management:  Patient has been reassessed noted to have significant improvement in her condition.  Patient was placed on BiPAP and given broad-spectrum antibiotics due to her history of ESBL UTIs.  Patient's chest x-ray does show patchy infiltrate which we can not rule out infectious etiology as well based on her respiratory distress.  Patient only received 500 cc bolus of IV fluids due to possible fluid overload from chest x-ray and Prieto catheter placed to monitor in out levels.  Patient has been consult to hospitalist for admission.  She is remained stable while in the ED and Ms. Encarnacion and family are aware of the plan and agreement with admission.    Laboratory results and radiology imaging results reviewed.  Based on the patient's history, physical, and workup here in the emergency department I believe the patient requires admission for a diagnosis of bacteremia versus sepsis.  I discussed the patient's case with the on-call hospitalist who has agreed to evaluate the patient for admission.  In addition, I discussed the results with the patient and they have verbalized understanding of the results, plan, and need for " admission.    ________________________  BOBY Shultz MD   Emergency Medicine                          Clinical Impression:   Final diagnoses:  [R00.0] Tachycardia (Primary)  [R06.02] SOB (shortness of breath)               Vel Shultz MD  02/25/23 1670

## 2023-02-25 NOTE — PT/OT/SLP EVAL
"Physical Therapy Evaluation    Patient Name:  Erich Encarnacion   MRN:  [id number]    Recommendations:     Discharge Recommendations: home with home health, home health PT, home health OT   Discharge Equipment Recommendations: bedside commode, other (see comments) (Pt wants to request a bariatric BSC)   Barriers to discharge: None    Assessment:     Erich Encarnacion is a 89 y.o. female admitted with a medical diagnosis of Severe sepsis.  She presents with the following impairments/functional limitations: weakness, impaired endurance, impaired self care skills, impaired functional mobility, gait instability, impaired balance, decreased upper extremity function, decreased lower extremity function, decreased safety awareness, pain, edema, impaired cardiopulmonary response to activity.    Rehab Prognosis: Good; patient would benefit from acute skilled PT services to address these deficits and reach maximum level of function.    Recent Surgery: * No surgery found *      Plan:     During this hospitalization, patient to be seen 6 x/week to address the identified rehab impairments via gait training, therapeutic activities, therapeutic exercises, neuromuscular re-education and progress toward the following goals:    Plan of Care Expires:  03/25/23    Subjective     Chief Complaint: mid-thoracic pain, coughing  Patient/Family Comments/goals: "My back hurts so badly. I need a massage."  Pain/Comfort:  Pain Rating 1: 9/10  Location - Orientation 1: upper  Location 1: back  Pain Addressed 1: Reposition, Distraction, Nurse notified    Patients cultural, spiritual, Synagogue conflicts given the current situation: yes (1st Language is Armenian (Deutch). Pt is unfamiliar with PT role in hospital in )    Living Environment:  Pt lives alone in [facility] / 3 [address], (R) railing  Prior to admission, patients level of function was April.  Equipment used at home: rollator, bedside commode, bath bench, grab bar.  DME owned (not currently used): " single point cane.  Upon discharge, patient will have assistance from extended family intermittently.    Objective:     Communicated with nurse prior to session.  Patient found supine with bed alarm, blood pressure cuff, mckenna catheter, oxygen, PICC line, pulse ox (continuous), telemetry  upon PT entry to room.    General Precautions: Standard, fall  Orthopedic Precautions:N/A   Braces: N/A  Respiratory Status: Nasal cannula, flow 2 L/min. Pt states she does not use Home O2. PT removed nasal canula when sitting pt up EOB; Pt maintained SPO2% WNL on RA for remainder of session.    Exams:  Cognitive Exam:  Patient is oriented to Person and Place  Gross Motor Coordination:  moderately slowed/impaired  Sensation:    -       Intact  RLE ROM: WFL  RLE Strength: 3-/5  LLE ROM: WFL  LLE Strength: Deficits: 3-/5    Functional Mobility:  Bed Mobility:     Supine to Sit: minimum assistance  Transfers:     Sit to Stand:  minimum assistance with rolling walker  Bed to Chair: minimum assistance with  rolling walker  using  Stand Pivot  Gait: 25ft minimum assistance with rolling walker  Balance: standing supported dynamic: Fair-      AM-PAC 6 CLICK MOBILITY  Total Score:16       Treatment & Education:  PT had to edu pt on safety and technique using RW as pt is used to alternate type of AD.    Patient left up in chair with all lines intact, call button in reach, nurse notified, and family present.    GOALS:   Multidisciplinary Problems       Physical Therapy Goals          Problem: Physical Therapy    Goal Priority Disciplines Outcome Goal Variances Interventions   Physical Therapy Goal     PT, PT/OT      Description: Goals to be met by: 3/4/23     Patient will increase functional independence with mobility by performin. Supine to sit with Stand-by Assistance  2. Sit to supine with Stand-by Assistance  3. Sit to stand transfer with Stand-by Assistance  4. Bed to chair transfer with Stand-by Assistance using Rolling  Walker  5. Gait  x 50 feet with Minimal Assistance using Rolling Walker.                          History:     Past Medical History:   Diagnosis Date    Macula lutea degeneration     Squamous cell carcinoma of skin        Past Surgical History:   Procedure Laterality Date    APPENDECTOMY      BACK SURGERY  1975    HYSTERECTOMY         Time Tracking:     PT Received On: 02/25/23  PT Start Time: 1333     PT Stop Time: 1412  PT Total Time (min): 39 min     Billable Minutes: Evaluation 30 and Gait Training 10      02/25/2023

## 2023-02-25 NOTE — PROGRESS NOTES
VANCOMYCIN PHARMACOKINETIC NOTE:  Vancomycin Day # 1    Objective/Assessment:    Diagnosis/Indication for Vancomycin:Pneumonia      89 y.o., female; Actual Body Weight = 62.6 kg (138 lb 0.1 oz).    The patient has the following labs:  2/25/2023 Estimated Creatinine Clearance: 32.9 mL/min (based on SCr of 0.9 mg/dL). Lab Results   Component Value Date    BUN 20 02/25/2023     Lab Results   Component Value Date    WBC 16.27 (H) 02/25/2023          Plan:  Adjust vancomycin dose and/or frequency based on the patient's actual weight and renal function:  Initiate Vancomycin 1000 mg IV every 24 hours.  Orders have been entered into patient's chart.        Vancomycin trough level has been ordered for 04:00 on 2/26    Pharmacy will manage vancomycin therapy, monitor serum vancomycin levels, monitor renal function and adjust regimen as necessary.    Thank you for allowing us to participate in this patient's care.     Darlene Villarreal 2/25/2023   Department of Pharmacy  Ext [phone]

## 2023-02-25 NOTE — HPI
89 yo F with PMH including hypothyroidism, angina, chronic pain, hypertension, seizure hx who presents with fever, cough, fatigue, and worsening back pain. Patient has not been in her usual camilla, been increasingly weak, poor appetite, productive cough. She was diagnosed with UTI due to frequency and started on antibiotics by PCP. She has also been having increased back pain and feels she is not improving as well as feeling more winded. She thus presented to the ED. In the ED, patient was febrile, hypertensive, and tachycardic with elevated WBC. CXR showed volume overload versus infiltrate. Reviewing a prior culture, she was started started on meropenem for hx of ESBL and vanc. She was also placed on BiPAP. Patient states she is feeling slightly better. Hospitalist requested for admission.

## 2023-02-25 NOTE — PROGRESS NOTES
[facility]  Adult Nutrition   Progress Note (Initial Assessment)     SUMMARY     Recommendations  Recommendation/Intervention: 1. Difficulty swallowing leading to N/V per patient and daughter. Recommend SLP consult. 2. RD added ONS, Ensure Plus High Protein (to provide 1050 kcal/day and 60 g/day protein) 3. Recommend malnutrition be added to problem list.  Goals: 1. SLP to assess for dysphagia and appropriate diet texture to be determined. 2. Patient to tolerate diet. 3. Patient to meet at least 75% of estimated energy and protein needs  Nutrition Goal Status: new  Communication of RD Recs: reviewed with RN    Dietitian Rounds Brief  Patient assessed 2' MST score 4. Poor oral intake due  to difficulty swallowing, food feels like it gets stuck causing N/V per patient and daughter. Recommend SLP consult. RD offered ONS, patient accepted. Noted s/s of malnutrition. Patient and daughter state  lbs (77.27 kg) about 3 months ago.      Nutritional Diagnosis (PES Statement)   Malnutrition in the context of chronic disease   Severe malnutrition related to inadequate protein energy intake due to swallowing difficulty as evidence by severe weight loss of 14.67 kg (18.98%) in 3 months, estimated oral intake meeting < 75% of needs in >/= 3 months, severe muscle loss (temples), severe fat loss (triceps, orbital).     Reason for Assessment  Reason For Assessment: identified at risk by screening criteria  Diagnosis: infection/sepsis  Relevant Medical History: Severe sepsis; advanced age    Nutrition Risk Screen  Nutrition Risk Screen: unintentional loss of 10 lbs or more in the past 2 months, reduced oral intake over the last month     MST Score: 3  Have you recently lost weight without trying?: Unsure  Weight loss score: 2  Have you been eating poorly because of a decreased appetite?: Yes  Appetite score: 1       Nutrition/Diet History  Food Allergies: NKFA  Factors Affecting Nutritional Intake:  "difficulty/impaired swallowing    Anthropometrics  Temp: 98.7 °F (37.1 °C)  Height Method: Stated  Height: 5' 5" (165.1 cm)  Height (inches): 65 in  Weight Method: Bed Scale  Weight: 62.6 kg (138 lb 0.1 oz)  Weight (lb): 138.01 lb  Ideal Body Weight (IBW), Female: 125 lb  % Ideal Body Weight, Female (lb): 110.41 %  BMI (Calculated): 23  BMI Grade: 18.5-24.9 - normal  Weight Loss: unintentional  Usual Body Weight (UBW), k.27 kg (3 months ago per pateint)  % Usual Body Weight: 81.18  % Weight Change From Usual Weight: -18.99 %       Weight History:  Wt Readings from Last 10 Encounters:   23 62.6 kg (138 lb 0.1 oz)   22 73.5 kg (162 lb 0.6 oz)   22 73.5 kg (162 lb 0.6 oz)   22 73.5 kg (162 lb 0.6 oz)   22 73.5 kg (162 lb 0.6 oz)   21 73.5 kg (162 lb)   21 73.5 kg (162 lb)   21 75 kg (165 lb 5.5 oz)   10/29/21 77.1 kg (170 lb)   10/20/21 79.3 kg (174 lb 13.2 oz)       Lab/Procedures/Meds: Pertinent Labs Reviewed    Clinical Chemistry:  Recent Labs   Lab 23  0219   *   K 3.4*      CO2 23   *   BUN 20   CREATININE 0.9   CALCIUM 8.8   PROT 6.8   ALBUMIN 3.5   BILITOT 0.9   ALKPHOS 74   AST 16   ALT 11   ANIONGAP 9       CBC:   Recent Labs   Lab 23  0219   WBC 16.27*   RBC 4.26   HGB 12.6   HCT 37.0      MCV 87   MCH 29.6   MCHC 34.1         Cardiac Profile:  Recent Labs   Lab 23  0318   *         Medications: Pertinent Medications reviewed    Scheduled Meds:   albuterol-ipratropium  3 mL Nebulization Q6H WAKE    [START ON 2023] amLODIPine  2.5 mg Oral Daily    aspirin  81 mg Oral Daily    chlorhexidine  15 mL Mouth/Throat BID    enoxaparin  40 mg Subcutaneous Daily    isosorbide dinitrate  30 mg Oral Daily    levothyroxine  25 mcg Oral QAM    meropenem (MERREM) IVPB  1 g Intravenous Q12H    mupirocin   Nasal BID    pantoprazole  40 mg Oral Daily    topiramate  25 mg Oral Daily    [START ON 2023] vancomycin " (VANCOCIN) IVPB  1,000 mg Intravenous Q24H       Continuous Infusions:    PRN Meds:.acetaminophen, guaiFENesin 100 mg/5 ml, hydrocodone-chlorpheniramine, meclizine, melatonin, ondansetron, polyethylene glycol, sodium chloride 0.9%, traMADoL, Pharmacy to dose Vancomycin consult **AND** vancomycin - pharmacy to dose    Estimated/Assessed Needs  Weight Used For Calorie Calculations: 62.6 kg (138 lb 0.1 oz)  Energy Calorie Requirements (kcal): 1565 - 1878 (25 - 30)  Energy Need Method: Kcal/kg  Protein Requirements: 75 - 94 (1.2 - 1.5)  Weight Used For Protein Calculations: 62.6 kg (138 lb 0.1 oz)     Estimated Fluid Requirement Method: RDA Method  RDA Method (mL): 1565       Nutrition Prescription Ordered  Current Diet Order: Regular    Evaluation of Received Nutrient/Fluid Intake  Energy Calories Required: not meeting needs  Protein Required: not meeting needs  Fluid Required: meeting needs  Tolerance: not tolerating     Intake/Output Summary (Last 24 hours) at 2/25/2023 1727  Last data filed at 2/25/2023 1327  Gross per 24 hour   Intake 510 ml   Output --   Net 510 ml      % Intake of Estimated Energy Needs: 0 - 25 %  % Meal Intake: 0 - 25 %    Nutrition Risk  Level of Risk/Frequency of Follow-up: high     Monitor and Evaluation  Food and Nutrient Intake: energy intake, food and beverage intake  Food and Nutrient Adminstration: diet order  Physical Activity and Function: nutrition-related ADLs and IADLs, factors affecting access to physical activity  Anthropometric Measurements: weight, weight change, body mass index  Biochemical Data, Medical Tests and Procedures: electrolyte and renal panel, glucose/endocrine profile, lipid profile, gastrointestinal profile, inflammatory profile  Nutrition-Focused Physical Findings: overall appearance     Nutrition Follow-Up  RD Follow-up?: Yes    Rochelle Haynes RD 02/25/2023 5:28 PM

## 2023-02-25 NOTE — CARE UPDATE
02/25/23 1254   Patient Assessment/Suction   Level of Consciousness (AVPU) alert   All Lung Fields Breath Sounds clear;diminished   PRE-TX-O2   Device (Oxygen Therapy) nasal cannula   Flow (L/min) 3   Aerosol Therapy   $ Aerosol Therapy Charges Aerosol Treatment   Daily Review of Necessity (SVN) completed   Respiratory Treatment Status (SVN) given   Treatment Route (SVN) mask   Patient Position (SVN) semi-Davis's   Post Treatment Assessment (SVN) increased aeration   Signs of Intolerance (SVN) none   Breath Sounds Post-Respiratory Treatment   Post-treatment Heart Rate (beats/min) 84   Post-treatment Resp Rate (breaths/min) 15   Education   $ Education Bronchodilator;15 min   Respiratory Evaluation   $ Care Plan Tech Time 15 min

## 2023-02-25 NOTE — PROGRESS NOTES
Seen on rounds today.  She is clinically doing much better.  Primary complaint is back pain.  She says the back pain developed with severe coughing.  She may have developed a compression fracture due to coughing.  Will obtain x-rays of her back and ribs.  Continue workup for what appears to be likely viral pneumonia.  Continuing broad antibiotics for now.  Clinically improving.

## 2023-02-25 NOTE — SUBJECTIVE & OBJECTIVE
Past Medical History:   Diagnosis Date    Macula lutea degeneration     Squamous cell carcinoma of skin        Past Surgical History:   Procedure Laterality Date    APPENDECTOMY      BACK SURGERY  1975    HYSTERECTOMY         Review of patient's allergies indicates:  No Known Allergies    Current Facility-Administered Medications on File Prior to Encounter   Medication    triamcinolone acetonide injection 40 mg     Current Outpatient Medications on File Prior to Encounter   Medication Sig    albuterol-ipratropium (DUO-NEB) 2.5 mg-0.5 mg/3 mL nebulizer solution Take 3 mLs by nebulization every 4 (four) hours as needed. rescue    amLODIPine (NORVASC) 2.5 MG tablet Take 2.5 mg by mouth once daily.     aspirin (ECOTRIN) 81 MG EC tablet Take 81 mg by mouth every 12 (twelve) hours.    cyproheptadine (PERIACTIN) 4 mg tablet 1 tablet    HYDROcodone-acetaminophen (NORCO) 5-325 mg per tablet Take 1 tablet by mouth every 4 (four) hours as needed for Pain.    isosorbide dinitrate (ISORDIL) 30 MG Tab Take 30 mg by mouth once daily.     levothyroxine (SYNTHROID) 25 MCG tablet Take 25 mcg by mouth every morning.     magnesium 250 mg Tab Take 1 tablet by mouth once daily.    meclizine (ANTIVERT) 12.5 mg tablet Take 12.5 mg by mouth daily as needed.     nitrofurantoin, macrocrystal-monohydrate, (MACROBID) 100 MG capsule 1 capsule at bedtime with food    polyethylene glycol (GLYCOLAX) 17 gram/dose powder Take 17 g by mouth Daily.    topiramate (TOPAMAX) 25 MG tablet Take 25 mg by mouth once daily.     vit A/vit C/vit E/zinc/copper (PRESERVISION AREDS ORAL) Take 1 tablet by mouth once daily.     zolpidem (AMBIEN) 10 mg Tab Take 1 tablet (10 mg total) by mouth nightly as needed (insomnia).     Family History    None       Tobacco Use    Smoking status: Former     Packs/day: 1.00     Years: 20.00     Pack years: 20.00     Types: Cigarettes    Smokeless tobacco: Never   Substance and Sexual Activity    Alcohol use: No    Drug use: No     Sexual activity: Not on file     Review of Systems   Constitutional:  Positive for appetite change, fatigue and fever. Negative for chills.   HENT:  Positive for congestion. Negative for hearing loss.    Respiratory:  Positive for cough and shortness of breath.    Cardiovascular:  Negative for chest pain and palpitations.   Gastrointestinal:  Negative for diarrhea and nausea.   Genitourinary:  Positive for frequency. Negative for dysuria.   Musculoskeletal:  Positive for back pain. Negative for gait problem.   Skin:  Negative for wound.   Neurological:  Positive for dizziness. Negative for headaches.   Objective:     Vital Signs (Most Recent):  Temp: (!) 102.2 °F (39 °C) (02/25/23 0302)  Pulse: 104 (02/25/23 0248)  Resp: 18 (02/25/23 0248)  BP: (!) 204/98 (02/25/23 0204)  SpO2: (!) 92 % (02/25/23 0248)   Vital Signs (24h Range):  Temp:  [99.1 °F (37.3 °C)-102.2 °F (39 °C)] 102.2 °F (39 °C)  Pulse:  [104-113] 104  Resp:  [18-32] 18  SpO2:  [92 %] 92 %  BP: (204)/(98) 204/98     Weight: 62.6 kg (138 lb 0.1 oz)  Body mass index is 22.97 kg/m².    Physical Exam  Vitals reviewed.   Constitutional:       Appearance: Normal appearance. She is ill-appearing. She is not toxic-appearing.   HENT:      Head: Normocephalic and atraumatic.   Eyes:      Extraocular Movements: Extraocular movements intact.      Conjunctiva/sclera: Conjunctivae normal.   Cardiovascular:      Rate and Rhythm: Normal rate and regular rhythm.   Pulmonary:      Breath sounds: No stridor. No wheezing.   Abdominal:      General: There is no distension.      Palpations: Abdomen is soft.      Tenderness: There is no abdominal tenderness.   Musculoskeletal:      Cervical back: Neck supple. No rigidity.      Right lower leg: No edema.      Left lower leg: No edema.      Comments: weakness   Skin:     General: Skin is warm and dry.   Neurological:      General: No focal deficit present.      Mental Status: She is alert and oriented to person, place, and time.    Psychiatric:         Mood and Affect: Mood normal.         Judgment: Judgment normal.           Significant Labs: All pertinent labs within the past 24 hours have been reviewed.    Significant Imaging: I have reviewed all pertinent imaging results/findings within the past 24 hours.

## 2023-02-25 NOTE — CARE UPDATE
02/25/23 0832   Skin Integrity   $ Wound Care Tech Time 15 min   Area Observed Bridge of nose   Skin Appearance without discoloration   PRE-TX-O2   Device (Oxygen Therapy) nasal cannula   $ Is the patient on Low Flow Oxygen? Yes   Flow (L/min) 3   Pulse Oximetry Type Continuous   $ Pulse Oximetry - Multiple Charge Pulse Oximetry - Multiple   Preset CPAP/BiPAP Settings   $ CPAP/BiPAP Daily Charge BiPAP/CPAP Daily   Respiratory Evaluation   $ Care Plan Tech Time 15 min

## 2023-02-25 NOTE — PLAN OF CARE
Problem: Malnutrition  Goal: Improved Nutritional Intake  Outcome: Ongoing, Progressing  Intervention: Promote and Optimize Oral Intake  Flowsheets (Taken 2/25/2023 1727)  Oral Nutrition Promotion: calorie-dense liquids provided

## 2023-02-25 NOTE — H&P
[facility] - Emergency Dept  Hospital Medicine  History & Physical    Patient Name: Erich Encarnacion  MRN: [id number]  Patient Class: IP- Inpatient  Admission Date: 2/25/2023  Attending Physician: Jania Ramirez MD   Primary Care Provider: Se Rios MD         Patient information was obtained from patient, relative(s), past medical records and ER records.     Subjective:     Principal Problem:Severe sepsis    Chief Complaint:   Chief Complaint   Patient presents with    Shortness of Breath     Pt dx with UTI Monday.  Pt complaint of kidney pain and shortness of breath        HPI: 89 yo F with PMH including hypothyroidism, angina, chronic pain, hypertension, seizure hx who presents with fever, cough, fatigue, and worsening back pain. Patient has not been in her usual camilla, been increasingly weak, poor appetite, productive cough. She was diagnosed with UTI due to frequency and started on antibiotics by PCP. She has also been having increased back pain and feels she is not improving as well as feeling more winded. She thus presented to the ED. In the ED, patient was febrile, hypertensive, and tachycardic with elevated WBC. CXR showed volume overload versus infiltrate. Reviewing a prior culture, she was started started on meropenem for hx of ESBL and vanc. She was also placed on BiPAP. Patient states she is feeling slightly better. Hospitalist requested for admission.       Past Medical History:   Diagnosis Date    Macula lutea degeneration     Squamous cell carcinoma of skin        Past Surgical History:   Procedure Laterality Date    APPENDECTOMY      BACK SURGERY  1975    HYSTERECTOMY         Review of patient's allergies indicates:  No Known Allergies    Current Facility-Administered Medications on File Prior to Encounter   Medication    triamcinolone acetonide injection 40 mg     Current Outpatient Medications on File Prior to Encounter   Medication Sig    albuterol-ipratropium  (DUO-NEB) 2.5 mg-0.5 mg/3 mL nebulizer solution Take 3 mLs by nebulization every 4 (four) hours as needed. rescue    amLODIPine (NORVASC) 2.5 MG tablet Take 2.5 mg by mouth once daily.     aspirin (ECOTRIN) 81 MG EC tablet Take 81 mg by mouth every 12 (twelve) hours.    cyproheptadine (PERIACTIN) 4 mg tablet 1 tablet    HYDROcodone-acetaminophen (NORCO) 5-325 mg per tablet Take 1 tablet by mouth every 4 (four) hours as needed for Pain.    isosorbide dinitrate (ISORDIL) 30 MG Tab Take 30 mg by mouth once daily.     levothyroxine (SYNTHROID) 25 MCG tablet Take 25 mcg by mouth every morning.     magnesium 250 mg Tab Take 1 tablet by mouth once daily.    meclizine (ANTIVERT) 12.5 mg tablet Take 12.5 mg by mouth daily as needed.     nitrofurantoin, macrocrystal-monohydrate, (MACROBID) 100 MG capsule 1 capsule at bedtime with food    polyethylene glycol (GLYCOLAX) 17 gram/dose powder Take 17 g by mouth Daily.    topiramate (TOPAMAX) 25 MG tablet Take 25 mg by mouth once daily.     vit A/vit C/vit E/zinc/copper (PRESERVISION AREDS ORAL) Take 1 tablet by mouth once daily.     zolpidem (AMBIEN) 10 mg Tab Take 1 tablet (10 mg total) by mouth nightly as needed (insomnia).     Family History    None       Tobacco Use    Smoking status: Former     Packs/day: 1.00     Years: 20.00     Pack years: 20.00     Types: Cigarettes    Smokeless tobacco: Never   Substance and Sexual Activity    Alcohol use: No    Drug use: No    Sexual activity: Not on file     Review of Systems   Constitutional:  Positive for appetite change, fatigue and fever. Negative for chills.   HENT:  Positive for congestion. Negative for hearing loss.    Respiratory:  Positive for cough and shortness of breath.    Cardiovascular:  Negative for chest pain and palpitations.   Gastrointestinal:  Negative for diarrhea and nausea.   Genitourinary:  Positive for frequency. Negative for dysuria.   Musculoskeletal:  Positive for back pain. Negative for  gait problem.   Skin:  Negative for wound.   Neurological:  Positive for dizziness. Negative for headaches.   Objective:     Vital Signs (Most Recent):  Temp: (!) 102.2 °F (39 °C) (02/25/23 0302)  Pulse: 104 (02/25/23 0248)  Resp: 18 (02/25/23 0248)  BP: (!) 204/98 (02/25/23 0204)  SpO2: (!) 92 % (02/25/23 0248)   Vital Signs (24h Range):  Temp:  [99.1 °F (37.3 °C)-102.2 °F (39 °C)] 102.2 °F (39 °C)  Pulse:  [104-113] 104  Resp:  [18-32] 18  SpO2:  [92 %] 92 %  BP: (204)/(98) 204/98     Weight: 62.6 kg (138 lb 0.1 oz)  Body mass index is 22.97 kg/m².    Physical Exam  Vitals reviewed.   Constitutional:       Appearance: Normal appearance. She is ill-appearing. She is not toxic-appearing.   HENT:      Head: Normocephalic and atraumatic.   Eyes:      Extraocular Movements: Extraocular movements intact.      Conjunctiva/sclera: Conjunctivae normal.   Cardiovascular:      Rate and Rhythm: Normal rate and regular rhythm.   Pulmonary:      Breath sounds: No stridor. No wheezing.   Abdominal:      General: There is no distension.      Palpations: Abdomen is soft.      Tenderness: There is no abdominal tenderness.   Musculoskeletal:      Cervical back: Neck supple. No rigidity.      Right lower leg: No edema.      Left lower leg: No edema.      Comments: weakness   Skin:     General: Skin is warm and dry.   Neurological:      General: No focal deficit present.      Mental Status: She is alert and oriented to person, place, and time.   Psychiatric:         Mood and Affect: Mood normal.         Judgment: Judgment normal.           Significant Labs: All pertinent labs within the past 24 hours have been reviewed.    Significant Imaging: I have reviewed all pertinent imaging results/findings within the past 24 hours.    Assessment/Plan:     Sepsis  Probable UTI versus Pneumonia versus Multifactorial  Acute hypoxic respiratory failure likely volume overload versus pneumonia  HTN  Hypothyroidism  Angina  Chronic  pain  Hypertension  Seizure hx     -WBC, fever, tachycardia, + source  -IV meropenem for UTI, IV Vanc for pneumonia/ MRSA  -Follow-up Bcx, Ucx, procalcitonin  -May want CT chest or CT A/P for further characterization based on source and clinical history  -On BiPAP in the ED, can be transitioned off as tolerated  -Bronchodilator  -Mucinex  -PRN Tylenol, Tramadol (home med)  -Resume other home medications as documented and appropriate     FULL CODE - d/w patient and daughter  DVT Lovenox  GI PPI     Jania Ramirez MD  Department of Hospital Medicine   [facility] - Emergency Dept

## 2023-02-26 PROBLEM — I20.9 ANGINA PECTORIS: Status: ACTIVE | Noted: 2023-02-26

## 2023-02-26 PROBLEM — J18.9 COMMUNITY ACQUIRED PNEUMONIA: Status: ACTIVE | Noted: 2023-02-26

## 2023-02-26 PROBLEM — J96.01 ACUTE HYPOXEMIC RESPIRATORY FAILURE: Status: ACTIVE | Noted: 2023-02-26

## 2023-02-26 LAB
ANION GAP SERPL CALC-SCNC: 6 MMOL/L (ref 8–16)
BUN SERPL-MCNC: 20 MG/DL (ref 10–30)
CALCIUM SERPL-MCNC: 8.5 MG/DL (ref 8.7–10.5)
CHLORIDE SERPL-SCNC: 103 MMOL/L (ref 95–110)
CO2 SERPL-SCNC: 24 MMOL/L (ref 23–29)
CREAT SERPL-MCNC: 1 MG/DL (ref 0.5–1.4)
ERYTHROCYTE [DISTWIDTH] IN BLOOD BY AUTOMATED COUNT: 13.6 % (ref 11.5–14.5)
EST. GFR  (NO RACE VARIABLE): 51.6 ML/MIN/1.73 M^2
GLUCOSE SERPL-MCNC: 117 MG/DL (ref 70–110)
HCT VFR BLD AUTO: 30.6 % (ref 37–48.5)
HGB BLD-MCNC: 10.3 G/DL (ref 12–16)
MAGNESIUM SERPL-MCNC: 2.1 MG/DL (ref 1.6–2.6)
MCH RBC QN AUTO: 29.5 PG (ref 27–31)
MCHC RBC AUTO-ENTMCNC: 33.7 G/DL (ref 32–36)
MCV RBC AUTO: 88 FL (ref 82–98)
PLATELET # BLD AUTO: 177 K/UL (ref 150–450)
PMV BLD AUTO: 10.8 FL (ref 9.2–12.9)
POTASSIUM SERPL-SCNC: 3.4 MMOL/L (ref 3.5–5.1)
RBC # BLD AUTO: 3.49 M/UL (ref 4–5.4)
SODIUM SERPL-SCNC: 133 MMOL/L (ref 136–145)
VANCOMYCIN TROUGH SERPL-MCNC: 5.5 UG/ML
WBC # BLD AUTO: 9.42 K/UL (ref 3.9–12.7)

## 2023-02-26 PROCEDURE — 99900035 HC TECH TIME PER 15 MIN (STAT)

## 2023-02-26 PROCEDURE — 36415 COLL VENOUS BLD VENIPUNCTURE: CPT | Performed by: STUDENT IN AN ORGANIZED HEALTH CARE EDUCATION/TRAINING PROGRAM

## 2023-02-26 PROCEDURE — 63600175 PHARM REV CODE 636 W HCPCS: Mod: TB,JG | Performed by: STUDENT IN AN ORGANIZED HEALTH CARE EDUCATION/TRAINING PROGRAM

## 2023-02-26 PROCEDURE — 99900031 HC PATIENT EDUCATION (STAT)

## 2023-02-26 PROCEDURE — 80202 ASSAY OF VANCOMYCIN: CPT | Performed by: STUDENT IN AN ORGANIZED HEALTH CARE EDUCATION/TRAINING PROGRAM

## 2023-02-26 PROCEDURE — 27000221 HC OXYGEN, UP TO 24 HOURS

## 2023-02-26 PROCEDURE — 94761 N-INVAS EAR/PLS OXIMETRY MLT: CPT

## 2023-02-26 PROCEDURE — 94618 PULMONARY STRESS TESTING: CPT

## 2023-02-26 PROCEDURE — 94640 AIRWAY INHALATION TREATMENT: CPT

## 2023-02-26 PROCEDURE — 25000003 PHARM REV CODE 250: Performed by: STUDENT IN AN ORGANIZED HEALTH CARE EDUCATION/TRAINING PROGRAM

## 2023-02-26 PROCEDURE — 94799 UNLISTED PULMONARY SVC/PX: CPT

## 2023-02-26 PROCEDURE — 83735 ASSAY OF MAGNESIUM: CPT | Performed by: STUDENT IN AN ORGANIZED HEALTH CARE EDUCATION/TRAINING PROGRAM

## 2023-02-26 PROCEDURE — 80048 BASIC METABOLIC PNL TOTAL CA: CPT | Performed by: STUDENT IN AN ORGANIZED HEALTH CARE EDUCATION/TRAINING PROGRAM

## 2023-02-26 PROCEDURE — 21000000 HC CCU ICU ROOM CHARGE

## 2023-02-26 PROCEDURE — 92610 EVALUATE SWALLOWING FUNCTION: CPT

## 2023-02-26 PROCEDURE — 63600175 PHARM REV CODE 636 W HCPCS: Performed by: STUDENT IN AN ORGANIZED HEALTH CARE EDUCATION/TRAINING PROGRAM

## 2023-02-26 PROCEDURE — 25000242 PHARM REV CODE 250 ALT 637 W/ HCPCS: Performed by: STUDENT IN AN ORGANIZED HEALTH CARE EDUCATION/TRAINING PROGRAM

## 2023-02-26 PROCEDURE — 85027 COMPLETE CBC AUTOMATED: CPT | Performed by: STUDENT IN AN ORGANIZED HEALTH CARE EDUCATION/TRAINING PROGRAM

## 2023-02-26 RX ORDER — ENOXAPARIN SODIUM 100 MG/ML
30 INJECTION SUBCUTANEOUS EVERY 24 HOURS
Status: DISCONTINUED | OUTPATIENT
Start: 2023-02-26 | End: 2023-02-28 | Stop reason: HOSPADM

## 2023-02-26 RX ADMIN — ISOSORBIDE DINITRATE 30 MG: 10 TABLET ORAL at 08:02

## 2023-02-26 RX ADMIN — DOXYCYCLINE 100 MG: 100 INJECTION, POWDER, LYOPHILIZED, FOR SOLUTION INTRAVENOUS at 02:02

## 2023-02-26 RX ADMIN — LEVOTHYROXINE SODIUM 25 MCG: 0.03 TABLET ORAL at 08:02

## 2023-02-26 RX ADMIN — MEROPENEM AND SODIUM CHLORIDE 1 G: 1 INJECTION, SOLUTION INTRAVENOUS at 08:02

## 2023-02-26 RX ADMIN — PANTOPRAZOLE SODIUM 40 MG: 40 TABLET, DELAYED RELEASE ORAL at 08:02

## 2023-02-26 RX ADMIN — MUPIROCIN 1 G: 20 OINTMENT TOPICAL at 08:02

## 2023-02-26 RX ADMIN — POTASSIUM BICARBONATE 40 MEQ: 782 TABLET, EFFERVESCENT ORAL at 08:02

## 2023-02-26 RX ADMIN — IPRATROPIUM BROMIDE AND ALBUTEROL SULFATE 3 ML: 2.5; .5 SOLUTION RESPIRATORY (INHALATION) at 08:02

## 2023-02-26 RX ADMIN — AMLODIPINE BESYLATE 2.5 MG: 2.5 TABLET ORAL at 08:02

## 2023-02-26 RX ADMIN — TRAMADOL HYDROCHLORIDE 50 MG: 50 TABLET, COATED ORAL at 04:02

## 2023-02-26 RX ADMIN — IPRATROPIUM BROMIDE AND ALBUTEROL SULFATE 3 ML: 2.5; .5 SOLUTION RESPIRATORY (INHALATION) at 07:02

## 2023-02-26 RX ADMIN — IPRATROPIUM BROMIDE AND ALBUTEROL SULFATE 3 ML: 2.5; .5 SOLUTION RESPIRATORY (INHALATION) at 01:02

## 2023-02-26 RX ADMIN — GUAIFENESIN 200 MG: 200 SOLUTION ORAL at 08:02

## 2023-02-26 RX ADMIN — TOPIRAMATE 25 MG: 25 TABLET, FILM COATED ORAL at 08:02

## 2023-02-26 RX ADMIN — VANCOMYCIN HYDROCHLORIDE 1000 MG: 1 INJECTION, POWDER, LYOPHILIZED, FOR SOLUTION INTRAVENOUS at 05:02

## 2023-02-26 RX ADMIN — CHLORHEXIDINE GLUCONATE 15 ML: 1.2 RINSE ORAL at 08:02

## 2023-02-26 RX ADMIN — ASPIRIN 81 MG: 81 TABLET, COATED ORAL at 08:02

## 2023-02-26 RX ADMIN — MUPIROCIN 1 G: 20 OINTMENT TOPICAL at 10:02

## 2023-02-26 RX ADMIN — TRAMADOL HYDROCHLORIDE 50 MG: 50 TABLET, COATED ORAL at 11:02

## 2023-02-26 RX ADMIN — CHLORHEXIDINE GLUCONATE 15 ML: 1.2 RINSE ORAL at 10:02

## 2023-02-26 RX ADMIN — HYDROCODONE POLISTIREX AND CHLORPHENIRAMINE POLISTIREX 5 ML: 10; 8 SUSPENSION, EXTENDED RELEASE ORAL at 11:02

## 2023-02-26 RX ADMIN — ENOXAPARIN SODIUM 30 MG: 100 INJECTION SUBCUTANEOUS at 05:02

## 2023-02-26 NOTE — CARE UPDATE
02/26/23 1342   Home Oxygen Qualification   $ Home O2 Qualification Pulmonary Stress Test/6 min walk;Tech time 15 minutes   Room Air SpO2 At Rest (!) 88 %   Room Air SpO2 During Ambulation (!) 86 %   SpO2 During Ambulation on O2 93 %   Heart Rate on O2 84 bpm   Ambulation O2 LPM 3 LPM   SpO2 Post Ambulation 95 %   Post Ambulation Heart Rate 78 bpm   Post Ambulation O2 LPM 3 LPM   Home O2 Eval Comments Pt requires home O2

## 2023-02-26 NOTE — PROGRESS NOTES
Pharmacist Renal Dose Adjustment Note    Erich Encarnacion is a 89 y.o. female being treated with the medication Enoxaparin    Patient Data:    Vital Signs (Most Recent):  Temp: 97.4 °F (36.3 °C) (02/26/23 0318)  Pulse: 72 (02/26/23 0545)  Resp: 15 (02/26/23 0545)  BP: 110/60 (02/26/23 0500)  SpO2: 97 % (02/26/23 0545) Vital Signs (72h Range):  Temp:  [97.4 °F (36.3 °C)-102.2 °F (39 °C)]   Pulse:  []   Resp:  [14-40]   BP: ()/(51-98)   SpO2:  [91 %-100 %]      Recent Labs   Lab 02/25/23 0219 02/26/23 0442   CREATININE 0.9 1.0     Serum creatinine: 1 mg/dL 02/26/23 0442  Estimated creatinine clearance: 29.6 mL/min    Enoxaparin 40mg Q24H will be changed to Enoxaparin 30mg Q24H due to CrCl <30    Pharmacist's Name: Anuja Horn  Pharmacist's Extension: [phone]

## 2023-02-26 NOTE — CARE UPDATE
02/25/23 2003   Patient Assessment/Suction   Level of Consciousness (AVPU) alert   Respiratory Effort Unlabored   Expansion/Accessory Muscles/Retractions no use of accessory muscles   All Lung Fields Breath Sounds wheezes, expiratory   Rhythm/Pattern, Respiratory no shortness of breath reported   Cough Frequency no cough   PRE-TX-O2   Device (Oxygen Therapy) nasal cannula   $ Is the patient on Low Flow Oxygen? Yes   Flow (L/min) 3   SpO2 98 %   Pulse Oximetry Type Continuous   $ Pulse Oximetry - Multiple Charge Pulse Oximetry - Multiple   Pulse 80   Resp 15   Positioning   Head of Bed (HOB) Positioning HOB elevated;HOB at 30 degrees   Aerosol Therapy   $ Aerosol Therapy Charges Aerosol Treatment   Daily Review of Necessity (SVN) completed   Respiratory Treatment Status (SVN) given   Treatment Route (SVN) mask   Patient Position (SVN) semi-Davis's   Post Treatment Assessment (SVN) breath sounds improved   Signs of Intolerance (SVN) none   Breath Sounds Post-Respiratory Treatment   Throughout All Fields Post-Treatment All Fields   Throughout All Fields Post-Treatment aeration increased   Post-treatment Heart Rate (beats/min) 79   Post-treatment Resp Rate (breaths/min) 17   Education   $ Education Bronchodilator;15 min   Respiratory Evaluation   $ Care Plan Tech Time 15 min   $ Eval/Re-eval Charges Re-evaluation

## 2023-02-26 NOTE — PLAN OF CARE
B/s swallow evaluation initiated. Pt c/o esophageal dysphagia; GI consult warranted. ST will follow.

## 2023-02-26 NOTE — PROGRESS NOTES
Pharmacokinetic Assessment Follow Up: IV Vancomycin    Patient brief summary:  Erich Encarnacion is a 89 y.o. female initiated on antimicrobial therapy with IV Vancomycin for treatment of Pneumonia    The patient's current regimen is Vancomycin 1000 mg every 24 hours.       Actual Body Weight = 62.6 kg (138 lb 0.1 oz).    Renal Function:   Estimated Creatinine Clearance: 29.6 mL/min (based on SCr of 1 mg/dL).      Vancomycin serum concentration assessment(s):    The trough level was drawn correctly and can be used to guide therapy at this time. The measurement is below the desired definitive target range of 10 to 15 mcg/mL.    Vancomycin Regimen Plan:    Continue regimen to Vancomycin 1000 mg IV every 24 hours with next serum trough concentration measured at 4:00 prior to 3rd dose on 2/27.     Drug levels (last 3 results):  Recent Labs   Lab Result Units 02/26/23  0442   Vancomycin-Trough ug/mL 5.5*       Pharmacy will continue to follow and monitor vancomycin.    Please contact pharmacy at extension [phone] for questions regarding this assessment.    Thank you for the consult,   Darlene Villarreal

## 2023-02-26 NOTE — PROGRESS NOTES
[facility] Medicine  Progress Note    Patient name: Erich Encarnacion  MRN: [id number]  Admit Date: 2/25/2023   LOS: 1 day     SUBJECTIVE:     Principal problem: Severe sepsis  Chief Complaint   Patient presents with    Shortness of Breath     Pt dx with UTI Monday.  Pt complaint of kidney pain and shortness of breath       Interval History:  Feeling ok today, she is wheezing somewhat. Hypoxic on room air at rest. Imaging and workup consistent with bacterial pneumonia. Continue nebulizer treatment for reactive airways disease with pneumonia.     Scheduled Meds:   albuterol-ipratropium  3 mL Nebulization Q6H WAKE    amLODIPine  2.5 mg Oral Daily    aspirin  81 mg Oral Daily    chlorhexidine  15 mL Mouth/Throat BID    doxycycline (VIBRAMYCIN) IVPB  100 mg Intravenous Q12H    enoxaparin  30 mg Subcutaneous Daily    isosorbide dinitrate  30 mg Oral Daily    levothyroxine  25 mcg Oral QAM    mupirocin   Nasal BID    pantoprazole  40 mg Oral Daily    topiramate  25 mg Oral Daily     Continuous Infusions:  PRN Meds:acetaminophen, guaiFENesin 100 mg/5 ml, hydrocodone-chlorpheniramine, meclizine, melatonin, ondansetron, polyethylene glycol, sodium chloride 0.9%, traMADoL    Review of patient's allergies indicates:  No Known Allergies    Review of Systems: As per interval history    OBJECTIVE:     Vital Signs (Most Recent)  Temp: (!) 95.6 °F (35.3 °C) (02/26/23 1101)  Pulse: 85 (02/26/23 1100)  Resp: 19 (02/26/23 1132)  BP: (!) 103/58 (02/26/23 1100)  SpO2: (!) 90 % (02/26/23 1100)    Vital Signs Range (Last 24H):  Temp:  [95.6 °F (35.3 °C)-98.7 °F (37.1 °C)]   Pulse:  [69-94]   Resp:  [14-40]   BP: ()/(51-62)   SpO2:  [90 %-98 %]     I & O (Last 24H):  Intake/Output Summary (Last 24 hours) at 2/26/2023 1141  Last data filed at 2/26/2023 0553  Gross per 24 hour   Intake 360 ml   Output 1200 ml   Net -840 ml       Physical Exam:  General: Patient resting comfortably in no acute distress. Appears as  stated age. Calm  Eyes: No conjunctival injection. No scleral icterus.  ENT: Hearing grossly intact. No discharge from ears. No nasal discharge.   Neck: Supple, trachea midline. No JVD  CVS: RRR. No LE edema BL  Lungs: slight wheeze.  Good breath sounds. No accessory muscle use. No acute respiratory distress  Abdomen:  Soft, nontender and nondistended.  No organomegaly  Neuro: AOx3. Moves all extremities. Follows commands. Responds appropriately   Skin:  No rash or erythema noted    Laboratory:  I have reviewed all pertinent lab results within the past 24 hours.    Diagnostic Results:       ASSESSMENT/PLAN:     89 y/o F with hx of hypothyroid, CAD with angina, Chronic pain, HTN, seizures, who presents with CAP and back pain found to have hypoxemia due to pneumonia and severe sepsis now improved.     Active Hospital Problems    Diagnosis  POA    *Severe sepsis [A41.9, R65.20]  Yes    Community acquired pneumonia [J18.9]  Yes    Angina pectoris [I20.9]  Unknown    Acute hypoxemic respiratory failure [J96.01]  Unknown    Essential hypertension [I10]  Yes      Resolved Hospital Problems   No resolved problems to display.         Plan:   - continue abx. Deescalated to Doxycycline  - discontinue vancomycin and Merrem  - Duo Nebs, Pulmicort for bronchitis associated with pneumonia  - procalcitonin elevated.   - Viral pneumonia workup is negative.   - PT / OT as she is weak  - Hypoxic at rest. Home O2 eval  - CT t spine given concern for possible fracture not seen on xray given her pain.   - cough suppressant, mucinex.   - wean oxygen as tolerated  - will review CT  - ordered AM cbc, BMP. Will review      VTE Risk Mitigation (From admission, onward)           Ordered     enoxaparin injection 30 mg  Daily         02/26/23 0624     IP VTE HIGH RISK PATIENT  Once         02/25/23 0833     Place sequential compression device  Until discontinued         02/25/23 0833                      Department Hospital Medicine  Luis Enrique  [facility] FAN Ross MD  Date of service: 02/26/2023

## 2023-02-26 NOTE — PLAN OF CARE
Problem: Infection  Goal: Absence of Infection Signs and Symptoms  Outcome: Ongoing, Progressing     Problem: Adult Inpatient Plan of Care  Goal: Plan of Care Review  Outcome: Ongoing, Progressing  Goal: Patient-Specific Goal (Individualized)  Outcome: Ongoing, Progressing  Goal: Absence of Hospital-Acquired Illness or Injury  Outcome: Ongoing, Progressing  Goal: Optimal Comfort and Wellbeing  Outcome: Ongoing, Progressing  Goal: Readiness for Transition of Care  Outcome: Ongoing, Progressing     Problem: Adjustment to Illness (Sepsis/Septic Shock)  Goal: Optimal Coping  Outcome: Ongoing, Progressing     Problem: Bleeding (Sepsis/Septic Shock)  Goal: Absence of Bleeding  Outcome: Ongoing, Progressing     Problem: Glycemic Control Impaired (Sepsis/Septic Shock)  Goal: Blood Glucose Level Within Desired Range  Outcome: Ongoing, Progressing     Problem: Infection Progression (Sepsis/Septic Shock)  Goal: Absence of Infection Signs and Symptoms  Outcome: Ongoing, Progressing     Problem: Nutrition Impaired (Sepsis/Septic Shock)  Goal: Optimal Nutrition Intake  Outcome: Ongoing, Progressing     Problem: Skin Injury Risk Increased  Goal: Skin Health and Integrity  Outcome: Ongoing, Progressing     Problem: Fall Injury Risk  Goal: Absence of Fall and Fall-Related Injury  Outcome: Ongoing, Progressing     Problem: Malnutrition  Goal: Improved Nutritional Intake  Outcome: Ongoing, Progressing

## 2023-02-26 NOTE — CARE UPDATE
02/26/23 0708   Patient Assessment/Suction   Level of Consciousness (AVPU) alert   All Lung Fields Breath Sounds clear;coarse   PRE-TX-O2   Device (Oxygen Therapy) nasal cannula   $ Is the patient on Low Flow Oxygen? Yes   Flow (L/min) 3   SpO2 98 %   Pulse Oximetry Type Continuous   $ Pulse Oximetry - Multiple Charge Pulse Oximetry - Multiple   Pulse 76   Resp 15   Aerosol Therapy   $ Aerosol Therapy Charges Aerosol Treatment   Daily Review of Necessity (SVN) completed   Respiratory Treatment Status (SVN) given   Treatment Route (SVN) mask   Patient Position (SVN) semi-Davis's   Post Treatment Assessment (SVN) increased aeration   Signs of Intolerance (SVN) none   Breath Sounds Post-Respiratory Treatment   Throughout All Fields Post-Treatment aeration increased   Post-treatment Heart Rate (beats/min) 73   Post-treatment Resp Rate (breaths/min) 15   Education   $ Education Bronchodilator;15 min   Respiratory Evaluation   $ Care Plan Tech Time 15 min

## 2023-02-26 NOTE — CARE UPDATE
02/26/23 1051   Home Oxygen Qualification   $ Home O2 Qualification Pulmonary Stress Test/6 min walk;Tech time 15 minutes   Room Air SpO2 At Rest (!) 88 %   Room Air SpO2 During Ambulation   (Pt stated she didn't want to ambulate @ this time)

## 2023-02-26 NOTE — PT/OT/SLP EVAL
Speech Language Pathology Evaluation  Bedside Swallow    Patient Name:  Erich Encarnacion   MRN:  [id number]  Admitting Diagnosis: Severe sepsis    Recommendations:                 General Recommendations:  Dysphagia therapy and GI consult   Diet recommendations:  Minced & Moist Diet - IDDSI Level 5, Thin   Aspiration Precautions: Alternating bites/sips, Feed only when awake/alert, Frequent oral care, HOB to 90 degrees, Meds whole 1 at a time, Remain upright 30 minutes post meal, Small bites/sips, and Wear oxygen during intake   General Precautions: Standard, aspiration, respiratory, hearing impaired  Communication strategies:  provide increased time to answer    History:     Past Medical History:   Diagnosis Date    Macula lutea degeneration     Squamous cell carcinoma of skin        Past Surgical History:   Procedure Laterality Date    APPENDECTOMY      BACK SURGERY  1975    HYSTERECTOMY         Social History: Patient lives with self.    Chest X-Rays: CXR on 2/25/23    FINDINGS: Portable chest radiograph at 0216 hours compared to 10/10/2022 shows stable enlarged cardiac silhouette, with normal pulmonary vascularity. There are aortic vascular calcifications and mitral annular calcifications.     There are scattered reticulonodular densities in both lungs with confluence at the lung bases, nonspecific. No consolidation, large pleural effusion, or pneumothorax. No acute fractures.    Prior diet: Regular/TL.    Subjective     Pt seated in b/s chair with son present. She was indep oriented x3. At onset of session, pt reported frequent occurrence of globus sensation and regurgitation  following PO intake. Also noted, pt with hx of GERD and hiatal hernia.    Patient goals: To improve swallowing function     Pain/Comfort:  Pain Rating 1: 0/10    Respiratory Status: Nasal cannula, flow 3 L/min    Objective:     Oral Musculature Evaluation  Oral Musculature: WFL  Dentition: present and adequate  Secretion Management:  adequate  Mucosal Quality: adequate  Mandibular Strength and Mobility: WFL  Oral Labial Strength and Mobility: WFL  Lingual Strength and Mobility: WFL  Velar Elevation: WFL  Buccal Strength and Mobility: WFL  Volitional Cough: WFL  Volitional Swallow: Delayed  Voice Prior to PO Intake: Clear    Bedside Swallow Eval:   Consistencies Assessed:  Thin liquids - 3oz via cup edge, straw  Puree - x3tsp  Soft solids - pt refused; both pt and son reported difficulty  with dry food.     Oral Phase:   WFL    Pharyngeal Phase:   delayed swallow initation  no overt clinical signs/symptoms of aspiration    Compensatory Strategies  None    Treatment: B/s swallow evaluation completed. Across trials, pt demo adequate tolerance for PO trials dispensed this date. However, pt described s/s of esophageal dysphagia, further assessment warranted.     Assessment:     Erich Encarnacion is a 89 y.o. female with a diagnosis of Severe sepsis. She presents with c/o frequent globus sensation following PO intake 2/2 suspected esophageal dysphagia. Pt requires GI consult and possible MBSS to r/o suspected aspiration of gastric fluids. ST will follow.     Goals:   Multidisciplinary Problems       SLP Goals          Problem: SLP    Goal Priority Disciplines Outcome   SLP Goal     SLP Ongoing, Progressing   Description: 1. Pt will tolerate LRD and thin liquids w/o overt s/s of aspiration.                        Plan:     Patient to be seen:  3 x/week   Plan of Care expires:     Plan of Care reviewed with:  patient   SLP Follow-Up:  Yes       Discharge recommendations:      Barriers to Discharge:  Level of Skilled Assistance Needed      Time Tracking:     SLP Treatment Date:   02/26/23  Speech Start Time:  1217  Speech Stop Time:  1228     Speech Total Time (min):  11 min    Billable Minutes: Eval Swallow and Oral Function 11min    02/26/2023

## 2023-02-26 NOTE — PLAN OF CARE
Problem: Infection  Goal: Absence of Infection Signs and Symptoms  Outcome: Ongoing, Progressing     Problem: Adult Inpatient Plan of Care  Goal: Plan of Care Review  Outcome: Ongoing, Progressing  Goal: Patient-Specific Goal (Individualized)  Outcome: Ongoing, Progressing  Goal: Absence of Hospital-Acquired Illness or Injury  Outcome: Ongoing, Progressing  Goal: Optimal Comfort and Wellbeing  Outcome: Ongoing, Progressing  Goal: Readiness for Transition of Care  Outcome: Ongoing, Progressing     Problem: Adjustment to Illness (Sepsis/Septic Shock)  Goal: Optimal Coping  Outcome: Ongoing, Progressing     Problem: Infection Progression (Sepsis/Septic Shock)  Goal: Absence of Infection Signs and Symptoms  Outcome: Ongoing, Progressing     Problem: Nutrition Impaired (Sepsis/Septic Shock)  Goal: Optimal Nutrition Intake  Outcome: Ongoing, Progressing     Problem: Skin Injury Risk Increased  Goal: Skin Health and Integrity  Outcome: Ongoing, Progressing     Problem: Respiratory Compromise (Pneumonia)  Goal: Effective Oxygenation and Ventilation  Outcome: Ongoing, Progressing

## 2023-02-26 NOTE — PT/OT/SLP PROGRESS
Occupational Therapy      Patient Name:  Erich Encarnacion   MRN:  [id number]    Patient not seen today secondary to Patient unwilling to participate, Patient fatigue. Will follow-up 2/27/2023.    2/26/2023

## 2023-02-27 LAB
ANION GAP SERPL CALC-SCNC: 8 MMOL/L (ref 8–16)
BUN SERPL-MCNC: 28 MG/DL (ref 10–30)
CALCIUM SERPL-MCNC: 8.9 MG/DL (ref 8.7–10.5)
CHLORIDE SERPL-SCNC: 96 MMOL/L (ref 95–110)
CO2 SERPL-SCNC: 25 MMOL/L (ref 23–29)
CREAT SERPL-MCNC: 0.9 MG/DL (ref 0.5–1.4)
ERYTHROCYTE [DISTWIDTH] IN BLOOD BY AUTOMATED COUNT: 13.6 % (ref 11.5–14.5)
EST. GFR  (NO RACE VARIABLE): 58.5 ML/MIN/1.73 M^2
GLUCOSE SERPL-MCNC: 143 MG/DL (ref 70–110)
HCT VFR BLD AUTO: 31.7 % (ref 37–48.5)
HGB BLD-MCNC: 10.6 G/DL (ref 12–16)
MCH RBC QN AUTO: 29.6 PG (ref 27–31)
MCHC RBC AUTO-ENTMCNC: 33.4 G/DL (ref 32–36)
MCV RBC AUTO: 89 FL (ref 82–98)
PLATELET # BLD AUTO: 235 K/UL (ref 150–450)
PMV BLD AUTO: 11.1 FL (ref 9.2–12.9)
POTASSIUM SERPL-SCNC: 4.3 MMOL/L (ref 3.5–5.1)
RBC # BLD AUTO: 3.58 M/UL (ref 4–5.4)
SODIUM SERPL-SCNC: 129 MMOL/L (ref 136–145)
WBC # BLD AUTO: 9.59 K/UL (ref 3.9–12.7)

## 2023-02-27 PROCEDURE — 36415 COLL VENOUS BLD VENIPUNCTURE: CPT | Performed by: STUDENT IN AN ORGANIZED HEALTH CARE EDUCATION/TRAINING PROGRAM

## 2023-02-27 PROCEDURE — 80048 BASIC METABOLIC PNL TOTAL CA: CPT | Performed by: STUDENT IN AN ORGANIZED HEALTH CARE EDUCATION/TRAINING PROGRAM

## 2023-02-27 PROCEDURE — 25000003 PHARM REV CODE 250: Performed by: STUDENT IN AN ORGANIZED HEALTH CARE EDUCATION/TRAINING PROGRAM

## 2023-02-27 PROCEDURE — 99900035 HC TECH TIME PER 15 MIN (STAT)

## 2023-02-27 PROCEDURE — 25000242 PHARM REV CODE 250 ALT 637 W/ HCPCS: Performed by: STUDENT IN AN ORGANIZED HEALTH CARE EDUCATION/TRAINING PROGRAM

## 2023-02-27 PROCEDURE — 97110 THERAPEUTIC EXERCISES: CPT | Mod: CQ

## 2023-02-27 PROCEDURE — 85027 COMPLETE CBC AUTOMATED: CPT | Performed by: STUDENT IN AN ORGANIZED HEALTH CARE EDUCATION/TRAINING PROGRAM

## 2023-02-27 PROCEDURE — 25500020 PHARM REV CODE 255: Performed by: STUDENT IN AN ORGANIZED HEALTH CARE EDUCATION/TRAINING PROGRAM

## 2023-02-27 PROCEDURE — 21000000 HC CCU ICU ROOM CHARGE

## 2023-02-27 PROCEDURE — 92526 ORAL FUNCTION THERAPY: CPT

## 2023-02-27 PROCEDURE — 94799 UNLISTED PULMONARY SVC/PX: CPT

## 2023-02-27 PROCEDURE — 94761 N-INVAS EAR/PLS OXIMETRY MLT: CPT

## 2023-02-27 PROCEDURE — 63600175 PHARM REV CODE 636 W HCPCS: Performed by: STUDENT IN AN ORGANIZED HEALTH CARE EDUCATION/TRAINING PROGRAM

## 2023-02-27 PROCEDURE — 94640 AIRWAY INHALATION TREATMENT: CPT

## 2023-02-27 PROCEDURE — 27000221 HC OXYGEN, UP TO 24 HOURS

## 2023-02-27 PROCEDURE — 97535 SELF CARE MNGMENT TRAINING: CPT

## 2023-02-27 PROCEDURE — 99900031 HC PATIENT EDUCATION (STAT)

## 2023-02-27 PROCEDURE — 97166 OT EVAL MOD COMPLEX 45 MIN: CPT

## 2023-02-27 RX ADMIN — AMLODIPINE BESYLATE 2.5 MG: 2.5 TABLET ORAL at 09:02

## 2023-02-27 RX ADMIN — IPRATROPIUM BROMIDE AND ALBUTEROL SULFATE 3 ML: 2.5; .5 SOLUTION RESPIRATORY (INHALATION) at 07:02

## 2023-02-27 RX ADMIN — PANTOPRAZOLE SODIUM 40 MG: 40 TABLET, DELAYED RELEASE ORAL at 09:02

## 2023-02-27 RX ADMIN — ISOSORBIDE DINITRATE 30 MG: 10 TABLET ORAL at 09:02

## 2023-02-27 RX ADMIN — IOHEXOL 100 ML: 350 INJECTION, SOLUTION INTRAVENOUS at 01:02

## 2023-02-27 RX ADMIN — IPRATROPIUM BROMIDE AND ALBUTEROL SULFATE 3 ML: 2.5; .5 SOLUTION RESPIRATORY (INHALATION) at 02:02

## 2023-02-27 RX ADMIN — MUPIROCIN 1 G: 20 OINTMENT TOPICAL at 09:02

## 2023-02-27 RX ADMIN — DOXYCYCLINE 100 MG: 100 INJECTION, POWDER, LYOPHILIZED, FOR SOLUTION INTRAVENOUS at 03:02

## 2023-02-27 RX ADMIN — LEVOTHYROXINE SODIUM 25 MCG: 0.03 TABLET ORAL at 09:02

## 2023-02-27 RX ADMIN — ENOXAPARIN SODIUM 30 MG: 100 INJECTION SUBCUTANEOUS at 04:02

## 2023-02-27 RX ADMIN — TOPIRAMATE 25 MG: 25 TABLET, FILM COATED ORAL at 09:02

## 2023-02-27 RX ADMIN — ASPIRIN 81 MG: 81 TABLET, COATED ORAL at 09:02

## 2023-02-27 RX ADMIN — GUAIFENESIN 200 MG: 200 SOLUTION ORAL at 09:02

## 2023-02-27 RX ADMIN — DOXYCYCLINE 100 MG: 100 INJECTION, POWDER, LYOPHILIZED, FOR SOLUTION INTRAVENOUS at 04:02

## 2023-02-27 RX ADMIN — CHLORHEXIDINE GLUCONATE 15 ML: 1.2 RINSE ORAL at 09:02

## 2023-02-27 RX ADMIN — CHLORHEXIDINE GLUCONATE 15 ML: 1.2 RINSE ORAL at 07:02

## 2023-02-27 RX ADMIN — TRAMADOL HYDROCHLORIDE 50 MG: 50 TABLET, COATED ORAL at 07:02

## 2023-02-27 RX ADMIN — MUPIROCIN 1 G: 20 OINTMENT TOPICAL at 07:02

## 2023-02-27 NOTE — PLAN OF CARE
02/27/23 0736   Patient Assessment/Suction   Level of Consciousness (AVPU) alert   Respiratory Effort Normal;Unlabored   All Lung Fields Breath Sounds wheezes, expiratory   Cough Frequency infrequent   PRE-TX-O2   Device (Oxygen Therapy) nasal cannula   $ Is the patient on Low Flow Oxygen? Yes   Flow (L/min) 3   SpO2 97 %   Pulse Oximetry Type Continuous   $ Pulse Oximetry - Multiple Charge Pulse Oximetry - Multiple   Pulse 81   Resp 18   Aerosol Therapy   $ Aerosol Therapy Charges Aerosol Treatment   Daily Review of Necessity (SVN) completed   Respiratory Treatment Status (SVN) given   Treatment Route (SVN) mask   Patient Position (SVN) HOB elevated   Post Treatment Assessment (SVN) breath sounds improved   Signs of Intolerance (SVN) none   Education   $ Education Bronchodilator;15 min   Respiratory Evaluation   $ Care Plan Tech Time 15 min   $ Eval/Re-eval Charges Re-evaluation

## 2023-02-27 NOTE — PT/OT/SLP EVAL
Occupational Therapy   Evaluation    Name: Erich Encarnacion  MRN: [id number]  Admitting Diagnosis: Severe sepsis  Recent Surgery: * No surgery found *      Recommendations:     Discharge Recommendations: home health OT, home with home health, home  Discharge Equipment Recommendations:     Barriers to discharge:  Decreased caregiver support    Assessment:     Erich Encarnacion is a 89 y.o. female with a medical diagnosis of Severe sepsis.  She presents with decreased activity endurance with mobility. Patient's daughter was in room and provided hx of PLOF. Per nurse, patient was on home hospice prior to current admission.  Performance deficits affecting function: weakness, impaired endurance, impaired self care skills, impaired functional mobility, gait instability, impaired balance, decreased lower extremity function, decreased ROM, impaired cardiopulmonary response to activity.      Rehab Prognosis: Fair; patient would benefit from acute skilled OT services to address these deficits and reach maximum level of function.       Plan:     Patient to be seen 3 x/week to address the above listed problems via self-care/home management, therapeutic activities, therapeutic exercises  Plan of Care Expires: 03/20/23  Plan of Care Reviewed with: patient, daughter    Subjective     Chief Complaint: none  Patient/Family Comments/goals: none    Occupational Profile:  Living Environment: Patient lives alone in a H with 3 CRISTOPHER and R handrail.   Previous level of function: Patient was Mod I with ADLs and ambulatory using rollator.   Equipment Used at Home: rollator, bedside commode, bath bench, grab bar  Assistance upon Discharge: Patient has a daughter that lives 8 miles away that provides assistance PRN.    Pain/Comfort:  Pain Rating 1: 0/10  Pain Rating Post-Intervention 1: 0/10    Patients cultural, spiritual, Sabianist conflicts given the current situation:      Objective:     Communicated with: nurse Weller prior to session.   Patient found HOB elevated with bed alarm, blood pressure cuff, mckenna catheter, oxygen, PICC line, pulse ox (continuous), telemetry upon OT entry to room.    General Precautions: Standard, respiratory, vision impaired, hearing impaired  Orthopedic Precautions: N/A  Braces: N/A  Respiratory Status: Nasal cannula, flow 3 L/min    Occupational Performance:    Bed Mobility:    Patient completed Scooting/Bridging with minimum assistance  Patient completed Supine to Sit with minimum assistance    Functional Mobility/Transfers:  Patient completed Sit <> Stand Transfer with minimum assistance  with  rolling walker   Patient completed Bed <> Chair Transfer using Stand Pivot technique with minimum assistance with rolling walker    Activities of Daily Living:  Grooming: stand by assistance with hair grooming while seated in chair  Lower Body Dressing: maximal assistance to don/doff socks while seated in chair  Toileting: dependence with mckenna cath in place    Cognitive/Visual Perceptual:  Cognitive/Psychosocial Skills:     -       Oriented to: person and place   -       Follows Commands/attention:Follows two-step commands  -       Communication: Patient native language is [place]er; speaks little English  -       Safety awareness/insight to disability: impaired   -       Mood/Affect/Coping skills/emotional control: Cooperative and Pleasant  Visual/Perceptual:      -Legally blind    Physical Exam:  Postural examination/scapula alignment:    -       Rounded shoulders  -       Forward head  Upper Extremity Range of Motion:     -       Right Upper Extremity: WFL  -       Left Upper Extremity: WFL  Upper Extremity Strength:    -       Right Upper Extremity: WFL  -       Left Upper Extremity: WFL   Strength:    -       Right Upper Extremity: WFL  -       Left Upper Extremity: WFL  Fine Motor Coordination:    -       Intact  Gross motor coordination:   WFL in BUE    AMPAC 6 Click ADL:  AMPAC Total Score: 16    Treatment &  Education:  OT ed pt on OT role & POC as well as discharge recommendations.  OT ed patient on safety with walker use for functional mobility with cues for hand placement & sequencing.       Patient left up in chair with all lines intact, call button in reach, chair alarm on, and daughter present    GOALS:   Multidisciplinary Problems       Occupational Therapy Goals          Problem: Occupational Therapy    Goal Priority Disciplines Outcome Interventions   Occupational Therapy Goal     OT, PT/OT Ongoing, Progressing    Description: Goals to be met by: 3/20/2023     Patient will increase functional independence with ADLs by performing:    LE Dressing with Stand-by Assistance and Assistive Devices as needed.  Grooming while seated at sink with Stand-by Assistance.  Toileting from bedside commode with Minimal Assistance for hygiene and clothing management.   Supine to sit with Stand-by Assistance.  Toilet transfer to bedside commode with Stand-by Assistance.  Upper extremity exercise program, with assistance as needed.                         History:     Past Medical History:   Diagnosis Date    Macula lutea degeneration     Squamous cell carcinoma of skin          Past Surgical History:   Procedure Laterality Date    APPENDECTOMY      BACK SURGERY  1975    HYSTERECTOMY         Time Tracking:     OT Date of Treatment: 02/27/23  OT Start Time: 0915  OT Stop Time: 0947  OT Total Time (min): 32 min    Billable Minutes:Evaluation 8  Self Care/Home Management 24    2/27/2023

## 2023-02-27 NOTE — PLAN OF CARE
Problem: Occupational Therapy  Goal: Occupational Therapy Goal  Description: Goals to be met by: 3/20/2023     Patient will increase functional independence with ADLs by performing:    LE Dressing with Stand-by Assistance and Assistive Devices as needed.  Grooming while seated at sink with Stand-by Assistance.  Toileting from bedside commode with Minimal Assistance for hygiene and clothing management.   Supine to sit with Stand-by Assistance.  Toilet transfer to bedside commode with Stand-by Assistance.  Upper extremity exercise program, with assistance as needed.    Outcome: Ongoing, Progressing

## 2023-02-27 NOTE — PROGRESS NOTES
[facility] Medicine  Progress Note    Patient name: Erich Encarnacion  MRN: [id number]  Admit Date: 2/25/2023   LOS: 2 days     SUBJECTIVE:     Principal problem: Severe sepsis  Chief Complaint   Patient presents with    Shortness of Breath     Pt dx with UTI Monday.  Pt complaint of kidney pain and shortness of breath       Interval History:  No further wheezing she is sitting up in the chair today.  PT recommending home health.  Will benefit from an additional day of antibiotics and then can return home.  Repeat ambulatory O2 test in the morning.    Scheduled Meds:   albuterol-ipratropium  3 mL Nebulization Q6H WAKE    amLODIPine  2.5 mg Oral Daily    aspirin  81 mg Oral Daily    chlorhexidine  15 mL Mouth/Throat BID    doxycycline (VIBRAMYCIN) IVPB  100 mg Intravenous Q12H    enoxaparin  30 mg Subcutaneous Daily    isosorbide dinitrate  30 mg Oral Daily    levothyroxine  25 mcg Oral QAM    mupirocin   Nasal BID    pantoprazole  40 mg Oral Daily    topiramate  25 mg Oral Daily     Continuous Infusions:  PRN Meds:acetaminophen, guaiFENesin 100 mg/5 ml, hydrocodone-chlorpheniramine, meclizine, melatonin, ondansetron, polyethylene glycol, sodium chloride 0.9%, traMADoL    Review of patient's allergies indicates:  No Known Allergies    Review of Systems: As per interval history    OBJECTIVE:     Vital Signs (Most Recent)  Temp: 97.5 °F (36.4 °C) (02/27/23 1500)  Pulse: 82 (02/27/23 1500)  Resp: (!) 23 (02/27/23 1500)  BP: (!) 123/58 (02/27/23 1500)  SpO2: 96 % (02/27/23 1500)    Vital Signs Range (Last 24H):  Temp:  [97.4 °F (36.3 °C)-98.5 °F (36.9 °C)]   Pulse:  [78-87]   Resp:  [11-26]   BP: (102-133)/(52-69)   SpO2:  [94 %-97 %]     I & O (Last 24H):  Intake/Output Summary (Last 24 hours) at 2/27/2023 1715  Last data filed at 2/27/2023 1356  Gross per 24 hour   Intake 120 ml   Output 380 ml   Net -260 ml       Physical Exam:  General: Patient resting comfortably in no acute distress. Appears as  stated age. Calm  Eyes: No conjunctival injection. No scleral icterus.  ENT: Hearing grossly intact. No discharge from ears. No nasal discharge.   Neck: Supple, trachea midline. No JVD  CVS: RRR. No LE edema BL  Lungs: slight wheeze.  Good breath sounds. No accessory muscle use. No acute respiratory distress  Abdomen:  Soft, nontender and nondistended.  No organomegaly  Neuro: AOx3. Moves all extremities. Follows commands. Responds appropriately   Skin:  No rash or erythema noted    Laboratory:  I have reviewed all pertinent lab results within the past 24 hours.    Diagnostic Results:       ASSESSMENT/PLAN:     89 y/o F with hx of hypothyroid, CAD with angina, Chronic pain, HTN, seizures, who presents with CAP and back pain found to have hypoxemia due to pneumonia and severe sepsis now improved.     Active Hospital Problems    Diagnosis  POA    *Severe sepsis [A41.9, R65.20]  Yes    Community acquired pneumonia [J18.9]  Yes    Angina pectoris [I20.9]  Yes    Acute hypoxemic respiratory failure [J96.01]  Yes    Essential hypertension [I10]  Yes      Resolved Hospital Problems   No resolved problems to display.         Plan:   - continue abx. Deescalated to Doxycycline  - Duo Nebs, Pulmicort for bronchitis associated with pneumonia  - procalcitonin elevated.   - Viral pneumonia workup is negative.   - PT / OT as she is weak  - Hypoxic at rest. Home O2 eval  - CT t spine given concern for possible fracture not seen on xray given her pain.   - cough suppressant, mucinex.   - wean oxygen as tolerated  - will review CT > pneumonia  - no signs of fracture  - ordered AM cbc, BMP. Will review      VTE Risk Mitigation (From admission, onward)           Ordered     enoxaparin injection 30 mg  Daily         02/26/23 0624     IP VTE HIGH RISK PATIENT  Once         02/25/23 0833     Place sequential compression device  Until discontinued         02/25/23 0833                      Department Hospital Medicine  [facility]  [facility] FAN Ross MD  Date of service: 02/27/2023

## 2023-02-27 NOTE — CARE UPDATE
02/26/23 2000   Patient Assessment/Suction   Level of Consciousness (AVPU) alert   Respiratory Effort Unlabored   Expansion/Accessory Muscles/Retractions no use of accessory muscles   All Lung Fields Breath Sounds wheezes, expiratory   Rhythm/Pattern, Respiratory no shortness of breath reported   Cough Frequency infrequent   Cough Type no productive sputum   PRE-TX-O2   Device (Oxygen Therapy) nasal cannula   $ Is the patient on Low Flow Oxygen? Yes   Flow (L/min) 3   SpO2 96 %   Pulse Oximetry Type Continuous   $ Pulse Oximetry - Multiple Charge Pulse Oximetry - Multiple   Pulse 82   Resp 15   Positioning   Head of Bed (HOB) Positioning HOB elevated;HOB at 30 degrees   Aerosol Therapy   $ Aerosol Therapy Charges Aerosol Treatment   Daily Review of Necessity (SVN) completed   Respiratory Treatment Status (SVN) given   Treatment Route (SVN) mask   Patient Position (SVN) HOB elevated   Post Treatment Assessment (SVN) breath sounds improved   Signs of Intolerance (SVN) none   Breath Sounds Post-Respiratory Treatment   Throughout All Fields Post-Treatment All Fields   Throughout All Fields Post-Treatment aeration increased   Post-treatment Heart Rate (beats/min) 81   Post-treatment Resp Rate (breaths/min) 17   Education   $ Education Bronchodilator;15 min   Respiratory Evaluation   $ Care Plan Tech Time 15 min   $ Eval/Re-eval Charges Re-evaluation

## 2023-02-27 NOTE — PT/OT/SLP PROGRESS
"Speech Language Pathology Treatment    Patient Name:  Erich Encarnacion   MRN:  [id number]  Admitting Diagnosis: Severe sepsis    Recommendations:                 General Recommendations:  Dysphagia therapy  Diet recommendations:  Minced & Moist Diet - IDDSI Level 5, Liquid Diet Level: Thin   Aspiration Precautions:  Provide rest breaks/reflux precautions, 1 bite/sip at a time, HOB to 90 degrees, No straws, Remain upright 30 minutes post meal, Small bites/sips, Strict aspiration precautions, and Wear oxygen during intake   General Precautions: Standard, aspiration, respiratory, hearing impaired  Communication strategies:  provide increased time to answer    Subjective     Pt seen at b/s with family/CG present.  Reviewed pt.'s medical hx and prior SLP swallow eval (10/2021) with recs for IDDSI-7/Thin and Out-pt GI eval for long standing/premorbid GI deficits.      Discussed case with CG.  Pt.'s CG reported:  prior hx of limited po intake; prior incidents of nausea, feeling full quickly, and/or limited po acceptance; and last GI consultation >10 years ago for hiatal hernia without any recent f/u.     Patient goals: "To stay in hospital a little bit longer, to get stronger."     Pain/Comfort:  Pain Rating 1: 0/10    Respiratory Status: Nasal cannula, flow 3 L/min    Objective:     Has the patient been evaluated by SLP for swallowing?      Keep patient NPO?     Current Respiratory Status:        Pt with mild, general-gross weakness, including slow general movements.  Pt also with intermittent, dry coarse/congested cough at rest, and not correlated to any po intake.      Pt refused most of breakfast tray.  However, pt was agreeable and participated in thin liquid   (Ensure) and smooth puree trials, from breakfast meal tray:  All trials were WFL without any overt s/s of aspiration/penetration.  Pt accepted ~< 1 ounce before requesting no more, 2 to feeling full, and frequent belching/burping was noted.      Education:  " "General reflux/swallowing/aspiration precautions were verbally reviewed directly with pt and CG.  Demonstration provided.  Written swallowing precautions were also placed at pt.'s HOB.    Assessment:     Erich Encarnacion is a 89 y.o. female with reportedly limited po/meal acceptance 2 to "feeling full" quickly.  Pt also has long premorbid hx of GI deficits, with recs during prior [facility] hospital admission (10/2021) by acute-care SLP for an out-patient GI consultation.  At this time, pt/CG report last GI visit was >10 years ago.  Today's session pt demonstrated fair tolerance of current meal tray items, with implementation of recommended reflux/swallowing precautions.      REC:  Cont to rec GI consult, and modified diet level, with strict reflux/aspiration precautions as listed above. SLP to cont to f/u for ongoing swallowing assessment and training for increased implementation of SLP recommendations.      Goals:   Multidisciplinary Problems       SLP Goals          Problem: SLP    Goal Priority Disciplines Outcome   SLP Goal     SLP Ongoing, Progressing   Description: 1. Pt will tolerate LRD and thin liquids w/o overt s/s of aspiration.                        Plan:     Patient to be seen:  3 x/week   Plan of Care expires:     Plan of Care reviewed with:  patient   SLP Follow-Up:  Yes       Discharge recommendations:      Barriers to Discharge:  None    Time Tracking:     SLP Treatment Date:   02/27/23  Speech Start Time:  1125  Speech Stop Time:  1150     Speech Total Time (min):  25 min    Billable Minutes: Treatment Swallowing Dysfunction 25mins    02/27/2023  "

## 2023-02-27 NOTE — PT/OT/SLP PROGRESS
Physical Therapy Treatment    Patient Name:  Erich Encarnacion   MRN:  [id number]    Recommendations:     Discharge Recommendations: home with home health, home health PT, home health OT  Discharge Equipment Recommendations: bedside commode, other (see comments) (Pt wants to request a bariatric BSC)  Barriers to discharge: None    Assessment:     Erich Encarnacion is a 89 y.o. female admitted with a medical diagnosis of Severe sepsis.  She presents with the following impairments/functional limitations: weakness, impaired endurance, impaired self care skills, impaired functional mobility, gait instability, impaired balance, decreased lower extremity function, decreased ROM, impaired cardiopulmonary response to activity.  Pt available on 3rd attempt. Declined ambulation due to fatigue post OT, tests, and extended time seated in chair. Productive cough throughout session.    Performed therapeutic exercises while seated EOB and in supine. Multiple impulsive stands from EOB for adjustment of gown, straightening of bedding, and for side-stepping toward HOB.     Daughter present and supportive throughout. Good participation.    Rehab Prognosis: Good; patient would benefit from acute skilled PT services to address these deficits and reach maximum level of function.    Recent Surgery: * No surgery found *      Plan:     During this hospitalization, patient to be seen 6 x/week to address the identified rehab impairments via gait training, therapeutic activities, therapeutic exercises, neuromuscular re-education and progress toward the following goals:    Plan of Care Expires:  03/25/23    Subjective     Chief Complaint: fatigue following OT, tests, and up in chair many hours  Patient/Family Comments/goals: bed-level therapy today; walk tomorrow  Pain/Comfort:  Pain Rating 1: 0/10  Pain Rating Post-Intervention 1: 0/10      Objective:     Communicated with nurse Weller prior to session.  Patient found HOB elevated with bed alarm,  "blood pressure cuff, mckenna catheter, oxygen, PICC line, pulse ox (continuous), telemetry upon PT entry to room.     General Precautions: Standard, respiratory, vision impaired, hearing impaired  Orthopedic Precautions: N/A  Braces: N/A  Respiratory Status: Nasal cannula, flow 2 L/min     Functional Mobility:  Bed Mobility:     Supine to Sit: minimum assistance  Sit to Supine: minimum assistance  Transfers:     Sit to Stand:  minimum assistance with no AD and hand-held assist      AM-PAC 6 CLICK MOBILITY          Treatment & Education:  -BLE therex seated EOB: knee extension w/ isometric hold, ankle circles, knee flexion (heel slides)  -Multiple impulsive sit to stands at EOB  -Supine bridges x 10 rated as "hard"      Patient left HOB elevated with all lines intact, call button in reach, bed alarm on, and daughter present..    GOALS:   Multidisciplinary Problems       Physical Therapy Goals          Problem: Physical Therapy    Goal Priority Disciplines Outcome Goal Variances Interventions   Physical Therapy Goal     PT, PT/OT      Description: Goals to be met by: 3/4/23     Patient will increase functional independence with mobility by performin. Supine to sit with Stand-by Assistance  2. Sit to supine with Stand-by Assistance  3. Sit to stand transfer with Stand-by Assistance  4. Bed to chair transfer with Stand-by Assistance using Rolling Walker  5. Gait  x 50 feet with Minimal Assistance using Rolling Walker.                          Time Tracking:     PT Received On: 23  PT Start Time: 1440     PT Stop Time: 1455  PT Total Time (min): 15 min     Billable Minutes: Therapeutic Exercise 15    Treatment Type: Treatment        PTA Visit Number: 1     2023  "

## 2023-02-28 VITALS
HEART RATE: 87 BPM | WEIGHT: 138 LBS | BODY MASS INDEX: 22.99 KG/M2 | SYSTOLIC BLOOD PRESSURE: 111 MMHG | DIASTOLIC BLOOD PRESSURE: 54 MMHG | RESPIRATION RATE: 20 BRPM | HEIGHT: 65 IN | OXYGEN SATURATION: 94 % | TEMPERATURE: 97 F

## 2023-02-28 LAB
ANION GAP SERPL CALC-SCNC: 7 MMOL/L (ref 8–16)
BUN SERPL-MCNC: 23 MG/DL (ref 10–30)
CALCIUM SERPL-MCNC: 9.4 MG/DL (ref 8.7–10.5)
CHLORIDE SERPL-SCNC: 106 MMOL/L (ref 95–110)
CO2 SERPL-SCNC: 24 MMOL/L (ref 23–29)
CREAT SERPL-MCNC: 0.8 MG/DL (ref 0.5–1.4)
ERYTHROCYTE [DISTWIDTH] IN BLOOD BY AUTOMATED COUNT: 13.6 % (ref 11.5–14.5)
EST. GFR  (NO RACE VARIABLE): >60 ML/MIN/1.73 M^2
GLUCOSE SERPL-MCNC: 131 MG/DL (ref 70–110)
HCT VFR BLD AUTO: 30.6 % (ref 37–48.5)
HGB BLD-MCNC: 10.2 G/DL (ref 12–16)
MCH RBC QN AUTO: 29.5 PG (ref 27–31)
MCHC RBC AUTO-ENTMCNC: 33.3 G/DL (ref 32–36)
MCV RBC AUTO: 88 FL (ref 82–98)
PLATELET # BLD AUTO: 220 K/UL (ref 150–450)
PMV BLD AUTO: 11 FL (ref 9.2–12.9)
POTASSIUM SERPL-SCNC: 4.3 MMOL/L (ref 3.5–5.1)
RBC # BLD AUTO: 3.46 M/UL (ref 4–5.4)
SODIUM SERPL-SCNC: 137 MMOL/L (ref 136–145)
WBC # BLD AUTO: 6.38 K/UL (ref 3.9–12.7)

## 2023-02-28 PROCEDURE — 94640 AIRWAY INHALATION TREATMENT: CPT

## 2023-02-28 PROCEDURE — 94799 UNLISTED PULMONARY SVC/PX: CPT

## 2023-02-28 PROCEDURE — 94761 N-INVAS EAR/PLS OXIMETRY MLT: CPT

## 2023-02-28 PROCEDURE — 99900035 HC TECH TIME PER 15 MIN (STAT)

## 2023-02-28 PROCEDURE — 97530 THERAPEUTIC ACTIVITIES: CPT

## 2023-02-28 PROCEDURE — 25000003 PHARM REV CODE 250: Performed by: STUDENT IN AN ORGANIZED HEALTH CARE EDUCATION/TRAINING PROGRAM

## 2023-02-28 PROCEDURE — 99900031 HC PATIENT EDUCATION (STAT)

## 2023-02-28 PROCEDURE — 36415 COLL VENOUS BLD VENIPUNCTURE: CPT | Performed by: STUDENT IN AN ORGANIZED HEALTH CARE EDUCATION/TRAINING PROGRAM

## 2023-02-28 PROCEDURE — 25000242 PHARM REV CODE 250 ALT 637 W/ HCPCS: Performed by: STUDENT IN AN ORGANIZED HEALTH CARE EDUCATION/TRAINING PROGRAM

## 2023-02-28 PROCEDURE — 85027 COMPLETE CBC AUTOMATED: CPT | Performed by: STUDENT IN AN ORGANIZED HEALTH CARE EDUCATION/TRAINING PROGRAM

## 2023-02-28 PROCEDURE — 97116 GAIT TRAINING THERAPY: CPT | Mod: CQ

## 2023-02-28 PROCEDURE — 97535 SELF CARE MNGMENT TRAINING: CPT

## 2023-02-28 PROCEDURE — 80048 BASIC METABOLIC PNL TOTAL CA: CPT | Performed by: STUDENT IN AN ORGANIZED HEALTH CARE EDUCATION/TRAINING PROGRAM

## 2023-02-28 RX ORDER — ONDANSETRON 4 MG/1
4 TABLET, ORALLY DISINTEGRATING ORAL EVERY 8 HOURS PRN
Qty: 30 TABLET | Refills: 0 | Status: SHIPPED | OUTPATIENT
Start: 2023-02-28 | End: 2023-03-10

## 2023-02-28 RX ORDER — DOXYCYCLINE 100 MG/1
100 CAPSULE ORAL EVERY 12 HOURS
Qty: 6 CAPSULE | Refills: 0 | Status: SHIPPED | OUTPATIENT
Start: 2023-02-28 | End: 2023-03-03

## 2023-02-28 RX ADMIN — LEVOTHYROXINE SODIUM 25 MCG: 0.03 TABLET ORAL at 06:02

## 2023-02-28 RX ADMIN — AMLODIPINE BESYLATE 2.5 MG: 2.5 TABLET ORAL at 08:02

## 2023-02-28 RX ADMIN — ASPIRIN 81 MG: 81 TABLET, COATED ORAL at 08:02

## 2023-02-28 RX ADMIN — PANTOPRAZOLE SODIUM 40 MG: 40 TABLET, DELAYED RELEASE ORAL at 08:02

## 2023-02-28 RX ADMIN — CHLORHEXIDINE GLUCONATE 15 ML: 1.2 RINSE ORAL at 08:02

## 2023-02-28 RX ADMIN — MUPIROCIN 1 G: 20 OINTMENT TOPICAL at 08:02

## 2023-02-28 RX ADMIN — GUAIFENESIN 200 MG: 200 SOLUTION ORAL at 08:02

## 2023-02-28 RX ADMIN — TRAMADOL HYDROCHLORIDE 50 MG: 50 TABLET, COATED ORAL at 08:02

## 2023-02-28 RX ADMIN — ISOSORBIDE DINITRATE 30 MG: 10 TABLET ORAL at 08:02

## 2023-02-28 RX ADMIN — TOPIRAMATE 25 MG: 25 TABLET, FILM COATED ORAL at 08:02

## 2023-02-28 RX ADMIN — DOXYCYCLINE 100 MG: 100 INJECTION, POWDER, LYOPHILIZED, FOR SOLUTION INTRAVENOUS at 02:02

## 2023-02-28 RX ADMIN — IPRATROPIUM BROMIDE AND ALBUTEROL SULFATE 3 ML: 2.5; .5 SOLUTION RESPIRATORY (INHALATION) at 08:02

## 2023-02-28 NOTE — PLAN OF CARE
02/28/23 0821   Patient Assessment/Suction   Level of Consciousness (AVPU) alert   Respiratory Effort Normal;Unlabored   All Lung Fields Breath Sounds wheezes, expiratory;coarse   PRE-TX-O2   Device (Oxygen Therapy) room air   SpO2 95 %   Pulse Oximetry Type Continuous   $ Pulse Oximetry - Multiple Charge Pulse Oximetry - Multiple   Pulse 86   Resp 18   Aerosol Therapy   $ Aerosol Therapy Charges Aerosol Treatment   Daily Review of Necessity (SVN) completed   Respiratory Treatment Status (SVN) given   Treatment Route (SVN) mask;oxygen   Patient Position (SVN) Davis's;HOB elevated   Post Treatment Assessment (SVN) breath sounds improved   Signs of Intolerance (SVN) none   Education   $ Education Bronchodilator;15 min   Respiratory Evaluation   $ Care Plan Tech Time 15 min   $ Eval/Re-eval Charges Re-evaluation

## 2023-02-28 NOTE — NURSING
Discharge instruction reviewed with daughter and pt, questions answered, daughter verbalized understand, pt and daughter demonstrated proper use of portable oxygen tank, belongs collect by daughter at bedside. Pt to vehicle via wheelchair accompanied by daughter to home with portable oxygen tank and bedside commode.

## 2023-02-28 NOTE — PLAN OF CARE
Problem: Infection  Goal: Absence of Infection Signs and Symptoms  Outcome: Met     Problem: Adult Inpatient Plan of Care  Goal: Plan of Care Review  Outcome: Met  Goal: Patient-Specific Goal (Individualized)  Outcome: Met  Goal: Absence of Hospital-Acquired Illness or Injury  Outcome: Met  Goal: Optimal Comfort and Wellbeing  Outcome: Met  Goal: Readiness for Transition of Care  Outcome: Met     Problem: Adjustment to Illness (Sepsis/Septic Shock)  Goal: Optimal Coping  Outcome: Met     Problem: Bleeding (Sepsis/Septic Shock)  Goal: Absence of Bleeding  Outcome: Met     Problem: Glycemic Control Impaired (Sepsis/Septic Shock)  Goal: Blood Glucose Level Within Desired Range  Outcome: Met     Problem: Infection Progression (Sepsis/Septic Shock)  Goal: Absence of Infection Signs and Symptoms  Outcome: Met     Problem: Nutrition Impaired (Sepsis/Septic Shock)  Goal: Optimal Nutrition Intake  Outcome: Met     Problem: Skin Injury Risk Increased  Goal: Skin Health and Integrity  Outcome: Met     Problem: Fall Injury Risk  Goal: Absence of Fall and Fall-Related Injury  Outcome: Met     Problem: Malnutrition  Goal: Improved Nutritional Intake  Outcome: Met     Problem: Fluid Imbalance (Pneumonia)  Goal: Fluid Balance  Outcome: Met     Problem: Infection (Pneumonia)  Goal: Resolution of Infection Signs and Symptoms  Outcome: Met     Problem: Respiratory Compromise (Pneumonia)  Goal: Effective Oxygenation and Ventilation  Outcome: Met

## 2023-02-28 NOTE — PLAN OF CARE
met with Pt at bedside to discuss discharge plan. Pt unable to respond at time.  met with Pt's daughter at bedside to discuss discharge planning.  informed Pt's daughter of PT/OT recommendations for home health. Pt's daughter not agreeable to home health care services. Pt's daughter stated her mother lives alone and she cannot be there 24/7.  informed Pt's daughter of other home care resources available. Pt's daughter declined citing prices being too high.  discussed prison placement with Pt's daughter. Pt's daughter declined citing monthly cost of nursing home placement.       1200-  met with Pt's daughter at bedside to discuss discharge plan.  informed Pt's daughter insurance would not cover a skilled-nursing stay without proper recommendations from PT/OT or a physician. Daughter verbalized understand. Daughter verbalized wanting to receive services from [facility] as her mother has them previously. Pt choice for home health signed and scanned into . Referral sent to [facility] via [employer]. Home health packet faxed to [facility].  CM to follow up.     02/28/23 1228   Post-Acute Status   Post-Acute Authorization Home Health   Home Health Status Referrals Sent   Coverage [facility] Medicare   Discharge Plan   Discharge Plan A Home Health   Discharge Plan B Home Health

## 2023-02-28 NOTE — PT/OT/SLP PROGRESS
Occupational Therapy   Treatment and discharge summary    Name: Erich Encarnacion  MRN: [id number]  Admitting Diagnosis:  Severe sepsis       Recommendations:     Discharge Recommendations: home health OT, home with home health, home  Discharge Equipment Recommendations:     Barriers to discharge:       Assessment:     Erich Encarnacion is a 89 y.o. female with a medical diagnosis of Severe sepsis.  She presents with Performance deficits affecting function are weakness, impaired endurance, impaired self care skills, impaired functional mobility, gait instability, impaired balance, impaired cognition, decreased coordination, decreased upper extremity function, decreased lower extremity function, visual deficits, decreased safety awareness, decreased ROM, impaired coordination, impaired cardiopulmonary response to activity.     Pt. Met 1/6 OT goals.  Family training completed with daughter.  DC acute OT. See functional status below.     Rehab Prognosis:  Good and Fair; patient would benefit from acute skilled OT services to address these deficits and reach maximum level of function.       Plan:     Patient to be seen 3 x/week to address the above listed problems via self-care/home management, therapeutic activities, therapeutic exercises  Plan of Care Expires: 03/20/23  Plan of Care Reviewed with: patient, daughter, caregiver    Subjective     Pain/Comfort:  Pain Rating 1: 0/10  Pain Rating Post-Intervention 1: 0/10    Objective:     Communicated with: nurse prior to session.  Patient found up in chair with telemetry, oxygen, chair check, pulse ox (continuous), blood pressure cuff, peripheral IV upon OT entry to room.    General Precautions: Standard, vision impaired    Orthopedic Precautions:N/A  Braces: N/A  Respiratory Status: Nasal cannula, flow 3 L/min     Occupational Performance:       Functional Mobility/Transfers:  Patient completed Sit <> Stand Transfer with contact guard assistance  with  rolling walker    Patient completed Bed <> Chair Transfer using Step Transfer technique with contact guard assistance with rolling walker  Functional Mobility: ambulated ~4 FEET BED<.CHAIOR WITH cga WITH rw, MAX VC FOR PROPER HAND PLACEMENT ON WALKER AND CLOSER WALKER PROXIMITY    Activities of Daily Living:  Grooming: supervision SEATED   Lower Body Dressing: contact guard assistance donned pants and shoes SBA, CGA for posterior LOB pull pants over hips       AMPAC 6 Click ADL: 20    Treatment & Education:  Purpose of oT and PPC  BSC was delivered to room and pt's daughter educated in use and folding capability to transport home in car.  All questions/concerns addressed within scope  Pt anxious, wanting chair alarm belt removed, poor understanding as to purpose.    Patient left up in chair with all lines intact, call button in reach, chair alarm on, nurse notified, and daughter/caregiver present    GOALS:   Multidisciplinary Problems       Occupational Therapy Goals          Problem: Occupational Therapy    Goal Priority Disciplines Outcome Interventions   Occupational Therapy Goal     OT, PT/OT Ongoing, Progressing    Description: Goals to be met by: 3/20/2023     Patient will increase functional independence with ADLs by performing:    LE Dressing with Stand-by Assistance and Assistive Devices as needed.  Grooming while seated at sink with Stand-by Assistance.  Toileting from bedside commode with Minimal Assistance for hygiene and clothing management.   Supine to sit with Stand-by Assistance.  Toilet transfer to bedside commode with Stand-by Assistance.  Upper extremity exercise program, with assistance as needed.                         Time Tracking:     OT Date of Treatment: 02/28/23  OT Start Time: 1454  OT Stop Time: 1521  OT Total Time (min): 27 min    Billable Minutes:Self Care/Home Management 18  Therapeutic Activity 11  Total Time 27    OT/RACHEL: OT          2/28/2023

## 2023-02-28 NOTE — PT/OT/SLP PROGRESS
"Physical Therapy Treatment    Patient Name:  Erich Encarnacion   MRN:  [id number]    Recommendations:     Discharge Recommendations: home with home health, home health PT, home health OT  Discharge Equipment Recommendations: bedside commode, other (see comments) (Pt wants to request a bariatric BSC)  Barriers to discharge:  endurance deficits    Assessment:     Erich Encarnacion is a 89 y.o. female admitted with a medical diagnosis of Severe sepsis.  She presents with the following impairments/functional limitations: weakness, impaired endurance, impaired self care skills, impaired functional mobility, gait instability, impaired balance, decreased lower extremity function, impaired cardiopulmonary response to activity.  Pt found HOB elevated, agreeable to ambulate.     Supine to sit with SBA; initial c/o brief dizziness with quick resolution and /64 in seated. Stood to RW SBA. Ambulated with 3L O2 NC x 40' with RW, close Henrique, mildly tremulous, and pt rating intensity as "easy."   Will benefit from supervision for safety post discharge, as pt demonstrates endurance deficits and impulsivity.  Remained up in chair with chair alarm active.     Rehab Prognosis: Good; patient would benefit from acute skilled PT services to address these deficits and reach maximum level of function.    Recent Surgery: * No surgery found *      Plan:     During this hospitalization, patient to be seen 6 x/week to address the identified rehab impairments via gait training, therapeutic activities, therapeutic exercises, neuromuscular re-education and progress toward the following goals:    Plan of Care Expires:  03/25/23    Subjective     Chief Complaint: "dont I need some clothes on to go for a walk?"   Patient/Family Comments/goals: none verbalized  Pain/Comfort:  Pain Rating 1: 0/10  Pain Rating Post-Intervention 1: 0/10      Objective:     Communicated with nurse Weller prior to session.  Patient found HOB elevated with bed alarm, blood " pressure cuff, mckenna catheter, oxygen, PICC line, pulse ox (continuous), telemetry upon PT entry to room.     General Precautions: Standard, respiratory, vision impaired, hearing impaired  Orthopedic Precautions: N/A  Braces: N/A  Respiratory Status:  found on RA; ambulated with 3L O2 per nurse instruction     Functional Mobility:  Bed Mobility:     Supine to Sit: stand by assistance  Transfers:     Sit to Stand:  SBA-CGA with rolling walker  Gait: 40' with RW, close Henrique      AM-PAC 6 CLICK MOBILITY          Treatment & Education:      Patient left up in chair with all lines intact, call button in reach, chair alarm on, and nurse notified..    GOALS:   Multidisciplinary Problems       Physical Therapy Goals          Problem: Physical Therapy    Goal Priority Disciplines Outcome Goal Variances Interventions   Physical Therapy Goal     PT, PT/OT      Description: Goals to be met by: 3/4/23     Patient will increase functional independence with mobility by performin. Supine to sit with Stand-by Assistance  2. Sit to supine with Stand-by Assistance  3. Sit to stand transfer with Stand-by Assistance  4. Bed to chair transfer with Stand-by Assistance using Rolling Walker  5. Gait  x 50 feet with Minimal Assistance using Rolling Walker.                          Time Tracking:     PT Received On: 23  PT Start Time: 1400     PT Stop Time: 1420  PT Total Time (min): 20 min     Billable Minutes: Gait Training 20    Treatment Type: Treatment  PT/PTA: PTA     PTA Visit Number: 2023

## 2023-02-28 NOTE — PLAN OF CARE
Problem: Infection  Goal: Absence of Infection Signs and Symptoms  Outcome: Ongoing, Progressing     Problem: Adult Inpatient Plan of Care  Goal: Plan of Care Review  Outcome: Ongoing, Progressing  Goal: Patient-Specific Goal (Individualized)  Outcome: Ongoing, Progressing  Goal: Absence of Hospital-Acquired Illness or Injury  Outcome: Ongoing, Progressing  Goal: Optimal Comfort and Wellbeing  Outcome: Ongoing, Progressing  Goal: Readiness for Transition of Care  Outcome: Ongoing, Progressing     Problem: Adjustment to Illness (Sepsis/Septic Shock)  Goal: Optimal Coping  Outcome: Ongoing, Progressing     Problem: Bleeding (Sepsis/Septic Shock)  Goal: Absence of Bleeding  Outcome: Ongoing, Progressing     Problem: Glycemic Control Impaired (Sepsis/Septic Shock)  Goal: Blood Glucose Level Within Desired Range  Outcome: Ongoing, Progressing     Problem: Infection Progression (Sepsis/Septic Shock)  Goal: Absence of Infection Signs and Symptoms  Outcome: Ongoing, Progressing     Problem: Nutrition Impaired (Sepsis/Septic Shock)  Goal: Optimal Nutrition Intake  Outcome: Ongoing, Progressing     Problem: Skin Injury Risk Increased  Goal: Skin Health and Integrity  Outcome: Ongoing, Progressing     Problem: Fall Injury Risk  Goal: Absence of Fall and Fall-Related Injury  Outcome: Ongoing, Progressing     Problem: Malnutrition  Goal: Improved Nutritional Intake  Outcome: Ongoing, Progressing     Problem: Fluid Imbalance (Pneumonia)  Goal: Fluid Balance  Outcome: Ongoing, Progressing     Problem: Infection (Pneumonia)  Goal: Resolution of Infection Signs and Symptoms  Outcome: Ongoing, Progressing     Problem: Respiratory Compromise (Pneumonia)  Goal: Effective Oxygenation and Ventilation  Outcome: Ongoing, Progressing

## 2023-02-28 NOTE — PLAN OF CARE
02/28/23 1700   Final Note   Assessment Type Final Discharge Note   Anticipated Discharge Disposition Home-Health   What phone number can be called within the next 1-3 days to see how you are doing after discharge? [phone]   Hospital Resources/Appts/Education Provided Appointments scheduled and added to AVS   Post-Acute Status   Post-Acute Authorization HME;Home Health   HME Status Set-up Complete/Auth obtained   Home Health Status Set-up Complete/Auth obtained   Discharge Delays (!) Home Medical Equipment (Insurance, Delivery)     Patient cleared for discharge from case management standpoint. Pt discharged with [facility] with start of care date of 3/1/23. [facility] delivered bedside commode. Edwin to deliver Pt oxygen to room and to set up home delivery.     Follow up appointments scheduled and added to AVS.    Chart and discharge orders reviewed.  Patient discharged home with no further case management needs.

## 2023-03-02 LAB
BACTERIA BLD CULT: NORMAL
BACTERIA BLD CULT: NORMAL

## 2023-03-09 NOTE — HOSPITAL COURSE
89 y/o F with hx of hypothyroid, CAD with angina, Chronic pain, HTN, seizures, who presents with CAP and back pain found to have hypoxemia due to pneumonia.  She was admitted and treated with nebulizers and steroids due to wheezing with pneumonia.  She was also treated with antibiotics.  She had significant improvement in her symptoms.  PT and OT evaluated the patient and recommended home with home health.  Respiratory therapy evaluated her for home oxygen and she did not require any oxygen at discharge.  Of note, the patient's daughter advised that she is on hospice at home.  She will follow-up with her primary physician regarding this.  They are considering revoked hospice as she would indeed want treatment for her medical conditions.  She will complete a course of antibiotics upon discharge home.  She was found to be in good condition and stable for ongoing outpatient care.  I reviewed the discharge plan of care instructions with the patient and her daughter on the day of discharge.  She was discharged in good condition with plans for close outpatient follow-up with her providers.

## 2023-03-09 NOTE — DISCHARGE SUMMARY
[facility]  Discharge Summary      Patient Name: Erich Encarnacion  MRN: [id number]  VIANNEY: [id number]  Patient Class: IP- Inpatient  Admission Date: 2/25/2023  Hospital Length of Stay: 3 days  Discharge Date and Time:  03/09/2023 4:30 AM  Attending Physician: No att. providers found   Discharging Provider: Tony Ross MD  Primary Care Provider: Se Rios MD    Primary Care Team: Networked reference to record PCT     HPI:   89 yo F with PMH including hypothyroidism, angina, chronic pain, hypertension, seizure hx who presents with fever, cough, fatigue, and worsening back pain. Patient has not been in her usual camilla, been increasingly weak, poor appetite, productive cough. She was diagnosed with UTI due to frequency and started on antibiotics by PCP. She has also been having increased back pain and feels she is not improving as well as feeling more winded. She thus presented to the ED. In the ED, patient was febrile, hypertensive, and tachycardic with elevated WBC. CXR showed volume overload versus infiltrate. Reviewing a prior culture, she was started started on meropenem for hx of ESBL and vanc. She was also placed on BiPAP. Patient states she is feeling slightly better. Hospitalist requested for admission.       * No surgery found *      Hospital Course:   89 y/o F with hx of hypothyroid, CAD with angina, Chronic pain, HTN, seizures, who presents with CAP and back pain found to have hypoxemia due to pneumonia.  She was admitted and treated with nebulizers and steroids due to wheezing with pneumonia.  She was also treated with antibiotics.  She had significant improvement in her symptoms.  PT and OT evaluated the patient and recommended home with home health.  Respiratory therapy evaluated her for home oxygen and she did not require any oxygen at discharge.  Of note, the patient's daughter advised that she is on hospice at home.  She will follow-up with her primary  physician regarding this.  They are considering revoked hospice as she would indeed want treatment for her medical conditions.  She will complete a course of antibiotics upon discharge home.  She was found to be in good condition and stable for ongoing outpatient care.  I reviewed the discharge plan of care instructions with the patient and her daughter on the day of discharge.  She was discharged in good condition with plans for close outpatient follow-up with her providers.       Goals of Care Treatment Preferences:  Code Status: Full Code      Consults:     No new Assessment & Plan notes have been filed under this hospital service since the last note was generated.  Service: Hospital Medicine    Final Active Diagnoses:    Diagnosis Date Noted POA    PRINCIPAL PROBLEM:  Severe sepsis [A41.9, R65.20] 02/25/2023 Yes    Community acquired pneumonia [J18.9] 02/26/2023 Yes    Angina pectoris [I20.9] 02/26/2023 Yes    Acute hypoxemic respiratory failure [J96.01] 02/26/2023 Yes    Essential hypertension [I10] 10/17/2021 Yes      Problems Resolved During this Admission:       Discharged Condition: good    [place] [facility] [place]:   Follow-up Information     Se Rios MD. Go on 3/3/2023.    Specialty: Family Medicine  Why: 11:00am  Contact information:  [address] [facility] [address]  [phone]             [facility] Follow up.    Specialty: Home Health Services  Why: home health  Contact information:  [address] [facility] [address]  [phone]                       Patient Instructions:      OXYGEN FOR HOME USE     Order Specific Question Answer Comments   Liter Flow 2    Duration Continuous    Qualifying Test Performed at: Rest    Oxygen saturation: 88    Portable mode: continuous    Route nasal cannula    Device: home concentrator with portable tanks    Length of need (in months): 3 mos    Patient condition with qualifying saturation Other - List  "qualifying diagnosis and code    Select a diagnosis & list the code in the comments Community acquired pneumonia [074619]    Height: 5' 5" (1.651 m)    Weight: 138    Alternative treatment measures have been tried or considered and deemed clinically ineffective. Yes      COMMODE FOR HOME USE     Order Specific Question Answer Comments   Type: Standard    Height: 5' 5" (1.651 m)    Weight: 138lb    Does patient have medical equipment at home? rollator    Does patient have medical equipment at home? bedside commode    Does patient have medical equipment at home? bath bench    Does patient have medical equipment at home? grab bar    Length of need (1-99 months): 99      Ambulatory referral/consult to Home Health   Standing Status: Future   Referral Priority: Routine Referral Type: Home Health   Referral Reason: Specialty Services Required   Requested Specialty: Home Health Services   Number of Visits Requested: 1     Diet Adult Regular     No dressing needed     Activity as tolerated       Significant Diagnostic Studies: Labs: BMP: No results for input(s): GLU, NA, K, CL, CO2, BUN, CREATININE, CALCIUM, MG in the last 48 hours., CBC No results for input(s): WBC, HGB, HCT, PLT in the last 48 hours. and All labs within the past 24 hours have been reviewed    Pending Diagnostic Studies:     None         Medications:  Reconciled Home Medications:      Medication List      START taking these medications    ondansetron 4 MG Tbdl  Commonly known as: ZOFRAN-ODT  Take 1 tablet (4 mg total) by mouth every 8 (eight) hours as needed (nausea).        CONTINUE taking these medications    albuterol-ipratropium 2.5 mg-0.5 mg/3 mL nebulizer solution  Commonly known as: DUO-NEB  Take 3 mLs by nebulization every 4 (four) hours as needed. rescue     amLODIPine 2.5 MG tablet  Commonly known as: NORVASC  Take 2.5 mg by mouth once daily.     aspirin 81 MG EC tablet  Commonly known as: ECOTRIN  Take 81 mg by mouth every 12 (twelve) hours.   "   cyproheptadine 4 mg tablet  Commonly known as: PERIACTIN  1 tablet     HYDROcodone-acetaminophen 5-325 mg per tablet  Commonly known as: NORCO  Take 1 tablet by mouth every 4 (four) hours as needed for Pain.     isosorbide dinitrate 30 MG Tab  Commonly known as: ISORDIL  Take 30 mg by mouth once daily.     levothyroxine 25 MCG tablet  Commonly known as: SYNTHROID  Take 25 mcg by mouth every morning.     magnesium 250 mg Tab  Take 1 tablet by mouth once daily.     meclizine 12.5 mg tablet  Commonly known as: ANTIVERT  Take 12.5 mg by mouth daily as needed.     nitrofurantoin (macrocrystal-monohydrate) 100 MG capsule  Commonly known as: MACROBID  1 capsule at bedtime with food     polyethylene glycol 17 gram/dose powder  Commonly known as: GLYCOLAX  Take 17 g by mouth Daily.     PRESERVISION AREDS ORAL  Take 1 tablet by mouth once daily.     topiramate 25 MG tablet  Commonly known as: TOPAMAX  Take 25 mg by mouth once daily.     zolpidem 10 mg Tab  Commonly known as: AMBIEN  Take 1 tablet (10 mg total) by mouth nightly as needed (insomnia).        ASK your doctor about these medications    doxycycline 100 MG Cap  Commonly known as: VIBRAMYCIN  Take 1 capsule (100 mg total) by mouth every 12 (twelve) hours. for 3 days  Ask about: Should I take this medication?            Indwelling Lines/Drains at time of discharge:   Lines/Drains/Airways     None                 Time spent on the discharge of patient: 50 minutes         Tony Ross MD  Department of Hospital Medicine  [facility]

## 2023-04-10 ENCOUNTER — TELEPHONE (OUTPATIENT)
Dept: ORTHOPEDICS | Facility: CLINIC | Age: 88
End: 2023-04-10
Payer: MEDICARE

## 2023-04-10 ENCOUNTER — HOSPITAL ENCOUNTER (EMERGENCY)
Facility: HOSPITAL | Age: 88
Discharge: HOME | End: 2023-04-10
Attending: EMERGENCY MEDICINE
Payer: MEDICARE

## 2023-04-10 VITALS
HEART RATE: 87 BPM | SYSTOLIC BLOOD PRESSURE: 178 MMHG | HEIGHT: 65 IN | TEMPERATURE: 98 F | RESPIRATION RATE: 16 BRPM | BODY MASS INDEX: 27.49 KG/M2 | OXYGEN SATURATION: 95 % | WEIGHT: 165 LBS | DIASTOLIC BLOOD PRESSURE: 83 MMHG

## 2023-04-10 DIAGNOSIS — M25.512 SHOULDER PAIN, LEFT: ICD-10-CM

## 2023-04-10 DIAGNOSIS — M79.602 LEFT ARM PAIN: ICD-10-CM

## 2023-04-10 DIAGNOSIS — M19.019 SHOULDER ARTHRITIS: Primary | ICD-10-CM

## 2023-04-10 LAB
ALBUMIN SERPL BCP-MCNC: 3.4 G/DL (ref 3.5–5.2)
ALP SERPL-CCNC: 66 U/L (ref 55–135)
ALT SERPL W/O P-5'-P-CCNC: 12 U/L (ref 10–44)
ANION GAP SERPL CALC-SCNC: 5 MMOL/L (ref 8–16)
AST SERPL-CCNC: 19 U/L (ref 10–40)
BACTERIA #/AREA URNS HPF: NEGATIVE /HPF
BASOPHILS # BLD AUTO: 0.09 K/UL (ref 0–0.2)
BASOPHILS NFR BLD: 1.1 % (ref 0–1.9)
BILIRUB SERPL-MCNC: 0.5 MG/DL (ref 0.1–1)
BILIRUB UR QL STRIP: NEGATIVE
BNP SERPL-MCNC: 92 PG/ML (ref 0–99)
BUN SERPL-MCNC: 23 MG/DL (ref 10–30)
CALCIUM SERPL-MCNC: 9 MG/DL (ref 8.7–10.5)
CHLORIDE SERPL-SCNC: 110 MMOL/L (ref 95–110)
CK SERPL-CCNC: 42 U/L (ref 20–180)
CLARITY UR: CLEAR
CO2 SERPL-SCNC: 24 MMOL/L (ref 23–29)
COLOR UR: YELLOW
CREAT SERPL-MCNC: 1 MG/DL (ref 0.5–1.4)
DIFFERENTIAL METHOD: ABNORMAL
EOSINOPHIL # BLD AUTO: 0.3 K/UL (ref 0–0.5)
EOSINOPHIL NFR BLD: 3 % (ref 0–8)
ERYTHROCYTE [DISTWIDTH] IN BLOOD BY AUTOMATED COUNT: 14.1 % (ref 11.5–14.5)
EST. GFR  (NO RACE VARIABLE): 51.6 ML/MIN/1.73 M^2
GLUCOSE SERPL-MCNC: 136 MG/DL (ref 70–110)
GLUCOSE UR QL STRIP: NEGATIVE
HCT VFR BLD AUTO: 36 % (ref 37–48.5)
HGB BLD-MCNC: 11.8 G/DL (ref 12–16)
HGB UR QL STRIP: NEGATIVE
HYALINE CASTS #/AREA URNS LPF: 4 /LPF
IMM GRANULOCYTES # BLD AUTO: 0.03 K/UL (ref 0–0.04)
IMM GRANULOCYTES NFR BLD AUTO: 0.4 % (ref 0–0.5)
KETONES UR QL STRIP: NEGATIVE
LEUKOCYTE ESTERASE UR QL STRIP: ABNORMAL
LIPASE SERPL-CCNC: 30 U/L (ref 4–60)
LYMPHOCYTES # BLD AUTO: 2.3 K/UL (ref 1–4.8)
LYMPHOCYTES NFR BLD: 27.5 % (ref 18–48)
MAGNESIUM SERPL-MCNC: 2.1 MG/DL (ref 1.6–2.6)
MCH RBC QN AUTO: 29 PG (ref 27–31)
MCHC RBC AUTO-ENTMCNC: 32.8 G/DL (ref 32–36)
MCV RBC AUTO: 89 FL (ref 82–98)
MICROSCOPIC COMMENT: ABNORMAL
MONOCYTES # BLD AUTO: 0.6 K/UL (ref 0.3–1)
MONOCYTES NFR BLD: 7.7 % (ref 4–15)
NEUTROPHILS # BLD AUTO: 5 K/UL (ref 1.8–7.7)
NEUTROPHILS NFR BLD: 60.3 % (ref 38–73)
NITRITE UR QL STRIP: NEGATIVE
NRBC BLD-RTO: 0 /100 WBC
PH UR STRIP: 7 [PH] (ref 5–8)
PLATELET # BLD AUTO: 273 K/UL (ref 150–450)
PMV BLD AUTO: 11.1 FL (ref 9.2–12.9)
POTASSIUM SERPL-SCNC: 3.7 MMOL/L (ref 3.5–5.1)
PROT SERPL-MCNC: 6.6 G/DL (ref 6–8.4)
PROT UR QL STRIP: NEGATIVE
RBC # BLD AUTO: 4.07 M/UL (ref 4–5.4)
RBC #/AREA URNS HPF: 4 /HPF (ref 0–4)
SODIUM SERPL-SCNC: 139 MMOL/L (ref 136–145)
SP GR UR STRIP: 1.01 (ref 1–1.03)
SQUAMOUS #/AREA URNS HPF: 3 /HPF
TROPONIN I SERPL HS-MCNC: 22.6 PG/ML (ref 0–14.9)
TROPONIN I SERPL HS-MCNC: 23.2 PG/ML (ref 0–14.9)
TSH SERPL DL<=0.005 MIU/L-ACNC: 3.59 UIU/ML (ref 0.34–5.6)
URN SPEC COLLECT METH UR: ABNORMAL
UROBILINOGEN UR STRIP-ACNC: NEGATIVE EU/DL
WBC # BLD AUTO: 8.29 K/UL (ref 3.9–12.7)
WBC #/AREA URNS HPF: 11 /HPF (ref 0–5)

## 2023-04-10 PROCEDURE — 84443 ASSAY THYROID STIM HORMONE: CPT | Performed by: EMERGENCY MEDICINE

## 2023-04-10 PROCEDURE — 63600175 PHARM REV CODE 636 W HCPCS: Performed by: EMERGENCY MEDICINE

## 2023-04-10 PROCEDURE — 93010 EKG 12-LEAD: ICD-10-PCS | Mod: ,,, | Performed by: INTERNAL MEDICINE

## 2023-04-10 PROCEDURE — 93010 ELECTROCARDIOGRAM REPORT: CPT | Mod: ,,, | Performed by: INTERNAL MEDICINE

## 2023-04-10 PROCEDURE — 83690 ASSAY OF LIPASE: CPT | Performed by: EMERGENCY MEDICINE

## 2023-04-10 PROCEDURE — 99285 EMERGENCY DEPT VISIT HI MDM: CPT | Mod: 25

## 2023-04-10 PROCEDURE — 85025 COMPLETE CBC W/AUTO DIFF WBC: CPT | Performed by: EMERGENCY MEDICINE

## 2023-04-10 PROCEDURE — 84484 ASSAY OF TROPONIN QUANT: CPT | Performed by: EMERGENCY MEDICINE

## 2023-04-10 PROCEDURE — 87086 URINE CULTURE/COLONY COUNT: CPT | Performed by: EMERGENCY MEDICINE

## 2023-04-10 PROCEDURE — 83735 ASSAY OF MAGNESIUM: CPT | Performed by: EMERGENCY MEDICINE

## 2023-04-10 PROCEDURE — 80053 COMPREHEN METABOLIC PANEL: CPT | Performed by: EMERGENCY MEDICINE

## 2023-04-10 PROCEDURE — 82550 ASSAY OF CK (CPK): CPT | Performed by: EMERGENCY MEDICINE

## 2023-04-10 PROCEDURE — 81001 URINALYSIS AUTO W/SCOPE: CPT | Performed by: EMERGENCY MEDICINE

## 2023-04-10 PROCEDURE — 96374 THER/PROPH/DIAG INJ IV PUSH: CPT

## 2023-04-10 PROCEDURE — 25000003 PHARM REV CODE 250: Performed by: EMERGENCY MEDICINE

## 2023-04-10 PROCEDURE — 83880 ASSAY OF NATRIURETIC PEPTIDE: CPT | Performed by: EMERGENCY MEDICINE

## 2023-04-10 PROCEDURE — 93005 ELECTROCARDIOGRAM TRACING: CPT | Performed by: INTERNAL MEDICINE

## 2023-04-10 RX ORDER — PREDNISONE 20 MG/1
20 TABLET ORAL DAILY
Qty: 2 TABLET | Refills: 0 | Status: SHIPPED | OUTPATIENT
Start: 2023-04-10 | End: 2023-04-12

## 2023-04-10 RX ORDER — METHOCARBAMOL 500 MG/1
500 TABLET, FILM COATED ORAL EVERY 12 HOURS PRN
Qty: 4 TABLET | Refills: 0 | Status: SHIPPED | OUTPATIENT
Start: 2023-04-10

## 2023-04-10 RX ORDER — METHOCARBAMOL 500 MG/1
500 TABLET, FILM COATED ORAL
Status: COMPLETED | OUTPATIENT
Start: 2023-04-10 | End: 2023-04-10

## 2023-04-10 RX ORDER — CEFUROXIME AXETIL 500 MG/1
500 TABLET ORAL 2 TIMES DAILY
Qty: 14 TABLET | Refills: 0 | Status: SHIPPED | OUTPATIENT
Start: 2023-04-10 | End: 2023-04-17

## 2023-04-10 RX ADMIN — METHYLPREDNISOLONE SODIUM SUCCINATE 40 MG: 40 INJECTION, POWDER, FOR SOLUTION INTRAMUSCULAR; INTRAVENOUS at 02:04

## 2023-04-10 RX ADMIN — METHOCARBAMOL 500 MG: 500 TABLET ORAL at 02:04

## 2023-04-10 NOTE — DISCHARGE INSTRUCTIONS
Please read and follow discharge instructions and return precautions.  Rest, avoid any strenuous activity, over exertion or overheating.  Keep well hydrated.  Robaxin as directed if needed for possible muscle spasms--do not drive, drink alcohol or operate machinery while taking this medication as it may make you sleepy.  Prednisone as directed over the next 3 days.  Do not take any other anti-inflammatory medications such as a leave or ibuprofen while taking this medication.  Important to see your primary care provider in the next 1-2 days.  Important to see your orthopedic physician in the next 1-2 days.  Return immediately if you develop new or worsening symptoms or if you have new problems or concerns.

## 2023-04-10 NOTE — TELEPHONE ENCOUNTER
----- Message from Kulwant Mauro MA sent at 4/10/2023 10:57 AM CDT -----  Contact: Ms. Pringle/Dtr  Ms. Pringle stated patient was up all night with left shoulder pain which she was last seen for on 8/9/22.  Ms. Pringle wanted to speak to nurse to see what can be done for patient.    Call back number is [phone]

## 2023-04-10 NOTE — ED NOTES
Left arm pain started 3 weeks ago but has been going on for several years. Typically gets a steroid shot from primary care when pain flares up but couldn't get an appointment today.

## 2023-04-10 NOTE — TELEPHONE ENCOUNTER
Patient was advised to seek treatment with the eR as shoulder pain that radiates down the arm could be something more than orthopedic. She was informed after she was seen in an ER setting that she could call us back for an appointment in our office if the pain was related to orthopedics.

## 2023-04-10 NOTE — ED PROVIDER NOTES
Encounter Date: 4/10/2023       History     Chief Complaint   Patient presents with    Arm Pain     Left upper arm pain x3 weeks, worsening x2 days, denies injury     This is a pleasant 89-year-old female who presents complaining of left shoulder pain.  The pain radiates from the shoulder area into the upper arm down to the elbow.  The pain is much worse with movement as well as positioning in his somewhat improved with correct position and keeping the shoulder still/not moving the area.  She has had this problem over the past 3 weeks.  The pain did get worse last night when she was sleeping in a particular position.  She has had the exact same problem in the past and benefitted from an intra-articular steroid injection.  She thought she could receive a steroid injection into the shoulder joint in the emergency room.  She denies any associated chest pain or shortness of breath.  She denies any lightheadedness, syncope or near-syncope.  She denies any extremity weakness, tingling or loss of function.  She denies fever or chills.  She denies any other myalgias or arthralgias.  She denies any other problems or complaints and feels well otherwise.      Review of patient's allergies indicates:  No Known Allergies  Past Medical History:   Diagnosis Date    Macula lutea degeneration     Squamous cell carcinoma of skin      Past Surgical History:   Procedure Laterality Date    APPENDECTOMY      BACK SURGERY  1975    HYSTERECTOMY       Family History   Problem Relation Age of Onset    Melanoma Neg Hx     Psoriasis Neg Hx     Lupus Neg Hx     Eczema Neg Hx      Social History     Tobacco Use    Smoking status: Former     Packs/day: 1.00     Years: 20.00     Pack years: 20.00     Types: Cigarettes    Smokeless tobacco: Never   Substance Use Topics    Alcohol use: No    Drug use: No     Review of Systems   Constitutional: Negative.  Negative for activity change, appetite change, chills, fatigue and fever.   HENT: Negative.   Negative for congestion, ear pain, rhinorrhea, sore throat and trouble swallowing.    Eyes: Negative.  Negative for photophobia, pain, redness and visual disturbance.   Respiratory: Negative.  Negative for cough, chest tightness, shortness of breath and wheezing.    Cardiovascular: Negative.  Negative for chest pain, palpitations and leg swelling.   Gastrointestinal: Negative.  Negative for abdominal distention, abdominal pain, blood in stool, constipation, diarrhea, nausea and vomiting.   Endocrine: Negative.    Genitourinary: Negative.  Negative for decreased urine volume, difficulty urinating, dysuria, flank pain, frequency, pelvic pain and urgency.   Musculoskeletal:  Positive for arthralgias and myalgias. Negative for back pain, gait problem, neck pain and neck stiffness.        Left shoulder pain as per HPI.   Skin: Negative.  Negative for rash.   Neurological: Negative.  Negative for dizziness, syncope, facial asymmetry, speech difficulty, weakness, light-headedness, numbness and headaches.   Hematological:  Does not bruise/bleed easily.   Psychiatric/Behavioral: Negative.  Negative for confusion.    All other systems reviewed and are negative.    Physical Exam     Initial Vitals [04/10/23 1210]   BP Pulse Resp Temp SpO2   122/73 90 16 98.2 °F (36.8 °C) 96 %      MAP       --         Physical Exam    Nursing note and vitals reviewed.  Constitutional: She is not diaphoretic. She is active and cooperative.  Non-toxic appearance. She does not have a sickly appearance. She does not appear ill. No distress.   HENT:   Head: Normocephalic and atraumatic.   Right Ear: Tympanic membrane normal.   Left Ear: Tympanic membrane normal.   Nose: Nose normal.   Mouth/Throat: Uvula is midline, oropharynx is clear and moist and mucous membranes are normal. No oral lesions. No uvula swelling. No oropharyngeal exudate, posterior oropharyngeal edema or posterior oropharyngeal erythema.   Eyes: Conjunctivae, EOM and lids are  normal. Pupils are equal, round, and reactive to light. No scleral icterus.   Neck: Trachea normal and phonation normal. Neck supple. No thyroid mass and no thyromegaly present. No stridor present. No JVD present.   Normal range of motion.   Full passive range of motion without pain.     Cardiovascular:  Normal rate, regular rhythm, normal heart sounds, intact distal pulses and normal pulses.     Exam reveals no gallop, no distant heart sounds and no friction rub.       No murmur heard.  Pulmonary/Chest: Effort normal and breath sounds normal. No accessory muscle usage or stridor. No tachypnea. No respiratory distress. She has no wheezes. She has no rhonchi. She has no rales.   Abdominal: Abdomen is soft. Bowel sounds are normal. She exhibits no distension, no pulsatile midline mass and no mass. There is no abdominal tenderness. There is no rigidity and no guarding.   Musculoskeletal:         General: No edema.      Right shoulder: Normal. Normal strength. Normal pulse.      Left shoulder: Tenderness present. No swelling, deformity, effusion, laceration or crepitus. Decreased range of motion. Normal strength. Normal pulse.      Right upper arm: Normal. No swelling, tenderness or bony tenderness.      Left upper arm: Normal. No swelling, tenderness or bony tenderness.      Right elbow: Normal. Normal range of motion. No tenderness.      Left elbow: Normal. Normal range of motion. No tenderness.      Right forearm: Normal. No tenderness.      Left forearm: Normal. No tenderness.      Right wrist: Normal. Normal range of motion. Normal pulse.      Left wrist: Normal. No tenderness. Normal range of motion. Normal pulse.      Right hand: Normal. Normal range of motion. Normal strength. Normal sensation. Normal capillary refill. Normal pulse.      Left hand: Normal. Normal range of motion. Normal strength. Normal sensation. Normal capillary refill. Normal pulse.      Cervical back: Normal, full passive range of motion  without pain, normal range of motion and neck supple. No edema, erythema, rigidity or bony tenderness. No spinous process tenderness or muscular tenderness. Normal range of motion.      Thoracic back: Normal. No bony tenderness. Normal range of motion.      Lumbar back: Normal. No bony tenderness. Normal range of motion.      Right foot: Normal. Normal range of motion and normal capillary refill. No tenderness or bony tenderness. Normal pulse.      Left foot: Normal. Normal range of motion and normal capillary refill. No tenderness or bony tenderness. Normal pulse.      Comments: Pulses are 2+ throughout, cap refill is less than 2 sec throughout,  Tenderness to palpation to the left anterior and lateral shoulder.  No erythema, no effusion, no increased warmth, moderate pain with range of motion of the left shoulder.  Palpation of the shoulder and range of motion of the shoulder reproduces the patient's symptoms.  She is neurovascularly intact distal to the shoulder area.  No cervical spine tenderness to palpation, no midline or spinal tenderness to palpation throughout.  extremities are  otherwise nontender throughout with full range of motion. There is no spinal tenderness to palpation.     Neurological: She is alert and oriented to person, place, and time. She has normal strength. She displays normal reflexes. No cranial nerve deficit or sensory deficit. Gait normal.   No focal deficits.   Skin: Skin is warm, dry and intact. Capillary refill takes less than 2 seconds. No ecchymosis, no petechiae and no rash noted. No erythema. No pallor.   Psychiatric: She has a normal mood and affect. Her speech is normal and behavior is normal. Judgment normal. Cognition and memory are normal.       ED Course   Procedures  Labs Reviewed   URINALYSIS, REFLEX TO URINE CULTURE - Abnormal; Notable for the following components:       Result Value    Leukocytes, UA 2+ (*)     All other components within normal limits    Narrative:      Specimen Source->Urine   CBC W/ AUTO DIFFERENTIAL - Abnormal; Notable for the following components:    Hemoglobin 11.8 (*)     Hematocrit 36.0 (*)     All other components within normal limits   COMPREHENSIVE METABOLIC PANEL - Abnormal; Notable for the following components:    Glucose 136 (*)     Albumin 3.4 (*)     Anion Gap 5 (*)     eGFR 51.6 (*)     All other components within normal limits   TROPONIN I HIGH SENSITIVITY - Abnormal; Notable for the following components:    Troponin I High Sensitivity 23.2 (*)     All other components within normal limits   TROPONIN I HIGH SENSITIVITY - Abnormal; Notable for the following components:    Troponin I High Sensitivity 22.6 (*)     All other components within normal limits   URINALYSIS MICROSCOPIC - Abnormal; Notable for the following components:    WBC, UA 11 (*)     Hyaline Casts, UA 4 (*)     All other components within normal limits    Narrative:     Specimen Source->Urine   CULTURE, URINE    Narrative:     Specimen Source->Urine   LIPASE   CK   MAGNESIUM   TSH   B-TYPE NATRIURETIC PEPTIDE        ECG Results              EKG 12-lead (Final result)  Result time 04/12/23 18:56:33      Final result by Interface, Lab In [facility] (04/12/23 18:56:33)                   Narrative:    Test Reason : M79.602,    Vent. Rate : 087 BPM     Atrial Rate : 087 BPM     P-R Int : 182 ms          QRS Dur : 084 ms      QT Int : 374 ms       P-R-T Axes : 058 -34 -19 degrees     QTc Int : 450 ms    Normal sinus rhythm with sinus arrhythmia  Left axis deviation  Moderate voltage criteria for LVH, may be normal variant  Abnormal ECG  When compared with ECG of 25-FEB-2023 05:04,  Premature ventricular complexes are no longer Present  Confirmed by David Reeves MD ([id number]) on 4/12/2023 6:56:27 PM    Referred By: [email or web address]   SELF           Confirmed By:David Reeves MD                                  Imaging Results              US Upper Extremity Veins Left (Final result)  Result time  04/10/23 15:59:09      Final result by Ruth Mosley IV, MD (04/10/23 15:59:09)                   Narrative:    Left upper extremity venous Doppler evaluation    HISTORY: Arm pain and swelling.    Real-time, duplex and color Doppler interrogation of the left upper extremity venous system is performed.    This demonstrates patency of the internal jugular, subclavian, axillary, brachial, basilic and cephalic veins as well is the major forearm veins. There are no findings of venous thrombosis.    IMPRESSION:    No evidence of venous thrombosis involving the left upper extremity venous system.    Electronically signed by:  Ruth Mosley MD  4/10/2023 3:59 PM CDT Workstation: [id number]                                     X-Ray Shoulder Trauma Left (Final result)  Result time 04/10/23 14:36:31      Final result by Mehnaz Lin MD (04/10/23 14:36:31)                   Narrative:    3 views of the left shoulder    Clinical history is pain    There are mild osteoarthritic changes of the AC joint and glenohumeral joint with joint space narrowing and spurring. There are no fractures, dislocations or acute osseous abnormalities. The soft tissues are normal. The visualized portion of the left lung is clear.    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 The cardiomediastinal silhouette is normal in size. There are degenerative changes of both shoulders.    IMPRESSION: No acute pulmonary process    Mild  degenerative changes of the shoulders    Electronically signed by:  Mehnaz Lin MD  4/10/2023 2:35 PM CDT Workstation: [id number]                                     Medications   methocarbamoL tablet 500 mg (500 mg Oral Given 4/10/23 1419)   methylPREDNISolone sodium succinate injection 40 mg (40 mg Intravenous Given 4/10/23 1419)     Medical Decision Making:   Clinical Tests:   Lab Tests: Ordered and Reviewed  Radiological Study: Ordered and Reviewed  Medical Tests: Ordered and Reviewed  ED Management:  The patient is currently hemodynamically stable.  Blood pressure is up however she denies any chest pain or shortness of breath.  She also did not take her blood pressure medication today.  The shoulder pain is consistent with previous episodes of bursitis or arthritis of the shoulder.  There is no evidence of DVT by ultrasound.  X-ray demonstrates arthritic changes.  Patient's troponin is chronically elevated in his actually lower than previous values.  EKG is unchanged from previous.  She strictly denies having any symptoms of chest pain, shortness of breath palpitations lightheadedness syncope or near-syncope.  Shoulder exam is consistent with musculoskeletal pain.  She will be treated with a short course of steroid medication.  Have explained that we do not do intra-articular injections in the emergency room.  Have explained the need for very close outpatient evaluation with her primary care provider as well as Orthopedics.  Return precautions discussed in detail, symptomatic supportive care discussed, patient voices understanding, feels well and would like to go home.  Patient also with evidence of urinary tract infection.  Antibiotic therapy initiated.  She is not exhibiting any evidence of sepsis, shock or systemic infection.  Left shoulder exam is not consistent with septic arthritis.                        Clinical Impression:   Final diagnoses:  [M79.602] Left arm pain  [M25.512] Shoulder pain,  left  [M19.019] Shoulder arthritis vs bursitis (Primary)        ED Disposition Condition    Discharge Stable          ED Prescriptions       Medication Sig Dispense Start Date End Date Auth. Provider    methocarbamoL (ROBAXIN) 500 MG Tab Take 1 tablet (500 mg total) by mouth every 12 (twelve) hours as needed (for possible muscle spasms). 4 tablet 4/10/2023 -- Ksenia Anguiano MD    predniSONE (DELTASONE) 20 MG tablet () Take 1 tablet (20 mg total) by mouth once daily. for 2 days 2 tablet 4/10/2023 2023 Ksenia Anguiano MD    cefUROXime (CEFTIN) 500 MG tablet () Take 1 tablet (500 mg total) by mouth 2 (two) times daily. for 7 days 14 tablet 4/10/2023 2023 Ksenia Anguiano MD          Follow-up Information       Follow up With Specialties Details Why Contact Info    Se Riso MD Family Medicine Schedule an appointment as soon as possible for a visit in 1 day  [address] [facility]  [place] [address]  [phone]      Kenneth Nevarez MD Orthopedic Surgery, Surgery Schedule an appointment as soon as possible for a visit in 1 day  [address] [place] [address] [address] [place] [address]  [phone]               Ksenia Anguiano MD  [phone]

## 2023-04-11 LAB
BACTERIA UR CULT: NORMAL
BACTERIA UR CULT: NORMAL

## 2023-05-03 DIAGNOSIS — M75.52 BURSITIS OF LEFT SHOULDER: Primary | ICD-10-CM

## 2023-05-05 ENCOUNTER — HOSPITAL ENCOUNTER (OUTPATIENT)
Dept: RADIOLOGY | Facility: HOSPITAL | Age: 88
Discharge: HOME | End: 2023-05-05
Attending: ORTHOPAEDIC SURGERY
Payer: MEDICARE

## 2023-05-05 ENCOUNTER — OFFICE VISIT (OUTPATIENT)
Dept: ORTHOPEDICS | Facility: CLINIC | Age: 88
End: 2023-05-05
Payer: MEDICARE

## 2023-05-05 VITALS — BODY MASS INDEX: 27.47 KG/M2 | WEIGHT: 164.88 LBS | HEIGHT: 65 IN

## 2023-05-05 DIAGNOSIS — M75.52 BURSITIS OF LEFT SHOULDER: ICD-10-CM

## 2023-05-05 DIAGNOSIS — M75.52 BURSITIS OF LEFT SHOULDER: Primary | ICD-10-CM

## 2023-05-05 PROCEDURE — 73030 X-RAY EXAM OF SHOULDER: CPT | Mod: TC,PN,LT

## 2023-05-05 PROCEDURE — 1159F MED LIST DOCD IN RCRD: CPT | Mod: CPTII,S$GLB,, | Performed by: ORTHOPAEDIC SURGERY

## 2023-05-05 PROCEDURE — 1101F PR PT FALLS ASSESS DOC 0-1 FALLS W/OUT INJ PAST YR: ICD-10-PCS | Mod: CPTII,S$GLB,, | Performed by: ORTHOPAEDIC SURGERY

## 2023-05-05 PROCEDURE — 99213 PR OFFICE/OUTPT VISIT, EST, LEVL III, 20-29 MIN: ICD-10-PCS | Mod: 25,GW,S$GLB, | Performed by: ORTHOPAEDIC SURGERY

## 2023-05-05 PROCEDURE — 1125F AMNT PAIN NOTED PAIN PRSNT: CPT | Mod: CPTII,S$GLB,, | Performed by: ORTHOPAEDIC SURGERY

## 2023-05-05 PROCEDURE — 1101F PT FALLS ASSESS-DOCD LE1/YR: CPT | Mod: CPTII,S$GLB,, | Performed by: ORTHOPAEDIC SURGERY

## 2023-05-05 PROCEDURE — 99999 PR PBB SHADOW E&M-EST. PATIENT-LVL III: CPT | Mod: PBBFAC,,, | Performed by: ORTHOPAEDIC SURGERY

## 2023-05-05 PROCEDURE — 3288F PR FALLS RISK ASSESSMENT DOCUMENTED: ICD-10-PCS | Mod: CPTII,S$GLB,, | Performed by: ORTHOPAEDIC SURGERY

## 2023-05-05 PROCEDURE — 3288F FALL RISK ASSESSMENT DOCD: CPT | Mod: CPTII,S$GLB,, | Performed by: ORTHOPAEDIC SURGERY

## 2023-05-05 PROCEDURE — 73030 X-RAY EXAM OF SHOULDER: CPT | Mod: 26,LT,, | Performed by: RADIOLOGY

## 2023-05-05 PROCEDURE — 99213 OFFICE O/P EST LOW 20 MIN: CPT | Mod: 25,GW,S$GLB, | Performed by: ORTHOPAEDIC SURGERY

## 2023-05-05 PROCEDURE — 1159F PR MEDICATION LIST DOCUMENTED IN MEDICAL RECORD: ICD-10-PCS | Mod: CPTII,S$GLB,, | Performed by: ORTHOPAEDIC SURGERY

## 2023-05-05 PROCEDURE — 1125F PR PAIN SEVERITY QUANTIFIED, PAIN PRESENT: ICD-10-PCS | Mod: CPTII,S$GLB,, | Performed by: ORTHOPAEDIC SURGERY

## 2023-05-05 PROCEDURE — 99999 PR PBB SHADOW E&M-EST. PATIENT-LVL III: ICD-10-PCS | Mod: PBBFAC,,, | Performed by: ORTHOPAEDIC SURGERY

## 2023-05-05 PROCEDURE — 73030 XR SHOULDER COMPLETE 2 OR MORE VIEWS LEFT: ICD-10-PCS | Mod: 26,LT,, | Performed by: RADIOLOGY

## 2023-05-05 NOTE — PROGRESS NOTES
5/5/2023    Past Medical History:   Diagnosis Date    Macula lutea degeneration     Squamous cell carcinoma of skin        Past Surgical History:   Procedure Laterality Date    APPENDECTOMY      BACK SURGERY  1975    HYSTERECTOMY         Current Outpatient Medications   Medication Sig    albuterol-ipratropium (DUO-NEB) 2.5 mg-0.5 mg/3 mL nebulizer solution Take 3 mLs by nebulization every 4 (four) hours as needed. rescue    amLODIPine (NORVASC) 2.5 MG tablet Take 2.5 mg by mouth once daily.     aspirin (ECOTRIN) 81 MG EC tablet Take 81 mg by mouth every 12 (twelve) hours.    cyproheptadine (PERIACTIN) 4 mg tablet 1 tablet    HYDROcodone-acetaminophen (NORCO) 5-325 mg per tablet Take 1 tablet by mouth every 4 (four) hours as needed for Pain.    isosorbide dinitrate (ISORDIL) 30 MG Tab Take 30 mg by mouth once daily.     levothyroxine (SYNTHROID) 25 MCG tablet Take 25 mcg by mouth every morning.     magnesium 250 mg Tab Take 1 tablet by mouth once daily.    meclizine (ANTIVERT) 12.5 mg tablet Take 12.5 mg by mouth daily as needed.     methocarbamoL (ROBAXIN) 500 MG Tab Take 1 tablet (500 mg total) by mouth every 12 (twelve) hours as needed (for possible muscle spasms).    nitrofurantoin, macrocrystal-monohydrate, (MACROBID) 100 MG capsule 1 capsule at bedtime with food    polyethylene glycol (GLYCOLAX) 17 gram/dose powder Take 17 g by mouth Daily.    topiramate (TOPAMAX) 25 MG tablet Take 25 mg by mouth once daily.     vit A/vit C/vit E/zinc/copper (PRESERVISION AREDS ORAL) Take 1 tablet by mouth once daily.     zolpidem (AMBIEN) 10 mg Tab Take 1 tablet (10 mg total) by mouth nightly as needed (insomnia).     No current facility-administered medications for this visit.     Facility-Administered Medications Ordered in Other Visits   Medication    triamcinolone acetonide injection 40 mg       Review of patient's allergies indicates:  No Known Allergies    Family History   Problem Relation Age of Onset    Melanoma Neg Hx  "    Psoriasis Neg Hx     Lupus Neg Hx     Eczema Neg Hx        Social History     Socioeconomic History    Marital status:    Tobacco Use    Smoking status: Former     Packs/day: 1.00     Years: 20.00     Pack years: 20.00     Types: Cigarettes    Smokeless tobacco: Never   Substance and Sexual Activity    Alcohol use: No    Drug use: No       Chief Complaint:   Chief Complaint   Patient presents with    Left Shoulder - Pain      left shoulder Pain Had injection on 8/9/2022. She states her pain is severe today. She states she can not sleep well due tot he pain. Painful ROM         History of present illness:    This is a 89 y.o. year old female who complains of patient is being seen today for left shoulder pain she had an injection back in August of 2022 she complains of pain today    Review of Systems:    Constitution: Denies chills, fever, and sweats.  HENT: Denies headaches or blurry vision.  Cardiovascular: Denies chest pain or irregular heart beat.  Respiratory: Denies cough or shortness of breath.  Gastrointestinal: Denies abdominal pain, nausea, or vomiting.  Musculoskeletal:  Denies muscle cramps.  Neurological: Denies dizziness or focal weakness.  Psychiatric/Behavioral: Normal mental status.  Hematologic/Lymphatic: Denies bleeding problem or easy bruising/bleeding.  Skin: Denies rash or suspicious lesions.    Examination:    Vital Signs:    Vitals:    05/05/23 1105   Weight: 74.8 kg (164 lb 14.5 oz)   Height: 5' 5" (1.651 m)   PainSc:   9   PainLoc: Shoulder       Body mass index is 27.44 kg/m².    This a well-developed, well nourished patient in no acute distress.    Alert and oriented x 3 and cooperative to examination.       Physical Exam:  Left shoulder-no external signs of any trauma bruising some painful abduction and weak abduction pain over the lateral deltoid    Imaging:  X-ray of the left shoulder shows some degenerative changes in the glenohumeral joint no active lesions or fractures     "   Assessment: Bursitis of left shoulder        Plan:  We will inject the deltoid bursa of the left shoulder with Kenalog today      DISCLAIMER: This note may have been dictated using voice recognition software and may contain grammatical errors.     NOTE: Consult report sent to referring provider via [employer] EMR.

## 2023-05-29 PROBLEM — A41.9 SEVERE SEPSIS: Status: RESOLVED | Noted: 2023-02-25 | Resolved: 2023-05-29

## 2023-05-29 PROBLEM — J18.9 COMMUNITY ACQUIRED PNEUMONIA: Status: RESOLVED | Noted: 2023-02-26 | Resolved: 2023-05-29

## 2023-05-29 PROBLEM — R65.20 SEVERE SEPSIS: Status: RESOLVED | Noted: 2023-02-25 | Resolved: 2023-05-29

## 2023-05-29 PROBLEM — J96.01 ACUTE HYPOXEMIC RESPIRATORY FAILURE: Status: RESOLVED | Noted: 2023-02-26 | Resolved: 2023-05-29

## 2023-07-25 ENCOUNTER — TELEPHONE (OUTPATIENT)
Dept: ORTHOPEDICS | Facility: CLINIC | Age: 88
End: 2023-07-25
Payer: MEDICARE

## 2023-07-25 NOTE — TELEPHONE ENCOUNTER
----- Message from Jesse Arndt sent at 7/25/2023 12:01 PM CDT -----  Regarding: wants to priyank inj, call Aleksandra   Contact: Aleksandra West   wants to priyank inj, call Aleksandra

## 2023-08-04 ENCOUNTER — OFFICE VISIT (OUTPATIENT)
Dept: ORTHOPEDICS | Facility: CLINIC | Age: 88
End: 2023-08-04
Payer: MEDICARE

## 2023-08-04 VITALS — WEIGHT: 164.88 LBS | HEIGHT: 65 IN | RESPIRATION RATE: 18 BRPM | BODY MASS INDEX: 27.47 KG/M2

## 2023-08-04 DIAGNOSIS — M75.42 IMPINGEMENT SYNDROME OF LEFT SHOULDER: Primary | ICD-10-CM

## 2023-08-04 DIAGNOSIS — M19.012 OSTEOARTHRITIS OF LEFT SHOULDER, UNSPECIFIED OSTEOARTHRITIS TYPE: ICD-10-CM

## 2023-08-04 PROCEDURE — 3288F FALL RISK ASSESSMENT DOCD: CPT | Mod: CPTII,S$GLB,, | Performed by: ORTHOPAEDIC SURGERY

## 2023-08-04 PROCEDURE — 99999 PR PBB SHADOW E&M-EST. PATIENT-LVL III: ICD-10-PCS | Mod: PBBFAC,,, | Performed by: ORTHOPAEDIC SURGERY

## 2023-08-04 PROCEDURE — 1101F PT FALLS ASSESS-DOCD LE1/YR: CPT | Mod: CPTII,S$GLB,, | Performed by: ORTHOPAEDIC SURGERY

## 2023-08-04 PROCEDURE — 1125F PR PAIN SEVERITY QUANTIFIED, PAIN PRESENT: ICD-10-PCS | Mod: CPTII,S$GLB,, | Performed by: ORTHOPAEDIC SURGERY

## 2023-08-04 PROCEDURE — 3288F PR FALLS RISK ASSESSMENT DOCUMENTED: ICD-10-PCS | Mod: CPTII,S$GLB,, | Performed by: ORTHOPAEDIC SURGERY

## 2023-08-04 PROCEDURE — 99214 OFFICE O/P EST MOD 30 MIN: CPT | Mod: 25,S$GLB,, | Performed by: ORTHOPAEDIC SURGERY

## 2023-08-04 PROCEDURE — 20610 DRAIN/INJ JOINT/BURSA W/O US: CPT | Mod: LT,S$GLB,, | Performed by: ORTHOPAEDIC SURGERY

## 2023-08-04 PROCEDURE — 1159F MED LIST DOCD IN RCRD: CPT | Mod: CPTII,S$GLB,, | Performed by: ORTHOPAEDIC SURGERY

## 2023-08-04 PROCEDURE — 1159F PR MEDICATION LIST DOCUMENTED IN MEDICAL RECORD: ICD-10-PCS | Mod: CPTII,S$GLB,, | Performed by: ORTHOPAEDIC SURGERY

## 2023-08-04 PROCEDURE — 1160F RVW MEDS BY RX/DR IN RCRD: CPT | Mod: CPTII,S$GLB,, | Performed by: ORTHOPAEDIC SURGERY

## 2023-08-04 PROCEDURE — 99214 PR OFFICE/OUTPT VISIT, EST, LEVL IV, 30-39 MIN: ICD-10-PCS | Mod: 25,S$GLB,, | Performed by: ORTHOPAEDIC SURGERY

## 2023-08-04 PROCEDURE — 1160F PR REVIEW ALL MEDS BY PRESCRIBER/CLIN PHARMACIST DOCUMENTED: ICD-10-PCS | Mod: CPTII,S$GLB,, | Performed by: ORTHOPAEDIC SURGERY

## 2023-08-04 PROCEDURE — 1125F AMNT PAIN NOTED PAIN PRSNT: CPT | Mod: CPTII,S$GLB,, | Performed by: ORTHOPAEDIC SURGERY

## 2023-08-04 PROCEDURE — 99999 PR PBB SHADOW E&M-EST. PATIENT-LVL III: CPT | Mod: PBBFAC,,, | Performed by: ORTHOPAEDIC SURGERY

## 2023-08-04 PROCEDURE — 20610 LARGE JOINT ASPIRATION/INJECTION: L SUBACROMIAL BURSA: ICD-10-PCS | Mod: LT,S$GLB,, | Performed by: ORTHOPAEDIC SURGERY

## 2023-08-04 PROCEDURE — 1101F PR PT FALLS ASSESS DOC 0-1 FALLS W/OUT INJ PAST YR: ICD-10-PCS | Mod: CPTII,S$GLB,, | Performed by: ORTHOPAEDIC SURGERY

## 2023-08-04 RX ORDER — TRIAMCINOLONE ACETONIDE 40 MG/ML
40 INJECTION, SUSPENSION INTRA-ARTICULAR; INTRAMUSCULAR
Status: DISCONTINUED | OUTPATIENT
Start: 2023-08-04 | End: 2023-08-04 | Stop reason: HOSPADM

## 2023-08-04 RX ADMIN — TRIAMCINOLONE ACETONIDE 40 MG: 40 INJECTION, SUSPENSION INTRA-ARTICULAR; INTRAMUSCULAR at 11:08

## 2023-08-04 NOTE — PROCEDURES
Large Joint Aspiration/Injection: L subacromial bursa    Date/Time: 8/4/2023 11:30 AM    Performed by: Alon Copeland MD  Authorized by: Alon Copeland MD    Consent Done?:  Yes (Verbal)  Indications:  Pain  Site marked: the procedure site was marked    Timeout: prior to procedure the correct patient, procedure, and site was verified      Local anesthesia used?: Yes    Local anesthetic: Ropivicaine.  Anesthetic total (ml):  3      Details:  Needle Size:  21 G  Ultrasonic Guidance for needle placement?: No    Approach:  Posterior  Location:  Shoulder  Site:  L subacromial bursa  Medications:  40 mg triamcinolone acetonide 40 mg/mL  Patient tolerance:  Patient tolerated the procedure well with no immediate complications

## 2023-08-04 NOTE — PROGRESS NOTES
Subjective:      Patient ID: Erich Encarnacion is a 89 y.o. female.    Chief Complaint: Pain of the Left Shoulder    HPI  89-year-old female long history of bilateral shoulder pain left greater than right.  She is had previous injections which have given her short-term relief.  No trauma.  She has pain at night pain with sleeping on her left shoulder doing any repetitive use or heavy lifting.  ROS      Objective:    Ortho Exam     Constitutional:   Patient is alert  and oriented in no acute distress  HEENT:  normocephalic atraumatic; PERRL EOMI  Neck:  Supple without adenopathy  Cardiovascular:  Normal rate and rhythm  Pulmonary:  Normal respiratory effort normal chest wall expansion  Abdominal:  Nonprotuberant nondistended  Musculoskeletal:  Patient has a steady nonantalgic gait.  Neck is supple with adequate range of motion  No Lhermitte's or Spurling sign.  Mild limitation of range of motion of the left shoulder with discomfort with full forward flexion and internal rotation.  There is a mildly positive impingement sign.  Negative Yergason's and Speed's testing.  Mild weakness however pain with strength testing against resistance there is mild age-related atrophy  There is a normal distal neurologic and vascular examination.  Neurological:  No focal defect; cranial nerves 2-12 grossly intact  Psychiatric/behavioral:  Mood and behavior normal    X-Ray Shoulder 2 or More Views Left  EXAMINATION:  XR SHOULDER COMPLETE 2 OR MORE VIEWS LEFT    CLINICAL HISTORY:  Bursitis of left shoulder    TECHNIQUE:  Two or three views of the left shoulder were performed.    COMPARISON:  04/10/2023    FINDINGS:  No acute fracture or malalignment.  Moderate degenerative change AC joint.  Moderate degenerative change left glenohumeral joint.  Atherosclerosis.    Electronically signed by: Remy Lucas  Date:    05/05/2023  Time:    11:07       My Radiographs Findings:    I have personally reviewed radiographs and concur with above  findings    Assessment:       Encounter Diagnoses   Name Primary?    Impingement syndrome of left shoulder Yes    Osteoarthritis of left shoulder, unspecified osteoarthritis type          Plan:       I have discussed medical condition and treatment options with her at length.  After a verbal consent and sterile prep I injected her most symptomatic left shoulder today without complication using combination of cc of Kenalog several cc of lidocaine.  I have suggested that she continue with general shoulder range-of-motion exercises and follow up can be as needed.        Past Medical History:   Diagnosis Date    Macula lutea degeneration     Squamous cell carcinoma of skin      Past Surgical History:   Procedure Laterality Date    APPENDECTOMY      BACK SURGERY  1975    HYSTERECTOMY           Current Outpatient Medications:     albuterol-ipratropium (DUO-NEB) 2.5 mg-0.5 mg/3 mL nebulizer solution, Take 3 mLs by nebulization every 4 (four) hours as needed. rescue, Disp: , Rfl:     amLODIPine (NORVASC) 2.5 MG tablet, Take 2.5 mg by mouth once daily. , Disp: , Rfl:     aspirin (ECOTRIN) 81 MG EC tablet, Take 81 mg by mouth every 12 (twelve) hours., Disp: , Rfl:     cyproheptadine (PERIACTIN) 4 mg tablet, 1 tablet, Disp: , Rfl:     HYDROcodone-acetaminophen (NORCO) 5-325 mg per tablet, Take 1 tablet by mouth every 4 (four) hours as needed for Pain., Disp: 18 tablet, Rfl: 0    isosorbide dinitrate (ISORDIL) 30 MG Tab, Take 30 mg by mouth once daily. , Disp: , Rfl:     levothyroxine (SYNTHROID) 25 MCG tablet, Take 25 mcg by mouth every morning. , Disp: , Rfl:     magnesium 250 mg Tab, Take 1 tablet by mouth once daily., Disp: , Rfl:     meclizine (ANTIVERT) 12.5 mg tablet, Take 12.5 mg by mouth daily as needed. , Disp: , Rfl:     methocarbamoL (ROBAXIN) 500 MG Tab, Take 1 tablet (500 mg total) by mouth every 12 (twelve) hours as needed (for possible muscle spasms)., Disp: 4 tablet, Rfl: 0    nitrofurantoin,  macrocrystal-monohydrate, (MACROBID) 100 MG capsule, 1 capsule at bedtime with food, Disp: , Rfl:     polyethylene glycol (GLYCOLAX) 17 gram/dose powder, Take 17 g by mouth Daily., Disp: , Rfl:     topiramate (TOPAMAX) 25 MG tablet, Take 25 mg by mouth once daily. , Disp: , Rfl: 0    vit A/vit C/vit E/zinc/copper (PRESERVISION AREDS ORAL), Take 1 tablet by mouth once daily. , Disp: , Rfl:     zolpidem (AMBIEN) 10 mg Tab, Take 1 tablet (10 mg total) by mouth nightly as needed (insomnia)., Disp: , Rfl:   No current facility-administered medications for this visit.    Facility-Administered Medications Ordered in Other Visits:     triamcinolone acetonide injection 40 mg, 40 mg, Intra-articular, , Kenneth Nevarez MD, 40 mg at 06/03/22 1030    Review of patient's allergies indicates:  No Known Allergies    Family History   Problem Relation Age of Onset    Melanoma Neg Hx     Psoriasis Neg Hx     Lupus Neg Hx     Eczema Neg Hx      Social History     Occupational History    Not on file   Tobacco Use    Smoking status: Former     Current packs/day: 1.00     Average packs/day: 1 pack/day for 20.0 years (20.0 ttl pk-yrs)     Types: Cigarettes    Smokeless tobacco: Never   Substance and Sexual Activity    Alcohol use: No    Drug use: No    Sexual activity: Not on file

## 2023-08-22 DIAGNOSIS — R10.32 ABDOMINAL PAIN, LEFT LOWER QUADRANT: Primary | ICD-10-CM

## 2023-09-12 ENCOUNTER — HOSPITAL ENCOUNTER (OUTPATIENT)
Dept: RADIOLOGY | Facility: HOSPITAL | Age: 88
Discharge: HOME | End: 2023-09-12
Attending: NURSE PRACTITIONER
Payer: MEDICARE

## 2023-09-12 DIAGNOSIS — R10.32 ABDOMINAL PAIN, LEFT LOWER QUADRANT: ICD-10-CM

## 2023-09-12 LAB
CREAT SERPL-MCNC: 1 MG/DL (ref 0.5–1.4)
SAMPLE: NORMAL

## 2023-09-12 PROCEDURE — 74177 CT ABD & PELVIS W/CONTRAST: CPT | Mod: 26,,, | Performed by: RADIOLOGY

## 2023-09-12 PROCEDURE — A9698 NON-RAD CONTRAST MATERIALNOC: HCPCS

## 2023-09-12 PROCEDURE — 25500020 PHARM REV CODE 255

## 2023-09-12 PROCEDURE — 74177 CT ABD & PELVIS W/CONTRAST: CPT | Mod: TC

## 2023-09-12 PROCEDURE — 74177 CT ABDOMEN PELVIS WITH CONTRAST: ICD-10-PCS | Mod: 26,,, | Performed by: RADIOLOGY

## 2023-09-12 RX ADMIN — IOHEXOL 75 ML: 350 INJECTION, SOLUTION INTRAVENOUS at 03:09

## 2023-09-12 RX ADMIN — IOHEXOL 600 ML: 9 SOLUTION ORAL at 03:09

## 2023-10-30 ENCOUNTER — OFFICE VISIT (OUTPATIENT)
Dept: URGENT CARE | Facility: CLINIC | Age: 88
End: 2023-10-30
Payer: MEDICARE

## 2023-10-30 VITALS
TEMPERATURE: 98 F | BODY MASS INDEX: 27.32 KG/M2 | HEIGHT: 65 IN | HEART RATE: 79 BPM | SYSTOLIC BLOOD PRESSURE: 99 MMHG | WEIGHT: 164 LBS | OXYGEN SATURATION: 95 % | RESPIRATION RATE: 20 BRPM | DIASTOLIC BLOOD PRESSURE: 63 MMHG

## 2023-10-30 DIAGNOSIS — J02.9 SORE THROAT: ICD-10-CM

## 2023-10-30 DIAGNOSIS — U07.1 COVID-19: Primary | ICD-10-CM

## 2023-10-30 LAB
CTP QC/QA: YES
CTP QC/QA: YES
S PYO RRNA THROAT QL PROBE: NEGATIVE
SARS-COV-2 AG RESP QL IA.RAPID: POSITIVE

## 2023-10-30 PROCEDURE — 99204 OFFICE O/P NEW MOD 45 MIN: CPT | Mod: S$GLB,,, | Performed by: PHYSICIAN ASSISTANT

## 2023-10-30 PROCEDURE — 99204 PR OFFICE/OUTPT VISIT, NEW, LEVL IV, 45-59 MIN: ICD-10-PCS | Mod: S$GLB,,, | Performed by: PHYSICIAN ASSISTANT

## 2023-10-30 PROCEDURE — 87880 POCT RAPID STREP A: ICD-10-PCS | Mod: QW,,, | Performed by: PHYSICIAN ASSISTANT

## 2023-10-30 PROCEDURE — 87811 SARS-COV-2 COVID19 W/OPTIC: CPT | Mod: QW,S$GLB,, | Performed by: PHYSICIAN ASSISTANT

## 2023-10-30 PROCEDURE — 87811 SARS CORONAVIRUS 2 ANTIGEN POCT, MANUAL READ: ICD-10-PCS | Mod: QW,S$GLB,, | Performed by: PHYSICIAN ASSISTANT

## 2023-10-30 PROCEDURE — 87880 STREP A ASSAY W/OPTIC: CPT | Mod: QW,,, | Performed by: PHYSICIAN ASSISTANT

## 2023-10-30 NOTE — PROGRESS NOTES
"Subjective:      Patient ID: Erich Encarnacion is a 89 y.o. female.    Vitals:  height is 5' 5" (1.651 m) and weight is 74.4 kg (164 lb). Her temperature is 97.6 °F (36.4 °C). Her blood pressure is 99/63 and her pulse is 79. Her respiration is 20 and oxygen saturation is 95%.     Chief Complaint: No chief complaint on file.    Patient is a 89-year-old female who presents with sore throat for 4 days.  She has past medical history significant for macular degeneration, hypertension and arthritis.  She reports associated cough and headache.  She is been taking anti-inflammatories with mild relief.  Daughter is sick with cough and congestion.  She denies nausea, vomiting or diarrhea.            Constitution: Negative for chills and fever.   HENT:  Positive for sore throat.    Respiratory:  Positive for cough.    Gastrointestinal:  Negative for abdominal pain, nausea, vomiting and diarrhea.      Objective:     Physical Exam   Constitutional: She appears well-developed.   HENT:   Head: Normocephalic and atraumatic.   Ears:   Right Ear: Tympanic membrane, external ear and ear canal normal.   Left Ear: Tympanic membrane, external ear and ear canal normal.   Nose: Nose normal.   Mouth/Throat: Uvula is midline and oropharynx is clear and moist. Mucous membranes are moist. Oropharynx is clear.   Eyes: Conjunctivae are normal.   Cardiovascular: Normal rate and normal heart sounds.   Pulmonary/Chest: Effort normal and breath sounds normal. No stridor. No respiratory distress. She has no decreased breath sounds.   Abdominal: Normal appearance.   Neurological: She is alert.   Nursing note and vitals reviewed.      Assessment:     1. COVID-19    2. Sore throat        Plan:       COVID-19    Sore throat  -     SARS Coronavirus 2 Antigen, POCT Manual Read  -     POCT rapid strep A          Medical Decision Making:   History:   Old Medical Records: I decided to obtain old medical records.  Clinical Tests:   Lab Tests: Ordered and " Reviewed  Urgent Care Management:  Urgent evaluation of a well-appearing 89-year-old female who presents with complaint of congestion, rhinorrhea, cough and sore throat. Patient denied fever.  No abdominal pain, nausea or vomiting.  Vital signs are stable.  Patient is afebrile.  Bilateral TMs with no erythema, retraction or perforation.  There is no mastoid tenderness.  There is no movement tenderness to bilateral ears.  No tonsillar swelling or exudate noted.  Uvula is midline.  Breath sounds clear and equal bilaterally.  Oxygen saturation is stable.  I doubt pneumonia.  Patient is noted to be COVID positive.  She is 4 days out from onset of symptoms.  Do not feel Paxlovid is indicated at this time.  Discussed results with patient. Return precautions given. Patient is to follow up with their primary care provider. All questions answered.

## 2023-10-30 NOTE — PATIENT INSTRUCTIONS
If you develop worsening symptoms, dizziness, chest pain or shortness of breath need to go to the emergency room.

## 2023-10-30 NOTE — PROGRESS NOTES
"Subjective:      Patient ID: Erich Encarnacion is a 89 y.o. female.    Vitals:  height is 5' 5" (1.651 m) and weight is 74.4 kg (164 lb). Her temperature is 97.6 °F (36.4 °C). Her blood pressure is 99/63 and her pulse is 79. Her respiration is 20 and oxygen saturation is 95%.     Chief Complaint: No chief complaint on file.    Sore Throat   Episode onset: x's 4 days. The problem has been gradually worsening. The pain is severe. Associated symptoms include coughing and headaches. She has tried NSAIDs for the symptoms. The treatment provided mild relief.       HENT:  Positive for sore throat.    Respiratory:  Positive for cough.    Neurological:  Positive for headaches.      Objective:     Physical Exam    Assessment:     No diagnosis found.    Plan:       There are no diagnoses linked to this encounter.                "

## 2023-11-03 ENCOUNTER — PATIENT MESSAGE (OUTPATIENT)
Dept: RESEARCH | Facility: HOSPITAL | Age: 88
End: 2023-11-03
Payer: MEDICARE

## 2023-11-27 ENCOUNTER — OFFICE VISIT (OUTPATIENT)
Dept: URGENT CARE | Facility: CLINIC | Age: 88
End: 2023-11-27
Payer: MEDICARE

## 2023-11-27 VITALS
OXYGEN SATURATION: 96 % | RESPIRATION RATE: 20 BRPM | SYSTOLIC BLOOD PRESSURE: 124 MMHG | DIASTOLIC BLOOD PRESSURE: 73 MMHG | TEMPERATURE: 98 F | HEIGHT: 65 IN | HEART RATE: 77 BPM | BODY MASS INDEX: 27.32 KG/M2 | WEIGHT: 164 LBS

## 2023-11-27 DIAGNOSIS — S99.922A INJURY OF TOE ON LEFT FOOT, INITIAL ENCOUNTER: Primary | ICD-10-CM

## 2023-11-27 PROCEDURE — 99214 PR OFFICE/OUTPT VISIT, EST, LEVL IV, 30-39 MIN: ICD-10-PCS | Mod: S$GLB,,, | Performed by: NURSE PRACTITIONER

## 2023-11-27 PROCEDURE — 99214 OFFICE O/P EST MOD 30 MIN: CPT | Mod: S$GLB,,, | Performed by: NURSE PRACTITIONER

## 2023-11-27 RX ORDER — PREDNISONE 20 MG/1
20 TABLET ORAL DAILY
Qty: 7 TABLET | Refills: 0 | Status: SHIPPED | OUTPATIENT
Start: 2023-11-27 | End: 2024-01-29

## 2023-11-27 RX ORDER — ACETAMINOPHEN AND CODEINE PHOSPHATE 300; 30 MG/1; MG/1
1 TABLET ORAL EVERY 6 HOURS PRN
Qty: 10 TABLET | Refills: 0 | Status: SHIPPED | OUTPATIENT
Start: 2023-11-27

## 2023-11-27 NOTE — PROGRESS NOTES
"Subjective:      Patient ID: Erich Encarnacion is a 89 y.o. female.    Vitals:  height is 5' 5" (1.651 m) and weight is 74.4 kg (164 lb). Her temperature is 97.6 °F (36.4 °C). Her blood pressure is 124/73 and her pulse is 77. Her respiration is 20 and oxygen saturation is 96%.     Chief Complaint: Foot Injury    Pt States "Fell last night going to rest room"  Toe pain left 2nd. No numbness.     Foot Injury   The incident occurred 12 to 24 hours ago. The incident occurred at home. There was no injury mechanism. The pain is present in the left foot. The pain is severe. Associated symptoms include an inability to bear weight. She reports no foreign bodies present. The symptoms are aggravated by weight bearing and movement. She has tried nothing for the symptoms.     ROS   Objective:     Physical Exam   Abdominal: Normal appearance.   Musculoskeletal: Normal range of motion.         General: Swelling, tenderness and signs of injury present. No deformity. Normal range of motion.      Comments: Bruising noted   Neurological: She is alert.   Vitals reviewed.      Assessment:     1. Injury of toe on left foot, initial encounter        Plan:       Injury of toe on left foot, initial encounter  -     X-Ray Toe 2 or More Views Left; Future; Expected date: 11/27/2023    Other orders  -     acetaminophen-codeine 300-30mg (TYLENOL #3) 300-30 mg Tab; Take 1 tablet by mouth every 6 (six) hours as needed (pain).  Dispense: 10 tablet; Refill: 0  -     predniSONE (DELTASONE) 20 MG tablet; Take 1 tablet (20 mg total) by mouth once daily.  Dispense: 7 tablet; Refill: 0                  Fracture 2nd toe at the MIP. Minimal displacement and minimal compression. Buddy taped. Pt refused prafo shoe.   Patient Instructions   Cautioned pt about risk of falls. Discussed with family member present.  Do not take sleeping med and pain med concurrently.   Buddy tape 2nd toe to the 1st toes for up to 6 weeks.    Mild renal insuff. Avoid nsaid. Stop " motrin. Rx as above.

## 2023-12-14 ENCOUNTER — TELEPHONE (OUTPATIENT)
Dept: DERMATOLOGY | Facility: CLINIC | Age: 88
End: 2023-12-14
Payer: MEDICARE

## 2023-12-14 NOTE — TELEPHONE ENCOUNTER
----- Message from Trish Barboza sent at 12/14/2023  9:21 AM CST -----  Regarding: appt access  Type:  Sooner Appointment Request    Caller is requesting a sooner appointment.  Caller declined first available appointment listed below.  Caller will not accept being placed on the waitlist and is requesting a message be sent to doctor.    Name of Caller:  pt daughter llse   When is the first available appointment?  April   Symptoms:  white bumps under the skin  Best Call Back Number:  [phone]   Additional Information:  requesting a appt and call back asap please advise thank you

## 2023-12-14 NOTE — TELEPHONE ENCOUNTER
----- Message from Nida Morrison sent at 12/14/2023 10:09 AM CST -----  Contact: Self  Type:  Patient Returning Call    Who Called:  Pt daughter   Who Left Message for Patient:  Wilda   Does the patient know what this is regarding?:  Appt   Best Call Back Number:  [phone]    Additional Information:  Please call

## 2023-12-19 ENCOUNTER — TELEPHONE (OUTPATIENT)
Dept: DERMATOLOGY | Facility: CLINIC | Age: 88
End: 2023-12-19
Payer: MEDICARE

## 2023-12-19 NOTE — TELEPHONE ENCOUNTER
----- Message from Junior Diaz sent at 12/19/2023  8:45 AM CST -----  Contact: Daughter/Cortez Pringle  Type:  Patient Returning Call    Who Called: Daughter/Cortez Pringle  Who Left Message for Patient: Wilda  Does the patient know what this is regarding?:  Appt  Best Call Back Number:  [phone]  Additional Information:

## 2024-01-29 ENCOUNTER — OFFICE VISIT (OUTPATIENT)
Dept: DERMATOLOGY | Facility: CLINIC | Age: 89
End: 2024-01-29
Payer: MEDICARE

## 2024-01-29 VITALS — BODY MASS INDEX: 27.32 KG/M2 | HEIGHT: 65 IN | WEIGHT: 164 LBS

## 2024-01-29 DIAGNOSIS — L72.0 MILIA: Primary | ICD-10-CM

## 2024-01-29 DIAGNOSIS — L81.4 SOLAR LENTIGO: ICD-10-CM

## 2024-01-29 PROCEDURE — 99213 OFFICE O/P EST LOW 20 MIN: CPT | Mod: S$GLB,,, | Performed by: DERMATOLOGY

## 2024-01-29 RX ORDER — TRETINOIN 0.25 MG/G
CREAM TOPICAL NIGHTLY
Qty: 20 G | Refills: 5 | Status: ON HOLD | OUTPATIENT
Start: 2024-01-29 | End: 2024-05-28

## 2024-01-29 NOTE — PROGRESS NOTES
Subjective:      Patient ID:  Erich Encarnacion is a 89 y.o. female who presents for   Chief Complaint   Patient presents with    Spot     LOV 8/23/22 (Teja) Solar purpura, Sk    Patient here today for face check.  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 rescue, Disp: , Rfl:   ·  amLODIPine (NORVASC) 2.5 MG tablet, Take 2.5 mg by mouth once daily. , Disp: , Rfl:   ·  aspirin (ECOTRIN) 81 MG EC tablet, Take 81 mg by mouth every 12 (twelve) hours., Disp: , Rfl:   ·  cyproheptadine (PERIACTIN) 4 mg tablet, 1 tablet, Disp: , Rfl:   ·  HYDROcodone-acetaminophen (NORCO) 5-325 mg per tablet, Take 1 tablet by mouth every 4 (four) hours as needed for Pain., Disp: 18 tablet, Rfl: 0  ·  isosorbide dinitrate (ISORDIL) 30 MG Tab, Take 30 mg by mouth once daily. , Disp: , Rfl:   ·  levothyroxine (SYNTHROID) 25 MCG tablet, Take 25 mcg by mouth every morning. , Disp: , Rfl:   ·  magnesium 250 mg Tab, Take 1 tablet by mouth once daily., Disp: , Rfl:   ·  meclizine (ANTIVERT) 12.5 mg tablet, Take 12.5 mg by mouth daily as needed. , Disp: , Rfl:   ·  methocarbamoL (ROBAXIN) 500 MG Tab, Take 1 tablet (500 mg total) by mouth every 12 (twelve) hours as needed (for possible muscle spasms)., Disp: 4 tablet, Rfl: 0  ·  polyethylene glycol (GLYCOLAX) 17 gram/dose powder, Take 17 g by mouth Daily., Disp: , Rfl:   ·  topiramate (TOPAMAX) 25 MG tablet, Take 25 mg by mouth once daily. , Disp: , Rfl: 0  ·  vit A/vit C/vit E/zinc/copper (PRESERVISION AREDS ORAL), Take 1 tablet by mouth once daily. , Disp: , Rfl:   ·  zolpidem (AMBIEN) 10 mg  Tab, Take 1 tablet (10 mg total) by mouth nightly as needed (insomnia)., Disp: , Rfl:         Review of Systems   Constitutional:  Negative for fever, chills and fatigue.   Respiratory:  Negative for cough and shortness of breath.    Gastrointestinal:  Negative for nausea and vomiting.   Hematologic/Lymphatic: Bruises/bleeds easily.       Objective:   Physical Exam   Constitutional: She appears well-developed and well-nourished.   Neurological: She is alert and oriented to person, place, and time.   Psychiatric: She has a normal mood and affect.   Skin:   Areas Examined (abnormalities noted in diagram):   Scalp / Hair Palpated and Inspected  Head / Face Inspection Performed                 Diagram Legend     Erythematous scaling macule/papule c/w actinic keratosis       Vascular papule c/w angioma      Pigmented verrucoid papule/plaque c/w seborrheic keratosis      Yellow umbilicated papule c/w sebaceous hyperplasia      Irregularly shaped tan macule c/w lentigo     1-2 mm smooth white papules consistent with Milia      Movable subcutaneous cyst with punctum c/w epidermal inclusion cyst      Subcutaneous movable cyst c/w pilar cyst      Firm pink to brown papule c/w dermatofibroma      Pedunculated fleshy papule(s) c/w skin tag(s)      Evenly pigmented macule c/w junctional nevus     Mildly variegated pigmented, slightly irregular-bordered macule c/w mildly atypical nevus      Flesh colored to evenly pigmented papule c/w intradermal nevus       Pink pearly papule/plaque c/w basal cell carcinoma      Erythematous hyperkeratotic cursted plaque c/w SCC      Surgical scar with no sign of skin cancer recurrence      Open and closed comedones      Inflammatory papules and pustules      Verrucoid papule consistent consistent with wart     Erythematous eczematous patches and plaques     Dystrophic onycholytic nail with subungual debris c/w onychomycosis     Umbilicated papule    Erythematous-base heme-crusted tan verrucoid  plaque consistent with inflamed seborrheic keratosis     Erythematous Silvery Scaling Plaque c/w Psoriasis     See annotation      Assessment / Plan:        Milia  -     tretinoin (RETIN-A) 0.025 % cream; Apply topically every evening.  Dispense: 20 g; Refill: 5  Very bothersome to patient  Some closed comedone, some smaller milia  Part of favre racouchot / sun damage  Reassurance provided but patient desires treatment    Solar lentigo  This is a benign hyperpigmented sun induced lesion. Daily sun protection will reduce the number of new lesions. Treatment of these benign lesions are considered cosmetic.               No follow-ups on file.

## 2024-02-06 DIAGNOSIS — G45.9 TIA (TRANSIENT ISCHEMIC ATTACK): Primary | ICD-10-CM

## 2024-02-09 ENCOUNTER — HOSPITAL ENCOUNTER (OUTPATIENT)
Dept: RADIOLOGY | Facility: HOSPITAL | Age: 89
Discharge: HOME | End: 2024-02-09
Attending: NURSE PRACTITIONER
Payer: MEDICARE

## 2024-02-09 DIAGNOSIS — G45.9 TIA (TRANSIENT ISCHEMIC ATTACK): Primary | ICD-10-CM

## 2024-02-09 DIAGNOSIS — G45.9 TIA (TRANSIENT ISCHEMIC ATTACK): ICD-10-CM

## 2024-02-09 DIAGNOSIS — R42 DIZZINESS: ICD-10-CM

## 2024-02-09 PROCEDURE — 70450 CT HEAD/BRAIN W/O DYE: CPT | Mod: TC,PO

## 2024-02-12 DIAGNOSIS — R42 DIZZINESS AND GIDDINESS: Primary | ICD-10-CM

## 2024-02-16 ENCOUNTER — HOSPITAL ENCOUNTER (OUTPATIENT)
Dept: RADIOLOGY | Facility: HOSPITAL | Age: 89
Discharge: HOME | End: 2024-02-16
Attending: NURSE PRACTITIONER
Payer: MEDICARE

## 2024-02-16 DIAGNOSIS — G45.9 TIA (TRANSIENT ISCHEMIC ATTACK): ICD-10-CM

## 2024-02-16 PROCEDURE — 93880 EXTRACRANIAL BILAT STUDY: CPT | Mod: TC,PO

## 2024-04-16 ENCOUNTER — HOSPITAL ENCOUNTER (EMERGENCY)
Facility: HOSPITAL | Age: 89
Discharge: HOME | End: 2024-04-16
Attending: EMERGENCY MEDICINE
Payer: MEDICARE

## 2024-04-16 VITALS
DIASTOLIC BLOOD PRESSURE: 80 MMHG | OXYGEN SATURATION: 96 % | HEART RATE: 74 BPM | SYSTOLIC BLOOD PRESSURE: 157 MMHG | RESPIRATION RATE: 18 BRPM | TEMPERATURE: 98 F

## 2024-04-16 DIAGNOSIS — N39.0 URINARY TRACT INFECTION WITHOUT HEMATURIA, SITE UNSPECIFIED: ICD-10-CM

## 2024-04-16 DIAGNOSIS — N20.0 STAGHORN CALCULUS: ICD-10-CM

## 2024-04-16 DIAGNOSIS — R10.9 LEFT FLANK PAIN: Primary | ICD-10-CM

## 2024-04-16 LAB
ALBUMIN SERPL BCP-MCNC: 3.9 G/DL (ref 3.5–5.2)
ALP SERPL-CCNC: 74 U/L (ref 55–135)
ALT SERPL W/O P-5'-P-CCNC: 9 U/L (ref 10–44)
ANION GAP SERPL CALC-SCNC: 8 MMOL/L (ref 8–16)
AST SERPL-CCNC: 14 U/L (ref 10–40)
BACTERIA #/AREA URNS HPF: ABNORMAL /HPF
BASOPHILS # BLD AUTO: 0.05 K/UL (ref 0–0.2)
BASOPHILS NFR BLD: 0.4 % (ref 0–1.9)
BILIRUB SERPL-MCNC: 0.7 MG/DL (ref 0.1–1)
BILIRUB UR QL STRIP: NEGATIVE
BUN SERPL-MCNC: 24 MG/DL (ref 10–30)
CALCIUM SERPL-MCNC: 9.2 MG/DL (ref 8.7–10.5)
CHLORIDE SERPL-SCNC: 111 MMOL/L (ref 95–110)
CLARITY UR: ABNORMAL
CO2 SERPL-SCNC: 22 MMOL/L (ref 23–29)
COLOR UR: YELLOW
CREAT SERPL-MCNC: 0.9 MG/DL (ref 0.5–1.4)
DIFFERENTIAL METHOD BLD: ABNORMAL
EOSINOPHIL # BLD AUTO: 0.1 K/UL (ref 0–0.5)
EOSINOPHIL NFR BLD: 0.6 % (ref 0–8)
ERYTHROCYTE [DISTWIDTH] IN BLOOD BY AUTOMATED COUNT: 13.7 % (ref 11.5–14.5)
EST. GFR  (NO RACE VARIABLE): 58.1 ML/MIN/1.73 M^2
GLUCOSE SERPL-MCNC: 140 MG/DL (ref 70–110)
GLUCOSE UR QL STRIP: NEGATIVE
HCT VFR BLD AUTO: 37.7 % (ref 37–48.5)
HGB BLD-MCNC: 12.5 G/DL (ref 12–16)
HGB UR QL STRIP: ABNORMAL
HYALINE CASTS #/AREA URNS LPF: 0 /LPF
IMM GRANULOCYTES # BLD AUTO: 0.05 K/UL (ref 0–0.04)
IMM GRANULOCYTES NFR BLD AUTO: 0.4 % (ref 0–0.5)
KETONES UR QL STRIP: NEGATIVE
LEUKOCYTE ESTERASE UR QL STRIP: ABNORMAL
LYMPHOCYTES # BLD AUTO: 1.6 K/UL (ref 1–4.8)
LYMPHOCYTES NFR BLD: 13 % (ref 18–48)
MCH RBC QN AUTO: 29.8 PG (ref 27–31)
MCHC RBC AUTO-ENTMCNC: 33.2 G/DL (ref 32–36)
MCV RBC AUTO: 90 FL (ref 82–98)
MICROSCOPIC COMMENT: ABNORMAL
MONOCYTES # BLD AUTO: 0.6 K/UL (ref 0.3–1)
MONOCYTES NFR BLD: 5.1 % (ref 4–15)
NEUTROPHILS # BLD AUTO: 9.7 K/UL (ref 1.8–7.7)
NEUTROPHILS NFR BLD: 80.5 % (ref 38–73)
NITRITE UR QL STRIP: NEGATIVE
NRBC BLD-RTO: 0 /100 WBC
PH UR STRIP: 6 [PH] (ref 5–8)
PLATELET # BLD AUTO: 208 K/UL (ref 150–450)
PMV BLD AUTO: 10.9 FL (ref 9.2–12.9)
POTASSIUM SERPL-SCNC: 3.4 MMOL/L (ref 3.5–5.1)
PROT SERPL-MCNC: 6.8 G/DL (ref 6–8.4)
PROT UR QL STRIP: ABNORMAL
RBC # BLD AUTO: 4.2 M/UL (ref 4–5.4)
RBC #/AREA URNS HPF: 35 /HPF (ref 0–4)
SODIUM SERPL-SCNC: 141 MMOL/L (ref 136–145)
SP GR UR STRIP: 1.02 (ref 1–1.03)
SQUAMOUS #/AREA URNS HPF: 1 /HPF
URN SPEC COLLECT METH UR: ABNORMAL
UROBILINOGEN UR STRIP-ACNC: NEGATIVE EU/DL
WBC # BLD AUTO: 12.01 K/UL (ref 3.9–12.7)
WBC #/AREA URNS HPF: >100 /HPF (ref 0–5)
WBC CLUMPS URNS QL MICRO: ABNORMAL

## 2024-04-16 PROCEDURE — 85025 COMPLETE CBC W/AUTO DIFF WBC: CPT | Performed by: NURSE PRACTITIONER

## 2024-04-16 PROCEDURE — 63600175 PHARM REV CODE 636 W HCPCS: Performed by: EMERGENCY MEDICINE

## 2024-04-16 PROCEDURE — 81001 URINALYSIS AUTO W/SCOPE: CPT | Performed by: NURSE PRACTITIONER

## 2024-04-16 PROCEDURE — 87077 CULTURE AEROBIC IDENTIFY: CPT | Performed by: NURSE PRACTITIONER

## 2024-04-16 PROCEDURE — 25000003 PHARM REV CODE 250: Performed by: EMERGENCY MEDICINE

## 2024-04-16 PROCEDURE — 87086 URINE CULTURE/COLONY COUNT: CPT | Performed by: NURSE PRACTITIONER

## 2024-04-16 PROCEDURE — 87186 SC STD MICRODIL/AGAR DIL: CPT | Performed by: NURSE PRACTITIONER

## 2024-04-16 PROCEDURE — 96365 THER/PROPH/DIAG IV INF INIT: CPT

## 2024-04-16 PROCEDURE — 63600175 PHARM REV CODE 636 W HCPCS: Mod: JZ,JG | Performed by: EMERGENCY MEDICINE

## 2024-04-16 PROCEDURE — 96367 TX/PROPH/DG ADDL SEQ IV INF: CPT

## 2024-04-16 PROCEDURE — 93010 ELECTROCARDIOGRAM REPORT: CPT | Mod: ,,, | Performed by: INTERNAL MEDICINE

## 2024-04-16 PROCEDURE — 96375 TX/PRO/DX INJ NEW DRUG ADDON: CPT

## 2024-04-16 PROCEDURE — 80053 COMPREHEN METABOLIC PANEL: CPT | Performed by: NURSE PRACTITIONER

## 2024-04-16 PROCEDURE — 99285 EMERGENCY DEPT VISIT HI MDM: CPT | Mod: 25

## 2024-04-16 PROCEDURE — 93005 ELECTROCARDIOGRAM TRACING: CPT | Performed by: INTERNAL MEDICINE

## 2024-04-16 RX ORDER — ONDANSETRON HYDROCHLORIDE 2 MG/ML
4 INJECTION, SOLUTION INTRAVENOUS
Status: COMPLETED | OUTPATIENT
Start: 2024-04-16 | End: 2024-04-16

## 2024-04-16 RX ORDER — ONDANSETRON 4 MG/1
4 TABLET, ORALLY DISINTEGRATING ORAL EVERY 6 HOURS PRN
Qty: 12 TABLET | Refills: 0 | Status: ON HOLD | OUTPATIENT
Start: 2024-04-16 | End: 2024-05-28

## 2024-04-16 RX ORDER — ACETAMINOPHEN 10 MG/ML
1000 INJECTION, SOLUTION INTRAVENOUS ONCE
Status: COMPLETED | OUTPATIENT
Start: 2024-04-16 | End: 2024-04-16

## 2024-04-16 RX ORDER — AMOXICILLIN AND CLAVULANATE POTASSIUM 400; 57 MG/5ML; MG/5ML
800 POWDER, FOR SUSPENSION ORAL 2 TIMES DAILY
Qty: 200 ML | Refills: 0 | Status: SHIPPED | OUTPATIENT
Start: 2024-04-16 | End: 2024-04-26

## 2024-04-16 RX ADMIN — ONDANSETRON 4 MG: 2 INJECTION INTRAMUSCULAR; INTRAVENOUS at 12:04

## 2024-04-16 RX ADMIN — ACETAMINOPHEN 1000 MG: 10 INJECTION INTRAVENOUS at 01:04

## 2024-04-16 RX ADMIN — CEFTRIAXONE SODIUM 1 G: 1 INJECTION, POWDER, FOR SOLUTION INTRAMUSCULAR; INTRAVENOUS at 12:04

## 2024-04-16 NOTE — DISCHARGE INSTRUCTIONS
Take antibiotics as directed until all gone  Zofran as directed if needed for nausea vomiting  Tylenol for pain   Please follow-up with your primary care provider and urology for further evaluation, definitive care   Return to the emergency department if condition becomes worse, develops worsening pain, fever, vomiting or any concerns

## 2024-04-16 NOTE — FIRST PROVIDER EVALUATION
Emergency Department TeleTriage Encounter Note      CHIEF COMPLAINT    Chief Complaint   Patient presents with    Flank Pain     Left, started today       VITAL SIGNS   Initial Vitals [04/16/24 0934]   BP Pulse Resp Temp SpO2   (!) 176/105 (!) 129 20 97.5 °F (36.4 °C) 96 %      MAP       --            ALLERGIES    Review of patient's allergies indicates:  No Known Allergies    PROVIDER TRIAGE NOTE  Verbal consent for the teletriage evaluation was given by the patient at the start of the evaluation.  All efforts will be made to maintain patient's privacy during the evaluation.      This is a teletriage evaluation of a 89 y.o. female presenting to the ED with c/o lower abdominal pain that radiates to left flank pain that started today. Also reports nausea.  No urinary complaints.  Limited physical exam via telehealth: The patient is awake, alert, answering questions appropriately and is not in respiratory distress.  As the Teletriage provider, I performed an initial assessment and ordered appropriate labs and imaging studies, if any, to facilitate the patient's care once placed in the ED. Once a room is available, care and a full evaluation will be completed by an alternate ED provider.  Any additional orders and the final disposition will be determined by that provider.  All imaging and labs will not be followed-up by the Teletriage Team, including myself.          ORDERS  Labs Reviewed - No data to display    ED Orders (720h ago, onward)      Start Ordered     Status Ordering Provider    04/16/24 0945 04/16/24 0945  Saline lock IV  Once         Ordered RAMESH PEDERSON    04/16/24 0945 04/16/24 0945  CBC auto differential  STAT         Ordered RAMESH PEDERSON    04/16/24 0945 04/16/24 0945  Comprehensive metabolic panel  STAT         Ordered ARMESH PEDERSON    04/16/24 0945 04/16/24 0945  Urinalysis, Reflex to Urine Culture Urine, Clean Catch  STAT         Ordered RAMESH PEDERSON    04/16/24 0937 04/16/24 0937  EKG 12-lead   Once         Ordered LATRICE PRYOR              Virtual Visit Note: The provider triage portion of this emergency department evaluation and documentation was performed via [place] [employer], a HIPAA-compliant telemedicine application, in concert with a tele-presenter in the room. A face to face patient evaluation with one of my colleagues will occur once the patient is placed in an emergency department room.      DISCLAIMER: This note was prepared with [employer] voice recognition transcription software. Garbled syntax, mangled pronouns, and other bizarre constructions may be attributed to that software system.

## 2024-04-16 NOTE — ED PROVIDER NOTES
Encounter Date: 4/16/2024       History     Chief Complaint   Patient presents with    Flank Pain     Left, started today     89-year-old well-appearing female presents emergency department with her family member past medical history includes appendectomy, back surgery, previous hysterectomy, skin cancer.  Patient reports that she has pain to the left flank that radiates to her left lower abdomen that started a proximally 6:00 a.m. reports some mild nausea has had no vomiting denies any urinary symptoms has no known history of kidney stones.  At the time of my evaluation the patient did not have any significant pain.    The history is provided by the patient and a relative.     Review of patient's allergies indicates:  No Known Allergies  Past Medical History:   Diagnosis Date    Macula lutea degeneration     Squamous cell carcinoma of skin      Past Surgical History:   Procedure Laterality Date    APPENDECTOMY      BACK SURGERY  1975    HYSTERECTOMY       Family History   Problem Relation Name Age of Onset    Melanoma Neg Hx      Psoriasis Neg Hx      Lupus Neg Hx      Eczema Neg Hx       Social History     Tobacco Use    Smoking status: Former     Current packs/day: 1.00     Average packs/day: 1 pack/day for 20.0 years (20.0 ttl pk-yrs)     Types: Cigarettes    Smokeless tobacco: Never   Substance Use Topics    Alcohol use: No    Drug use: No     Review of Systems   Constitutional: Negative.  Negative for fever.   HENT: Negative.     Respiratory: Negative.     Cardiovascular: Negative.    Gastrointestinal: Negative.    Genitourinary:  Positive for flank pain. Negative for dysuria, frequency and urgency.   Neurological: Negative.    Hematological: Negative.    Psychiatric/Behavioral: Negative.     All other systems reviewed and are negative.      Physical Exam     Initial Vitals [04/16/24 0934]   BP Pulse Resp Temp SpO2   (!) 176/105 (!) 129 20 97.5 °F (36.4 °C) 96 %      MAP       --         Physical  Exam    Nursing note and vitals reviewed.  Constitutional: She appears well-developed and well-nourished.   HENT:   Head: Normocephalic and atraumatic.   Eyes: EOM are normal. Pupils are equal, round, and reactive to light.   Cardiovascular:  Normal rate and regular rhythm.           Pulmonary/Chest: Breath sounds normal.   Abdominal: Abdomen is soft. Bowel sounds are normal.   Musculoskeletal:         General: Normal range of motion.     Neurological: She is alert and oriented to person, place, and time.   Skin: Skin is warm. No rash noted.         ED Course   Procedures  Labs Reviewed   CBC W/ AUTO DIFFERENTIAL - Abnormal; Notable for the following components:       Result Value    Gran # (ANC) 9.7 (*)     Immature Grans (Abs) 0.05 (*)     Gran % 80.5 (*)     Lymph % 13.0 (*)     All other components within normal limits   COMPREHENSIVE METABOLIC PANEL - Abnormal; Notable for the following components:    Potassium 3.4 (*)     Chloride 111 (*)     CO2 22 (*)     Glucose 140 (*)     ALT 9 (*)     eGFR 58.1 (*)     All other components within normal limits   URINALYSIS, REFLEX TO URINE CULTURE - Abnormal; Notable for the following components:    Appearance, UA Cloudy (*)     Protein, UA 2+ (*)     Occult Blood UA 2+ (*)     Leukocytes, UA 3+ (*)     All other components within normal limits    Narrative:     Specimen Source->Urine   URINALYSIS MICROSCOPIC - Abnormal; Notable for the following components:    RBC, UA 35 (*)     WBC, UA >100 (*)     Bacteria Few (*)     All other components within normal limits    Narrative:     Specimen Source->Urine   CULTURE, URINE        ECG Results              EKG 12-lead (In process)        Collection Time Result Time QRS Duration OHS QTC Calculation    04/16/24 09:42:39 04/16/24 16:23:18 86 480                     In process by Interface, Lab In [facility] (04/16/24 16:23:25)                   Narrative:    Test Reason : R00.0,    Vent. Rate : 116 BPM     Atrial Rate : 116 BPM      P-R Int : 150 ms          QRS Dur : 086 ms      QT Int : 346 ms       P-R-T Axes : 096 -44 064 degrees     QTc Int : 480 ms    Sinus tachycardia  Left axis deviation  Pulmonary disease pattern  LVH with repolarization abnormality  Abnormal ECG  When compared with ECG of 16-FEB-2024 14:34,  T wave inversion no longer evident in Inferior leads  Nonspecific T wave abnormality no longer evident in Anterior leads    Referred By: [email or web address]   SELF           Confirmed By:                                   Imaging Results              CT Renal Stone Study ABD Pelvis WO (Final result)  Result time 04/16/24 11:31:39      Final result by Ruth Mosley IV, MD (04/16/24 11:31:39)                   Impression:      Enlarging, staghorn type calculus within the left kidney now associated with mild dilatation of the left renal pelvis and intrarenal collecting structures and associated perinephric inflammation.    Incomplete distention of the urinary bladder.  There is mild haziness of the juanita cystic fat.  Correlate with evidence of urinary tract infection.    Suggestion of small indeterminate left renal mass on review of previous examination.  As developing renal neoplasm is not excluded, correlation with targeted ultrasound is recommended.    Diverticulosis coli.    Prominent arteriosclerosis including coronary arterial calcifications.    Multiple incidental findings as noted above.      Electronically signed by: Ruth Mosley  Date:    04/16/2024  Time:    11:31               Narrative:    EXAMINATION:  CT RENAL STONE STUDY ABD PELVIS WO    CLINICAL HISTORY:  Flank pain, kidney stone suspected;    COMPARISON:  09/12/2023    FINDINGS:  There has been interval increase in size of a partially staghorn type calculus within the left renal pelvis and extending into adjacent infundibulum having maximum dimension of 2.2 cm currently.  Mild dilatation of the left renal pelvis and intrarenal collecting system represents a detrimental change  and there are surrounding changes of perinephric edema.  The left kidney is mildly enlarged.  No additional renal or ureteral calculi are identified.  The right kidney appears normal in size and position.  The bladder is incompletely distended no such wall thickness not adequately assessed.  There is mild haziness in the pericystic fat.  Correlate with any evidence of urinary tract infection.    Reviewing the previous examination, there was a small indeterminate mass along the anterior cortex of the mid to lower left kidney which is not evaluated with certainty on the current noncontrast examination.  Follow-up targeted ultrasound recommended.    A small noncalcified nodular density along the plane of the minor fissure remains unchanged.  There is otherwise bilateral lower lung zone interstitial prominence and areas of apparent volume loss and/or scarring.  There is a trace amount of left-sided pleural fluid/thickening.  There is minimal pericardial fluid or thickening.  Prominent calcified plaque formation is noted in the coronary arteries.    The unenhanced liver and spleen appear normal in size and demonstrate no focal parenchymal abnormalities.  The gallbladder is mildly distended.  There is no evidence of wall thickening or pericholecystic edema.  No biliary dilatation is observed.  No pancreatic or adrenal abnormality demonstrated.    Calcified plaque formation is noted within the wall of the abdominal aorta and abdominal and pelvic branch vessels.  There is mild fusiform ectasia of the common iliac arteries.    There are numerous colonic diverticuli without evidence of acute diverticulitis.  There is no bowel wall thickening.  There are no findings of obstruction.  There is a small sliding-type hiatal hernia.    There is been a prior hysterectomy.  There are no pelvic masses or adenopathy.  No significant free fluid identified.    A remote compression deformity involves the inferior endplate of L2                                        Medications   cefTRIAXone (ROCEPHIN) 1 g in dextrose 5 % 100 mL IVPB (ready to mix) (0 g Intravenous Stopped 4/16/24 1324)   ondansetron injection 4 mg (4 mg Intravenous Given 4/16/24 1248)   acetaminophen 1,000 mg/100 mL (10 mg/mL) injection 1,000 mg (0 mg Intravenous Stopped 4/16/24 1406)     Medical Decision Making  89-year-old well-appearing female presents emergency department with her family member past medical history includes appendectomy, back surgery, previous hysterectomy, skin cancer.  Patient reports that she has pain to the left flank that radiates to her left lower abdomen that started a proximally 6:00 a.m. reports some mild nausea has had no vomiting denies any urinary symptoms has no known history of kidney stones.  At the time of my evaluation the patient did not have any significant pain.    The history is provided by the patient and a relative.     Considerations include but not limited to, UTI, ureterolithiasis, pyelonephritis, electrolyte abnormality    89-year-old well-appearing female presents emergency department with complaint of left flank pain denies any urinary symptoms.  Patient's labs drawn no evidence of leukocytosis electrolytes are normal patient does have a significant urinary tract infection she was given IV Rocephin.  Her family member reports that she can not swallow pills therefore she will be discharged home with Augmentin orally.  Further evaluation performed with CT imaging without contrast which revealedEnlarging, staghorn type calculus within the left kidney now associated with mild dilatation of the left renal pelvis and intrarenal collecting structures and associated perinephric inflammation.     Incomplete distention of the urinary bladder.  There is mild haziness of the juanita cystic fat.  Correlate with evidence of urinary tract infection.     Suggestion of small indeterminate left renal mass on review of previous examination.  As developing  renal neoplasm is not excluded, correlation with targeted ultrasound is recommended.     Diverticulosis coli.     Prominent arteriosclerosis including coronary arterial calcifications.     Multiple incidental findings as noted above.  The patient was given IV fluids, IV Zofran, and IV Tylenol Dr. Mosher Urology on-call reviewed the patient's labs and CT imaging reported that the patient can be discharged home if she was pain-free and follow-up outpatient for any further testing and evaluation.I have also reviewed these findings with Dr Rosas my attending physcian.  The patient reports that she feels much better and would like to go home.  Her family member was given return precautions    Amount and/or Complexity of Data Reviewed  External Data Reviewed: labs, radiology and notes.  Labs: ordered. Decision-making details documented in ED Course.  Radiology: ordered. Decision-making details documented in ED Course.    Risk  Prescription drug management.               ED Course as of 04/16/24 1729   Tue Apr 16, 2024   1357 Patient reports she is pain-free has had no nausea vomiting tolerating fluids by mouth.  I have discussed this patient with Dr. Mosher Urology on-call he reports that he is reviewed her chart, labs, CT scan and that she can be discharged home if she is pain-free. [MP]      ED Course User Index  [MP] Maria Esther Corrales FNP                           Clinical Impression:  Final diagnoses:  [R10.9] Left flank pain (Primary)  [N20.0] Staghorn calculus  [N39.0] Urinary tract infection without hematuria, site unspecified          ED Disposition Condition    Discharge Stable          ED Prescriptions       Medication Sig Dispense Start Date End Date Auth. Provider    amoxicillin-clavulanate (AUGMENTIN) 400-57 mg/5 mL SusR Take 10 mLs (800 mg total) by mouth 2 (two) times daily. for 10 days 200 mL 4/16/2024 4/26/2024 Maria Esther Corrales FNP    ondansetron (ZOFRAN-ODT) 4 MG TbDL Take 1 tablet (4 mg total) by  mouth every 6 (six) hours as needed. 12 tablet 4/16/2024 -- Maria Esther Corrales FNP          Follow-up Information       Follow up With Specialties Details Why Contact Info    Se Rios MD Family Medicine Schedule an appointment as soon as possible for a visit in 1 day  [address] [facility]  [place] [address]  [phone]      Juhi Mosher Jr., MD Urology Schedule an appointment as soon as possible for a visit in 2 days  [address] [place] [address] [address]  [address]  [place] [address]  [phone]               Maria Esther Corrales FNP  04/16/24 1812

## 2024-04-18 LAB — BACTERIA UR CULT: ABNORMAL

## 2024-04-20 NOTE — PROGRESS NOTES
I called the patient's daughter she has an appointment to see the urologist on Tuesday.  Patient's daughter reports that she is doing well she has had no fevers she has no complaints of pain no urinary symptoms.  Unfortunately her urine culture is not sensitive to Augmentin it was only sensitive to either oral Cipro or oral Levaquin the patient does not swallow pills I did call and speak with the pharmacist she states that Levaquin can be crushed I will order Levaquin 250 patient's take 1 tablet twice daily for the next 7 days.  I have discussed this with patient's daughter she understands she must  [facility]

## 2024-05-02 ENCOUNTER — OFFICE VISIT (OUTPATIENT)
Dept: UROLOGY | Facility: CLINIC | Age: 89
End: 2024-05-02
Payer: MEDICARE

## 2024-05-02 VITALS
BODY MASS INDEX: 27.32 KG/M2 | WEIGHT: 164 LBS | SYSTOLIC BLOOD PRESSURE: 157 MMHG | HEIGHT: 65 IN | HEART RATE: 74 BPM | DIASTOLIC BLOOD PRESSURE: 80 MMHG

## 2024-05-02 DIAGNOSIS — N12 PYELONEPHRITIS: ICD-10-CM

## 2024-05-02 DIAGNOSIS — N39.0 URINARY TRACT INFECTION WITHOUT HEMATURIA, SITE UNSPECIFIED: Primary | ICD-10-CM

## 2024-05-02 DIAGNOSIS — N28.89 RENAL MASS: ICD-10-CM

## 2024-05-02 DIAGNOSIS — N20.0 STAGHORN CALCULUS: ICD-10-CM

## 2024-05-02 LAB — POC RESIDUAL URINE VOLUME: 66 ML (ref 0–100)

## 2024-05-02 PROCEDURE — 99204 OFFICE O/P NEW MOD 45 MIN: CPT | Mod: S$GLB,,, | Performed by: NURSE PRACTITIONER

## 2024-05-02 PROCEDURE — 51798 US URINE CAPACITY MEASURE: CPT | Mod: S$GLB,,, | Performed by: NURSE PRACTITIONER

## 2024-05-02 PROCEDURE — 3288F FALL RISK ASSESSMENT DOCD: CPT | Mod: CPTII,S$GLB,, | Performed by: NURSE PRACTITIONER

## 2024-05-02 PROCEDURE — 1160F RVW MEDS BY RX/DR IN RCRD: CPT | Mod: CPTII,S$GLB,, | Performed by: NURSE PRACTITIONER

## 2024-05-02 PROCEDURE — 1159F MED LIST DOCD IN RCRD: CPT | Mod: CPTII,S$GLB,, | Performed by: NURSE PRACTITIONER

## 2024-05-02 PROCEDURE — 1101F PT FALLS ASSESS-DOCD LE1/YR: CPT | Mod: CPTII,S$GLB,, | Performed by: NURSE PRACTITIONER

## 2024-05-02 PROCEDURE — 99999 PR PBB SHADOW E&M-EST. PATIENT-LVL IV: CPT | Mod: PBBFAC,,, | Performed by: NURSE PRACTITIONER

## 2024-05-02 NOTE — PROGRESS NOTES
[facility] Note  Staff: BRADY Cortes    PCP: Dr. Rios    Chief Complaint: Hospital F/UP-Pyelonephritis, Staghorn calculus    Subjective:        HPI: Erich Encarnacion is a 89 y.o. female NEW PT presents today for hospital ER f/up.    Pt initially presented to [facility] ED on 4/16/24 with left sided flank pains.  Patient reported that she has pain to the left flank that radiates to her left lower abdomen that started a proximally 6:00 a.m. reports some mild nausea has had no vomiting denies any urinary symptoms has no known history of kidney stones.  At the time of my evaluation the patient did not have any significant pain.     Urine culture done during ER visit showed +UTI with pseudomonas aeruginosa bacteria.  Therefore pt was able to take Levaquin 250 mg BID x 7 days at that time.    *Please note that pt does not swallow pills-liquid only.    CT RSS performed on 4/16/24 showed the following:  IMPRESSION:  Enlarging, staghorn type calculus within the left kidney now associated with mild dilatation of the left renal pelvis and intrarenal collecting structures and associated perinephric inflammation.     Incomplete distention of the urinary bladder.  There is mild haziness of the juanita cystic fat.  Correlate with evidence of urinary tract infection.     Suggestion of small indeterminate left renal mass on review of previous examination.  As developing renal neoplasm is not excluded, correlation with targeted ultrasound is recommended.     Diverticulosis coli.  Prominent arteriosclerosis including coronary arterial calcifications.  Multiple incidental findings as noted above.    5/2/24:  UA attempted on arrival with no success  PVR by bladder scan 66 mL*  No dysuria, no gross hematuria noted.      REVIEW OF SYSTEMS:  A comprehensive 10 system review was performed and is negative except as noted above in HPI    PMHx:  Past Medical History:   Diagnosis Date    Macula lutea degeneration      Squamous cell carcinoma of skin      PSHx:  Past Surgical History:   Procedure Laterality Date    APPENDECTOMY      BACK SURGERY  1975    HYSTERECTOMY       Allergies:  Patient has no known allergies.    Medications: reviewed   Objective:     Vitals:    05/02/24 1315   BP: (!) 157/80   Pulse: 74     Physical Exam  Vitals reviewed.   Constitutional:       Appearance: She is well-developed.   HENT:      Head: Normocephalic and atraumatic.   Eyes:      Conjunctiva/sclera: Conjunctivae normal.      Pupils: Pupils are equal, round, and reactive to light.   Cardiovascular:      Rate and Rhythm: Normal rate and regular rhythm.      Heart sounds: Normal heart sounds.   Pulmonary:      Effort: Pulmonary effort is normal.      Breath sounds: Normal breath sounds.   Abdominal:      General: Bowel sounds are normal.      Palpations: Abdomen is soft.   Musculoskeletal:         General: Normal range of motion.      Cervical back: Normal range of motion and neck supple.   Skin:     General: Skin is warm and dry.   Neurological:      Mental Status: She is alert and oriented to person, place, and time.      Deep Tendon Reflexes: Reflexes are normal and symmetric.   Psychiatric:         Behavior: Behavior normal.         Thought Content: Thought content normal.         Judgment: Judgment normal.           Assessment:       1. Urinary tract infection without hematuria, site unspecified    2. Pyelonephritis    3. Staghorn calculus    4. Renal mass          Plan:      Renal/bladder US to be scheduled for evaluation at this time.  Consulted with Dr. Juhi Mosher, regarding recent CT findings on imaging in correlation with pt's symptoms.    Pt will be scheduled to see MD in the future after US to discuss further medical management and/or treatment of staghorn calculus in the near future.  Pt and family verbalized understanding.      [facility]: Active    BRADY Philip

## 2024-05-07 ENCOUNTER — HOSPITAL ENCOUNTER (OUTPATIENT)
Dept: RADIOLOGY | Facility: HOSPITAL | Age: 89
Discharge: HOME | End: 2024-05-07
Attending: NURSE PRACTITIONER
Payer: MEDICARE

## 2024-05-07 DIAGNOSIS — N12 PYELONEPHRITIS: ICD-10-CM

## 2024-05-07 DIAGNOSIS — N39.0 URINARY TRACT INFECTION WITHOUT HEMATURIA, SITE UNSPECIFIED: ICD-10-CM

## 2024-05-07 DIAGNOSIS — N20.0 STAGHORN CALCULUS: ICD-10-CM

## 2024-05-07 DIAGNOSIS — N28.89 RENAL MASS: ICD-10-CM

## 2024-05-07 LAB
OHS QRS DURATION: 86 MS
OHS QTC CALCULATION: 480 MS

## 2024-05-07 PROCEDURE — 76770 US EXAM ABDO BACK WALL COMP: CPT | Mod: TC

## 2024-05-07 PROCEDURE — 76770 US EXAM ABDO BACK WALL COMP: CPT | Mod: 26,,, | Performed by: RADIOLOGY

## 2024-05-09 ENCOUNTER — OFFICE VISIT (OUTPATIENT)
Dept: URGENT CARE | Facility: CLINIC | Age: 89
End: 2024-05-09
Payer: MEDICARE

## 2024-05-09 VITALS
SYSTOLIC BLOOD PRESSURE: 117 MMHG | HEIGHT: 65 IN | WEIGHT: 164 LBS | HEART RATE: 88 BPM | BODY MASS INDEX: 27.32 KG/M2 | OXYGEN SATURATION: 95 % | DIASTOLIC BLOOD PRESSURE: 69 MMHG | TEMPERATURE: 99 F | RESPIRATION RATE: 18 BRPM

## 2024-05-09 DIAGNOSIS — N30.01 ACUTE CYSTITIS WITH HEMATURIA: Primary | ICD-10-CM

## 2024-05-09 DIAGNOSIS — M54.9 BACK PAIN, UNSPECIFIED BACK LOCATION, UNSPECIFIED BACK PAIN LATERALITY, UNSPECIFIED CHRONICITY: ICD-10-CM

## 2024-05-09 DIAGNOSIS — R30.0 DYSURIA: ICD-10-CM

## 2024-05-09 LAB
BILIRUB UR QL STRIP: NEGATIVE
GLUCOSE UR QL STRIP: NEGATIVE
KETONES UR QL STRIP: NEGATIVE
LEUKOCYTE ESTERASE UR QL STRIP: POSITIVE
PH, POC UA: 5 (ref 5–8)
POC BLOOD, URINE: POSITIVE
POC NITRATES, URINE: NEGATIVE
PROT UR QL STRIP: POSITIVE
SP GR UR STRIP: 1.02 (ref 1–1.03)
UROBILINOGEN UR STRIP-ACNC: POSITIVE (ref 0.1–1.1)

## 2024-05-09 PROCEDURE — 96372 THER/PROPH/DIAG INJ SC/IM: CPT | Mod: S$GLB,,, | Performed by: NURSE PRACTITIONER

## 2024-05-09 PROCEDURE — 81003 URINALYSIS AUTO W/O SCOPE: CPT | Mod: QW,S$GLB,, | Performed by: NURSE PRACTITIONER

## 2024-05-09 PROCEDURE — 87086 URINE CULTURE/COLONY COUNT: CPT | Performed by: NURSE PRACTITIONER

## 2024-05-09 PROCEDURE — 99204 OFFICE O/P NEW MOD 45 MIN: CPT | Mod: 25,S$GLB,, | Performed by: NURSE PRACTITIONER

## 2024-05-09 RX ORDER — CEFTRIAXONE 500 MG/1
500 INJECTION, POWDER, FOR SOLUTION INTRAMUSCULAR; INTRAVENOUS
Status: COMPLETED | OUTPATIENT
Start: 2024-05-09 | End: 2024-05-09

## 2024-05-09 RX ORDER — ACETAMINOPHEN 160 MG/5ML
1000 LIQUID ORAL
Status: COMPLETED | OUTPATIENT
Start: 2024-05-09 | End: 2024-05-09

## 2024-05-09 RX ORDER — AMOXICILLIN AND CLAVULANATE POTASSIUM 400; 57 MG/5ML; MG/5ML
10 POWDER, FOR SUSPENSION ORAL EVERY 12 HOURS
Qty: 200 ML | Refills: 0 | Status: SHIPPED | OUTPATIENT
Start: 2024-05-09 | End: 2024-05-19

## 2024-05-09 RX ORDER — ACETAMINOPHEN 160 MG/5ML
500 LIQUID ORAL EVERY 6 HOURS PRN
Qty: 236 ML | Refills: 0 | Status: SHIPPED | OUTPATIENT
Start: 2024-05-09

## 2024-05-09 RX ADMIN — CEFTRIAXONE 500 MG: 500 INJECTION, POWDER, FOR SOLUTION INTRAMUSCULAR; INTRAVENOUS at 03:05

## 2024-05-09 RX ADMIN — ACETAMINOPHEN 1001.6 MG: 160 LIQUID ORAL at 03:05

## 2024-05-09 NOTE — PATIENT INSTRUCTIONS
- You must understand that you have received an Urgent Care treatment only and that you may be released before all of your medical problems are known or treated.   - You, the patient, will arrange for follow up care as instructed.   - If your condition worsens or fails to improve we recommend that you receive another evaluation at the ER immediately or contact your PCP to discuss your concerns.   - You can call [phone] or [phone] to help schedule an appointment with the appropriate provider.    Follow up with Urology tomorrow as scheduled  Complete all antibiotics as prescribed, unless otherwise directed by Urology  We are doing a Urine Culture and will call you with the results  Strict ER precautions for new or worsening symptoms

## 2024-05-09 NOTE — PROGRESS NOTES
"Subjective:      Patient ID: Erich Encarnacion is a 89 y.o. female.    Vitals:  height is 5' 5" (1.651 m) and weight is 74.4 kg (164 lb). Her oral temperature is 98.9 °F (37.2 °C). Her blood pressure is 117/69 and her pulse is 88. Her respiration is 18 and oxygen saturation is 95%.     Chief Complaint: Back Pain    Patient is a 89 yo female who presents w/ c.o. lower back pain. Symptoms include an aching pain. Symptoms began today and come and go. Moving around makes the pain worse, laying still makes it better. She has taken tylenol for her symptoms with mild relief, but she has only been taking half a tablet. Pt has an appointment with Urology tomorrow.      Back Pain  The current episode started today. The problem occurs constantly. The problem is unchanged. The quality of the pain is described as aching. Radiates to: Radiates to the front. The pain is at a severity of 8/10. The pain is moderate. The pain is The same all the time. The symptoms are aggravated by bending. Stiffness is present All day. Pertinent negatives include no abdominal pain, bladder incontinence, bowel incontinence, chest pain, dysuria, fever, headaches, leg pain, numbness, paresis, paresthesias, pelvic pain, perianal numbness, tingling, weakness or weight loss. She has tried nothing for the symptoms. The treatment provided no relief.       Constitution: Negative for fever.   Cardiovascular:  Negative for chest pain.   Gastrointestinal:  Negative for abdominal pain and bowel incontinence.   Genitourinary:  Negative for dysuria, bladder incontinence and pelvic pain.   Musculoskeletal:  Positive for back pain.   Neurological:  Negative for headaches and numbness.      Objective:     Physical Exam   Constitutional: She is oriented to person, place, and time.   HENT:   Head: Normocephalic.   Ears:   Right Ear: External ear normal.   Left Ear: External ear normal.   Nose: Nose normal.   Mouth/Throat: Mucous membranes are moist.   Eyes: Conjunctivae " are normal.   Cardiovascular: Normal rate.   Pulmonary/Chest: Effort normal.   Abdominal: Soft. There is no abdominal tenderness. There is no guarding, no left CVA tenderness and no right CVA tenderness.   Musculoskeletal: Normal range of motion.         General: Normal range of motion.        Arms:    Neurological: She is alert and oriented to person, place, and time.   Skin: Skin is dry.   Psychiatric: Her behavior is normal.   Nursing note and vitals reviewed.    Results for orders placed or performed in visit on 05/09/24   POCT Urinalysis, Dipstick, Automated, W/O Scope   Result Value Ref Range    POC Blood, Urine Positive (A) Negative    POC Bilirubin, Urine Negative Negative    POC Urobilinogen, Urine Positive 0.1 - 1.1    POC Ketones, Urine Negative Negative    POC Protein, Urine Positive (A) Negative    POC Nitrates, Urine Negative Negative    POC Glucose, Urine Negative Negative    pH, UA 5.0 5 - 8    POC Specific Gravity, Urine 1.025 1.003 - 1.029    POC Leukocytes, Urine Positive (A) Negative       Assessment:     1. Acute cystitis with hematuria    2. Dysuria    3. Back pain, unspecified back location, unspecified back pain laterality, unspecified chronicity        Plan:   Pt requesting liquid medication, she has difficulty swallowing pills.     Acute cystitis with hematuria  -     cefTRIAXone injection 500 mg  -     amoxicillin-clavulanate (AUGMENTIN) 400-57 mg/5 mL SusR; Take 10 mLs by mouth every 12 (twelve) hours. for 10 days  Dispense: 200 mL; Refill: 0    Dysuria  -     POCT Urinalysis, Dipstick, Automated, W/O Scope    Back pain, unspecified back location, unspecified back pain laterality, unspecified chronicity  -     CULTURE, URINE  -     acetaminophen 160 mg/5 mL solution 1,001.6 mg  -     acetaminophen (TYLENOL) 160 mg/5 mL Liqd; Take 15.6 mLs (499.2 mg total) by mouth every 6 (six) hours as needed (pain).  Dispense: 236 mL; Refill: 0      Patient Instructions   - You must understand that you  have received an Urgent Care treatment only and that you may be released before all of your medical problems are known or treated.   - You, the patient, will arrange for follow up care as instructed.   - If your condition worsens or fails to improve we recommend that you receive another evaluation at the ER immediately or contact your PCP to discuss your concerns.   - You can call [phone] or [phone] to help schedule an appointment with the appropriate provider.    Follow up with Urology tomorrow as scheduled  Complete all antibiotics as prescribed, unless otherwise directed by Urology  We are doing a Urine Culture and will call you with the results  Strict ER precautions for new or worsening symptoms

## 2024-05-10 ENCOUNTER — OFFICE VISIT (OUTPATIENT)
Dept: UROLOGY | Facility: CLINIC | Age: 89
End: 2024-05-10
Payer: MEDICARE

## 2024-05-10 VITALS — BODY MASS INDEX: 27.32 KG/M2 | WEIGHT: 164 LBS | HEIGHT: 65 IN

## 2024-05-10 DIAGNOSIS — N12 PYELONEPHRITIS: ICD-10-CM

## 2024-05-10 DIAGNOSIS — N39.0 URINARY TRACT INFECTION WITHOUT HEMATURIA, SITE UNSPECIFIED: ICD-10-CM

## 2024-05-10 DIAGNOSIS — N20.0 STAGHORN CALCULUS: Primary | ICD-10-CM

## 2024-05-10 PROCEDURE — 99215 OFFICE O/P EST HI 40 MIN: CPT | Mod: S$GLB,,, | Performed by: UROLOGY

## 2024-05-10 PROCEDURE — 1159F MED LIST DOCD IN RCRD: CPT | Mod: CPTII,S$GLB,, | Performed by: UROLOGY

## 2024-05-10 PROCEDURE — 1101F PT FALLS ASSESS-DOCD LE1/YR: CPT | Mod: CPTII,S$GLB,, | Performed by: UROLOGY

## 2024-05-10 PROCEDURE — 1160F RVW MEDS BY RX/DR IN RCRD: CPT | Mod: CPTII,S$GLB,, | Performed by: UROLOGY

## 2024-05-10 PROCEDURE — 1126F AMNT PAIN NOTED NONE PRSNT: CPT | Mod: CPTII,S$GLB,, | Performed by: UROLOGY

## 2024-05-10 PROCEDURE — 3288F FALL RISK ASSESSMENT DOCD: CPT | Mod: CPTII,S$GLB,, | Performed by: UROLOGY

## 2024-05-10 PROCEDURE — 99999 PR PBB SHADOW E&M-EST. PATIENT-LVL III: CPT | Mod: PBBFAC,,, | Performed by: UROLOGY

## 2024-05-10 NOTE — PROGRESS NOTES
"[facility] Urology New Patient/H&P:    Erich Encarnacion is a 89 y.o. female who presents for left staghorn calculus and left flank pain.    Patient with a history of skin cancer who presents for a left staghorn calculus. On review of records, she presented to the emergency department on 4/16/24 with left flank pain starting earlier int he day.     CT renal stone with an enlarging 2.2 cm staghorn calculus in the left kidney with mild dilation of the left renal pelvis and perinephric inflammation. Also with a small indeterminate left renal mass. Renal ultrasound on 5/7/24 with a 1.9 cm left renal stone without hydronephrosis.     She reports left flank pain intermittently for several weeks. Recently treated with Abx for Pseudomonas urinary tract infection.     Denies any fever, chills, gross hematuria, bone pain, unintentional weight loss,  trauma or history of  malignancy.       Urine culture  Pseudo  4/16/24    Past Medical History:   Diagnosis Date    Macula lutea degeneration     Squamous cell carcinoma of skin        Past Surgical History:   Procedure Laterality Date    APPENDECTOMY      BACK SURGERY  1975    HYSTERECTOMY         Family History   Problem Relation Name Age of Onset    Melanoma Neg Hx      Psoriasis Neg Hx      Lupus Neg Hx      Eczema Neg Hx         Review of patient's allergies indicates:  No Known Allergies    Medications Reviewed: see MAR    PHYSICAL EXAM:    There were no vitals filed for this visit.  Body mass index is 27.29 kg/m². Weight: 74.4 kg (164 lb 0.4 oz) Height: 5' 5" (165.1 cm)       General: Alert, cooperative, no distress, appears stated age  Abdomen: Soft, non-tender, no CVA tenderness, non-distended      LABS:    Recent Results (from the past 336 hour(s))   POCT Bladder Scan    Collection Time: 05/02/24  1:19 PM   Result Value Ref Range    POC Residual Urine Volume 66 0 - 100 mL   POCT Urinalysis, Dipstick, Automated, W/O Scope    Collection Time: 05/09/24  2:44 " PM   Result Value Ref Range    POC Blood, Urine Positive (A) Negative    POC Bilirubin, Urine Negative Negative    POC Urobilinogen, Urine Positive 0.1 - 1.1    POC Ketones, Urine Negative Negative    POC Protein, Urine Positive (A) Negative    POC Nitrates, Urine Negative Negative    POC Glucose, Urine Negative Negative    pH, UA 5.0 5 - 8    POC Specific Gravity, Urine 1.025 1.003 - 1.029    POC Leukocytes, Urine Positive (A) Negative           Assessment/Diagnosis:    1. Staghorn calculus        2. Pyelonephritis        3. Urinary tract infection without hematuria, site unspecified            Plans:    - I spent 40 minutes of the day of this encounter preparing for, treating and managing this patient. Discussed the etiology and management of the patient's nephrolithiasis. Explained that stone disease is multifactorial and can be secondary to urinary obstruction, urine composition, low urine volume, diet, hypokalemia, DM, hypertension, gout, RTA, UTI and medications. We discussed that stones can be managed with observation, trial of passage, ureteroscopy with laser lithotripsy, ESWL and PCNL.  - Discussed that she would benefit from ureteroscopy with laser lithotripsy given that she is symptomatic from the stone with urinary tract infection. States she will think about it as she is nervous given her age.   - Virtual audio only in 2 weeks to discuss scheduling left ureteroscopy.

## 2024-05-11 LAB
BACTERIA UR CULT: NORMAL
BACTERIA UR CULT: NORMAL

## 2024-05-23 ENCOUNTER — LAB VISIT (OUTPATIENT)
Dept: LAB | Facility: HOSPITAL | Age: 89
End: 2024-05-23
Attending: UROLOGY
Payer: MEDICARE

## 2024-05-23 ENCOUNTER — OFFICE VISIT (OUTPATIENT)
Dept: UROLOGY | Facility: CLINIC | Age: 89
End: 2024-05-23
Payer: MEDICARE

## 2024-05-23 ENCOUNTER — TELEPHONE (OUTPATIENT)
Dept: UROLOGY | Facility: CLINIC | Age: 89
End: 2024-05-23

## 2024-05-23 DIAGNOSIS — N20.0 STAGHORN CALCULUS: Primary | ICD-10-CM

## 2024-05-23 DIAGNOSIS — N12 PYELONEPHRITIS: ICD-10-CM

## 2024-05-23 DIAGNOSIS — N20.0 STAGHORN CALCULUS: ICD-10-CM

## 2024-05-23 PROCEDURE — 87086 URINE CULTURE/COLONY COUNT: CPT | Performed by: UROLOGY

## 2024-05-23 PROCEDURE — 1159F MED LIST DOCD IN RCRD: CPT | Mod: CPTII,95,, | Performed by: UROLOGY

## 2024-05-23 PROCEDURE — 1160F RVW MEDS BY RX/DR IN RCRD: CPT | Mod: CPTII,95,, | Performed by: UROLOGY

## 2024-05-23 PROCEDURE — 99443 PR PHYSICIAN TELEPHONE EVALUATION 21-30 MIN: CPT | Mod: 95,,, | Performed by: UROLOGY

## 2024-05-23 RX ORDER — CEFAZOLIN SODIUM 2 G/50ML
2 SOLUTION INTRAVENOUS
Status: CANCELLED | OUTPATIENT
Start: 2024-05-23

## 2024-05-23 NOTE — Clinical Note
- Left ureteroscopy with laser lithotripsy and stent placement on 6/11/24.  - Phone pre-op and urine culture 1 week prior. Thanks.

## 2024-05-23 NOTE — PROGRESS NOTES
Procedure Order to Urology [[id number]]    Electronically signed by: Juhi Mosher Jr., MD on 05/23/24 1342 Status: Active   Ordering user: Juhi Mosher Jr., MD 05/23/24 1342 Authorized by: Juhi Mosher Jr., MD   Ordering mode: Standard   Frequency:  05/23/24 -   Released by: Juhi Mosher Jr., MD 05/23/24 1342   Diagnoses  Staghorn calculus [N20.0]  Pyelonephritis [N12]   Questionnaire    Question Answer   Procedure Ureteroscopy Stone Extraction (Remove Calculus Ureter)   Pre-op Diagnosis Kidney stone   Facility Name: [facility]   Order comments: Left ureteroscopy with laser lithotripsy on 6/11/24 at [facility].   Please order out patient hospital.CBC, BMP, U/A and culture , EKG over 40, Start IV,NPO, General sedation ,  Ancef 2gram ( alternative for pcn allergy cipro 400mg IV ) cpt- [id number]

## 2024-05-23 NOTE — TELEPHONE ENCOUNTER
Spoke with pt daughter. Pt was scheduled on 6/11 for procedure. Pt daughter asked if procedure can be changed to 5/28. Dr. Mosher changed the procedure date to 5/28. Pt daughter verbalized understanding.

## 2024-05-23 NOTE — PROGRESS NOTES
Established Patient - Audio Only Telehealth Visit     The patient location is: Home  The chief complaint leading to consultation is: Left staghorn calculus  Visit type: Virtual visit with audio only (telephone)  Total time spent with patient: 25 minutes       The reason for the audio only service rather than synchronous audio and video virtual visit was related to technical difficulties or patient preference/necessity.     Each patient to whom I provide medical services by telemedicine is:  (1) informed of the relationship between the physician and patient and the respective role of any other health care provider with respect to management of the patient; and (2) notified that they may decline to receive medical services by telemedicine and may withdraw from such care at any time. Patient verbally consented to receive this service via voice-only telephone call.       HPI:     Erich Encarnacion is a 89 y.o. female who presents for left staghorn calculus and left flank pain.     Patient with a history of skin cancer who presents for a left staghorn calculus. On review of records, she presented to the emergency department on 4/16/24 with left flank pain starting earlier int he day.      CT renal stone with an enlarging 2.2 cm staghorn calculus in the left kidney with mild dilation of the left renal pelvis and perinephric inflammation. Also with a small indeterminate left renal mass. Renal ultrasound on 5/7/24 with a 1.9 cm left renal stone without hydronephrosis.      She reports left flank pain intermittently for several weeks. Recently treated with Abx for Pseudomonas urinary tract infection.       Interval History    5/23/24: Virtual audio visit today for follow up. Patient and family have decided to proceed with left ureteroscopy and laser lithotripsy of her left staghorn given her persistent left flank pain. No new complaints today.      Denies any fever, chills, gross hematuria, bone pain, unintentional weight  loss,  trauma or history of  malignancy.         Urine culture  Multi orgs 5/9/24  Pseudo            4/16/24      Assessment and plan:         Left staghorn calculus  Left flank pain    - Discussed the etiology and management of the patient's nephrolithiasis. Explained that stone disease is multifactorial and can be secondary to urinary obstruction, urine composition, low urine volume, diet, hypokalemia, DM, hypertension, gout, RTA, UTI and medications. We discussed that stones can be managed with observation, trial of passage, ureteroscopy with laser lithotripsy, ESWL and PCNL. After extensive discussion, patient has decided to proceed with left ureteroscopy with laser lithotripsy and stent placement on 6/11/24. Phone pre-op and urine culture 1 week prior. All risks, benefits and alternatives discussed at length with patient.          This service was not originating from a related E/M service provided within the previous 7 days nor will  to an E/M service or procedure within the next 24 hours or my soonest available appointment.  Prevailing standard of care was able to be met in this audio-only visit.

## 2024-05-24 ENCOUNTER — HOSPITAL ENCOUNTER (OUTPATIENT)
Dept: PREADMISSION TESTING | Facility: HOSPITAL | Age: 89
Discharge: HOME | End: 2024-05-24

## 2024-05-24 NOTE — PRE-PROCEDURE INSTRUCTIONS
PREOP INSTRUCTIONS GIVEN TO DAUGHTER PER PHONE. AVS SENT TO [employer]. QUESTIONS ANSWERED. INSTRUCTED WILL BE CALLED THE AFTERNOON BEFORE SURGERY RE ARRIVAL TIME. QUESTIONS ANSWERED.

## 2024-05-26 LAB — BACTERIA UR CULT: NO GROWTH

## 2024-05-27 ENCOUNTER — ANESTHESIA EVENT (OUTPATIENT)
Dept: SURGERY | Facility: HOSPITAL | Age: 89
DRG: 988 | End: 2024-05-27
Payer: MEDICARE

## 2024-05-28 ENCOUNTER — ANESTHESIA (OUTPATIENT)
Dept: SURGERY | Facility: HOSPITAL | Age: 89
DRG: 988 | End: 2024-05-28
Payer: MEDICARE

## 2024-05-28 ENCOUNTER — TELEPHONE (OUTPATIENT)
Dept: UROLOGY | Facility: CLINIC | Age: 89
End: 2024-05-28
Payer: MEDICARE

## 2024-05-28 DIAGNOSIS — N20.0 STAGHORN CALCULUS: Primary | ICD-10-CM

## 2024-05-28 PROCEDURE — 0TC78ZZ EXTIRPATION OF MATTER FROM LEFT URETER, VIA NATURAL OR ARTIFICIAL OPENING ENDOSCOPIC: ICD-10-PCS | Performed by: UROLOGY

## 2024-05-28 PROCEDURE — D9220A PRA ANESTHESIA: Mod: CRNA,,, | Performed by: STUDENT IN AN ORGANIZED HEALTH CARE EDUCATION/TRAINING PROGRAM

## 2024-05-28 PROCEDURE — D9220A PRA ANESTHESIA: Mod: ANES,,, | Performed by: ANESTHESIOLOGY

## 2024-05-28 PROCEDURE — 0T778DZ DILATION OF LEFT URETER WITH INTRALUMINAL DEVICE, VIA NATURAL OR ARTIFICIAL OPENING ENDOSCOPIC: ICD-10-PCS | Performed by: UROLOGY

## 2024-05-28 PROCEDURE — 63600175 PHARM REV CODE 636 W HCPCS: Performed by: STUDENT IN AN ORGANIZED HEALTH CARE EDUCATION/TRAINING PROGRAM

## 2024-05-28 PROCEDURE — 63600175 PHARM REV CODE 636 W HCPCS: Performed by: UROLOGY

## 2024-05-28 PROCEDURE — 25000003 PHARM REV CODE 250: Performed by: STUDENT IN AN ORGANIZED HEALTH CARE EDUCATION/TRAINING PROGRAM

## 2024-05-28 PROCEDURE — BT1F1ZZ FLUOROSCOPY OF LEFT KIDNEY, URETER AND BLADDER USING LOW OSMOLAR CONTRAST: ICD-10-PCS | Performed by: UROLOGY

## 2024-05-28 PROCEDURE — 27200651 HC AIRWAY, LMA: Performed by: ANESTHESIOLOGY

## 2024-05-28 RX ORDER — ONDANSETRON HYDROCHLORIDE 2 MG/ML
INJECTION, SOLUTION INTRAVENOUS
Status: DISCONTINUED | OUTPATIENT
Start: 2024-05-28 | End: 2024-05-28

## 2024-05-28 RX ORDER — LIDOCAINE HYDROCHLORIDE 20 MG/ML
INJECTION INTRAVENOUS
Status: DISCONTINUED | OUTPATIENT
Start: 2024-05-28 | End: 2024-05-28

## 2024-05-28 RX ORDER — FENTANYL CITRATE 50 UG/ML
INJECTION, SOLUTION INTRAMUSCULAR; INTRAVENOUS
Status: DISCONTINUED | OUTPATIENT
Start: 2024-05-28 | End: 2024-05-28

## 2024-05-28 RX ORDER — PROPOFOL 10 MG/ML
VIAL (ML) INTRAVENOUS
Status: DISCONTINUED | OUTPATIENT
Start: 2024-05-28 | End: 2024-05-28

## 2024-05-28 RX ORDER — ACETAMINOPHEN 10 MG/ML
INJECTION, SOLUTION INTRAVENOUS
Status: DISCONTINUED | OUTPATIENT
Start: 2024-05-28 | End: 2024-05-28

## 2024-05-28 RX ADMIN — ONDANSETRON 4 MG: 2 INJECTION INTRAMUSCULAR; INTRAVENOUS at 11:05

## 2024-05-28 RX ADMIN — CIPROFLOXACIN 400 MG: 2 INJECTION INTRAVENOUS at 11:05

## 2024-05-28 RX ADMIN — GLYCOPYRROLATE 0.1 MG: 0.2 INJECTION, SOLUTION INTRAMUSCULAR; INTRAVITREAL at 11:05

## 2024-05-28 RX ADMIN — FENTANYL CITRATE 25 MCG: 50 INJECTION, SOLUTION INTRAMUSCULAR; INTRAVENOUS at 11:05

## 2024-05-28 RX ADMIN — LIDOCAINE HYDROCHLORIDE 40 MG: 20 INJECTION, SOLUTION INTRAVENOUS at 11:05

## 2024-05-28 RX ADMIN — ACETAMINOPHEN 1000 MG: 10 INJECTION, SOLUTION INTRAVENOUS at 12:05

## 2024-05-28 RX ADMIN — PROPOFOL 120 MG: 10 INJECTION, EMULSION INTRAVENOUS at 11:05

## 2024-05-28 NOTE — TELEPHONE ENCOUNTER
----- Message from Stone Villanueva sent at 5/28/2024  1:23 PM CDT -----  Regarding: Pharm Auth  Type:  Pharmacy Calling to Clarify an RX    Name of Caller:Phoebe    Pharmacy Name:  [facility] [id number]  [place], [place] [facility]  [address] [place] [id number]  Phone: [phone] Fax: [phone]      Prescription Name:hydrocodone-acetaminophen (HYCET) solution 7.5-325 mg/15mL     What do they need to clarify?:are not able to get the RX in.  Would need to be transferred else where or changed.    Best Call Back Number:[phone]    Additional Information:     Please advise -- Thank you

## 2024-05-28 NOTE — ANESTHESIA PROCEDURE NOTES
Intubation    Date/Time: 5/28/2024 11:20 AM    Performed by: Starla Yates CRNA  Authorized by: Branden Patrick MD    Intubation:     Induction:  Intravenous    Mask Ventilation:  Not attempted    Attempts:  1    Attempted By:  CRNA    Difficult Airway Encountered?: No      Complications:  None    Airway Device:  Supraglottic airway/LMA    Airway Device Size:  3.0    Secured at:  The lips    Placement Verified By:  Capnometry    Complicating Factors:  None    Findings Post-Intubation:  BS equal bilateral and atraumatic/condition of teeth unchanged

## 2024-05-28 NOTE — ANESTHESIA POSTPROCEDURE EVALUATION
Anesthesia Post Evaluation    Patient: Erich Encarnacion    Procedure(s) Performed: Procedure(s) (LRB):  REMOVAL, CALCULUS, URETER, URETEROSCOPIC (Left)    Final Anesthesia Type: general      Patient location during evaluation: PACU  Patient participation: Yes- Able to Participate  Level of consciousness: awake and alert  Post-procedure vital signs: reviewed and stable  Pain management: adequate  Airway patency: patent    PONV status at discharge: No PONV  Anesthetic complications: no      Cardiovascular status: hemodynamically stable  Respiratory status: unassisted and room air  Hydration status: euvolemic  Follow-up not needed.              Vitals Value Taken Time   /86 05/28/24 1400   Temp 36.7 °C (98.1 °F) 05/28/24 1215   Pulse 73 05/28/24 1400   Resp 20 05/28/24 1400   SpO2 94 % 05/28/24 1400         Event Time   Out of Recovery 12:32:00         Pain/Stewart Score: Pain Rating Prior to Med Admin: 2 (5/28/2024 12:33 PM)  Pain Rating Post Med Admin: 0 (5/28/2024  1:57 PM)  Stewart Score: 10 (5/28/2024  1:15 PM)  Modified Stewart Score: 20 (5/28/2024  1:57 PM)

## 2024-05-28 NOTE — TRANSFER OF CARE
"Anesthesia Transfer of Care Note    Patient: Erich Encarnacion    Procedure(s) Performed: Procedure(s) (LRB):  REMOVAL, CALCULUS, URETER, URETEROSCOPIC (Left)    Patient location: PACU    Anesthesia Type: general    Transport from OR: Transported from OR on 2-3 L/min O2 by NC with adequate spontaneous ventilation    Post pain: adequate analgesia    Post assessment: no apparent anesthetic complications    Post vital signs: stable    Level of consciousness: responds to stimulation and lethargic    Nausea/Vomiting: no nausea/vomiting    Complications: none    Transfer of care protocol was followed      Last vitals: Visit Vitals  BP (!) 180/79 (BP Location: Right arm, Patient Position: Lying)   Pulse 72   Temp 36.6 °C (97.9 °F) (Skin)   Resp 20   Ht 5' 5" (1.651 m)   Wt 74.4 kg (164 lb 0.4 oz)   SpO2 (!) 93%   Breastfeeding No   BMI 27.29 kg/m²     "

## 2024-05-28 NOTE — ANESTHESIA PREPROCEDURE EVALUATION
05/28/2024  Erich Encarnacion is a 89 y.o., female.      Pre-op Assessment    I have reviewed the Patient Summary Reports.     I have reviewed the Nursing Notes. I have reviewed the NPO Status.   I have reviewed the Medications.     Review of Systems  Anesthesia Hx:  No problems with previous Anesthesia                Social:  Former Smoker       Cardiovascular:     Hypertension      Angina           Cardiovascular Symptoms: Angina                       Hypertension         Renal/:  Chronic Renal Disease renal calculi               Musculoskeletal:  Arthritis        Arthritis          Neurological:      Headaches      Dx of Headaches   Arthritis                           Endocrine:   Hypothyroidism              Physical Exam  General: Well nourished, Cooperative, Alert and Oriented    Airway:  Mallampati: II   Mouth Opening: Normal  TM Distance: Normal  Tongue: Normal  Neck ROM: Normal ROM    Dental:  Intact    Chest/Lungs:  Normal Respiratory Rate    Heart:  Rate: Normal        Anesthesia Plan  Type of Anesthesia, risks & benefits discussed:    Anesthesia Type: Gen Supraglottic Airway  Intra-op Monitoring Plan: Standard ASA Monitors  Post Op Pain Control Plan: multimodal analgesia and IV/PO Opioids PRN  Induction:  IV  Airway Plan: , Post-Induction  Informed Consent: Informed consent signed with the Patient and all parties understand the risks and agree with anesthesia plan.  All questions answered.   ASA Score: 3    Ready For Surgery From Anesthesia Perspective.     .

## 2024-05-29 ENCOUNTER — HOSPITAL ENCOUNTER (INPATIENT)
Facility: HOSPITAL | Age: 89
LOS: 1 days | Discharge: HOME HEALTH | DRG: 988 | End: 2024-05-30
Attending: EMERGENCY MEDICINE | Admitting: STUDENT IN AN ORGANIZED HEALTH CARE EDUCATION/TRAINING PROGRAM
Payer: MEDICARE

## 2024-05-29 ENCOUNTER — TELEPHONE (OUTPATIENT)
Dept: UROLOGY | Facility: CLINIC | Age: 89
End: 2024-05-29
Payer: MEDICARE

## 2024-05-29 DIAGNOSIS — J96.01 ACUTE RESPIRATORY FAILURE WITH HYPOXIA: Primary | ICD-10-CM

## 2024-05-29 DIAGNOSIS — R07.9 CHEST PAIN: ICD-10-CM

## 2024-05-29 DIAGNOSIS — J98.11 ATELECTASIS: ICD-10-CM

## 2024-05-29 DIAGNOSIS — R06.02 SOB (SHORTNESS OF BREATH): ICD-10-CM

## 2024-05-29 LAB
ALBUMIN SERPL BCP-MCNC: 2.8 G/DL (ref 3.5–5.2)
ALP SERPL-CCNC: 73 U/L (ref 55–135)
ALT SERPL W/O P-5'-P-CCNC: 12 U/L (ref 10–44)
ANION GAP SERPL CALC-SCNC: 8 MMOL/L (ref 8–16)
AST SERPL-CCNC: 18 U/L (ref 10–40)
BACTERIA #/AREA URNS HPF: ABNORMAL /HPF
BASOPHILS # BLD AUTO: 0.06 K/UL (ref 0–0.2)
BASOPHILS NFR BLD: 0.4 % (ref 0–1.9)
BILIRUB SERPL-MCNC: 0.6 MG/DL (ref 0.1–1)
BILIRUB UR QL STRIP: NEGATIVE
BNP SERPL-MCNC: 309 PG/ML (ref 0–99)
BUN SERPL-MCNC: 26 MG/DL (ref 10–30)
CALCIUM SERPL-MCNC: 8.7 MG/DL (ref 8.7–10.5)
CHLORIDE SERPL-SCNC: 104 MMOL/L (ref 95–110)
CLARITY UR: ABNORMAL
CO2 SERPL-SCNC: 22 MMOL/L (ref 23–29)
COLOR UR: YELLOW
CREAT SERPL-MCNC: 1.3 MG/DL (ref 0.5–1.4)
D DIMER PPP IA.FEU-MCNC: 2.69 MG/L FEU
DIFFERENTIAL METHOD BLD: ABNORMAL
EOSINOPHIL # BLD AUTO: 0.2 K/UL (ref 0–0.5)
EOSINOPHIL NFR BLD: 1.1 % (ref 0–8)
ERYTHROCYTE [DISTWIDTH] IN BLOOD BY AUTOMATED COUNT: 13.7 % (ref 11.5–14.5)
EST. GFR  (NO RACE VARIABLE): 37 ML/MIN/1.73 M^2
GLUCOSE SERPL-MCNC: 94 MG/DL (ref 70–110)
GLUCOSE UR QL STRIP: NEGATIVE
HCT VFR BLD AUTO: 32.5 % (ref 37–48.5)
HGB BLD-MCNC: 10.5 G/DL (ref 12–16)
HGB UR QL STRIP: ABNORMAL
HYALINE CASTS #/AREA URNS LPF: 0 /LPF
IMM GRANULOCYTES # BLD AUTO: 0.12 K/UL (ref 0–0.04)
IMM GRANULOCYTES NFR BLD AUTO: 0.8 % (ref 0–0.5)
KETONES UR QL STRIP: NEGATIVE
LACTATE SERPL-SCNC: 0.7 MMOL/L (ref 0.5–2.2)
LEUKOCYTE ESTERASE UR QL STRIP: ABNORMAL
LIPASE SERPL-CCNC: 3 U/L (ref 4–60)
LYMPHOCYTES # BLD AUTO: 0.9 K/UL (ref 1–4.8)
LYMPHOCYTES NFR BLD: 5.6 % (ref 18–48)
MCH RBC QN AUTO: 30.3 PG (ref 27–31)
MCHC RBC AUTO-ENTMCNC: 32.3 G/DL (ref 32–36)
MCV RBC AUTO: 94 FL (ref 82–98)
MICROSCOPIC COMMENT: ABNORMAL
MONOCYTES # BLD AUTO: 0.7 K/UL (ref 0.3–1)
MONOCYTES NFR BLD: 4.1 % (ref 4–15)
NEUTROPHILS # BLD AUTO: 14 K/UL (ref 1.8–7.7)
NEUTROPHILS NFR BLD: 88 % (ref 38–73)
NITRITE UR QL STRIP: NEGATIVE
NRBC BLD-RTO: 0 /100 WBC
PH UR STRIP: 6 [PH] (ref 5–8)
PLATELET # BLD AUTO: 128 K/UL (ref 150–450)
PMV BLD AUTO: 11.2 FL (ref 9.2–12.9)
POCT GLUCOSE: 85 MG/DL (ref 70–110)
POTASSIUM SERPL-SCNC: 3.8 MMOL/L (ref 3.5–5.1)
PROT SERPL-MCNC: 5.5 G/DL (ref 6–8.4)
PROT UR QL STRIP: ABNORMAL
RBC # BLD AUTO: 3.46 M/UL (ref 4–5.4)
RBC #/AREA URNS HPF: >100 /HPF (ref 0–4)
SODIUM SERPL-SCNC: 134 MMOL/L (ref 136–145)
SP GR UR STRIP: 1.01 (ref 1–1.03)
SQUAMOUS #/AREA URNS HPF: 0 /HPF
T4 FREE SERPL-MCNC: 0.97 NG/DL (ref 0.71–1.51)
TSH SERPL DL<=0.005 MIU/L-ACNC: 4.6 UIU/ML (ref 0.4–4)
URN SPEC COLLECT METH UR: ABNORMAL
UROBILINOGEN UR STRIP-ACNC: NEGATIVE EU/DL
WBC # BLD AUTO: 15.84 K/UL (ref 3.9–12.7)
WBC #/AREA URNS HPF: 74 /HPF (ref 0–5)
WBC CLUMPS URNS QL MICRO: ABNORMAL

## 2024-05-29 PROCEDURE — 84439 ASSAY OF FREE THYROXINE: CPT

## 2024-05-29 PROCEDURE — 81000 URINALYSIS NONAUTO W/SCOPE: CPT | Performed by: EMERGENCY MEDICINE

## 2024-05-29 PROCEDURE — 87040 BLOOD CULTURE FOR BACTERIA: CPT | Mod: 59 | Performed by: EMERGENCY MEDICINE

## 2024-05-29 PROCEDURE — 85379 FIBRIN DEGRADATION QUANT: CPT | Performed by: EMERGENCY MEDICINE

## 2024-05-29 PROCEDURE — 96365 THER/PROPH/DIAG IV INF INIT: CPT

## 2024-05-29 PROCEDURE — 83690 ASSAY OF LIPASE: CPT | Performed by: EMERGENCY MEDICINE

## 2024-05-29 PROCEDURE — 96361 HYDRATE IV INFUSION ADD-ON: CPT

## 2024-05-29 PROCEDURE — 25000003 PHARM REV CODE 250

## 2024-05-29 PROCEDURE — G0378 HOSPITAL OBSERVATION PER HR: HCPCS

## 2024-05-29 PROCEDURE — 36415 COLL VENOUS BLD VENIPUNCTURE: CPT | Performed by: EMERGENCY MEDICINE

## 2024-05-29 PROCEDURE — 87086 URINE CULTURE/COLONY COUNT: CPT | Performed by: EMERGENCY MEDICINE

## 2024-05-29 PROCEDURE — 63600175 PHARM REV CODE 636 W HCPCS

## 2024-05-29 PROCEDURE — 85025 COMPLETE CBC W/AUTO DIFF WBC: CPT | Performed by: EMERGENCY MEDICINE

## 2024-05-29 PROCEDURE — 82962 GLUCOSE BLOOD TEST: CPT

## 2024-05-29 PROCEDURE — 83880 ASSAY OF NATRIURETIC PEPTIDE: CPT | Performed by: EMERGENCY MEDICINE

## 2024-05-29 PROCEDURE — 99285 EMERGENCY DEPT VISIT HI MDM: CPT | Mod: 25

## 2024-05-29 PROCEDURE — 63600175 PHARM REV CODE 636 W HCPCS: Performed by: EMERGENCY MEDICINE

## 2024-05-29 PROCEDURE — 93010 ELECTROCARDIOGRAM REPORT: CPT | Mod: ,,, | Performed by: GENERAL PRACTICE

## 2024-05-29 PROCEDURE — 93005 ELECTROCARDIOGRAM TRACING: CPT

## 2024-05-29 PROCEDURE — 83605 ASSAY OF LACTIC ACID: CPT | Performed by: EMERGENCY MEDICINE

## 2024-05-29 PROCEDURE — 84443 ASSAY THYROID STIM HORMONE: CPT

## 2024-05-29 PROCEDURE — 25500020 PHARM REV CODE 255

## 2024-05-29 PROCEDURE — 80053 COMPREHEN METABOLIC PANEL: CPT | Performed by: EMERGENCY MEDICINE

## 2024-05-29 PROCEDURE — 25000003 PHARM REV CODE 250: Performed by: EMERGENCY MEDICINE

## 2024-05-29 PROCEDURE — 96372 THER/PROPH/DIAG INJ SC/IM: CPT

## 2024-05-29 RX ORDER — SODIUM CHLORIDE 0.9 % (FLUSH) 0.9 %
10 SYRINGE (ML) INJECTION EVERY 12 HOURS PRN
Status: DISCONTINUED | OUTPATIENT
Start: 2024-05-29 | End: 2024-05-30 | Stop reason: HOSPADM

## 2024-05-29 RX ORDER — SODIUM CHLORIDE, SODIUM LACTATE, POTASSIUM CHLORIDE, CALCIUM CHLORIDE 600; 310; 30; 20 MG/100ML; MG/100ML; MG/100ML; MG/100ML
INJECTION, SOLUTION INTRAVENOUS CONTINUOUS
Status: DISCONTINUED | OUTPATIENT
Start: 2024-05-29 | End: 2024-05-30

## 2024-05-29 RX ORDER — LIDOCAINE HYDROCHLORIDE 20 MG/ML
15 SOLUTION OROPHARYNGEAL ONCE
Status: COMPLETED | OUTPATIENT
Start: 2024-05-29 | End: 2024-05-29

## 2024-05-29 RX ORDER — ALUMINUM HYDROXIDE, MAGNESIUM HYDROXIDE, AND SIMETHICONE 2400; 240; 2400 MG/30ML; MG/30ML; MG/30ML
30 SUSPENSION ORAL ONCE
Status: COMPLETED | OUTPATIENT
Start: 2024-05-29 | End: 2024-05-29

## 2024-05-29 RX ORDER — LANOLIN ALCOHOL/MO/W.PET/CERES
800 CREAM (GRAM) TOPICAL
Status: DISCONTINUED | OUTPATIENT
Start: 2024-05-29 | End: 2024-05-30 | Stop reason: HOSPADM

## 2024-05-29 RX ORDER — ENOXAPARIN SODIUM 100 MG/ML
1 INJECTION SUBCUTANEOUS EVERY 24 HOURS
Status: DISCONTINUED | OUTPATIENT
Start: 2024-05-29 | End: 2024-05-30

## 2024-05-29 RX ORDER — IBUPROFEN 200 MG
16 TABLET ORAL
Status: DISCONTINUED | OUTPATIENT
Start: 2024-05-29 | End: 2024-05-30 | Stop reason: HOSPADM

## 2024-05-29 RX ORDER — NALOXONE HCL 0.4 MG/ML
0.02 VIAL (ML) INJECTION
Status: DISCONTINUED | OUTPATIENT
Start: 2024-05-29 | End: 2024-05-30 | Stop reason: HOSPADM

## 2024-05-29 RX ORDER — GLUCAGON 1 MG
1 KIT INJECTION
Status: DISCONTINUED | OUTPATIENT
Start: 2024-05-29 | End: 2024-05-30 | Stop reason: HOSPADM

## 2024-05-29 RX ORDER — IPRATROPIUM BROMIDE AND ALBUTEROL SULFATE 2.5; .5 MG/3ML; MG/3ML
3 SOLUTION RESPIRATORY (INHALATION)
Status: DISCONTINUED | OUTPATIENT
Start: 2024-05-30 | End: 2024-05-30

## 2024-05-29 RX ORDER — IBUPROFEN 200 MG
24 TABLET ORAL
Status: DISCONTINUED | OUTPATIENT
Start: 2024-05-29 | End: 2024-05-30 | Stop reason: HOSPADM

## 2024-05-29 RX ORDER — ONDANSETRON HYDROCHLORIDE 2 MG/ML
4 INJECTION, SOLUTION INTRAVENOUS EVERY 8 HOURS PRN
Status: DISCONTINUED | OUTPATIENT
Start: 2024-05-29 | End: 2024-05-30 | Stop reason: HOSPADM

## 2024-05-29 RX ORDER — ACETAMINOPHEN 325 MG/1
650 TABLET ORAL EVERY 4 HOURS PRN
Status: DISCONTINUED | OUTPATIENT
Start: 2024-05-29 | End: 2024-05-30 | Stop reason: HOSPADM

## 2024-05-29 RX ADMIN — ENOXAPARIN SODIUM 70 MG: 80 INJECTION SUBCUTANEOUS at 10:05

## 2024-05-29 RX ADMIN — IOHEXOL 75 ML: 350 INJECTION, SOLUTION INTRAVENOUS at 11:05

## 2024-05-29 RX ADMIN — SODIUM CHLORIDE, POTASSIUM CHLORIDE, SODIUM LACTATE AND CALCIUM CHLORIDE: 600; 310; 30; 20 INJECTION, SOLUTION INTRAVENOUS at 10:05

## 2024-05-29 RX ADMIN — ALUMINUM HYDROXIDE, MAGNESIUM HYDROXIDE, AND DIMETHICONE 30 ML: 400; 400; 40 SUSPENSION ORAL at 10:05

## 2024-05-29 RX ADMIN — LIDOCAINE HYDROCHLORIDE 15 ML: 20 SOLUTION ORAL at 10:05

## 2024-05-29 RX ADMIN — DEXTROSE MONOHYDRATE 4.5 G: 5 INJECTION INTRAVENOUS at 09:05

## 2024-05-29 RX ADMIN — SODIUM CHLORIDE 500 ML: 9 INJECTION, SOLUTION INTRAVENOUS at 11:05

## 2024-05-29 NOTE — TELEPHONE ENCOUNTER
[10:38 AM] Uli Mayfield  [facility] [id number] daughter is on the line regarding pt procedure from yesterday  [10:38 AM] Uli Mayfield  bleeding alot and not breathing normal

## 2024-05-29 NOTE — ED PROVIDER NOTES
Encounter Date: 5/29/2024       History     Chief Complaint   Patient presents with    Shortness of Breath     Patient states she had a kidney stone removed yesterday and is feeling SOB, her home health nurse advised her O2 saturation was low 90's and advised to come to the er      89 y.o. female pmhx left staghorn calculus now 1 day status post left ureteroscopy with laser lithotripsy and basket extraction of stone fragment and left ureteral stent presents today with shortness of breath.  Patient was hypoxic at home in the 80s per home health nurse so she recommends she come to the ER.  Patient's daughters at bedside and provides some of the history saying she was more swelling in her arms and fingers than than normal.  Patient reports shortness of breath is worse with lying flat.  Daughter states she gave her some upper pain medications last night and that made her very sleepy and she essentially slept all day.  Reports mildly productive cough.  Denies fevers or chills.  Denies any unilateral leg pain or swelling.  Denies any prior clots.  Denies any chest pain abdominal pain dysuria hematuria diarrhea constipation.  Denies any nausea vomiting.        Review of patient's allergies indicates:  No Known Allergies  Past Medical History:   Diagnosis Date    Macula lutea degeneration     Squamous cell carcinoma of skin      Past Surgical History:   Procedure Laterality Date    APPENDECTOMY      BACK SURGERY  1975    HYSTERECTOMY      URETEROSCOPIC REMOVAL OF URETERIC CALCULUS Left 5/28/2024    Procedure: REMOVAL, CALCULUS, URETER, URETEROSCOPIC;  Surgeon: Juhi Mosher Jr., MD;  Location: [facility];  Service: Urology;  Laterality: Left;     Family History   Problem Relation Name Age of Onset    Melanoma Neg Hx      Psoriasis Neg Hx      Lupus Neg Hx      Eczema Neg Hx       Social History     Tobacco Use    Smoking status: Former     Current packs/day: 1.00     Average packs/day: 1 pack/day for 20.0 years (20.0 ttl  pk-yrs)     Types: Cigarettes    Smokeless tobacco: Never   Substance Use Topics    Alcohol use: No    Drug use: No     Review of Systems    Physical Exam     Initial Vitals [05/29/24 1802]   BP Pulse Resp Temp SpO2   (!) 109/53 82 20 98 °F (36.7 °C) (!) 92 %      MAP       --         Physical Exam    Nursing note and vitals reviewed.  Constitutional: She is not diaphoretic. No distress.   Elderly appearing   HENT:   Head: Normocephalic and atraumatic.   Nose: Nose normal.   Eyes: EOM are normal. No scleral icterus.   Neck: Neck supple.   Normal range of motion.  Cardiovascular:  Normal rate, regular rhythm, normal heart sounds and intact distal pulses.     Exam reveals no gallop and no friction rub.       No murmur heard.  Pulmonary/Chest: Breath sounds normal. No stridor. No respiratory distress. She has no wheezes. She has no rhonchi. She has no rales.   Abdominal: Abdomen is soft. Bowel sounds are normal. She exhibits no distension. There is no abdominal tenderness. There is no rebound and no guarding.   Musculoskeletal:         General: Normal range of motion.      Cervical back: Normal range of motion and neck supple.     Neurological: She is alert and oriented to person, place, and time. She has normal strength. No cranial nerve deficit or sensory deficit.   Skin: Skin is warm and dry. Capillary refill takes less than 2 seconds. No rash noted.   Psychiatric: She has a normal mood and affect.         ED Course   Critical Care    Date/Time: 5/29/2024 5:38 PM    Performed by: Rafal Trammell MD  Authorized by: Zechariah Dowell MD  Direct patient critical care time: 20 minutes  Additional history critical care time: 10 minutes  Ordering / reviewing critical care time: 10 minutes  Total critical care time (exclusive of procedural time) : 40 minutes  Critical care time was exclusive of separately billable procedures and treating other patients and teaching time.  Critical care was necessary to treat or prevent  imminent or life-threatening deterioration of the following conditions: respiratory failure.  Critical care was time spent personally by me on the following activities: blood draw for specimens, development of treatment plan with patient or surrogate, interpretation of cardiac output measurements, evaluation of patient's response to treatment, examination of patient, obtaining history from patient or surrogate, ordering and performing treatments and interventions, ordering and review of laboratory studies, ordering and review of radiographic studies, pulse oximetry, re-evaluation of patient's condition and review of old charts.        Labs Reviewed   CBC W/ AUTO DIFFERENTIAL - Abnormal; Notable for the following components:       Result Value    WBC 15.84 (*)     RBC 3.46 (*)     Hemoglobin 10.5 (*)     Hematocrit 32.5 (*)     Platelets 128 (*)     Immature Granulocytes 0.8 (*)     Gran # (ANC) 14.0 (*)     Immature Grans (Abs) 0.12 (*)     Lymph # 0.9 (*)     Gran % 88.0 (*)     Lymph % 5.6 (*)     All other components within normal limits   COMPREHENSIVE METABOLIC PANEL - Abnormal; Notable for the following components:    Sodium 134 (*)     CO2 22 (*)     Total Protein 5.5 (*)     Albumin 2.8 (*)     eGFR 37 (*)     All other components within normal limits   URINALYSIS, REFLEX TO URINE CULTURE - Abnormal; Notable for the following components:    Appearance, UA Hazy (*)     Protein, UA 3+ (*)     Occult Blood UA 3+ (*)     Leukocytes, UA 3+ (*)     All other components within normal limits    Narrative:     Specimen Source->Urine   B-TYPE NATRIURETIC PEPTIDE - Abnormal; Notable for the following components:     (*)     All other components within normal limits   LIPASE - Abnormal; Notable for the following components:    Lipase 3 (*)     All other components within normal limits   D DIMER, QUANTITATIVE - Abnormal; Notable for the following components:    D-Dimer 2.69 (*)     All other components within  normal limits    Narrative:       Dddimer  critical result(s) called and verbal readback obtained   from Yue Mosher RN ED by MAP 05/29/2024 20:39   URINALYSIS MICROSCOPIC - Abnormal; Notable for the following components:    RBC, UA >100 (*)     WBC, UA 74 (*)     WBC Clumps, UA Few (*)     Bacteria Many (*)     All other components within normal limits    Narrative:     Specimen Source->Urine   CULTURE, URINE   LACTIC ACID, PLASMA   POCT GLUCOSE          Imaging Results              X-Ray Chest AP Portable (Final result)  Result time 05/30/24 08:13:05      Final result by Emmy Tinoco MD (05/30/24 08:13:05)                   Impression:      No acute abnormality.      Electronically signed by: Emmy Tinoco MD  Date:    05/30/2024  Time:    08:13               Narrative:    EXAMINATION:  XR CHEST AP PORTABLE    CLINICAL HISTORY:  Chest Pain;    TECHNIQUE:  Single frontal view of the chest was performed.    COMPARISON:  04/10/2023    FINDINGS:  The lungs are clear, with normal appearance of pulmonary vasculature and no pleural effusion or pneumothorax.    The cardiac silhouette is normal in size. The hilar and mediastinal contours are unremarkable.    Bones are intact.                                       Medications   levothyroxine tablet 25 mcg (25 mcg Oral Given 5/30/24 0607)   sodium chloride 0.9% flush 10 mL (has no administration in time range)   naloxone 0.4 mg/mL injection 0.02 mg (has no administration in time range)   glucose chewable tablet 16 g (has no administration in time range)   glucose chewable tablet 24 g (has no administration in time range)   glucagon (human recombinant) injection 1 mg (has no administration in time range)   potassium bicarbonate disintegrating tablet 50 mEq (50 mEq Oral Given 5/30/24 0150)   potassium bicarbonate disintegrating tablet 35 mEq (has no administration in time range)   potassium bicarbonate disintegrating tablet 60 mEq (has no administration in time  range)   magnesium oxide tablet 800 mg (has no administration in time range)   magnesium oxide tablet 800 mg (has no administration in time range)   acetaminophen tablet 650 mg (has no administration in time range)   ondansetron injection 4 mg (has no administration in time range)   dextrose 10% bolus 125 mL 125 mL (has no administration in time range)   dextrose 10% bolus 250 mL 250 mL (has no administration in time range)   pneumoc 20-wagner conj-dip cr(PF) (PREVNAR-20 (PF)) injection Syrg 0.5 mL (has no administration in time range)   enoxaparin injection 40 mg (has no administration in time range)   amLODIPine tablet 2.5 mg (2.5 mg Oral Given 5/30/24 0813)   isosorbide dinitrate tablet 30 mg (30 mg Oral Given 5/30/24 0813)   aspirin EC tablet 81 mg (81 mg Oral Given 5/30/24 0813)   topiramate tablet 25 mg (25 mg Oral Given 5/30/24 0813)   albuterol-ipratropium 2.5 mg-0.5 mg/3 mL nebulizer solution 3 mL (has no administration in time range)   piperacillin-tazobactam (ZOSYN) 4.5 g in dextrose 5 % in water (D5W) 100 mL IVPB (MB+) (0 g Intravenous Stopped 5/29/24 2130)   aluminum & magnesium hydroxide-simethicone 400-400-40 mg/5 mL suspension 30 mL (30 mLs Oral Given 5/29/24 2239)     And   LIDOcaine viscous HCl 2% oral solution 15 mL (15 mLs Oral Given 5/29/24 2239)   sodium chloride 0.9% bolus 500 mL 500 mL (0 mLs Intravenous Stopped 5/30/24 0020)   iohexoL (OMNIPAQUE 350) 350 mg iodine/mL injection (75 mLs  Given 5/29/24 2327)   furosemide injection 40 mg (40 mg Intravenous Given 5/30/24 0052)     Medical Decision Making  Amount and/or Complexity of Data Reviewed  Labs: ordered.  Radiology: ordered.               ED Course as of 05/30/24 1352   Wed May 29, 2024   9889 Care transferred from Dr. Trammell.  Status post lithotripsy for staghorn calculus yesterday presents the emergency room with hypoxia.  Anticipate admission. [JS]   1932 Chest x-ray independently interpreted by me shows atelectasis at the bases which  appears to be someone unchanged from prior x-ray in April 2013. [JS]   2019 I spoke to Hospital Medicine who will admit the patient.  My gut feeling is that she has atelectasis and took a pain pill and that is why she was hypoxic.  They requested a D-dimer.  I anticipate this will be positive.  She does have leukocytosis but this is likely secondary to her recent procedure.  Patient will be admitted for further observation. [JS]   2043 Urinalysis shows 74 white blood cells many bacteria few white blood cell clumps.  Given the leukocytosis I will give her a dose of IV antibiotics.  She has not tachypneic tachycardic or hypotensive.  I doubt this is sepsis.  However I will add on blood cultures. [JS]   2046 Patient has an elevated D-dimer.  This could be from surgery.  PE still needs to be ruled out.  Given that she has a decreased GFR would you slow hydration as I do not really think this is heart failure.  I also think she can get a dose of Lovenox with a HIDA scan in the morning and a bilateral venous ultrasound of the lower extremities in the morning.  She has not have unstable vital signs and I doubt this is a saddle embolus that would require catheter guided thrombolysis therefore I do not think she needs an expedited CT at this time. [JS]   2046 I spoke with the Hospital Medicine who agrees with the plan. [JS]      ED Course User Index  [JS] Jesse Pratt MD                           Clinical Impression:  Final diagnoses:  [R06.02] SOB (shortness of breath)  [J96.01] Acute respiratory failure with hypoxia (Primary)  [J98.11] Atelectasis          ED Disposition Condition    Observation Stable                Rafal Trammell MD  05/30/24 9537

## 2024-05-29 NOTE — TELEPHONE ENCOUNTER
Spoke with patients daughter and she states she is doing better and thinks its due to the pain meds. Informed her if bleeding and breathing got worse to report to the ER. She verbalized understanding

## 2024-05-29 NOTE — TELEPHONE ENCOUNTER
----- Message from Viktoriya Knight sent at 5/29/2024  4:34 PM CDT -----  Regarding: Medical Update  Contact: MARIBEL Chan with [facility] at [phone]  Type: Medical Update  Who Called:  MARIBEL Chan with [facility] at [phone]  Additional Information: updating doctor on patient's vitals. 89% room air, 85 for heart rate, respiratory 22, 98.0 for temperature, 115/74 blood pressure. Please call and advise. Thank you    Note:  Read note regarding ER to nurse.

## 2024-05-30 ENCOUNTER — NURSE TRIAGE (OUTPATIENT)
Dept: ADMINISTRATIVE | Facility: CLINIC | Age: 89
End: 2024-05-30
Payer: MEDICARE

## 2024-05-30 VITALS
DIASTOLIC BLOOD PRESSURE: 59 MMHG | BODY MASS INDEX: 27.47 KG/M2 | HEIGHT: 65 IN | WEIGHT: 164.88 LBS | HEART RATE: 102 BPM | SYSTOLIC BLOOD PRESSURE: 109 MMHG | TEMPERATURE: 98 F | OXYGEN SATURATION: 93 % | RESPIRATION RATE: 16 BRPM

## 2024-05-30 PROBLEM — N39.0 UTI (URINARY TRACT INFECTION): Status: ACTIVE | Noted: 2024-05-30

## 2024-05-30 LAB
ALBUMIN SERPL BCP-MCNC: 3.1 G/DL (ref 3.5–5.2)
ALP SERPL-CCNC: 94 U/L (ref 55–135)
ALT SERPL W/O P-5'-P-CCNC: 14 U/L (ref 10–44)
ANION GAP SERPL CALC-SCNC: 10 MMOL/L (ref 8–16)
AST SERPL-CCNC: 20 U/L (ref 10–40)
BASOPHILS # BLD AUTO: 0.06 K/UL (ref 0–0.2)
BASOPHILS NFR BLD: 0.4 % (ref 0–1.9)
BILIRUB SERPL-MCNC: 0.8 MG/DL (ref 0.1–1)
BUN SERPL-MCNC: 23 MG/DL (ref 10–30)
CALCIUM SERPL-MCNC: 9 MG/DL (ref 8.7–10.5)
CHLORIDE SERPL-SCNC: 102 MMOL/L (ref 95–110)
CO2 SERPL-SCNC: 25 MMOL/L (ref 23–29)
CREAT SERPL-MCNC: 1.1 MG/DL (ref 0.5–1.4)
DIFFERENTIAL METHOD BLD: ABNORMAL
EOSINOPHIL # BLD AUTO: 0.1 K/UL (ref 0–0.5)
EOSINOPHIL NFR BLD: 0.6 % (ref 0–8)
ERYTHROCYTE [DISTWIDTH] IN BLOOD BY AUTOMATED COUNT: 13.4 % (ref 11.5–14.5)
EST. GFR  (NO RACE VARIABLE): 46 ML/MIN/1.73 M^2
GLUCOSE SERPL-MCNC: 130 MG/DL (ref 70–110)
HCT VFR BLD AUTO: 37.7 % (ref 37–48.5)
HGB BLD-MCNC: 12.3 G/DL (ref 12–16)
IMM GRANULOCYTES # BLD AUTO: 0.11 K/UL (ref 0–0.04)
IMM GRANULOCYTES NFR BLD AUTO: 0.7 % (ref 0–0.5)
LYMPHOCYTES # BLD AUTO: 1.1 K/UL (ref 1–4.8)
LYMPHOCYTES NFR BLD: 6.7 % (ref 18–48)
MAGNESIUM SERPL-MCNC: 2 MG/DL (ref 1.6–2.6)
MCH RBC QN AUTO: 30.1 PG (ref 27–31)
MCHC RBC AUTO-ENTMCNC: 32.6 G/DL (ref 32–36)
MCV RBC AUTO: 92 FL (ref 82–98)
MONOCYTES # BLD AUTO: 0.8 K/UL (ref 0.3–1)
MONOCYTES NFR BLD: 4.7 % (ref 4–15)
NEUTROPHILS # BLD AUTO: 14.1 K/UL (ref 1.8–7.7)
NEUTROPHILS NFR BLD: 86.9 % (ref 38–73)
NRBC BLD-RTO: 0 /100 WBC
PHOSPHATE SERPL-MCNC: 2.2 MG/DL (ref 2.7–4.5)
PLATELET # BLD AUTO: 133 K/UL (ref 150–450)
PMV BLD AUTO: 11.5 FL (ref 9.2–12.9)
POTASSIUM SERPL-SCNC: 3.9 MMOL/L (ref 3.5–5.1)
PROT SERPL-MCNC: 6.4 G/DL (ref 6–8.4)
RBC # BLD AUTO: 4.08 M/UL (ref 4–5.4)
SODIUM SERPL-SCNC: 137 MMOL/L (ref 136–145)
WBC # BLD AUTO: 16.21 K/UL (ref 3.9–12.7)

## 2024-05-30 PROCEDURE — 80053 COMPREHEN METABOLIC PANEL: CPT

## 2024-05-30 PROCEDURE — 94761 N-INVAS EAR/PLS OXIMETRY MLT: CPT

## 2024-05-30 PROCEDURE — 11000001 HC ACUTE MED/SURG PRIVATE ROOM

## 2024-05-30 PROCEDURE — 36415 COLL VENOUS BLD VENIPUNCTURE: CPT

## 2024-05-30 PROCEDURE — 96375 TX/PRO/DX INJ NEW DRUG ADDON: CPT

## 2024-05-30 PROCEDURE — 97535 SELF CARE MNGMENT TRAINING: CPT

## 2024-05-30 PROCEDURE — 97165 OT EVAL LOW COMPLEX 30 MIN: CPT

## 2024-05-30 PROCEDURE — 85025 COMPLETE CBC W/AUTO DIFF WBC: CPT

## 2024-05-30 PROCEDURE — 84100 ASSAY OF PHOSPHORUS: CPT

## 2024-05-30 PROCEDURE — 25000003 PHARM REV CODE 250

## 2024-05-30 PROCEDURE — 97116 GAIT TRAINING THERAPY: CPT

## 2024-05-30 PROCEDURE — 25000242 PHARM REV CODE 250 ALT 637 W/ HCPCS

## 2024-05-30 PROCEDURE — 96361 HYDRATE IV INFUSION ADD-ON: CPT

## 2024-05-30 PROCEDURE — 97161 PT EVAL LOW COMPLEX 20 MIN: CPT

## 2024-05-30 PROCEDURE — 83735 ASSAY OF MAGNESIUM: CPT

## 2024-05-30 PROCEDURE — 27000221 HC OXYGEN, UP TO 24 HOURS

## 2024-05-30 PROCEDURE — 94799 UNLISTED PULMONARY SVC/PX: CPT

## 2024-05-30 PROCEDURE — 94640 AIRWAY INHALATION TREATMENT: CPT

## 2024-05-30 PROCEDURE — 96367 TX/PROPH/DG ADDL SEQ IV INF: CPT

## 2024-05-30 PROCEDURE — 63600175 PHARM REV CODE 636 W HCPCS

## 2024-05-30 RX ORDER — ASPIRIN 81 MG/1
81 TABLET ORAL EVERY 12 HOURS
Status: DISCONTINUED | OUTPATIENT
Start: 2024-05-30 | End: 2024-05-30 | Stop reason: HOSPADM

## 2024-05-30 RX ORDER — ENOXAPARIN SODIUM 100 MG/ML
40 INJECTION SUBCUTANEOUS EVERY 24 HOURS
Status: DISCONTINUED | OUTPATIENT
Start: 2024-05-30 | End: 2024-05-30 | Stop reason: HOSPADM

## 2024-05-30 RX ORDER — LEVOTHYROXINE SODIUM 25 UG/1
25 TABLET ORAL EVERY MORNING
Status: DISCONTINUED | OUTPATIENT
Start: 2024-05-30 | End: 2024-05-30 | Stop reason: HOSPADM

## 2024-05-30 RX ORDER — FUROSEMIDE 10 MG/ML
40 INJECTION INTRAMUSCULAR; INTRAVENOUS ONCE
Status: COMPLETED | OUTPATIENT
Start: 2024-05-30 | End: 2024-05-30

## 2024-05-30 RX ORDER — TOPIRAMATE 25 MG/1
25 TABLET ORAL DAILY
Status: DISCONTINUED | OUTPATIENT
Start: 2024-05-30 | End: 2024-05-30 | Stop reason: HOSPADM

## 2024-05-30 RX ORDER — LEVOFLOXACIN 250 MG/1
500 TABLET ORAL ONCE
Status: DISCONTINUED | OUTPATIENT
Start: 2024-05-30 | End: 2024-05-30

## 2024-05-30 RX ORDER — LEVOFLOXACIN 5 MG/ML
750 INJECTION, SOLUTION INTRAVENOUS
Status: DISCONTINUED | OUTPATIENT
Start: 2024-05-30 | End: 2024-05-30

## 2024-05-30 RX ORDER — AMLODIPINE BESYLATE 2.5 MG/1
2.5 TABLET ORAL DAILY
Status: DISCONTINUED | OUTPATIENT
Start: 2024-05-30 | End: 2024-05-30 | Stop reason: HOSPADM

## 2024-05-30 RX ORDER — ISOSORBIDE DINITRATE 10 MG/1
30 TABLET ORAL DAILY
Status: DISCONTINUED | OUTPATIENT
Start: 2024-05-30 | End: 2024-05-30 | Stop reason: HOSPADM

## 2024-05-30 RX ORDER — IPRATROPIUM BROMIDE AND ALBUTEROL SULFATE 2.5; .5 MG/3ML; MG/3ML
3 SOLUTION RESPIRATORY (INHALATION) EVERY 6 HOURS PRN
Status: DISCONTINUED | OUTPATIENT
Start: 2024-05-30 | End: 2024-05-30 | Stop reason: HOSPADM

## 2024-05-30 RX ADMIN — POTASSIUM BICARBONATE 50 MEQ: 977.5 TABLET, EFFERVESCENT ORAL at 01:05

## 2024-05-30 RX ADMIN — TOPIRAMATE 25 MG: 25 TABLET, FILM COATED ORAL at 08:05

## 2024-05-30 RX ADMIN — IPRATROPIUM BROMIDE AND ALBUTEROL SULFATE 3 ML: 2.5; .5 SOLUTION RESPIRATORY (INHALATION) at 07:05

## 2024-05-30 RX ADMIN — ISOSORBIDE DINITRATE 30 MG: 10 TABLET ORAL at 08:05

## 2024-05-30 RX ADMIN — LEVOFLOXACIN 750 MG: 750 INJECTION, SOLUTION INTRAVENOUS at 01:05

## 2024-05-30 RX ADMIN — ASPIRIN 81 MG: 81 TABLET, COATED ORAL at 08:05

## 2024-05-30 RX ADMIN — LEVOTHYROXINE SODIUM 25 MCG: 0.03 TABLET ORAL at 06:05

## 2024-05-30 RX ADMIN — AMLODIPINE BESYLATE 2.5 MG: 2.5 TABLET ORAL at 08:05

## 2024-05-30 RX ADMIN — FUROSEMIDE 40 MG: 10 INJECTION, SOLUTION INTRAMUSCULAR; INTRAVENOUS at 12:05

## 2024-05-30 NOTE — ASSESSMENT & PLAN NOTE
Chronic, uncontrolled. Latest blood pressure and vitals reviewed-     Temp:  [97 °F (36.1 °C)-98 °F (36.7 °C)]   Pulse:  [76-87]   Resp:  [14-20]   BP: (109-162)/(53-81)   SpO2:  [89 %-98 %] .   Home meds for hypertension were reviewed and noted below.   Hypertension Medications               amLODIPine (NORVASC) 2.5 MG tablet Take 2.5 mg by mouth once daily.     isosorbide dinitrate (ISORDIL) 30 MG Tab Take 30 mg by mouth once daily.             While in the hospital, will manage blood pressure as follows; Continue home antihypertensive regimen per daughter patient has not been taking daily medications    Will utilize p.r.n. blood pressure medication only if patient's blood pressure greater than 180/110 and she develops symptoms such as worsening chest pain or shortness of breath.

## 2024-05-30 NOTE — PLAN OF CARE
Pt clear for DC from case management standpoint. Discharging to home with HH.       05/30/24 1336   Final Note   Assessment Type Final Discharge Note   Anticipated Discharge Disposition Home-Health

## 2024-05-30 NOTE — PT/OT/SLP EVAL
Occupational Therapy   Evaluation and Discharge Note    Name: Erich Encarnacion  MRN: [id number]  Admitting Diagnosis: Acute hypoxemic respiratory failure  Recent Surgery: * No surgery found *      Recommendations:     Discharge Recommendations:  (Pt declined further OT. Daughter present and aware)  Discharge Equipment Recommendations:    Barriers to discharge:       Assessment:     Erich Encarnacion is a 89 y.o. female with a medical diagnosis of Acute hypoxemic respiratory failure. At this time, patient demonstrates need for assistance with ADL's, but declined further OT. Daughter present and aware and states pt will be going home today because that is what she wants. OT provided instruction in home safety with ADL's/IADL's including review of home set up and DME/AE. Pts daughter verbalized understanding. No further OT per pt request.    Plan:     During this hospitalization, patient does not require further acute OT services.  Please re-consult if situation changes.    Plan of Care Reviewed with: patient, daughter    Subjective     Chief Complaint: Wants to go home  Patient/Family Comments/goals: To go home    Occupational Profile:  Living Environment: Pt lives in 1 [place] home with 3 [address] with HR on L facing home  Previous level of function: Ambulates in the home with rollator. Assistance with bathing; Pt reports performing dressing and toileting with Modified [place].  Roles and Routines: Mother  Equipment Used at home: raised toilet, rollator, bath bench, grab bar  Assistance upon Discharge: Daughter    Pain/Comfort:  Pain Rating 1: 0/10  Pain Rating Post-Intervention 1: 0/10    Patients cultural, spiritual, Spiritism conflicts given the current situation:      Objective:     Communicated with: Nurse Gutierrez  prior to session.  Patient found HOB elevated with bed alarm, oxygen, peripheral IV upon OT entry to room.    General Precautions: Standard, fall  Orthopedic Precautions: N/A  Braces: N/A  Respiratory  Status: Nasal cannula, flow 1.5  L/min     Occupational Performance:    Bed Mobility:    Patient completed Scooting/Bridging with minimum assistance        with elevation of lower part of bed to assist  Functional Mobility/Transfers:   Pt declined EOB and OOB    Activities of Daily Living:  Grooming: minimum assistance with grooming/hygiene with HOB elevated.    Cognitive/Visual Perceptual:  Pt legally blind per daughter    Physical Exam:  Upper Extremity Strength:    -       Right Upper Extremity: WFL  -       Left Upper Extremity: Pt reports history of L shoulder arthritis and discomfort. Pt demonstrates AROM/strength LUE WFL's but with some discomfort.    AMPA 6 Click ADL:  AMPAC Total Score:      Treatment & Education:  OT provided education in role of OT. Patient verbalized understanding and participated in OT.  OT provided instruction in home safety with ADL/IADL including review of home set up and DME/AE. Patient verbalized understanding.   OT provided education in calling for assist. Patient verbalized understanding.        Patient left HOB elevated with all lines intact, call button in reach, bed alarm on, and daughter  present    GOALS:   Multidisciplinary Problems       Occupational Therapy Goals       Not on file                    History:     Past Medical History:   Diagnosis Date    Macula lutea degeneration     Squamous cell carcinoma of skin          Past Surgical History:   Procedure Laterality Date    APPENDECTOMY      BACK SURGERY  1975    HYSTERECTOMY      URETEROSCOPIC REMOVAL OF URETERIC CALCULUS Left 5/28/2024    Procedure: REMOVAL, CALCULUS, URETER, URETEROSCOPIC;  Surgeon: Juhi Mosher Jr., MD;  Location: [facility];  Service: Urology;  Laterality: Left;       Time Tracking:     OT Date of Treatment: 05/30/24  OT Start Time: 0957  OT Stop Time: 1028  OT Total Time (min): 31 min    Billable Minutes:Evaluation 8  Self Care/Home Management 23    5/30/2024

## 2024-05-30 NOTE — PLAN OF CARE
Contacted PCP and scheduled follow up appointment.  Appointment information added to AVS.    Inbasket message sent to Dr. Mosher office to contact pt to schedule follow up appointment.  Office information added to AVS.       05/30/24 8108   Post-Acute Status   Hospital Resources/Appts/Education Provided Appointments scheduled and added to AVS

## 2024-05-30 NOTE — PROGRESS NOTES
Pharmacist Renal Dose Adjustment Note    Erich Encarnacion is a 89 y.o. female being treated with the medication Levaquin    Patient Data:    Vital Signs (Most Recent):  Temp: 97 °F (36.1 °C) (05/29/24 2333)  Pulse: 87 (05/29/24 2333)  Resp: 14 (05/29/24 2333)  BP: (!) 162/81 (05/29/24 2333)  SpO2: (!) 92 % (05/29/24 2333) Vital Signs (72h Range):  Temp:  [97 °F (36.1 °C)-98.1 °F (36.7 °C)]   Pulse:  [72-92]   Resp:  [12-20]   BP: (109-189)/(53-95)   SpO2:  [89 %-98 %]      Recent Labs   Lab 05/29/24 1912   CREATININE 1.3     Serum creatinine: 1.3 mg/dL 05/29/24 1912  Estimated creatinine clearance: 25 mL/min    Medication:Levaquin dose: 750mg frequency q24h will be changed to medication:Levaquin dose:750mg frequency:q48h    Pharmacist's Name: Demian Villarreal  Pharmacist's Extension: [phone]

## 2024-05-30 NOTE — NURSING
Nurses Note -- 4 Eyes      5/29/2024   11:28 PM      Skin assessed during: Admit      [x] No Altered Skin Integrity Present    [x]Prevention Measures Documented      [] Yes- Altered Skin Integrity Present or Discovered   [] LDA Added if Not in Epic (Describe Wound)   [] New Altered Skin Integrity was Present on Admit and Documented in LDA   [] Wound Image Taken    Wound Care Consulted? No    Attending Nurse:  Marisa Dobbs RN/Staff Member:  Juan RN        Received to room 220 from ER via stretcher. Pt awake and alert, able to make needs known and turns herself in the bed.  States she uses a walker at home.  Requests bedpan tonight.  Daughter at bedside.  Oriented to call light, bed controls, and plan of care.  Pt and daughter verbalized understanding.

## 2024-05-30 NOTE — PLAN OF CARE
Problem: Adult Inpatient Plan of Care  Goal: Plan of Care Review  Outcome: Progressing     Problem: Fall Injury Risk  Goal: Absence of Fall and Fall-Related Injury  Outcome: Progressing     Problem: Infection  Goal: Absence of Infection Signs and Symptoms  Outcome: Progressing     Problem: Gas Exchange Impaired  Goal: Optimal Gas Exchange  Outcome: Progressing     Problem: Skin Injury Risk Increased  Goal: Skin Health and Integrity  Outcome: Progressing       Awake and alert,  urinating often so diapers in use per request.  Daughter remains at bedside.  Denies needs at this time.

## 2024-05-30 NOTE — ASSESSMENT & PLAN NOTE
Lithotripsy and stent placement on 5/28/24 with Dr. Mosher who placed patient on levofloxacin after procedure. Urine studies positive for infection    -IV levofloxacin daily

## 2024-05-30 NOTE — PLAN OF CARE
Problem: Physical Therapy  Goal: Physical Therapy Goal  Description: Goals to be met by: 24     Patient will increase functional independence with mobility by performin. Supine to sit with [place]  2. Sit to supine with [place]  3. Sit to stand transfer with Stand-by Assistance  4. Bed to chair transfer with Stand-by Assistance using Rolling Walker  5. Gait  x 150 feet with Stand-by Assistance using Rolling Walker.     Outcome: Progressing      [FreeTextEntry1] : 3/16/21: Advanced bilateral OA Right knee worse then left. Patient has poor ROM in the right knee. Patient failed conservative treatment measures including injections medications and home exercises. Discussed at length risks and benefits of right TKA. Went over risks and benefits. Answered all of the patients questions and concerns. Patient elected to proceed with surgery. Will obtain CT of the right knee to evaluate for bone loss. 5/11/21: 2 weeks postop doing very well cont PT. 6/8/21: Patient will continue PT and HEP. Minimal swelling and incision healed well. Takes Oxycodone as needed to relief pain and will refill prescription. 7/13/21: 3 months s/p right TKA - She is doing well at this time and pain is minimal - PT is no significant help and she will begin to transition to HEP at this time - I would like to see a little more improvement with motion - I will prescribe her Tramadol for pain 5/20/22: 1 year s/p R TKA doing okay with soreness likely body getting used to prothesis. No sign of infection,laxity or loosening. Left knee has mild-moderate OA. No severe pain today. Recc PT but patient declined and will start HEP.   5/3/24:  3 years s/p R TKA and moderate left knee OA. Having soreness in the right knee. Will obtain MRI to eval synovitis. Also will get b/w to r/o infection- CRP and ESR. Rx for Meloxicam. Follow up after MRI.

## 2024-05-30 NOTE — NURSING
Awake alert and oriented x4. Follows simple commands. Daughter (Court) at bedside. Reviewed room/unit assigned. Questions answered and encouraged. VSS. 100% 2 liter NC, 81 HR, 140/63.

## 2024-05-30 NOTE — PROGRESS NOTES
Pharmacist Renal Dose Adjustment Note    Erich Encarnacion is a 89 y.o. female being treated with the medication Lovenox    Patient Data:    Vital Signs (Most Recent):  Temp: 98 °F (36.7 °C) (05/29/24 1802)  Pulse: 82 (05/29/24 1802)  Resp: 20 (05/29/24 1802)  BP: (!) 109/53 (05/29/24 1802)  SpO2: 97 % (05/29/24 1853) Vital Signs (72h Range):  Temp:  [97.9 °F (36.6 °C)-98.1 °F (36.7 °C)]   Pulse:  [72-92]   Resp:  [12-20]   BP: (109-189)/(53-95)   SpO2:  [89 %-98 %]      Recent Labs   Lab 05/29/24 1912   CREATININE 1.3     Serum creatinine: 1.3 mg/dL 05/29/24 1912  Estimated creatinine clearance: 24.8 mL/min    Lovenox 1 mg/kg Q12H will be changed to Lovenox 1 mg/kg Q24H  Based on CrCl < 30    Pharmacist's Name: Cristy Woods  Pharmacist's Extension: [phone]

## 2024-05-30 NOTE — PT/OT/SLP EVAL
Physical Therapy Evaluation    Patient Name:  Erich Encarnacion   MRN:  [id number]    Recommendations:     Discharge Recommendations: Low Intensity Therapy   Discharge Equipment Recommendations: none   Barriers to discharge: None    Assessment:     Erich Encarnacion is a 89 y.o. female admitted with a medical diagnosis of Acute hypoxemic respiratory failure.  She presents with the following impairments/functional limitations: weakness, impaired endurance, impaired functional mobility, gait instability, impaired balance, decreased lower extremity function, impaired cardiopulmonary response to activity .  Patient reluctantly agreeable to PT evaluation this morning.   Patient presented supine in bed and required min assist to transfer to sitting EOB and then CGA to stand.  Patient then ambulated x 150 feet rW cGA.    Rehab Prognosis: Good; patient would benefit from acute skilled PT services to address these deficits and reach maximum level of function.    Recent Surgery: * No surgery found *      Plan:     During this hospitalization, patient to be seen 5 x/week to address the identified rehab impairments via gait training, therapeutic activities, therapeutic exercises and progress toward the following goals:    Plan of Care Expires:  06/25/24    Subjective     Chief Complaint: not being home  Patient/Family Comments/goals: go home  Pain/Comfort:       Patients cultural, spiritual, Taoist conflicts given the current situation:      Living Environment:  Currently lives alone in [address] [place] [address].  Prior to admission, patients level of function was modified independent.  Equipment used at home: rollator.  DME owned (not currently used): none.  Upon discharge, patient will have assistance from family.    Objective:     Communicated with nurse prior to session.  Patient found supine with bed alarm  upon PT entry to room.    General Precautions: Standard, fall  Orthopedic Precautions:N/A   Braces:    Respiratory Status: Room  air    Exams:  RLE ROM: WFL  RLE Strength: WNL  LLE ROM: WFL  LLE Strength: WNL    Functional Mobility:  Bed Mobility:     Supine to Sit: minimum assistance  Sit to Supine: minimum assistance  Transfers:     Sit to Stand:  contact guard assistance with rolling walker  Gait: x 150 feet rW CGA      AM-PAC 6 CLICK MOBILITY  Total Score:18       Treatment & Education:  Gait training x 150 feet rW CGA    Patient left supine with call button in reach, bed alarm on, nurse notified, and family present.    GOALS:   Multidisciplinary Problems       Physical Therapy Goals          Problem: Physical Therapy    Goal Priority Disciplines Outcome Goal Variances Interventions   Physical Therapy Goal     PT, PT/OT Progressing     Description: Goals to be met by: 24     Patient will increase functional independence with mobility by performin. Supine to sit with [place]  2. Sit to supine with [place]  3. Sit to stand transfer with Stand-by Assistance  4. Bed to chair transfer with Stand-by Assistance using Rolling Walker  5. Gait  x 150 feet with Stand-by Assistance using Rolling Walker.                          History:     Past Medical History:   Diagnosis Date    Macula lutea degeneration     Squamous cell carcinoma of skin        Past Surgical History:   Procedure Laterality Date    APPENDECTOMY      BACK SURGERY      HYSTERECTOMY      URETEROSCOPIC REMOVAL OF URETERIC CALCULUS Left 2024    Procedure: REMOVAL, CALCULUS, URETER, URETEROSCOPIC;  Surgeon: Juhi Mosher Jr., MD;  Location: [facility];  Service: Urology;  Laterality: Left;       Time Tracking:     PT Received On: 24  PT Start Time: 1104     PT Stop Time: 1128  PT Total Time (min): 24 min     Billable Minutes: Evaluation 15 and Gait Training 9      2024

## 2024-05-30 NOTE — SUBJECTIVE & OBJECTIVE
Past Medical History:   Diagnosis Date    Macula lutea degeneration     Squamous cell carcinoma of skin        Past Surgical History:   Procedure Laterality Date    APPENDECTOMY      BACK SURGERY  1975    HYSTERECTOMY      URETEROSCOPIC REMOVAL OF URETERIC CALCULUS Left 5/28/2024    Procedure: REMOVAL, CALCULUS, URETER, URETEROSCOPIC;  Surgeon: Juhi Mosher Jr., MD;  Location: [facility];  Service: Urology;  Laterality: Left;       Review of patient's allergies indicates:  No Known Allergies    Current Facility-Administered Medications on File Prior to Encounter   Medication    triamcinolone acetonide injection 40 mg     Current Outpatient Medications on File Prior to Encounter   Medication Sig    amLODIPine (NORVASC) 2.5 MG tablet Take 2.5 mg by mouth once daily.     HYDROcodone-acetaminophen (NORCO) 5-325 mg per tablet Take 1 tablet by mouth every 6 (six) hours as needed for Pain.    isosorbide dinitrate (ISORDIL) 30 MG Tab Take 30 mg by mouth once daily.     levothyroxine (SYNTHROID) 25 MCG tablet Take 25 mcg by mouth every morning.     meclizine (ANTIVERT) 12.5 mg tablet Take 12.5 mg by mouth daily as needed for Dizziness.    polyethylene glycol (GLYCOLAX) 17 gram/dose powder Take 17 g by mouth Daily.    topiramate (TOPAMAX) 25 MG tablet Take 25 mg by mouth once daily.     zolpidem (AMBIEN) 10 mg Tab Take 1 tablet (10 mg total) by mouth nightly as needed (insomnia).    acetaminophen (TYLENOL) 160 mg/5 mL Liqd Take 15.6 mLs (499.2 mg total) by mouth every 6 (six) hours as needed (pain).    aspirin (ECOTRIN) 81 MG EC tablet Take 81 mg by mouth every 12 (twelve) hours.    levoFLOXacin (LEVAQUIN) 750 MG tablet Take 1 tablet (750 mg total) by mouth once daily. for 5 days    magnesium 250 mg Tab Take 1 tablet by mouth once daily.    vit A/vit C/vit E/zinc/copper (PRESERVISION AREDS ORAL) Take 1 tablet by mouth once daily.      Family History    None       Tobacco Use    Smoking status: Former      Current packs/day: 1.00     Average packs/day: 1 pack/day for 20.0 years (20.0 ttl pk-yrs)     Types: Cigarettes    Smokeless tobacco: Never   Substance and Sexual Activity    Alcohol use: No    Drug use: No    Sexual activity: Not on file     Review of Systems   Constitutional:  Positive for activity change and fatigue.   Respiratory:  Positive for shortness of breath.    All other systems reviewed and are negative.  Objective:     Vital Signs (Most Recent):  Temp: 97 °F (36.1 °C) (05/29/24 2333)  Pulse: 87 (05/29/24 2333)  Resp: 14 (05/29/24 2333)  BP: (!) 162/81 (05/29/24 2333)  SpO2: (!) 92 % (05/29/24 2333) Vital Signs (24h Range):  Temp:  [97 °F (36.1 °C)-98 °F (36.7 °C)] 97 °F (36.1 °C)  Pulse:  [76-87] 87  Resp:  [14-20] 14  SpO2:  [89 %-98 %] 92 %  BP: (109-162)/(53-81) 162/81     Weight: 74.8 kg (164 lb 14.5 oz)  Body mass index is 27.44 kg/m².     Physical Exam  Vitals reviewed.   Constitutional:       General: She is not in acute distress.  HENT:      Head: Normocephalic and atraumatic.      Mouth/Throat:      Mouth: Mucous membranes are dry.      Pharynx: Oropharynx is clear.   Eyes:      Pupils: Pupils are equal, round, and reactive to light.   Cardiovascular:      Rate and Rhythm: Normal rate and regular rhythm.      Pulses: Normal pulses.      Heart sounds: Normal heart sounds.   Pulmonary:      Effort: Pulmonary effort is normal. No respiratory distress.      Breath sounds: Normal breath sounds.   Abdominal:      General: Bowel sounds are normal.      Palpations: Abdomen is soft.   Musculoskeletal:         General: Normal range of motion.   Skin:     General: Skin is warm and dry.      Capillary Refill: Capillary refill takes less than 2 seconds.      Coloration: Skin is pale.   Neurological:      Mental Status: Mental status is at baseline. She is lethargic and disoriented.      GCS: GCS eye subscore is 4. GCS verbal subscore is 4. GCS motor subscore is 6.      Comments: Patient at baseline  mental status per daughter at bedside but lethargic          CRANIAL NERVES     CN III, IV, VI   Pupils are equal, round, and reactive to light.     Significant Labs: All pertinent labs within the past 24 hours have been reviewed.  CBC:   Recent Labs   Lab 05/29/24 1912   WBC 15.84*   HGB 10.5*   HCT 32.5*   *     CMP:   Recent Labs   Lab 05/29/24 1912   *   K 3.8      CO2 22*   GLU 94   BUN 26   CREATININE 1.3   CALCIUM 8.7   PROT 5.5*   ALBUMIN 2.8*   BILITOT 0.6   ALKPHOS 73   AST 18   ALT 12   ANIONGAP 8     Cardiac Markers:   Recent Labs   Lab 05/29/24 1912   *     Lactic Acid:   Recent Labs   Lab 05/29/24 2103   LACTATE 0.7     Lipase:   Recent Labs   Lab 05/29/24 1912   LIPASE 3*     TSH:   Recent Labs   Lab 05/29/24 2109   TSH 4.597*     Urine Studies:   Recent Labs   Lab 05/29/24 2012   COLORU Yellow   APPEARANCEUA Hazy*   PHUR 6.0   SPECGRAV 1.015   PROTEINUA 3+*   GLUCUA Negative   KETONESU Negative   BILIRUBINUA Negative   OCCULTUA 3+*   NITRITE Negative   UROBILINOGEN Negative   LEUKOCYTESUR 3+*   RBCUA >100*   WBCUA 74*   BACTERIA Many*   SQUAMEPITHEL 0   HYALINECASTS 0       Significant Imaging: I have reviewed all pertinent imaging results/findings within the past 24 hours.    Chest xray:  IMPRESSION: No dense parenchymal consolidation, pleural effusion, or pneumothorax.      CTA:  MPRESSION: 1. Mild pulmonary edema and trace bilateral pleural effusions. 2. Findings which suggest underlying pulmonary arterial hypertension. 3. No evidence for clinically relevant pulmonary arterial filling defect.

## 2024-05-30 NOTE — ASSESSMENT & PLAN NOTE
Patient with Hypoxic Respiratory failure which is Acute.  she is not on home oxygen. Supplemental oxygen was provided and noted-      .   Signs/symptoms of respiratory failure include- lethargy. Contributing diagnoses includes -mild pulmonary edema, recent surgical procedure on 5/28/24. Labs and images were reviewed. Patient Has not had a recent ABG. CTA negative for PE    Will treat underlying causes and adjust management of respiratory failure as follows-   -patient administered one dose of IV lasix  -aggressive incentive spirometry   -jasmin PRN   -PRN oxygen

## 2024-05-30 NOTE — PLAN OF CARE
Referral sent to [facility] to resume care.  HALEY 05/31/24 05/30/24 1321   Post-Acute Status   Post-Acute Authorization Home Health   Home Health Status Set-up Complete/Auth obtained

## 2024-05-30 NOTE — PLAN OF CARE
[facility] - Med/Surg  Initial Discharge Assessment       Primary Care Provider: Se Rios MD    Admission Diagnosis: Acute respiratory failure with hypoxia [J96.01]    Admission Date: 5/29/2024  Expected Discharge Date: 5/30/2024    Transition of Care Barriers: None    Payor: [employer] [facility] / Plan: [employer] CHO[employer] / Product Type: Medicare Advantage /     Extended Emergency Contact Information  Primary Emergency Contact: Court Pringle  Address: [address] [place] [address] [id number] [place] [address]  Home Phone: [phone]  Mobile Phone: [phone]  Relation: Daughter  Preferred language: English   needed? No    Discharge Plan A: Home Health  Discharge Plan B: Home with family      [address] [facility]  [address] [facility] [address]  Phone: [phone] Fax: [phone]  DC assessment completed with patient at bedside. Verified information on facesheet as correct. Pt lives at listed address alone. Reports she has help if needed from family. Denies POA, ADAN daughter, Aleksandra  PCP is Dr. Rios- reports last apt was approx 1 yr ago. Pharmacy is [facility] on [address]. Denies blood thinners/hd/outpt services. HH with [facility]. DME-reports rolator, RW, BSC, raised toilet and grab bars. Reports being independent with activities. Daughter  her to apts. Reports daughter will provide transportation home upon DC. Reports taking home medications as prescribed and can currently afford them. Verified insurance on file. Denies recent inpt stay in last 30 days.  DC plan is home with HH.      Initial Assessment (most recent)       Adult Discharge Assessment - 05/30/24 1311          Discharge Assessment    Assessment Type Discharge Planning Assessment     Confirmed/corrected address, phone number and insurance Yes     Confirmed Demographics Correct on Facesheet     Source of  Information patient;family     People in Home alone     Do you expect to return to your current living situation? Yes     Do you have help at home or someone to help you manage your care at home? Yes     Who are your caregiver(s) and their phone number(s)? Court Gutierrez     Prior to hospitilization cognitive status: Alert/Oriented     Current cognitive status: Alert/Oriented     Walking or Climbing Stairs Difficulty no     Dressing/Bathing Difficulty yes     Dressing/Bathing bathing difficulty, requires equipment     Equipment Currently Used at Home raised toilet;rollator;bath bench;grab bar     Readmission within 30 days? No     Patient currently being followed by outpatient case management? No     Do you currently have service(s) that help you manage your care at home? Yes     Name and Contact number of agency [facility]     Is the pt/caregiver preference to resume services with current agency Yes     Do you take prescription medications? Yes     Do you have prescription coverage? Yes     Do you have any problems affording any of your prescribed medications? No     Is the patient taking medications as prescribed? yes     Who is going to help you get home at discharge? Aleksandra gutierrez     How do you get to doctors appointments? family or friend will provide     Are you on dialysis? No     Do you take coumadin? No     Discharge Plan A Home Health     Discharge Plan B Home with family     DME Needed Upon Discharge  none     Discharge Plan discussed with: Patient;Adult children     Transition of Care Barriers None

## 2024-05-30 NOTE — HPI
Erich Encarnacion is a 89 year old female with a previous medical history of macular degeneration, hypothyroidism, migraines, and left staghorn kidney stone with removal and stent placement on 5/28/24 who presented to the ED for shortness of breath and oxygen saturation at home in 90's. Patient underwent Successful laser lithotripsy of a large left staghorn calculus with basket extraction and stent placement with Dr. Mosher on 5/28/24 and discharged the same day with PO levofloxacin and norco pain medication. Upon arriving home patient was given one pain pill by daughter on evening of 5/28 and slept with out waking to take daily meds or use incentive spirometer.  Per daughter the patient slept most of the day and  awoke with shortness of breath that is worse when lying flat. Patient requiring supplemental oxygen to maintain oxygen saturation and does not use oxygen at home. ED evaluation showed elevated WBC, BNP and D-dimer. CTA negative for PE and shows mild pulmonary edema. Patient admitted by hospital medicine for further evaluation and management

## 2024-05-30 NOTE — H&P
[facility] Medicine  History & Physical    Patient Name: Erich Encarnacion  MRN: [id number]  Patient Class: OP- Observation  Admission Date: 5/29/2024  Attending Physician: Zechariah Dowell MD   Primary Care Provider: Se Rios MD         Patient information was obtained from patient, relative(s), past medical records, and ER records.     Subjective:     Principal Problem:Acute hypoxemic respiratory failure    Chief Complaint:   Chief Complaint   Patient presents with    Shortness of Breath     Patient states she had a kidney stone removed yesterday and is feeling SOB, her home health nurse advised her O2 saturation was low 90's and advised to come to the er         HPI: Erich Encarnacion is a 89 year old female with a previous medical history of macular degeneration, hypothyroidism, migraines, and left staghorn kidney stone with removal and stent placement on 5/28/24 who presented to the ED for shortness of breath and oxygen saturation at home in 90's. Patient underwent Successful laser lithotripsy of a large left staghorn calculus with basket extraction and stent placement with Dr. Mosher on 5/28/24 and discharged the same day with PO levofloxacin and norco pain medication. Upon arriving home patient was given one pain pill by daughter on evening of 5/28 and slept with out waking to take daily meds or use incentive spirometer.  Per daughter the patient slept most of the day and  awoke with shortness of breath that is worse when lying flat. Patient requiring supplemental oxygen to maintain oxygen saturation and does not use oxygen at home. ED evaluation showed elevated WBC, BNP and D-dimer. CTA negative for PE and shows mild pulmonary edema. Patient admitted by hospital medicine for further evaluation and management    Past Medical History:   Diagnosis Date    Macula lutea degeneration     Squamous cell carcinoma of skin        Past Surgical History:   Procedure Laterality Date     APPENDECTOMY      BACK SURGERY  1975    HYSTERECTOMY      URETEROSCOPIC REMOVAL OF URETERIC CALCULUS Left 5/28/2024    Procedure: REMOVAL, CALCULUS, URETER, URETEROSCOPIC;  Surgeon: Juhi Mosher Jr., MD;  Location: [facility];  Service: Urology;  Laterality: Left;       Review of patient's allergies indicates:  No Known Allergies    Current Facility-Administered Medications on File Prior to Encounter   Medication    triamcinolone acetonide injection 40 mg     Current Outpatient Medications on File Prior to Encounter   Medication Sig    amLODIPine (NORVASC) 2.5 MG tablet Take 2.5 mg by mouth once daily.     HYDROcodone-acetaminophen (NORCO) 5-325 mg per tablet Take 1 tablet by mouth every 6 (six) hours as needed for Pain.    isosorbide dinitrate (ISORDIL) 30 MG Tab Take 30 mg by mouth once daily.     levothyroxine (SYNTHROID) 25 MCG tablet Take 25 mcg by mouth every morning.     meclizine (ANTIVERT) 12.5 mg tablet Take 12.5 mg by mouth daily as needed for Dizziness.    polyethylene glycol (GLYCOLAX) 17 gram/dose powder Take 17 g by mouth Daily.    topiramate (TOPAMAX) 25 MG tablet Take 25 mg by mouth once daily.     zolpidem (AMBIEN) 10 mg Tab Take 1 tablet (10 mg total) by mouth nightly as needed (insomnia).    acetaminophen (TYLENOL) 160 mg/5 mL Liqd Take 15.6 mLs (499.2 mg total) by mouth every 6 (six) hours as needed (pain).    aspirin (ECOTRIN) 81 MG EC tablet Take 81 mg by mouth every 12 (twelve) hours.    levoFLOXacin (LEVAQUIN) 750 MG tablet Take 1 tablet (750 mg total) by mouth once daily. for 5 days    magnesium 250 mg Tab Take 1 tablet by mouth once daily.    vit A/vit C/vit E/zinc/copper (PRESERVISION AREDS ORAL) Take 1 tablet by mouth once daily.      Family History    None       Tobacco Use    Smoking status: Former     Current packs/day: 1.00     Average packs/day: 1 pack/day for 20.0 years (20.0 ttl pk-yrs)     Types: Cigarettes    Smokeless tobacco: Never   Substance and Sexual Activity    Alcohol  use: No    Drug use: No    Sexual activity: Not on file     Review of Systems   Constitutional:  Positive for activity change and fatigue.   Respiratory:  Positive for shortness of breath.    All other systems reviewed and are negative.  Objective:     Vital Signs (Most Recent):  Temp: 97 °F (36.1 °C) (05/29/24 2333)  Pulse: 87 (05/29/24 2333)  Resp: 14 (05/29/24 2333)  BP: (!) 162/81 (05/29/24 2333)  SpO2: (!) 92 % (05/29/24 2333) Vital Signs (24h Range):  Temp:  [97 °F (36.1 °C)-98 °F (36.7 °C)] 97 °F (36.1 °C)  Pulse:  [76-87] 87  Resp:  [14-20] 14  SpO2:  [89 %-98 %] 92 %  BP: (109-162)/(53-81) 162/81     Weight: 74.8 kg (164 lb 14.5 oz)  Body mass index is 27.44 kg/m².     Physical Exam  Vitals reviewed.   Constitutional:       General: She is not in acute distress.  HENT:      Head: Normocephalic and atraumatic.      Mouth/Throat:      Mouth: Mucous membranes are dry.      Pharynx: Oropharynx is clear.   Eyes:      Pupils: Pupils are equal, round, and reactive to light.   Cardiovascular:      Rate and Rhythm: Normal rate and regular rhythm.      Pulses: Normal pulses.      Heart sounds: Normal heart sounds.   Pulmonary:      Effort: Pulmonary effort is normal. No respiratory distress.      Breath sounds: Normal breath sounds.   Abdominal:      General: Bowel sounds are normal.      Palpations: Abdomen is soft.   Musculoskeletal:         General: Normal range of motion.   Skin:     General: Skin is warm and dry.      Capillary Refill: Capillary refill takes less than 2 seconds.      Coloration: Skin is pale.   Neurological:      Mental Status: Mental status is at baseline. She is lethargic and disoriented.      GCS: GCS eye subscore is 4. GCS verbal subscore is 4. GCS motor subscore is 6.      Comments: Patient at baseline mental status per daughter at bedside but lethargic          CRANIAL NERVES     CN III, IV, VI   Pupils are equal, round, and reactive to light.     Significant Labs: All pertinent labs  within the past 24 hours have been reviewed.  CBC:   Recent Labs   Lab 05/29/24 1912   WBC 15.84*   HGB 10.5*   HCT 32.5*   *     CMP:   Recent Labs   Lab 05/29/24 1912   *   K 3.8      CO2 22*   GLU 94   BUN 26   CREATININE 1.3   CALCIUM 8.7   PROT 5.5*   ALBUMIN 2.8*   BILITOT 0.6   ALKPHOS 73   AST 18   ALT 12   ANIONGAP 8     Cardiac Markers:   Recent Labs   Lab 05/29/24 1912   *     Lactic Acid:   Recent Labs   Lab 05/29/24 2103   LACTATE 0.7     Lipase:   Recent Labs   Lab 05/29/24 1912   LIPASE 3*     TSH:   Recent Labs   Lab 05/29/24 2109   TSH 4.597*     Urine Studies:   Recent Labs   Lab 05/29/24 2012   COLORU Yellow   APPEARANCEUA Hazy*   PHUR 6.0   SPECGRAV 1.015   PROTEINUA 3+*   GLUCUA Negative   KETONESU Negative   BILIRUBINUA Negative   OCCULTUA 3+*   NITRITE Negative   UROBILINOGEN Negative   LEUKOCYTESUR 3+*   RBCUA >100*   WBCUA 74*   BACTERIA Many*   SQUAMEPITHEL 0   HYALINECASTS 0       Significant Imaging: I have reviewed all pertinent imaging results/findings within the past 24 hours.    Chest xray:  IMPRESSION: No dense parenchymal consolidation, pleural effusion, or pneumothorax.      CTA:  MPRESSION: 1. Mild pulmonary edema and trace bilateral pleural effusions. 2. Findings which suggest underlying pulmonary arterial hypertension. 3. No evidence for clinically relevant pulmonary arterial filling defect.    Assessment/Plan:     * Acute hypoxemic respiratory failure  Patient with Hypoxic Respiratory failure which is Acute.  she is not on home oxygen. Supplemental oxygen was provided and noted-      .   Signs/symptoms of respiratory failure include- lethargy. Contributing diagnoses includes -mild pulmonary edema, recent surgical procedure on 5/28/24. Labs and images were reviewed. Patient Has not had a recent ABG. CTA negative for PE    Will treat underlying causes and adjust management of respiratory failure as follows-   -patient administered one dose of IV  lasix  -aggressive incentive spirometry   -duonebs PRN   -PRN oxygen    UTI (urinary tract infection)  Lithotripsy and stent placement on 5/28/24 with Dr. Mosher who placed patient on levofloxacin after procedure. Urine studies positive for infection    -IV levofloxacin daily        Essential hypertension  Chronic, uncontrolled. Latest blood pressure and vitals reviewed-     Temp:  [97 °F (36.1 °C)-98 °F (36.7 °C)]   Pulse:  [76-87]   Resp:  [14-20]   BP: (109-162)/(53-81)   SpO2:  [89 %-98 %] .   Home meds for hypertension were reviewed and noted below.   Hypertension Medications               amLODIPine (NORVASC) 2.5 MG tablet Take 2.5 mg by mouth once daily.     isosorbide dinitrate (ISORDIL) 30 MG Tab Take 30 mg by mouth once daily.             While in the hospital, will manage blood pressure as follows; Continue home antihypertensive regimen per daughter patient has not been taking daily medications    Will utilize p.r.n. blood pressure medication only if patient's blood pressure greater than 180/110 and she develops symptoms such as worsening chest pain or shortness of breath.      VTE Risk Mitigation (From admission, onward)           Ordered     enoxaparin injection 40 mg  Every 24 hours         05/30/24 0016     Reason for No Pharmacological VTE Prophylaxis  Once        Question:  Reasons:  Answer:  Physician Provided (leave comment)  Comment:  ordered separately    05/29/24 2103     IP VTE HIGH RISK PATIENT  Once         05/29/24 2103     Place sequential compression device  Until discontinued         05/29/24 2103                         On 05/30/2024, patient should be placed in hospital observation services under my care in collaboration with Dr. Zechariah Dowell MD.      Pharmacist Renal Dose Adjustment Note    Erich Encarnacion is a 89 y.o. female being treated with the medication Levaquin    Patient Data:    Vital Signs (Most Recent):  Temp: 97 °F (36.1 °C) (05/29/24 2333)  Pulse: 87 (05/29/24  2333)  Resp: 14 (05/29/24 2333)  BP: (!) 162/81 (05/29/24 2333)  SpO2: (!) 92 % (05/29/24 2333) Vital Signs (72h Range):  Temp:  [97 °F (36.1 °C)-98.1 °F (36.7 °C)]   Pulse:  [72-92]   Resp:  [12-20]   BP: (109-189)/(53-95)   SpO2:  [89 %-98 %]      Recent Labs   Lab 05/29/24 1912   CREATININE 1.3     Serum creatinine: 1.3 mg/dL 05/29/24 1912  Estimated creatinine clearance: 25 mL/min    Medication:Levaquin dose: 750mg frequency q24h will be changed to medication:Levaquin dose:750mg frequency:q48h    Pharmacist's Name: Demian Villarreal  Pharmacist's Extension: [phone]         Deirdre Godinez NP  Department of Hospital Medicine  [facility]/Surg

## 2024-05-31 ENCOUNTER — TELEPHONE (OUTPATIENT)
Dept: UROLOGY | Facility: CLINIC | Age: 89
End: 2024-05-31
Payer: MEDICARE

## 2024-05-31 NOTE — TELEPHONE ENCOUNTER
Spoke with patients daughter,. She states  removed her stents last night while using the restroom. Patients daughter states  forgot what  the strings were and pulled the entire stent out. Informed them that  sis out of the officer and I will forward the message to him. They verbalized understanding

## 2024-05-31 NOTE — TELEPHONE ENCOUNTER
Pts daughter calling in reporting had recent procedure to remove kidney stone. Stent placed which was supposed to be removed Monday but pt pulled it out today. Pt not having any symptoms or difficulty urinating since removing stent.    Dispo- call urologist within 24 hours. Caller advised and state she will call in the morning and message routed to office staff as well.  advice given. Caller VU.  Reason for Disposition   Ureteral stent accidentally came out    Additional Information   Negative: Shock suspected (e.g., cold/pale/clammy skin, too weak to stand, low BP, rapid pulse)   Negative: Sounds like a life-threatening emergency to the triager   Negative: [1] Back pain AND [2] NOT recently diagnosed with a kidney stone   Negative: [1] Flank pain (i.e., pain in the side, over the lower ribs or just below the ribs) AND [2] NOT recently diagnosed with a kidney stone   Negative: [1] Unable to urinate (or only a few drops) > 4 hours AND [2] bladder feels very full (e.g., palpable bladder or strong urge to urinate)   Negative: [1] SEVERE pain (e.g., excruciating, scale 8-10) AND [2] not improved after pain medicine   Negative: SEVERE vomiting   Negative: Fever > 103 F (39.4 C)   Negative: Severe chills (i.e., feeling extremely cold WITH shaking chills)   Negative: Patient sounds very sick or weak to the triager   Negative: [1] MODERATE pain (e.g., interferes with normal activities) AND [2] constant AND [3] lasting longer than 4 hours AND [4] NOT improved with pain medicine   Negative: Passing blood or large blood clots (i.e., size > a dime)  (Exception: Pink or tea-colored urine, flecks or small strands of blood.)   Negative: Fever > 100.4 F (38.0 C)   Negative: [1] Caller has URGENT question (includes prescribed medication questions) AND [2] triager unable to answer question   Negative: SEVERE burning or pain with passing urine (urination)   Negative: [1] MODERATE pain (e.g., interferes with normal activities) AND  [2] pain comes and goes AND [3] present > 24 hours   Negative: Pregnant    Protocols used: Kidney Stone Follow-up Call-A-[facility]

## 2024-05-31 NOTE — TELEPHONE ENCOUNTER
----- Message from Zoe Gregory sent at 5/31/2024  8:23 AM CDT -----  Contact: pt daughter rudi  Type: Needs Medical Advice  Who Called:  pt daughter rudi  Symptoms (please be specific):  pt pulled her stent out yesterday evening and just had surgery on Tuesday would like some advise  Best Call Back Number: [phone]   Additional Information: please call

## 2024-06-02 LAB — BACTERIA UR CULT: NO GROWTH

## 2024-06-03 NOTE — DISCHARGE SUMMARY
[facility] Medicine  Discharge Summary      Patient Name: Erich Encarnacion  MRN: [id number]  VIANNEY: [id number]  Patient Class: IP- Inpatient  Admission Date: 5/29/2024  Hospital Length of Stay: 1 days  Discharge Date and Time: 5/30/2024  2:10 PM  Attending Physician: Kaitlin att. providers found   Discharging Provider: Zoe Ponce NP  Primary Care Provider: Se Rios MD    Primary Care Team: Networked reference to record PCT     HPI:   Erich Encarnacion is a 89 year old female with a previous medical history of macular degeneration, hypothyroidism, migraines, and left staghorn kidney stone with removal and stent placement on 5/28/24 who presented to the ED for shortness of breath and oxygen saturation at home in 90's. Patient underwent Successful laser lithotripsy of a large left staghorn calculus with basket extraction and stent placement with Dr. Mosher on 5/28/24 and discharged the same day with PO levofloxacin and norco pain medication. Upon arriving home patient was given one pain pill by daughter on evening of 5/28 and slept with out waking to take daily meds or use incentive spirometer.  Per daughter the patient slept most of the day and  awoke with shortness of breath that is worse when lying flat. Patient requiring supplemental oxygen to maintain oxygen saturation and does not use oxygen at home. ED evaluation showed elevated WBC, BNP and D-dimer. CTA negative for PE and shows mild pulmonary edema. Patient admitted by hospital medicine for further evaluation and management    * No surgery found *      Hospital Course:   Patient monitored closely during hospitalization. She was noted to have fatigue post urological procedure. Daughter states she took a pain pill and was sleepy for days. She did not start taking oral abx prescribed post procedure. PT consulted. Sit to Stand:  contact guard assistance with rolling walker Gait: x 150 feet rW CGA. She is AAO and eating well.  She is medically stabe for Dc from urological standpoint with outpatient follow as scheduled. Instructed to DC narcotics and continue abx as prescribed.          Goals of Care Treatment Preferences:  Code Status: DNR      Consults:     No new Assessment & Plan notes have been filed under this hospital service since the last note was generated.  Service: Hospital Medicine    Final Active Diagnoses:    Diagnosis Date Noted POA    PRINCIPAL PROBLEM:  Acute hypoxemic respiratory failure [J96.01] 02/26/2023 Yes    UTI (urinary tract infection) [N39.0] 05/30/2024 Yes    Essential hypertension [I10] 10/17/2021 Yes      Problems Resolved During this Admission:       Discharged Condition: stable    [place] [facility] Up:   Follow-up Information       Se Rios MD. Go on 6/3/2024.    Specialty: Family Medicine  Why: Hospital follow up 1:00pm  this appointment is with another provider in the office.  Contact information:  [address] [facility]  [place] [address]  [phone]               Juhi Mosher Jr., MD Follow up.    Specialty: Urology  Why: Office to contact you to schedule appointment.  Contact information:  [address] [place] [address]  [address]  [place] [address]  [phone]               [facility], [facility] Follow up.    Specialty: Home Health Services  Contact information:  [address] [facility] [address]  [facility] [address] [phone]                           Patient Instructions:      Diet Cardiac     Notify your health care provider if you experience any of the following:  redness, tenderness, or signs of infection (pain, swelling, redness, odor or green/yellow discharge around incision site)     Notify your health care provider if you experience any of the following:  temperature >100.4     Notify your health care provider if you experience any of the following:  persistent nausea and vomiting or diarrhea     SUBSEQUENT HOME HEALTH ORDERS   Order Comments: Continue HH orders.   CBC  and BMP weekly x 1.  Send to PCP     Order Specific Question Answer Comments   What Home Health Agency is the patient currently using? Other/External      Activity as tolerated       Significant Diagnostic Studies: Labs: All labs within the past 24 hours have been reviewed    Pending Diagnostic Studies:       None           Medications:  Reconciled Home Medications:      Medication List        CONTINUE taking these medications      acetaminophen 160 mg/5 mL Liqd  Commonly known as: TYLENOL  Take 15.6 mLs (499.2 mg total) by mouth every 6 (six) hours as needed (pain).     amLODIPine 2.5 MG tablet  Commonly known as: NORVASC  Take 2.5 mg by mouth once daily.     aspirin 81 MG EC tablet  Commonly known as: ECOTRIN  Take 81 mg by mouth every 12 (twelve) hours.     isosorbide dinitrate 30 MG Tab  Commonly known as: ISORDIL  Take 30 mg by mouth once daily.     levothyroxine 25 MCG tablet  Commonly known as: SYNTHROID  Take 25 mcg by mouth every morning.     magnesium 250 mg Tab  Take 1 tablet by mouth once daily.     meclizine 12.5 mg tablet  Commonly known as: ANTIVERT  Take 12.5 mg by mouth daily as needed for Dizziness.     polyethylene glycol 17 gram/dose powder  Commonly known as: GLYCOLAX  Take 17 g by mouth Daily.     PRESERVISION AREDS ORAL  Take 1 tablet by mouth once daily.     topiramate 25 MG tablet  Commonly known as: TOPAMAX  Take 25 mg by mouth once daily.     zolpidem 10 mg Tab  Commonly known as: AMBIEN  Take 1 tablet (10 mg total) by mouth nightly as needed (insomnia).            STOP taking these medications      HYDROcodone-acetaminophen 5-325 mg per tablet  Commonly known as: NORCO            ASK your doctor about these medications      levoFLOXacin 750 MG tablet  Commonly known as: LEVAQUIN  Take 1 tablet (750 mg total) by mouth once daily. for 5 days  Ask about: Should I take this medication?              Indwelling Lines/Drains at time of discharge:   Lines/Drains/Airways       None                    Time spent on the discharge of patient: 45 minutes         Zoe Ponce NP  Department of Hospital Medicine  [facility]/Surg

## 2024-06-03 NOTE — HOSPITAL COURSE
Patient monitored closely during hospitalization. She was noted to have fatigue post urological procedure. Daughter states she took a pain pill and was sleepy for days. She did not start taking oral abx prescribed post procedure. PT consulted. Sit to Stand:  contact guard assistance with rolling walker Gait: x 150 feet rW CGA. She is AAO and eating well. She is medically stabe for Dc from urological standpoint with outpatient follow as scheduled. Instructed to DC narcotics and continue abx as prescribed.

## 2024-06-04 ENCOUNTER — PATIENT OUTREACH (OUTPATIENT)
Dept: ADMINISTRATIVE | Facility: CLINIC | Age: 89
End: 2024-06-04
Payer: MEDICARE

## 2024-06-04 LAB
BACTERIA BLD CULT: NORMAL
BACTERIA BLD CULT: NORMAL
OHS QRS DURATION: 86 MS
OHS QTC CALCULATION: 454 MS

## 2024-06-04 RX ORDER — PANTOPRAZOLE SODIUM 40 MG/1
1 TABLET, DELAYED RELEASE ORAL EVERY MORNING
COMMUNITY

## 2024-06-04 RX ORDER — ATORVASTATIN CALCIUM 10 MG/1
1 TABLET, FILM COATED ORAL DAILY
COMMUNITY
Start: 2024-02-08

## 2024-06-04 NOTE — PROGRESS NOTES
C3 nurse spoke with Erich Encarnacion's daughter, Court, for a TCC post hospital discharge follow up call. The patient completed a HOSFU with Se Rios MD (PCP) on 6/3/24. She has a FU with Juhi Mosher Jr, MD (Urology) on 7/24/24 @ 9:00am.

## 2024-06-11 NOTE — PHYSICIAN QUERY
Due to the conflicting clinical picture, please clinically validate the diagnosis of Acute Respiratory Failure:   The respiratory condition is confirmed. Additional clinical support/decision making indicators for the diagnosis include (please specify): acute hypoxic respiratory failure from atelectasis

## 2024-06-27 ENCOUNTER — HOSPITAL ENCOUNTER (OUTPATIENT)
Facility: HOSPITAL | Age: 89
Discharge: HOME HEALTH | End: 2024-06-28
Attending: EMERGENCY MEDICINE | Admitting: INTERNAL MEDICINE
Payer: MEDICARE

## 2024-06-27 DIAGNOSIS — R79.89 ELEVATED TROPONIN: ICD-10-CM

## 2024-06-27 DIAGNOSIS — R79.89 ELEVATED BRAIN NATRIURETIC PEPTIDE (BNP) LEVEL: ICD-10-CM

## 2024-06-27 DIAGNOSIS — R07.9 CHEST PAIN: ICD-10-CM
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
## 2024-06-27 DIAGNOSIS — S20.212A CONTUSION OF RIB ON LEFT SIDE, INITIAL ENCOUNTER: ICD-10-CM

## 2024-06-27 DIAGNOSIS — M54.6 ACUTE MIDLINE THORACIC BACK PAIN: ICD-10-CM

## 2024-06-27 DIAGNOSIS — M54.50 ACUTE MIDLINE LOW BACK PAIN WITHOUT SCIATICA: ICD-10-CM

## 2024-06-27 LAB
ALBUMIN SERPL BCP-MCNC: 4.1 G/DL (ref 3.5–5.2)
ALP SERPL-CCNC: 88 U/L (ref 55–135)
ALT SERPL W/O P-5'-P-CCNC: 9 U/L (ref 10–44)
ANION GAP SERPL CALC-SCNC: 10 MMOL/L (ref 8–16)
AST SERPL-CCNC: 15 U/L (ref 10–40)
BASOPHILS # BLD AUTO: 0.05 K/UL (ref 0–0.2)
BASOPHILS NFR BLD: 0.7 % (ref 0–1.9)
BILIRUB SERPL-MCNC: 0.9 MG/DL (ref 0.1–1)
BNP SERPL-MCNC: 475 PG/ML (ref 0–99)
BUN SERPL-MCNC: 19 MG/DL (ref 10–30)
CALCIUM SERPL-MCNC: 9.2 MG/DL (ref 8.7–10.5)
CHLORIDE SERPL-SCNC: 110 MMOL/L (ref 95–110)
CO2 SERPL-SCNC: 22 MMOL/L (ref 23–29)
CREAT SERPL-MCNC: 0.9 MG/DL (ref 0.5–1.4)
DIFFERENTIAL METHOD BLD: ABNORMAL
EOSINOPHIL # BLD AUTO: 0.1 K/UL (ref 0–0.5)
EOSINOPHIL NFR BLD: 0.9 % (ref 0–8)
ERYTHROCYTE [DISTWIDTH] IN BLOOD BY AUTOMATED COUNT: 13.8 % (ref 11.5–14.5)
EST. GFR  (NO RACE VARIABLE): 58.1 ML/MIN/1.73 M^2
GLUCOSE SERPL-MCNC: 126 MG/DL (ref 70–110)
HCT VFR BLD AUTO: 38.3 % (ref 37–48.5)
HGB BLD-MCNC: 12.4 G/DL (ref 12–16)
IMM GRANULOCYTES # BLD AUTO: 0.02 K/UL (ref 0–0.04)
IMM GRANULOCYTES NFR BLD AUTO: 0.3 % (ref 0–0.5)
LYMPHOCYTES # BLD AUTO: 1.3 K/UL (ref 1–4.8)
LYMPHOCYTES NFR BLD: 18.7 % (ref 18–48)
MAGNESIUM SERPL-MCNC: 2.2 MG/DL (ref 1.6–2.6)
MCH RBC QN AUTO: 29.4 PG (ref 27–31)
MCHC RBC AUTO-ENTMCNC: 32.4 G/DL (ref 32–36)
MCV RBC AUTO: 91 FL (ref 82–98)
MONOCYTES # BLD AUTO: 0.4 K/UL (ref 0.3–1)
MONOCYTES NFR BLD: 5.5 % (ref 4–15)
NEUTROPHILS # BLD AUTO: 5.1 K/UL (ref 1.8–7.7)
NEUTROPHILS NFR BLD: 73.9 % (ref 38–73)
NRBC BLD-RTO: 0 /100 WBC
PLATELET # BLD AUTO: 149 K/UL (ref 150–450)
PMV BLD AUTO: 10.8 FL (ref 9.2–12.9)
POTASSIUM SERPL-SCNC: 3.4 MMOL/L (ref 3.5–5.1)
PROT SERPL-MCNC: 6.9 G/DL (ref 6–8.4)
RBC # BLD AUTO: 4.22 M/UL (ref 4–5.4)
SODIUM SERPL-SCNC: 142 MMOL/L (ref 136–145)
TROPONIN I SERPL HS-MCNC: 27.4 PG/ML (ref 0–14.9)
TROPONIN I SERPL HS-MCNC: 36.6 PG/ML (ref 0–14.9)
TROPONIN I SERPL HS-MCNC: 42.2 PG/ML (ref 0–14.9)
WBC # BLD AUTO: 6.95 K/UL (ref 3.9–12.7)

## 2024-06-27 PROCEDURE — 63600175 PHARM REV CODE 636 W HCPCS: Performed by: STUDENT IN AN ORGANIZED HEALTH CARE EDUCATION/TRAINING PROGRAM

## 2024-06-27 PROCEDURE — 25000003 PHARM REV CODE 250

## 2024-06-27 PROCEDURE — 63600175 PHARM REV CODE 636 W HCPCS: Performed by: INTERNAL MEDICINE

## 2024-06-27 PROCEDURE — G0378 HOSPITAL OBSERVATION PER HR: HCPCS

## 2024-06-27 PROCEDURE — 83735 ASSAY OF MAGNESIUM: CPT

## 2024-06-27 PROCEDURE — 96372 THER/PROPH/DIAG INJ SC/IM: CPT | Performed by: INTERNAL MEDICINE

## 2024-06-27 PROCEDURE — 85025 COMPLETE CBC W/AUTO DIFF WBC: CPT

## 2024-06-27 PROCEDURE — 93005 ELECTROCARDIOGRAM TRACING: CPT | Performed by: INTERNAL MEDICINE

## 2024-06-27 PROCEDURE — 25000003 PHARM REV CODE 250: Performed by: INTERNAL MEDICINE

## 2024-06-27 PROCEDURE — 84484 ASSAY OF TROPONIN QUANT: CPT | Mod: 91 | Performed by: INTERNAL MEDICINE

## 2024-06-27 PROCEDURE — 99285 EMERGENCY DEPT VISIT HI MDM: CPT | Mod: 25

## 2024-06-27 PROCEDURE — 93010 ELECTROCARDIOGRAM REPORT: CPT | Mod: ,,, | Performed by: INTERNAL MEDICINE

## 2024-06-27 PROCEDURE — 80201 ASSAY OF TOPIRAMATE: CPT | Performed by: INTERNAL MEDICINE

## 2024-06-27 PROCEDURE — 84484 ASSAY OF TROPONIN QUANT: CPT

## 2024-06-27 PROCEDURE — 25000003 PHARM REV CODE 250: Performed by: STUDENT IN AN ORGANIZED HEALTH CARE EDUCATION/TRAINING PROGRAM

## 2024-06-27 PROCEDURE — 96374 THER/PROPH/DIAG INJ IV PUSH: CPT

## 2024-06-27 PROCEDURE — 83880 ASSAY OF NATRIURETIC PEPTIDE: CPT

## 2024-06-27 PROCEDURE — 36415 COLL VENOUS BLD VENIPUNCTURE: CPT | Performed by: INTERNAL MEDICINE

## 2024-06-27 PROCEDURE — 80053 COMPREHEN METABOLIC PANEL: CPT

## 2024-06-27 RX ORDER — ACETAMINOPHEN 325 MG/1
650 TABLET ORAL EVERY 8 HOURS PRN
Status: DISCONTINUED | OUTPATIENT
Start: 2024-06-27 | End: 2024-06-28 | Stop reason: HOSPADM

## 2024-06-27 RX ORDER — POTASSIUM CHLORIDE 7.45 MG/ML
40 INJECTION INTRAVENOUS
Status: DISCONTINUED | OUTPATIENT
Start: 2024-06-27 | End: 2024-06-28 | Stop reason: HOSPADM

## 2024-06-27 RX ORDER — ZOLPIDEM TARTRATE 5 MG/1
5 TABLET ORAL NIGHTLY PRN
Status: DISCONTINUED | OUTPATIENT
Start: 2024-06-27 | End: 2024-06-28 | Stop reason: HOSPADM

## 2024-06-27 RX ORDER — ACETAMINOPHEN 500 MG
1000 TABLET ORAL
Status: COMPLETED | OUTPATIENT
Start: 2024-06-27 | End: 2024-06-27

## 2024-06-27 RX ORDER — ALUMINUM HYDROXIDE, MAGNESIUM HYDROXIDE, AND SIMETHICONE 1200; 120; 1200 MG/30ML; MG/30ML; MG/30ML
30 SUSPENSION ORAL 4 TIMES DAILY PRN
Status: DISCONTINUED | OUTPATIENT
Start: 2024-06-27 | End: 2024-06-28 | Stop reason: HOSPADM

## 2024-06-27 RX ORDER — TOPIRAMATE 25 MG/1
25 TABLET ORAL DAILY
Status: DISCONTINUED | OUTPATIENT
Start: 2024-06-27 | End: 2024-06-28 | Stop reason: HOSPADM

## 2024-06-27 RX ORDER — ISOSORBIDE DINITRATE 10 MG/1
30 TABLET ORAL DAILY
Status: DISCONTINUED | OUTPATIENT
Start: 2024-06-27 | End: 2024-06-28 | Stop reason: HOSPADM

## 2024-06-27 RX ORDER — ACETAMINOPHEN 325 MG/1
650 TABLET ORAL EVERY 4 HOURS PRN
Status: DISCONTINUED | OUTPATIENT
Start: 2024-06-27 | End: 2024-06-28 | Stop reason: HOSPADM

## 2024-06-27 RX ORDER — ASPIRIN 81 MG/1
162 TABLET ORAL DAILY
Status: DISCONTINUED | OUTPATIENT
Start: 2024-06-27 | End: 2024-06-28 | Stop reason: HOSPADM

## 2024-06-27 RX ORDER — SODIUM,POTASSIUM PHOSPHATES 280-250MG
2 POWDER IN PACKET (EA) ORAL
Status: DISCONTINUED | OUTPATIENT
Start: 2024-06-27 | End: 2024-06-28 | Stop reason: HOSPADM

## 2024-06-27 RX ORDER — LANOLIN ALCOHOL/MO/W.PET/CERES
800 CREAM (GRAM) TOPICAL
Status: DISCONTINUED | OUTPATIENT
Start: 2024-06-27 | End: 2024-06-28 | Stop reason: HOSPADM

## 2024-06-27 RX ORDER — ONDANSETRON HYDROCHLORIDE 2 MG/ML
4 INJECTION, SOLUTION INTRAVENOUS EVERY 6 HOURS PRN
Status: DISCONTINUED | OUTPATIENT
Start: 2024-06-27 | End: 2024-06-28 | Stop reason: HOSPADM

## 2024-06-27 RX ORDER — NALOXONE HCL 0.4 MG/ML
0.02 VIAL (ML) INJECTION
Status: DISCONTINUED | OUTPATIENT
Start: 2024-06-27 | End: 2024-06-28 | Stop reason: HOSPADM

## 2024-06-27 RX ORDER — HYDROCODONE BITARTRATE AND ACETAMINOPHEN 5; 325 MG/1; MG/1
1 TABLET ORAL EVERY 6 HOURS PRN
Status: DISCONTINUED | OUTPATIENT
Start: 2024-06-27 | End: 2024-06-28 | Stop reason: HOSPADM

## 2024-06-27 RX ORDER — TALC
6 POWDER (GRAM) TOPICAL NIGHTLY PRN
Status: DISCONTINUED | OUTPATIENT
Start: 2024-06-27 | End: 2024-06-27

## 2024-06-27 RX ORDER — ENOXAPARIN SODIUM 100 MG/ML
40 INJECTION SUBCUTANEOUS EVERY 24 HOURS
Status: DISCONTINUED | OUTPATIENT
Start: 2024-06-27 | End: 2024-06-28 | Stop reason: HOSPADM

## 2024-06-27 RX ORDER — ACETAMINOPHEN 325 MG/1
0.5 TABLET ORAL EVERY 6 HOURS PRN
Status: ON HOLD | COMMUNITY
End: 2024-07-05 | Stop reason: HOSPADM

## 2024-06-27 RX ORDER — AMLODIPINE BESYLATE 2.5 MG/1
2.5 TABLET ORAL DAILY
Status: DISCONTINUED | OUTPATIENT
Start: 2024-06-27 | End: 2024-06-27

## 2024-06-27 RX ORDER — PANTOPRAZOLE SODIUM 40 MG/1
40 TABLET, DELAYED RELEASE ORAL DAILY PRN
Status: DISCONTINUED | OUTPATIENT
Start: 2024-06-27 | End: 2024-06-28 | Stop reason: HOSPADM

## 2024-06-27 RX ORDER — LIDOCAINE 50 MG/G
1 PATCH TOPICAL
Status: DISCONTINUED | OUTPATIENT
Start: 2024-06-27 | End: 2024-06-28 | Stop reason: HOSPADM

## 2024-06-27 RX ORDER — ZOLPIDEM TARTRATE 5 MG/1
5 TABLET ORAL NIGHTLY PRN
Status: DISCONTINUED | OUTPATIENT
Start: 2024-06-27 | End: 2024-06-27

## 2024-06-27 RX ADMIN — ENOXAPARIN SODIUM 40 MG: 40 INJECTION SUBCUTANEOUS at 05:06

## 2024-06-27 RX ADMIN — TOPIRAMATE 25 MG: 25 TABLET, FILM COATED ORAL at 03:06

## 2024-06-27 RX ADMIN — LIDOCAINE 5% 1 PATCH: 700 PATCH TOPICAL at 09:06

## 2024-06-27 RX ADMIN — POTASSIUM BICARBONATE 35 MEQ: 977.5 TABLET, EFFERVESCENT ORAL at 05:06

## 2024-06-27 RX ADMIN — HYDROCODONE BITARTRATE AND ACETAMINOPHEN 1 TABLET: 5; 325 TABLET ORAL at 09:06

## 2024-06-27 RX ADMIN — HYDROCODONE BITARTRATE AND ACETAMINOPHEN 1 TABLET: 5; 325 TABLET ORAL at 03:06

## 2024-06-27 RX ADMIN — ACETAMINOPHEN 500 MG: 500 TABLET, FILM COATED ORAL at 09:06

## 2024-06-27 RX ADMIN — ASPIRIN 162 MG: 81 TABLET, COATED ORAL at 03:06

## 2024-06-27 RX ADMIN — ZOLPIDEM TARTRATE 5 MG: 5 TABLET, COATED ORAL at 09:06

## 2024-06-27 RX ADMIN — ISOSORBIDE DINITRATE 30 MG: 10 TABLET ORAL at 03:06

## 2024-06-27 RX ADMIN — POTASSIUM CHLORIDE 40 MEQ: 7.46 INJECTION, SOLUTION INTRAVENOUS at 08:06

## 2024-06-27 NOTE — ED PROVIDER NOTES
Encounter Date: 6/27/2024       History     Chief Complaint   Patient presents with    Fall     States she fell of of bed, denies hitting head or LOC, c/o back pain     Patient is a 89 y.o. female with past medical history of high blood pressure, macular degeneration, headaches, reflux, hypothyroidism, TIA, and kidney stones who presents to ED via family for concern for fall which began last night.  Patient reports she was sleeping in her bed and when she woke up she was on the floor around 2:00 a.m. with her head by the door and her feet under the bed.  Patient reports she hit her head on the door.  Patient denies loss of consciousness or vomiting. patient denies chest pain or shortness of breath.  Patient denies abdominal pain.  Patient reports she was having severe pain in her back.  Patient also endorses pain in her left rib and left hip.  Patient denies taking a blood thinner.  Patient reports she has a headache.  Patient reports she was able to get up and get into the bed by herself.  Patient's daughter went over to the house this morning and brought her to the hospital.  Patient is awake and alert.      Review of patient's allergies indicates:  No Known Allergies  Past Medical History:   Diagnosis Date    Macula lutea degeneration     Squamous cell carcinoma of skin      Past Surgical History:   Procedure Laterality Date    APPENDECTOMY      BACK SURGERY  1975    HYSTERECTOMY      URETEROSCOPIC REMOVAL OF URETERIC CALCULUS Left 5/28/2024    Procedure: REMOVAL, CALCULUS, URETER, URETEROSCOPIC;  Surgeon: Juhi Mosher Jr., MD;  Location: [facility];  Service: Urology;  Laterality: Left;     Family History   Problem Relation Name Age of Onset    Melanoma Neg Hx      Psoriasis Neg Hx      Lupus Neg Hx      Eczema Neg Hx       Social History     Tobacco Use    Smoking status: Former     Current packs/day: 1.00     Average packs/day: 1 pack/day for 20.0 years (20.0 ttl pk-yrs)     Types: Cigarettes    Smokeless  tobacco: Never   Substance Use Topics    Alcohol use: No    Drug use: No     Review of Systems   Constitutional: Negative.  Negative for fever.   HENT: Negative.  Negative for sore throat, trouble swallowing and voice change.    Respiratory: Negative.  Negative for shortness of breath.    Cardiovascular: Negative.  Negative for chest pain.   Gastrointestinal: Negative.  Negative for abdominal pain, nausea and vomiting.   Genitourinary: Negative.    Musculoskeletal:  Positive for back pain and neck pain. Negative for neck stiffness.   Skin: Negative.  Negative for color change, pallor and rash.   Neurological:  Positive for headaches. Negative for dizziness, tremors, seizures, syncope, facial asymmetry, speech difficulty, weakness, light-headedness and numbness.   Hematological:  Does not bruise/bleed easily.   Psychiatric/Behavioral: Negative.         Physical Exam     Initial Vitals [06/27/24 0711]   BP Pulse Resp Temp SpO2   (!) 179/115 84 16 97.8 °F (36.6 °C) (!) 94 %      MAP       --         Physical Exam    Nursing note and vitals reviewed.  Constitutional: She appears well-developed and well-nourished. She is not diaphoretic. No distress.   HENT:   Head: Normocephalic and atraumatic.   Right Ear: External ear normal.   Left Ear: External ear normal.   Nose: Nose normal.   Eyes: Conjunctivae and EOM are normal.   Neck: There are no signs of injury.   Normal range of motion.  Cardiovascular:  Normal rate, regular rhythm, normal heart sounds and intact distal pulses.     Exam reveals no gallop and no friction rub.       No murmur heard.  Pulmonary/Chest: Breath sounds normal. No respiratory distress. She has no decreased breath sounds. She has no wheezes. She has no rhonchi. She has no rales. She exhibits bony tenderness. She exhibits no tenderness.     Abdominal: Abdomen is soft. Bowel sounds are normal. She exhibits no distension and no mass. There is no abdominal tenderness. There is no rebound and no  guarding.   Musculoskeletal:      Cervical back: Normal range of motion. Bony tenderness present. No swelling, edema, deformity, erythema, signs of trauma, lacerations, rigidity, spasms or torticollis. Normal range of motion.      Thoracic back: Tenderness and bony tenderness present. No swelling, edema, deformity, signs of trauma or lacerations. Normal range of motion.      Lumbar back: Bony tenderness present. No swelling, edema, deformity, signs of trauma, lacerations or spasms.      Left hip: Tenderness and bony tenderness present. No deformity or crepitus. Normal range of motion.      Left upper leg: Normal.      Left knee: Normal.      Left lower leg: Normal.      Left ankle: Normal.      Left foot: Normal.     Neurological: She is alert and oriented to person, place, and time. She has normal strength. GCS score is 15. GCS eye subscore is 4. GCS verbal subscore is 5. GCS motor subscore is 6.   Skin: Skin is warm and dry. Capillary refill takes less than 2 seconds.   Psychiatric: She has a normal mood and affect. Her behavior is normal. Judgment and thought content normal.         ED Course   Procedures  Labs Reviewed   CBC W/ AUTO DIFFERENTIAL - Abnormal; Notable for the following components:       Result Value    Platelets 149 (*)     Gran % 73.9 (*)     All other components within normal limits   COMPREHENSIVE METABOLIC PANEL - Abnormal; Notable for the following components:    Potassium 3.4 (*)     CO2 22 (*)     Glucose 126 (*)     ALT 9 (*)     eGFR 58.1 (*)     All other components within normal limits   TROPONIN I HIGH SENSITIVITY - Abnormal; Notable for the following components:    Troponin I High Sensitivity 27.4 (*)     All other components within normal limits   B-TYPE NATRIURETIC PEPTIDE - Abnormal; Notable for the following components:     (*)     All other components within normal limits   TROPONIN I HIGH SENSITIVITY - Abnormal; Notable for the following components:    Troponin I High  Sensitivity 36.6 (*)     All other components within normal limits   MAGNESIUM   TROPONIN I HIGH SENSITIVITY   TOPIRAMATE LEVEL   TROPONIN I HIGH SENSITIVITY        ECG Results              EKG 12-lead (In process)        Collection Time Result Time QRS Duration OHS QTC Calculation    06/27/24 08:52:13 06/27/24 09:06:31 92 454                     In process by Interface, Lab In [facility] (06/27/24 09:06:36)                   Narrative:    Test Reason : R07.9,    Vent. Rate : 079 BPM     Atrial Rate : 079 BPM     P-R Int : 198 ms          QRS Dur : 092 ms      QT Int : 396 ms       P-R-T Axes : 040 -40 021 degrees     QTc Int : 454 ms    Normal sinus rhythm  Left axis deviation  Minimal voltage criteria for LVH, may be normal variant  Abnormal ECG  When compared with ECG of 29-MAY-2024 20:05,  No significant change was found    Referred By: LIA GUZMAN           Confirmed By:                                   Imaging Results              XR Ribs Min 3 views w/PA Chest Left (Final result)  Result time 06/27/24 10:00:29   Procedure changed from X-Ray Ribs 2 View Left     Final result by Mehnaz Lin MD (06/27/24 10:00:29)                   Impression:      Negative left ribs    Mild cardiomegaly    Degenerative changes of the thoracic spine      Electronically signed by: Mehnaz Lin  Date:    06/27/2024  Time:    10:00               Narrative:    EXAMINATION:  XR RIBS MIN 3 VIEWS W/ PA CHEST LEFT    CLINICAL HISTORY:  fall;    FINDINGS:  The heart is mildly enlarged.  There is vascular calcification of the aorta.  The lungs are clear.    Multiple views of the left ribs show no fractures or acute osseous abnormalities.  There are degenerative changes of the lower thoracic spine.                                       X-Ray Hip 2 or 3 views Left with Pelvis when performed (Final result)  Result time 06/27/24 09:59:17      Final result by Mehnaz Lin MD (06/27/24 09:59:17)                    Impression:      Mild joint space narrowing of both hips with no acute osseous abnormality    Degenerative changes of the lower lumbar spine      Electronically signed by: Mehnaz Lin  Date:    06/27/2024  Time:    09:59               Narrative:    CLINICAL HISTORY:  (MRN [id number])89 y/o  (10/2/1926) F    Unspecified fall, initial encounter    TECHNIQUE:  (A# [id number], Exam time 6/27/2024 9:55)    [id number] XR HIP WITH PELVIS WHEN PERFORMED 2 OR 3 VIEWS LEFT  view(s) obtained.    COMPARISON:  11/20/2020    FINDINGS:  AP pelvis and two views of the left hip show no fractures, dislocations or acute osseous abnormalities.  There is mild joint space narrowing of both hips.  The SI joints are congruent.  The soft tissues are normal.  There are degenerative changes of the lower lumbar spine.                                       CT Lumbar Spine Without Contrast (Final result)  Result time 06/27/24 10:15:43      Final result by Cyrus Bejarano MD (06/27/24 10:15:43)                   Impression:      1. Chronic findings as above.  No acute CT abnormalities.      Electronically signed by: Cyrus Bejarano  Date:    06/27/2024  Time:    10:15               Narrative:    EXAMINATION:  CT LUMBAR SPINE WITHOUT CONTRAST    CLINICAL HISTORY:  Low back pain, trauma;    COMPARISON:  CT abdomen and pelvis April 2024    FINDINGS:  Thin-section axial images were obtained, with reformatted imaging in the sagittal and coronal planes.    There is a transitional lumbosacral segment with unilateral right-sided pseudoarthrosis.  For the purposes of this examination, the transitional segment is designated as S1, with rudimentary ribs at L1.    Moderate inferior endplate compression fracture of L3 is chronic and unchanged.  There is no evidence of acute fracture or subluxation.  Post laminectomy changes are noted in the lower lumbar spine.    Changes of multilevel lumbar degenerative disc and facet disease are noted.  Findings are most  pronounced at the L4-5 level, where bilateral facet hypertrophy and broad-based disc bulge combine to result in probable moderate spinal stenosis.                                       CT Thoracic Spine Without Contrast (Final result)  Result time 06/27/24 10:09:11      Final result by Cyrus Bejarano MD (06/27/24 10:09:11)                   Impression:      1. Chronic findings as above.  No evidence of acute thoracic fracture or subluxation.      Electronically signed by: Cyrus Bejarano  Date:    06/27/2024  Time:    10:09               Narrative:    EXAMINATION:  CT THORACIC SPINE WITHOUT CONTRAST    CLINICAL HISTORY:  Mid-back pain, compression fracture suspected;    COMPARISON:  February 2023    FINDINGS:  Thin-section axial images were obtained, with reformatted imaging in the sagittal and coronal planes.    There is no evidence of acute thoracic fracture or subluxation.  No osseous destructive lesion is identified.    There is moderate multilevel intervertebral disc space narrowing compatible with degenerative disc disease.  Paraspinous soft tissues are unremarkable.    Incidentally noted is dense multifocal coronary artery atherosclerotic calcification.                                       CT Cervical Spine Without Contrast (Final result)  Result time 06/27/24 10:00:11      Final result by Cyrus Bejarano MD (06/27/24 10:00:11)                   Impression:      .      Electronically signed by: Cyrus Bejarano  Date:    06/27/2024  Time:    10:00               Narrative:    EXAMINATION:  CT CERVICAL SPINE WITHOUT CONTRAST    CLINICAL HISTORY:  Neck trauma (Age >= 65y);.    TECHNIQUE:  Thin axial imaging was performed without contrast, with sagittal and coronal reformatted images reviewed.    COMPARISON:  February 2023 there is no evidence of acute cervical fracture.  3 mm C4 anterolisthesis is chronic and unchanged, with normal alignment at all other levels.  Prevertebral soft tissues are  normal.  The odontoid is intact.    Changes of multilevel cervical degenerative disc and moderate to severe degenerative facet disease are noted.  There is resultant significant multilevel foraminal stenosis.  Specifically, there is severe narrowing of the right C4 foramen, with moderate narrowing of the left C6 and right C7 foramina.    FINDINGS:  1. No acute CT abnormalities.  2. Multilevel cervical degenerative disc/facet disease.  3. Minimal C4 anterolisthesis, chronic and unchanged.                                       CT Head Without Contrast (Final result)  Result time 06/27/24 09:51:00      Final result by Cyrus Bejarano MD (06/27/24 09:51:00)                   Impression:      No acute intracranial abnormalities. Chronic findings as discussed above.      Electronically signed by: Cyrus Bejarano  Date:    06/27/2024  Time:    09:51               Narrative:    EXAMINATION:  CT HEAD WITHOUT CONTRAST    CLINICAL HISTORY:  Head trauma, minor (Age >= 65y);.    TECHNIQUE:  Noninfusion images were obtained from the skull base to the vertex.    All CT scans at this facility utilize dose modulation, iterative reconstruction, and/or weight based dosing when appropriate to reduce radiation dose to as low as reasonably achievable    CMS MANDATED QUALITY DATA - CT RADIATION - 436    COMPARISON:  February 2024    FINDINGS:  There is no intracranial mass, hemorrhage, or midline shift.  Ventricles are within normal limits.  The sulci and extra-axial CSF spaces are prominent compatible with atrophy, stable.    There is no evidence of cortical ischemic change. Changes of mild-moderate chronic microvascular white matter disease are noted, stable.  Cerebellum and brainstem are normal. The calvarium is intact.  Small mucous retention cyst or polyp is noted in the left maxillary sinus, with mild mucoperiosteal thickening demonstrated in both maxillary sinuses.                                       Medications   aspirin  EC tablet 162 mg (162 mg Oral Given 6/27/24 1529)   isosorbide dinitrate tablet 30 mg (30 mg Oral Given 6/27/24 1532)   pantoprazole EC tablet 40 mg (has no administration in time range)   topiramate tablet 25 mg (25 mg Oral Given 6/27/24 1533)   ondansetron injection 4 mg (has no administration in time range)   acetaminophen tablet 650 mg (has no administration in time range)   aluminum-magnesium hydroxide-simethicone 200-200-20 mg/5 mL suspension 30 mL (has no administration in time range)   acetaminophen tablet 650 mg (has no administration in time range)   HYDROcodone-acetaminophen 5-325 mg per tablet 1 tablet (1 tablet Oral Given 6/27/24 1531)   naloxone 0.4 mg/mL injection 0.02 mg (has no administration in time range)   potassium bicarbonate disintegrating tablet 50 mEq (has no administration in time range)   potassium bicarbonate disintegrating tablet 35 mEq (35 mEq Oral Given 6/27/24 1724)   potassium bicarbonate disintegrating tablet 60 mEq (has no administration in time range)   magnesium oxide tablet 800 mg (has no administration in time range)   magnesium oxide tablet 800 mg (has no administration in time range)   potassium, sodium phosphates 280-160-250 mg packet 2 packet (has no administration in time range)   potassium, sodium phosphates 280-160-250 mg packet 2 packet (has no administration in time range)   potassium, sodium phosphates 280-160-250 mg packet 2 packet (has no administration in time range)   enoxaparin injection 40 mg (40 mg Subcutaneous Given 6/27/24 1703)   zolpidem tablet 5 mg (has no administration in time range)   acetaminophen tablet 1,000 mg (500 mg Oral Given 6/27/24 0913)     Medical Decision Making  MDM    Patient presents for emergent evaluation of acute fall that poses a possible threat to life and/or bodily function.    Differential diagnosis included but not limited to fracture, dislocation, sprain, subluxation, concussion, intracranial bleed, skull fracture, dehydration,  cardiac equivalent, MI.  In the ED patient found to have acute clear lung sounds bilaterally no increased work of breathing.  Patient has a soft tenderness palpation.  Patient was pain to palpation of cervical spine, thoracic spine, and lumbar spine.  Patient was pain to palpation of left ribs and left hip.  Patient was unable to move very likely gout crying out in pain.  Patient was awake and alert and oriented x3.  Patient has normal strength in bilateral upper and lower extremities..      Patient was noted to have elevated BNP and troponin on lab work.  First troponin 274.  EKG reviewed with attending, EKG significant for normal sinus rhythm, ventricular rate 79 beats per minute, no signs of occlusive MI. x-rays and CTs negative for any acute fracture.  Day patient was age and elevated troponin, will admit patient was hospital for further evaluation and management.      Admit MDM  I discussed the patient presentation labs, ekg, X-rays, CT findings with my attending Dr. Anguiano.    I discussed the patient presentation labs, ekg, X-rays, CT findings with the consultant Dr. Walton (\[facility]\"" medicine).    Patient was managed in the ED with oral Tylenol.    The response to treatment was good.    Patient was admitted in stable condition.     NP uses Epic and voice recognition software prone to occasional and minor errors that may persist in the medical record.     Amount and/or Complexity of Data Reviewed  Independent Historian: caregiver     Details: Patient's daughter  Labs: ordered. Decision-making details documented in ED Course.  Radiology: ordered. Decision-making details documented in ED Course.  ECG/medicine tests: ordered and independent interpretation performed.    Risk  OTC drugs.  Decision regarding hospitalization.               ED Course as of 06/27/24 1814   Thu Jun 27, 2024   0937 Platelet Count(!): 149 [MP]   0950 Gran %(!): 73.9 [MP]   0950 Potassium(!): 3.4 [MP]   0950 CO2(!): 22 [MP]   0950 Glucose(!):  126 [MP]   0950 ALT(!): 9 [MP]   0950 eGFR(!): 58.1 [MP]   0950 BNP(!): 475 [MP]   0950 Troponin I High Sensitivity(!): 27.4 [MP]   0959 CT Head Without Contrast  Impression:     No acute intracranial abnormalities. Chronic findings as discussed above.   [MP]   1013 CT Cervical Spine Without Contrast  FINDINGS:  1. No acute CT abnormalities.  2. Multilevel cervical degenerative disc/facet disease.  3. Minimal C4 anterolisthesis, chronic and unchanged.   [MP]   1013 X-Ray Hip 2 or 3 views Left with Pelvis when performed  Impression:  Mild joint space narrowing of both hips with no acute osseous abnormality  Degenerative changes of the lower lumbar spine [MP]   1013 XR Ribs Min 3 views w/PA Chest Left  Impression:  Negative left ribs  Mild cardiomegaly  Degenerative changes of the thoracic spine [MP]   1014 CT Thoracic Spine Without Contrast  Impression: 1. Chronic findings as above.  No evidence of acute thoracic fracture or subluxation. [MP]   1020 CT Lumbar Spine Without Contrast  Impression:     1. Chronic findings as above.  No acute CT abnormalities.   [MP]      ED Course User Index  [MP] Shannon Hall NP                           Clinical Impression:  Final diagnoses:  [W19.XXXA] Fall (Primary)  [R79.89] Elevated troponin  [R79.89] Elevated brain natriuretic peptide (BNP) level  [M54.6] Acute midline thoracic back pain  [M54.50] Acute midline low back pain without sciatica  [S20.212A] Contusion of rib on left side, initial encounter          ED Disposition Condition    Observation                 Shannon Hall NP  06/27/24 8996

## 2024-06-27 NOTE — ASSESSMENT & PLAN NOTE
Chronic, controlled. Latest blood pressure and vitals reviewed-     Temp:  [97.8 °F (36.6 °C)]   Pulse:  [78-84]   Resp:  [16-25]   BP: (179-197)/()   SpO2:  [94 %-95 %] .   Home meds for hypertension were reviewed and noted below.   Hypertension Medications               amLODIPine (NORVASC) 2.5 MG tablet Take 2.5 mg by mouth once daily.    isosorbide dinitrate (ISORDIL) 30 MG Tab Take 30 mg by mouth once daily.             While in the hospital, will manage blood pressure as follows; Continue home antihypertensive regimen    Will utilize p.r.n. blood pressure medication only if patient's blood pressure greater than 140/90 and she develops symptoms such as worsening chest pain or shortness of breath.

## 2024-06-27 NOTE — H&P
[facility] - Emergency Dept  Hospital Medicine  History & Physical    Patient Name: Erich Encarnacion  MRN: [id number]  Patient Class: OP- Observation  Admission Date: 6/27/2024  Attending Physician: Philippe Walton MD   Primary Care Provider: Se Rios MD         Patient information was obtained from patient, relative(s), and ER records.     Subjective:     Principal Problem:Fall    Chief Complaint:   Chief Complaint   Patient presents with    Fall     States she fell of of bed, denies hitting head or LOC, c/o back pain        HPI: 89 year old pt getting admitted with fall and elevated troponin  Pt lives alone and is an independent person  She awakened from sleep early AM and found herself lying on the floor  Pt doesn't know , how this happened  She was able to get up and she continued sleeping back in bed  Pt hit her head and was c/o back pain when her daughter visited her today AM  Pt was brought to ER and got admitted     Past Medical History:   Diagnosis Date    Macula lutea degeneration     Squamous cell carcinoma of skin        Past Surgical History:   Procedure Laterality Date    APPENDECTOMY      BACK SURGERY  1975    HYSTERECTOMY      URETEROSCOPIC REMOVAL OF URETERIC CALCULUS Left 5/28/2024    Procedure: REMOVAL, CALCULUS, URETER, URETEROSCOPIC;  Surgeon: Juhi Mosher Jr., MD;  Location: [facility];  Service: Urology;  Laterality: Left;       Review of patient's allergies indicates:  No Known Allergies    Current Facility-Administered Medications on File Prior to Encounter   Medication    triamcinolone acetonide injection 40 mg     Current Outpatient Medications on File Prior to Encounter   Medication Sig    acetaminophen (TYLENOL) 325 MG tablet Take 0.5 tablets by mouth every 6 (six) hours as needed for Pain.    amLODIPine (NORVASC) 2.5 MG tablet Take 2.5 mg by mouth once daily.    aspirin (ECOTRIN) 81 MG EC tablet Take 162 mg by mouth once daily.    isosorbide dinitrate (ISORDIL) 30  MG Tab Take 30 mg by mouth once daily.     levothyroxine (SYNTHROID) 25 MCG tablet Take 25 mcg by mouth every morning.     meclizine (ANTIVERT) 12.5 mg tablet Take 2 tablets by mouth Daily.    pantoprazole (PROTONIX) 40 MG tablet Take 1 tablet by mouth daily as needed (Heartburn).    polyethylene glycol (GLYCOLAX) 17 gram/dose powder Take 17 g by mouth every evening.    topiramate (TOPAMAX) 25 MG tablet Take 25 mg by mouth once daily.     zolpidem (AMBIEN) 10 mg Tab Take 1 tablet (10 mg total) by mouth nightly as needed (insomnia).    [DISCONTINUED] acetaminophen (TYLENOL) 160 mg/5 mL Liqd Take 15.6 mLs (499.2 mg total) by mouth every 6 (six) hours as needed (pain).    [DISCONTINUED] atorvastatin (LIPITOR) 10 MG tablet Take 1 tablet by mouth once daily.    [DISCONTINUED] magnesium 250 mg Tab Take 1 tablet by mouth once daily.    [DISCONTINUED] vit A/vit C/vit E/zinc/copper (PRESERVISION AREDS ORAL) Take 1 tablet by mouth once daily.  (Patient not taking: Reported on 6/4/2024)     Family History    None       Tobacco Use    Smoking status: Former     Current packs/day: 1.00     Average packs/day: 1 pack/day for 20.0 years (20.0 ttl pk-yrs)     Types: Cigarettes    Smokeless tobacco: Never   Substance and Sexual Activity    Alcohol use: No    Drug use: No    Sexual activity: Not on file     Review of Systems   Constitutional:  Negative for activity change and appetite change.   HENT:  Negative for congestion and dental problem.    Eyes:  Negative for discharge and itching.   Respiratory:  Negative for shortness of breath.    Cardiovascular:  Negative for chest pain.   Gastrointestinal:  Negative for abdominal distention and abdominal pain.   Endocrine: Negative for cold intolerance.   Genitourinary:  Negative for difficulty urinating and dysuria.   Musculoskeletal:  Positive for back pain. Negative for arthralgias.   Skin:  Negative for color change.   Neurological:  Negative for dizziness and facial asymmetry.    Hematological:  Negative for adenopathy.   Psychiatric/Behavioral:  Negative for agitation and behavioral problems.      Objective:     Vital Signs (Most Recent):  Temp: 97.8 °F (36.6 °C) (06/27/24 0711)  Pulse: 79 (06/27/24 0815)  Resp: (!) 24 (06/27/24 0815)  BP: (!) 183/95 (06/27/24 0815)  SpO2: (!) 94 % (06/27/24 0815) Vital Signs (24h Range):  Temp:  [97.8 °F (36.6 °C)] 97.8 °F (36.6 °C)  Pulse:  [78-84] 79  Resp:  [16-25] 24  SpO2:  [94 %-95 %] 94 %  BP: (179-197)/() 183/95     Weight: 74.8 kg (165 lb)  Body mass index is 27.46 kg/m².     Physical Exam  Vitals and nursing note reviewed.   Constitutional:       General: She is not in acute distress.  HENT:      Head: Atraumatic.      Right Ear: External ear normal.      Left Ear: External ear normal.      Nose: Nose normal.      Mouth/Throat:      Mouth: Mucous membranes are moist.   Eyes:      Extraocular Movements: Extraocular movements intact.   Cardiovascular:      Rate and Rhythm: Normal rate.   Pulmonary:      Effort: Pulmonary effort is normal.   Abdominal:      Palpations: Abdomen is soft.   Musculoskeletal:         General: Normal range of motion.      Cervical back: Normal range of motion.   Skin:     General: Skin is warm.   Neurological:      Mental Status: She is alert and oriented to person, place, and time.   Psychiatric:         Behavior: Behavior normal.                Significant Labs: All pertinent labs within the past 24 hours have been reviewed.  CBC:   Recent Labs   Lab 06/27/24  0856   WBC 6.95   HGB 12.4   HCT 38.3   *     CMP:   Recent Labs   Lab 06/27/24  0856      K 3.4*      CO2 22*   *   BUN 19   CREATININE 0.9   CALCIUM 9.2   PROT 6.9   ALBUMIN 4.1   BILITOT 0.9   ALKPHOS 88   AST 15   ALT 9*   ANIONGAP 10       Significant Imaging: I have reviewed all pertinent imaging results/findings within the past 24 hours.    Assessment/Plan:     * Fall  All investigations normal  PT/OT      Troponin level  elevated  Troponin ordered from ER on higher range  EKG WNL  Pt denies chest pain  Trend troponin's  If stable can go home tomorrow       Essential hypertension  Chronic, controlled. Latest blood pressure and vitals reviewed-     Temp:  [97.8 °F (36.6 °C)]   Pulse:  [78-84]   Resp:  [16-25]   BP: (179-197)/()   SpO2:  [94 %-95 %] .   Home meds for hypertension were reviewed and noted below.   Hypertension Medications               amLODIPine (NORVASC) 2.5 MG tablet Take 2.5 mg by mouth once daily.    isosorbide dinitrate (ISORDIL) 30 MG Tab Take 30 mg by mouth once daily.             While in the hospital, will manage blood pressure as follows; Continue home antihypertensive regimen    Will utilize p.r.n. blood pressure medication only if patient's blood pressure greater than 140/90 and she develops symptoms such as worsening chest pain or shortness of breath.    Advanced age  Aware         VTE Risk Mitigation (From admission, onward)           Ordered     enoxaparin injection 40 mg  Daily         06/27/24 1042     IP VTE HIGH RISK PATIENT  Once         06/27/24 1042     Place sequential compression device  Until discontinued         06/27/24 1042                       On 06/27/2024, patient should be placed in hospital observation services under my care.             Philippe Walton MD  Department of Hospital Medicine  [facility] - Emergency Dept

## 2024-06-27 NOTE — SUBJECTIVE & OBJECTIVE
Past Medical History:   Diagnosis Date    Macula lutea degeneration     Squamous cell carcinoma of skin        Past Surgical History:   Procedure Laterality Date    APPENDECTOMY      BACK SURGERY  1975    HYSTERECTOMY      URETEROSCOPIC REMOVAL OF URETERIC CALCULUS Left 5/28/2024    Procedure: REMOVAL, CALCULUS, URETER, URETEROSCOPIC;  Surgeon: Juhi Mosher Jr., MD;  Location: [facility];  Service: Urology;  Laterality: Left;       Review of patient's allergies indicates:  No Known Allergies    Current Facility-Administered Medications on File Prior to Encounter   Medication    triamcinolone acetonide injection 40 mg     Current Outpatient Medications on File Prior to Encounter   Medication Sig    acetaminophen (TYLENOL) 325 MG tablet Take 0.5 tablets by mouth every 6 (six) hours as needed for Pain.    amLODIPine (NORVASC) 2.5 MG tablet Take 2.5 mg by mouth once daily.    aspirin (ECOTRIN) 81 MG EC tablet Take 162 mg by mouth once daily.    isosorbide dinitrate (ISORDIL) 30 MG Tab Take 30 mg by mouth once daily.     levothyroxine (SYNTHROID) 25 MCG tablet Take 25 mcg by mouth every morning.     meclizine (ANTIVERT) 12.5 mg tablet Take 2 tablets by mouth Daily.    pantoprazole (PROTONIX) 40 MG tablet Take 1 tablet by mouth daily as needed (Heartburn).    polyethylene glycol (GLYCOLAX) 17 gram/dose powder Take 17 g by mouth every evening.    topiramate (TOPAMAX) 25 MG tablet Take 25 mg by mouth once daily.     zolpidem (AMBIEN) 10 mg Tab Take 1 tablet (10 mg total) by mouth nightly as needed (insomnia).    [DISCONTINUED] acetaminophen (TYLENOL) 160 mg/5 mL Liqd Take 15.6 mLs (499.2 mg total) by mouth every 6 (six) hours as needed (pain).    [DISCONTINUED] atorvastatin (LIPITOR) 10 MG tablet Take 1 tablet by mouth once daily.    [DISCONTINUED] magnesium 250 mg Tab Take 1 tablet by mouth once daily.    [DISCONTINUED] vit A/vit C/vit E/zinc/copper (PRESERVISION AREDS ORAL) Take 1 tablet by mouth once daily.  (Patient not  taking: Reported on 6/4/2024)     Family History    None       Tobacco Use    Smoking status: Former     Current packs/day: 1.00     Average packs/day: 1 pack/day for 20.0 years (20.0 ttl pk-yrs)     Types: Cigarettes    Smokeless tobacco: Never   Substance and Sexual Activity    Alcohol use: No    Drug use: No    Sexual activity: Not on file     Review of Systems   Constitutional:  Negative for activity change and appetite change.   HENT:  Negative for congestion and dental problem.    Eyes:  Negative for discharge and itching.   Respiratory:  Negative for shortness of breath.    Cardiovascular:  Negative for chest pain.   Gastrointestinal:  Negative for abdominal distention and abdominal pain.   Endocrine: Negative for cold intolerance.   Genitourinary:  Negative for difficulty urinating and dysuria.   Musculoskeletal:  Positive for back pain. Negative for arthralgias.   Skin:  Negative for color change.   Neurological:  Negative for dizziness and facial asymmetry.   Hematological:  Negative for adenopathy.   Psychiatric/Behavioral:  Negative for agitation and behavioral problems.      Objective:     Vital Signs (Most Recent):  Temp: 97.8 °F (36.6 °C) (06/27/24 0711)  Pulse: 79 (06/27/24 0815)  Resp: (!) 24 (06/27/24 0815)  BP: (!) 183/95 (06/27/24 0815)  SpO2: (!) 94 % (06/27/24 0815) Vital Signs (24h Range):  Temp:  [97.8 °F (36.6 °C)] 97.8 °F (36.6 °C)  Pulse:  [78-84] 79  Resp:  [16-25] 24  SpO2:  [94 %-95 %] 94 %  BP: (179-197)/() 183/95     Weight: 74.8 kg (165 lb)  Body mass index is 27.46 kg/m².     Physical Exam  Vitals and nursing note reviewed.   Constitutional:       General: She is not in acute distress.  HENT:      Head: Atraumatic.      Right Ear: External ear normal.      Left Ear: External ear normal.      Nose: Nose normal.      Mouth/Throat:      Mouth: Mucous membranes are moist.   Eyes:      Extraocular Movements: Extraocular movements intact.   Cardiovascular:      Rate and Rhythm:  Normal rate.   Pulmonary:      Effort: Pulmonary effort is normal.   Abdominal:      Palpations: Abdomen is soft.   Musculoskeletal:         General: Normal range of motion.      Cervical back: Normal range of motion.   Skin:     General: Skin is warm.   Neurological:      Mental Status: She is alert and oriented to person, place, and time.   Psychiatric:         Behavior: Behavior normal.                Significant Labs: All pertinent labs within the past 24 hours have been reviewed.  CBC:   Recent Labs   Lab 06/27/24  0856   WBC 6.95   HGB 12.4   HCT 38.3   *     CMP:   Recent Labs   Lab 06/27/24  0856      K 3.4*      CO2 22*   *   BUN 19   CREATININE 0.9   CALCIUM 9.2   PROT 6.9   ALBUMIN 4.1   BILITOT 0.9   ALKPHOS 88   AST 15   ALT 9*   ANIONGAP 10       Significant Imaging: I have reviewed all pertinent imaging results/findings within the past 24 hours.

## 2024-06-27 NOTE — Clinical Note
Diagnosis: Fall [[id number]]   Future Attending Provider: TIERNEY BELTRAN [[id number]]   Place in Observation: [facility] [[id number]]   Special Needs:: No Special Needs [1]

## 2024-06-27 NOTE — ASSESSMENT & PLAN NOTE
Troponin ordered from ER on higher range  EKG WNL  Pt denies chest pain  Trend troponin's  If stable can go home tomorrow

## 2024-06-27 NOTE — HPI
89 year old pt getting admitted with fall and elevated troponin  Pt lives alone and is an independent person  She awakened from sleep early AM and found herself lying on the floor  Pt doesn't know , how this happened  She was able to get up and she continued sleeping back in bed  Pt hit her head and was c/o back pain when her daughter visited her today AM  Pt was brought to ER and got admitted

## 2024-06-27 NOTE — PLAN OF CARE
Problem: Adult Inpatient Plan of Care  Goal: Plan of Care Review  Outcome: Progressing  Goal: Patient-Specific Goal (Individualized)  Outcome: Progressing  Goal: Absence of Hospital-Acquired Illness or Injury  Outcome: Progressing  Goal: Optimal Comfort and Wellbeing  Outcome: Progressing  Goal: Readiness for Transition of Care  Outcome: Progressing     Problem: Fall Injury Risk  Goal: Absence of Fall and Fall-Related Injury  Outcome: Progressing     Problem: Skin Injury Risk Increased  Goal: Skin Health and Integrity  Outcome: Progressing      [de-identified] : The patient was advised of her findings and her x-rays and clinical conditions were reviewed.  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  At the same time the patient does have a significant lumbar left-sided radiculopathy and the patient was informed that total hip replacement would certainly diminish her groin pain however would not address her radicular element.  Hip surgery should be considered prior to knee arthroplasty given her current condition.  The patient was advised on following up with pain management and back specialists accordingly.  She was given handouts for both total knee and total hip replacement, for which she states she never received previously.  Lastly and most importantly formal discussion took place pertaining to total hip replacement including the potential risks and complications associated with surgery, as well as the postoperative care.\par \par Aside from being seen and evaluated for his hip, the patient underwent a face to face lengthy discussion pertaining to total hip arthroplasty. This included instructions and information about the pre-operative preparation as well as information pertaining to the hospitalization and post-operative care and rehabilitation.\par \par The patient was also made aware of the potential risks and possible complications as it pertains to total hip arthroplasty as well as the general surgical and anesthesia risks and complications. This includes, but is not limited to, possible wound infection, cardio-pulmonary problems such as DVT, fat embolism and PE (potentially fatal), leg length inequality, soft tissue swelling, stiffness and fibrosis, skin slough, muscle or nerve injury, compartment syndrome, blood transfusion reaction/infection. Problems with the hip components include possible loosening or wearing-out which would likely require revision surgery as well as the potential for dislocation. A discussion took place pertaining to the various types of bearing surfaces and fixation; including polyethylene liners, ceramics, and metal-on-metal components, and the potential for particulate wear and accumulation, i.e. osteolysis, pseudocysts/tumors, metalosis, etc. Press-fit versus methylmethacrylate cement techniques were also discussed. \par \par Revision surgery or repeat arthrotomy may also be required for deep infections, and in extreme case the implants may be removed either temporarily or permanently.\par \par The patient understands the above discussion and has been given ample time to ask any other questions or address any concerns pertaining to the purposed surgery. They are also aware that our office staff, specifically our surgical coordinator will continue to be available to guide and instruct the patient during the perioperative phase, as well as answer or relay any other questions that may arise.\par \par The patient states she would like to proceed with left-sided total hip arthroplasty accordingly\par \par Total patient encounter time > 55 minutes\par

## 2024-06-27 NOTE — NURSING
Nurses Note -- 4 Eyes      6/27/2024   5:09 PM      Skin assessed during: Admit      [x] No Altered Skin Integrity Present    []Prevention Measures Documented      [] Yes- Altered Skin Integrity Present or Discovered   [] LDA Added if Not in Epic (Describe Wound)   [] New Altered Skin Integrity was Present on Admit and Documented in LDA   [] Wound Image Taken    Wound Care Consulted? No    Attending Nurse:  Delfina Dobbs RN/Staff Member:   Lizabeth Desai          EMT/paramedic

## 2024-06-27 NOTE — ED NOTES
TRIP AND FALL LAST PM OVER BSC LEG. STATES GOT UP PER SELF. DENIES ANY LOC. C/O LOW BACK AND R RIB AREA PAIN.  L SHOULDER PAIN AS OLD HX. R RIB AREA TENDER TO TOUCH. NO SOB. FALLS PRECAUTIONS.  CALL LIGHT IN REACH.  FAMILY AT .

## 2024-06-27 NOTE — PHARMACY MED REC
"Admission Medication History     The home medication history was taken by David Dockery.    You may go to "Admission" then "Reconcile Home Medications" tabs to review and/or act upon these items.     The home medication list has been updated by the Pharmacy department.   Please read ALL comments highlighted in yellow.   Please address this information as you see fit.    Feel free to contact us if you have any questions or require assistance.      The medications listed below were removed from the home medication list. Please reorder if appropriate:  Patient reports no longer taking the following medication(s):  Atorvastatin 10 mg  Magnesium 250 mg  Areds 2    Medications listed below were obtained from: Patient/family and Analytic software- [employer]  Current Facility-Administered Medications on File Prior to Encounter   Medication Dose Route Frequency Provider Last Rate Last Admin    triamcinolone acetonide injection 40 mg  40 mg Intra-articular  Kenneth Nevarez MD   40 mg at 06/03/22 1030     Current Outpatient Medications on File Prior to Encounter   Medication Sig Dispense Refill    acetaminophen (TYLENOL) 325 MG tablet Take 0.5 tablets by mouth every 6 (six) hours as needed for Pain.      amLODIPine (NORVASC) 2.5 MG tablet Take 2.5 mg by mouth once daily.      aspirin (ECOTRIN) 81 MG EC tablet Take 162 mg by mouth once daily.      isosorbide dinitrate (ISORDIL) 30 MG Tab Take 30 mg by mouth once daily.       levothyroxine (SYNTHROID) 25 MCG tablet Take 25 mcg by mouth every morning.       meclizine (ANTIVERT) 12.5 mg tablet Take 2 tablets by mouth Daily.      pantoprazole (PROTONIX) 40 MG tablet Take 1 tablet by mouth daily as needed (Heartburn).      polyethylene glycol (GLYCOLAX) 17 gram/dose powder Take 17 g by mouth every evening.      topiramate (TOPAMAX) 25 MG tablet Take 25 mg by mouth once daily.   0    zolpidem (AMBIEN) 10 mg Tab Take 1 tablet (10 mg total) by mouth nightly as needed (insomnia).   "    [DISCONTINUED] acetaminophen (TYLENOL) 160 mg/5 mL Liqd Take 15.6 mLs (499.2 mg total) by mouth every 6 (six) hours as needed (pain). 236 mL 0    [DISCONTINUED] atorvastatin (LIPITOR) 10 MG tablet Take 1 tablet by mouth once daily.      [DISCONTINUED] magnesium 250 mg Tab Take 1 tablet by mouth once daily.      [DISCONTINUED] vit A/vit C/vit E/zinc/copper (PRESERVISION AREDS ORAL) Take 1 tablet by mouth once daily.  (Patient not taking: Reported on 6/4/2024)         David Dockery  EXT 1924                .

## 2024-06-28 VITALS
HEART RATE: 78 BPM | DIASTOLIC BLOOD PRESSURE: 81 MMHG | TEMPERATURE: 98 F | OXYGEN SATURATION: 95 % | HEIGHT: 64 IN | WEIGHT: 155.19 LBS | RESPIRATION RATE: 18 BRPM | SYSTOLIC BLOOD PRESSURE: 159 MMHG | BODY MASS INDEX: 26.49 KG/M2

## 2024-06-28 LAB
ALBUMIN SERPL BCP-MCNC: 3.3 G/DL (ref 3.5–5.2)
ALP SERPL-CCNC: 69 U/L (ref 55–135)
ALT SERPL W/O P-5'-P-CCNC: 6 U/L (ref 10–44)
ANION GAP SERPL CALC-SCNC: 7 MMOL/L (ref 8–16)
AST SERPL-CCNC: 11 U/L (ref 10–40)
BASOPHILS # BLD AUTO: 0.05 K/UL (ref 0–0.2)
BASOPHILS NFR BLD: 0.8 % (ref 0–1.9)
BILIRUB SERPL-MCNC: 0.8 MG/DL (ref 0.1–1)
BUN SERPL-MCNC: 16 MG/DL (ref 10–30)
CALCIUM SERPL-MCNC: 8.5 MG/DL (ref 8.7–10.5)
CHLORIDE SERPL-SCNC: 110 MMOL/L (ref 95–110)
CO2 SERPL-SCNC: 24 MMOL/L (ref 23–29)
CREAT SERPL-MCNC: 0.8 MG/DL (ref 0.5–1.4)
DIFFERENTIAL METHOD BLD: ABNORMAL
EOSINOPHIL # BLD AUTO: 0.2 K/UL (ref 0–0.5)
EOSINOPHIL NFR BLD: 3.3 % (ref 0–8)
ERYTHROCYTE [DISTWIDTH] IN BLOOD BY AUTOMATED COUNT: 13.7 % (ref 11.5–14.5)
EST. GFR  (NO RACE VARIABLE): >60 ML/MIN/1.73 M^2
GLUCOSE SERPL-MCNC: 102 MG/DL (ref 70–110)
HCT VFR BLD AUTO: 31.5 % (ref 37–48.5)
HGB BLD-MCNC: 10.4 G/DL (ref 12–16)
IMM GRANULOCYTES # BLD AUTO: 0.02 K/UL (ref 0–0.04)
IMM GRANULOCYTES NFR BLD AUTO: 0.3 % (ref 0–0.5)
LYMPHOCYTES # BLD AUTO: 1.5 K/UL (ref 1–4.8)
LYMPHOCYTES NFR BLD: 24.2 % (ref 18–48)
MAGNESIUM SERPL-MCNC: 2 MG/DL (ref 1.6–2.6)
MCH RBC QN AUTO: 29.5 PG (ref 27–31)
MCHC RBC AUTO-ENTMCNC: 33 G/DL (ref 32–36)
MCV RBC AUTO: 89 FL (ref 82–98)
MONOCYTES # BLD AUTO: 0.5 K/UL (ref 0.3–1)
MONOCYTES NFR BLD: 7.3 % (ref 4–15)
NEUTROPHILS # BLD AUTO: 3.9 K/UL (ref 1.8–7.7)
NEUTROPHILS NFR BLD: 64.1 % (ref 38–73)
NRBC BLD-RTO: 0 /100 WBC
PLATELET # BLD AUTO: 146 K/UL (ref 150–450)
PMV BLD AUTO: 11.3 FL (ref 9.2–12.9)
POTASSIUM SERPL-SCNC: 3.5 MMOL/L (ref 3.5–5.1)
PROT SERPL-MCNC: 5.5 G/DL (ref 6–8.4)
RBC # BLD AUTO: 3.53 M/UL (ref 4–5.4)
SODIUM SERPL-SCNC: 141 MMOL/L (ref 136–145)
WBC # BLD AUTO: 6.15 K/UL (ref 3.9–12.7)

## 2024-06-28 PROCEDURE — 36415 COLL VENOUS BLD VENIPUNCTURE: CPT | Performed by: INTERNAL MEDICINE

## 2024-06-28 PROCEDURE — 97161 PT EVAL LOW COMPLEX 20 MIN: CPT

## 2024-06-28 PROCEDURE — G0378 HOSPITAL OBSERVATION PER HR: HCPCS

## 2024-06-28 PROCEDURE — 85025 COMPLETE CBC W/AUTO DIFF WBC: CPT | Performed by: INTERNAL MEDICINE

## 2024-06-28 PROCEDURE — 25000003 PHARM REV CODE 250: Performed by: HOSPITALIST

## 2024-06-28 PROCEDURE — 80053 COMPREHEN METABOLIC PANEL: CPT | Performed by: INTERNAL MEDICINE

## 2024-06-28 PROCEDURE — 83735 ASSAY OF MAGNESIUM: CPT | Performed by: INTERNAL MEDICINE

## 2024-06-28 PROCEDURE — 25000003 PHARM REV CODE 250: Performed by: INTERNAL MEDICINE

## 2024-06-28 RX ORDER — AMLODIPINE BESYLATE 5 MG/1
10 TABLET ORAL DAILY
Status: DISCONTINUED | OUTPATIENT
Start: 2024-06-28 | End: 2024-06-28 | Stop reason: HOSPADM

## 2024-06-28 RX ORDER — HYDROCODONE BITARTRATE AND ACETAMINOPHEN 5; 325 MG/1; MG/1
1 TABLET ORAL EVERY 6 HOURS PRN
Qty: 15 TABLET | Refills: 0 | Status: ON HOLD | OUTPATIENT
Start: 2024-06-28 | End: 2024-07-05 | Stop reason: HOSPADM

## 2024-06-28 RX ADMIN — POTASSIUM BICARBONATE 50 MEQ: 977.5 TABLET, EFFERVESCENT ORAL at 09:06

## 2024-06-28 RX ADMIN — HYDROCODONE BITARTRATE AND ACETAMINOPHEN 1 TABLET: 5; 325 TABLET ORAL at 11:06

## 2024-06-28 RX ADMIN — AMLODIPINE BESYLATE 10 MG: 5 TABLET ORAL at 09:06

## 2024-06-28 RX ADMIN — ASPIRIN 162 MG: 81 TABLET, COATED ORAL at 09:06

## 2024-06-28 RX ADMIN — TOPIRAMATE 25 MG: 25 TABLET, FILM COATED ORAL at 09:06

## 2024-06-28 RX ADMIN — ISOSORBIDE DINITRATE 30 MG: 10 TABLET ORAL at 09:06

## 2024-06-28 NOTE — PLAN OF CARE
[facility]  Initial Discharge Assessment       Primary Care Provider: Se Rios MD    Admission Diagnosis: Back injury [S39.92XA]  Fall [W19.XXXA]    Admission Date: 6/27/2024    DC assessment completed with patient at bedside.  Information verified as correct on facesheet.  Patient denies HPOA.  Denies HD/Coumadin.  Patient has home health with [facility] and plans to resume at discharge.  Patient lives alone but refuses therapy eval to consider SNF placement.  Patient independent in ADL's.  DC plan is home with HH. Daughter to provide transport on discharge.        Transition of Care Barriers: None    Payor: [employer] [facility] / Plan: [employer] CHOICES / Product Type: Medicare Advantage /     Extended Emergency Contact Information  Primary Emergency Contact: Court Pringle  Address: [address] [place] [address] [id number] [place] [address]  Home Phone: [phone]  Mobile Phone: [phone]  Relation: Daughter  Preferred language: English   needed? No    Discharge Plan A: Home Health  Discharge Plan B: Skilled Nursing Facility      [address] [address]  Phone: [phone] Fax: [phone]      Initial Assessment (most recent)       Adult Discharge Assessment - 06/28/24 1132          Discharge Assessment    Assessment Type Discharge Planning Assessment     Confirmed/corrected address, phone number and insurance Yes     Confirmed Demographics Correct on Facesheet     Source of Information patient;family     Does patient/caregiver understand observation status Yes     Communicated ARGELIA with patient/caregiver Yes     Reason For Admission fall     People in Home alone     Facility Arrived From: home     Do you expect to return to your current living situation? Yes     Do you have help at home or someone to help you manage your care at home? No      Current cognitive status: Alert/Oriented     Equipment Currently Used at Home rollator;bedside commode;bath bench     Do you currently have service(s) that help you manage your care at home? Yes     Name and Contact number of agency [facility]     Is the pt/caregiver preference to resume services with current agency Yes     Do you take prescription medications? Yes     Do you have prescription coverage? Yes     Do you have any problems affording any of your prescribed medications? No     Is the patient taking medications as prescribed? yes     Who is going to help you get home at discharge? daughter     How do you get to doctors appointments? family or friend will provide     Are you on dialysis? No     Do you take coumadin? No     Discharge Plan A Home Health     Discharge Plan B Skilled Nursing Facility     DME Needed Upon Discharge  none     Discharge Plan discussed with: Patient;Adult children     Transition of Care Barriers None

## 2024-06-28 NOTE — PT/OT/SLP EVAL
Physical Therapy Evaluation    Patient Name:  Erich Encarnacion   MRN:  [id number]    Recommendations:     Discharge Recommendations: Low Intensity Therapy   Discharge Equipment Recommendations: none   Barriers to discharge:  fall risk, medical status     Assessment:     Erich Encarnacion is a 89 y.o. female admitted with a medical diagnosis of Fall.  She presents with the following impairments/functional limitations: weakness, impaired endurance, impaired functional mobility, gait instability, impaired balance, pain, impaired cardiopulmonary response to activity.    Pt was found HOB elevated with daughter present and agreeable to therapy evaluation. Pt required [place] with hand held assist for bed mobility to reach EOB. Pt explained the details of her recent fall about how she attempted to climb over her bedrail and fell over. Pt explained that she has pain in her back and side and declined ambulation. Pt was adamant about going home asap. Pt required SBA to go from sitting back to supine. Pt was left HOB elevated with daughter present and all needs met.     Rehab Prognosis: Fair; patient would benefit from acute skilled PT services to address these deficits and reach maximum level of function.    Recent Surgery: * No surgery found *      Plan:     During this hospitalization, patient to be seen 3 x/week to address the identified rehab impairments via gait training, therapeutic activities, therapeutic exercises, neuromuscular re-education and progress toward the following goals:    Plan of Care Expires:  07/28/24    Subjective     Chief Complaint: Back pain/ fatigue   Patient/Family Comments/goals: Get better/ go home   Pain/Comfort:  Pain Rating 1: other (see comments) (did not quantify)  Location - Side 1: Bilateral  Location 1: back  Pain Addressed 1: Distraction, Cessation of Activity, Reposition    Patients cultural, spiritual, Christianity conflicts given the current situation: no    Living Environment:  Pt lives  alone in a SSH with no CRISTOPHER but daughter will be present to help her after discharge.   Prior to admission, patients level of function was April with rollator.  Equipment used at home: rollator, bedside commode, other (see comments) (bed railings).  DME owned (not currently used): none.  Upon discharge, patient will have assistance from daughter.    Objective:     Communicated with RN prior to session.  Patient found HOB elevated with telemetry, peripheral IV, pulse ox (continuous), blood pressure cuff  upon PT entry to room.    General Precautions: Standard, fall  Orthopedic Precautions:N/A   Braces: N/A  Respiratory Status: Room air    Exams:  RLE ROM: WFL  RLE Strength: WFL  LLE ROM: WFL  LLE Strength: WFL    Functional Mobility:  Bed Mobility:     Supine to Sit: minimum assistance  Sit to Supine: stand by assistance      AM-PAC 6 CLICK MOBILITY  Total Score:19       Treatment & Education:  Pt was educated on the importance of time OOB, POC, safe transfers and ambulation, and use of call button for further assistance.     Patient left HOB elevated with all lines intact, call button in reach, bed alarm on, and daughter present.    GOALS:   Multidisciplinary Problems       Physical Therapy Goals          Problem: Physical Therapy    Goal Priority Disciplines Outcome Goal Variances Interventions   Physical Therapy Goal     PT, PT/OT      Description: Goals to be met by: 2024     Patient will increase functional independence with mobility by performin. Supine to sit with Supervision   2. Sit to supine with Supervision   3. Sit to stand transfer with Supervision   4. Gait  x 150 feet with Supervision  using least restrictive AD.                         History:     Past Medical History:   Diagnosis Date    Macula lutea degeneration     Squamous cell carcinoma of skin        Past Surgical History:   Procedure Laterality Date    APPENDECTOMY      BACK SURGERY      HYSTERECTOMY      URETEROSCOPIC REMOVAL  OF URETERIC CALCULUS Left 5/28/2024    Procedure: REMOVAL, CALCULUS, URETER, URETEROSCOPIC;  Surgeon: Juhi Mosher Jr., MD;  Location: [facility];  Service: Urology;  Laterality: Left;       Time Tracking:     PT Received On: 06/28/24  PT Start Time: 0855     PT Stop Time: 0902  PT Total Time (min): 7 min     Billable Minutes: Evaluation 7      06/28/2024

## 2024-06-28 NOTE — PLAN OF CARE
Charts and orders reviewed. Pt to discharge home with resumption home health orders with [facility].  called Pt's PCP office to schedule Pt follow-up appointment; appointment scheduled and PCP details added to AVS. Pt has no other needs to be addressed by case management. Pt cleared to discharge from case management standpoint.        06/28/24 1023   Final Note   Assessment Type Final Discharge Note   Anticipated Discharge Disposition Home-Health   What phone number can be called within the next 1-3 days to see how you are doing after discharge? [phone]   Hospital Resources/Appts/Education Provided Provided patient/caregiver with written discharge plan information;Appointments scheduled and added to AVS   Post-Acute Status   Post-Acute Authorization Home Health   Home Health Status Set-up Complete/Auth obtained   Coverage [employer] [facility] [employer] CHOICES   Patient choice form signed by patient/caregiver List with quality metrics by geographic area provided   Discharge Delays None known at this time

## 2024-06-28 NOTE — PLAN OF CARE
Pt resting in bed with daughter at bedside. No c/o pain or SOB. No complaints at this time.     Problem: Adult Inpatient Plan of Care  Goal: Plan of Care Review  Outcome: Progressing  Goal: Patient-Specific Goal (Individualized)  Outcome: Progressing  Goal: Absence of Hospital-Acquired Illness or Injury  Outcome: Progressing  Goal: Optimal Comfort and Wellbeing  Outcome: Progressing  Goal: Readiness for Transition of Care  Outcome: Progressing     Problem: Fall Injury Risk  Goal: Absence of Fall and Fall-Related Injury  Outcome: Progressing     Problem: Skin Injury Risk Increased  Goal: Skin Health and Integrity  Outcome: Progressing

## 2024-06-28 NOTE — NURSING
Pt received d/c instructions. IV d/c'd. Assisted to care via w/c by tech and daughter. Alert and oriented upon departure.

## 2024-06-28 NOTE — PT/OT/SLP EVAL
Occupational Therapy   Evaluation    Name: Erich Encarnacion  MRN: [id number]  Admitting Diagnosis: Fall  Recent Surgery: * No surgery found *      Recommendations:     Discharge Recommendations: Low Intensity Therapy  Discharge Equipment Recommendations:  none  Barriers to discharge:  None    Assessment:     Erich Encarnacion is a 89 y.o. female with a medical diagnosis of Fall.  She presents with general weakness due to a fall. She completed bed mobility with CGA and transferred from sit>stand with CGA using HHA. She ambulated 10 ft in the hospital room with HHA. She donned/doffed socks with CGA. Performance deficits affecting function: weakness, impaired endurance, impaired functional mobility, impaired balance, decreased coordination, decreased upper extremity function, decreased lower extremity function, pain.      Rehab Prognosis: Good; patient would benefit from acute skilled OT services to address these deficits and reach maximum level of function.       Plan:     Patient to be seen 5 x/week to address the above listed problems via self-care/home management, therapeutic activities, therapeutic exercises  Plan of Care Expires: 07/28/24  Plan of Care Reviewed with: patient, daughter    Subjective     Chief Complaint: General Weakness  Patient/Family Comments/goals: Increased [place] with ADLs and functional mobility     Occupational Profile:  Living Environment: Pt lives alone in a [facility] with no CRISTOPHER.   Previous level of function: She can dress herself and use the BSC at night when alone. Her daughter comes over at lunch and completes IADLs with her. She takes a shower with a transfer tub bench with assistance from daughter. She ambulates with her rollater.   Roles and Routines: Mother  Equipment Used at Home: rollator, bedside commode, bath bench  Assistance upon Discharge: Daughter    Pain/Comfort:  Pain Rating 1: 4/10  Location - Orientation 1: lower  Location 1: back  Pain Addressed 1: Reposition, Nurse  notified  Pain Rating Post-Intervention 1: 4/10    Patients cultural, spiritual, Quaker conflicts given the current situation: no    Objective:     Communicated with: Nurse prior to session.  Patient found HOB elevated with peripheral IV, PureWick, telemetry upon OT entry to room.    General Precautions: Standard, fall  Orthopedic Precautions: N/A  Braces: N/A  Respiratory Status: Room air    Occupational Performance:    Bed Mobility:    Patient completed Rolling/Turning to Left with  contact guard assistance  Patient completed Supine to Sit with contact guard assistance  Patient completed Sit to Supine with contact guard assistance    Functional Mobility/Transfers:  Patient completed Sit <> Stand Transfer with contact guard assistance  with  hand-held assist   Functional Mobility: ambulated in the hospital room with HHA    Activities of Daily Living:  Lower Body Dressing: contact guard assistance sitting on the EOB    Cognitive/Visual Perceptual:  Cognitive/Psychosocial Skills:     -       Oriented to: Person, Place, Time, and Situation   -       Follows Commands/attention:Follows multistep  commands  -       Communication: clear/fluent  -       Safety awareness/insight to disability: intact     Physical Exam:  Balance:    -       Sitting Balance: G, Standing Balance F+, Dynamic standing balance F  Upper Extremity Range of Motion:     -       Right Upper Extremity: WNL  -       Left Upper Extremity: WNL  Upper Extremity Strength:    -       Right Upper Extremity: WNL  -       Left Upper Extremity: WNL   Strength:    -       Right Upper Extremity: WNL  -       Left Upper Extremity: WNL    AMPAC 6 Click ADL:  AMPAC Total Score: 20    Treatment & Education:  Pt was educated on the role of occupational therapy and the importance of completing ADLs and functional mobility.     Patient left supine with all lines intact, call button in reach, and bed alarm on    GOALS:   Multidisciplinary Problems        Occupational Therapy Goals          Problem: Occupational Therapy    Goal Priority Disciplines Outcome Interventions   Occupational Therapy Goal     OT, PT/OT     Description: Goals to be met by: 7/28/2024     Patient will increase functional independence with ADLs by performing:    UE Dressing with Modified [place].  LE Dressing with Modified [place].  Grooming while seated with Modified [place].  Toileting from toilet with Supervision for hygiene and clothing management.   Toilet transfer to toilet with Modified [place].                         History:     Past Medical History:   Diagnosis Date    Macula lutea degeneration     Squamous cell carcinoma of skin          Past Surgical History:   Procedure Laterality Date    APPENDECTOMY      BACK SURGERY  1975    HYSTERECTOMY      URETEROSCOPIC REMOVAL OF URETERIC CALCULUS Left 5/28/2024    Procedure: REMOVAL, CALCULUS, URETER, URETEROSCOPIC;  Surgeon: Juhi Mosher Jr., MD;  Location[facility];  Service: Urology;  Laterality: Left;       Time Tracking:     OT Date of Treatment: 06/28/24  OT Start Time: 1032  OT Stop Time: 1042  OT Total Time (min): 10 min    Billable Minutes:Evaluation 10    6/28/2024

## 2024-06-28 NOTE — PLAN OF CARE
THOMAS explained to patient.  Patient verbalized understanding and signed THOMAS form.       06/28/24 1134   THOMAS Message   Medicare Outpatient and Observation Notification regarding financial responsibility Given to patient/caregiver;Explained to patient/caregiver;Signed/date by patient/caregiver   Date THOMAS was signed 06/28/24   Time THOMAS was signed 1004

## 2024-06-28 NOTE — PLAN OF CARE
[facility]  Initial Discharge Assessment       Primary Care Provider: Se Rios MD    Admission Diagnosis: Back injury [S39.92XA]  Fall [W19.XXXA]    Admission Date: 6/27/2024  Expected Discharge Date: 6/28/2024     completed discharge assessment via chart review. Pt AAOx4s. Pt lives at home alone. Demographics, PCP, and insurance verified. Resumption of home health with [facility]. No dialysis. Pt completes ADLs with assistance of DME listed. Pt to discharge home via family transport. Pt has no other needs to be addressed at this time.          [facility] [employer] [facility] / Plan: [employer] CHOICES / Product Type: Medicare Advantage /     Extended Emergency Contact Information  Primary Emergency Contact: Abbey Pringlesindi AUGUSTIN  Address: [address] [place] [address] [id number] [place] [address]  Home Phone: [phone]  Mobile Phone: [phone]  Relation: Daughter  Preferred language: English   needed? No    Discharge Plan A: Home Health  Discharge Plan B: Home Health      [place] [address]  Phone: [phone] Fax: [phone]      Initial Assessment (most recent)       Adult Discharge Assessment - 06/28/24 1014          Discharge Assessment    Assessment Type Discharge Planning Assessment     Confirmed/corrected address, phone number and insurance Yes     Confirmed Demographics Correct on Facesheet     Source of Information other (see comments)   Chart review    Does patient/caregiver understand observation status Yes     Reason For Admission Fall     People in Home alone     Facility Arrived From: Home     Do you expect to return to your current living situation? Yes     Do you have help at home or someone to help you manage your care at home? Yes     Who are your caregiver(s) and their phone number(s)? vijaya Yary (daughter) [phone]      Walking or Climbing Stairs Difficulty yes     Walking or Climbing Stairs ambulation difficulty, requires equipment     Dressing/Bathing Difficulty yes     Dressing/Bathing bathing difficulty, requires equipment     Home Accessibility wheelchair accessible     Home Layout Able to live on 1st floor     Equipment Currently Used at Home raised toilet;rollator;bath bench;grab bar     Readmission within 30 days? Yes     Patient currently being followed by outpatient case management? No     Do you currently have service(s) that help you manage your care at home? Yes     Name and Contact number of agency [facility]     Is the pt/caregiver preference to resume services with current agency Yes     Do you take prescription medications? Yes     Do you have prescription coverage? Yes     Coverage [employer] [facility] CHOICES     Do you have any problems affording any of your prescribed medications? No     Is the patient taking medications as prescribed? yes     Who is going to help you get home at discharge? cora Pringle (daughter) [phone]     How do you get to doctors appointments? family or friend will provide     Are you on dialysis? No     Do you take coumadin? No     Discharge Plan A Home Health     Discharge Plan B Home Health     DME Needed Upon Discharge  none

## 2024-07-01 ENCOUNTER — HOSPITAL ENCOUNTER (INPATIENT)
Facility: HOSPITAL | Age: 89
LOS: 3 days | Discharge: HOME HEALTH | DRG: 305 | End: 2024-07-05
Attending: EMERGENCY MEDICINE | Admitting: HOSPITALIST
Payer: MEDICARE

## 2024-07-01 DIAGNOSIS — I95.9 HYPOTENSION: ICD-10-CM

## 2024-07-01 DIAGNOSIS — S32.010A COMPRESSION FRACTURE OF L1 VERTEBRA, INITIAL ENCOUNTER: ICD-10-CM

## 2024-07-01 DIAGNOSIS — R10.32 LLQ PAIN: ICD-10-CM

## 2024-07-01 DIAGNOSIS — R53.81 MALAISE: ICD-10-CM

## 2024-07-01 DIAGNOSIS — N39.0 URINARY TRACT INFECTION WITHOUT HEMATURIA, SITE UNSPECIFIED: ICD-10-CM

## 2024-07-01 DIAGNOSIS — R07.9 CHEST PAIN: ICD-10-CM

## 2024-07-01 DIAGNOSIS — R41.0 CONFUSION: ICD-10-CM

## 2024-07-01 DIAGNOSIS — R79.89 ELEVATED TROPONIN I LEVEL: Primary | ICD-10-CM

## 2024-07-01 DIAGNOSIS — I16.0 HYPERTENSIVE URGENCY: ICD-10-CM

## 2024-07-01 PROBLEM — I24.89 DEMAND ISCHEMIA: Status: ACTIVE | Noted: 2024-07-01

## 2024-07-01 PROBLEM — K59.04 CHRONIC IDIOPATHIC CONSTIPATION: Status: ACTIVE | Noted: 2024-07-01

## 2024-07-01 LAB
ALBUMIN SERPL BCP-MCNC: 3.8 G/DL (ref 3.5–5.2)
ALP SERPL-CCNC: 78 U/L (ref 55–135)
ALT SERPL W/O P-5'-P-CCNC: 8 U/L (ref 10–44)
ANION GAP SERPL CALC-SCNC: 9 MMOL/L (ref 8–16)
AST SERPL-CCNC: 17 U/L (ref 10–40)
BACTERIA #/AREA URNS HPF: ABNORMAL /HPF
BASOPHILS # BLD AUTO: 0.05 K/UL (ref 0–0.2)
BASOPHILS NFR BLD: 0.7 % (ref 0–1.9)
BILIRUB SERPL-MCNC: 0.8 MG/DL (ref 0.1–1)
BILIRUB UR QL STRIP: NEGATIVE
BNP SERPL-MCNC: 153 PG/ML (ref 0–99)
BUN SERPL-MCNC: 21 MG/DL (ref 10–30)
CALCIUM SERPL-MCNC: 9 MG/DL (ref 8.7–10.5)
CHLORIDE SERPL-SCNC: 103 MMOL/L (ref 95–110)
CLARITY UR: CLEAR
CO2 SERPL-SCNC: 23 MMOL/L (ref 23–29)
COLOR UR: COLORLESS
CREAT SERPL-MCNC: 0.9 MG/DL (ref 0.5–1.4)
CREAT SERPL-MCNC: 0.9 MG/DL (ref 0.5–1.4)
D DIMER PPP IA.FEU-MCNC: 2 MG/L FEU (ref 0–0.49)
DIFFERENTIAL METHOD BLD: ABNORMAL
EOSINOPHIL # BLD AUTO: 0.1 K/UL (ref 0–0.5)
EOSINOPHIL NFR BLD: 1.2 % (ref 0–8)
ERYTHROCYTE [DISTWIDTH] IN BLOOD BY AUTOMATED COUNT: 13.6 % (ref 11.5–14.5)
EST. GFR  (NO RACE VARIABLE): 58.1 ML/MIN/1.73 M^2
GLUCOSE SERPL-MCNC: 119 MG/DL (ref 70–110)
GLUCOSE UR QL STRIP: NEGATIVE
HCT VFR BLD AUTO: 36.8 % (ref 37–48.5)
HGB BLD-MCNC: 12.3 G/DL (ref 12–16)
HGB UR QL STRIP: NEGATIVE
IMM GRANULOCYTES # BLD AUTO: 0.03 K/UL (ref 0–0.04)
IMM GRANULOCYTES NFR BLD AUTO: 0.4 % (ref 0–0.5)
KETONES UR QL STRIP: ABNORMAL
LACTATE SERPL-SCNC: 0.9 MMOL/L (ref 0.5–1.9)
LEUKOCYTE ESTERASE UR QL STRIP: NEGATIVE
LIPASE SERPL-CCNC: 5 U/L (ref 4–60)
LYMPHOCYTES # BLD AUTO: 1.3 K/UL (ref 1–4.8)
LYMPHOCYTES NFR BLD: 17.4 % (ref 18–48)
MCH RBC QN AUTO: 29.9 PG (ref 27–31)
MCHC RBC AUTO-ENTMCNC: 33.4 G/DL (ref 32–36)
MCV RBC AUTO: 90 FL (ref 82–98)
MICROSCOPIC COMMENT: ABNORMAL
MONOCYTES # BLD AUTO: 0.5 K/UL (ref 0.3–1)
MONOCYTES NFR BLD: 6.4 % (ref 4–15)
NEUTROPHILS # BLD AUTO: 5.6 K/UL (ref 1.8–7.7)
NEUTROPHILS NFR BLD: 73.9 % (ref 38–73)
NITRITE UR QL STRIP: POSITIVE
NRBC BLD-RTO: 0 /100 WBC
OHS QRS DURATION: 88 MS
OHS QRS DURATION: 92 MS
OHS QTC CALCULATION: 452 MS
OHS QTC CALCULATION: 454 MS
PH UR STRIP: 8 [PH] (ref 5–8)
PLATELET # BLD AUTO: 159 K/UL (ref 150–450)
PMV BLD AUTO: 11.3 FL (ref 9.2–12.9)
POTASSIUM SERPL-SCNC: 3.2 MMOL/L (ref 3.5–5.1)
PROT SERPL-MCNC: 6.5 G/DL (ref 6–8.4)
PROT UR QL STRIP: NEGATIVE
RBC # BLD AUTO: 4.11 M/UL (ref 4–5.4)
RBC #/AREA URNS HPF: 5 /HPF (ref 0–4)
SAMPLE: NORMAL
SODIUM SERPL-SCNC: 135 MMOL/L (ref 136–145)
SP GR UR STRIP: 1.02 (ref 1–1.03)
SQUAMOUS #/AREA URNS HPF: 1 /HPF
TOPIRAMATE SERPL-MCNC: <1.5 UG/ML (ref 2–25)
TROPONIN I SERPL HS-MCNC: 101.3 PG/ML (ref 0–14.9)
TROPONIN I SERPL HS-MCNC: 107.6 PG/ML (ref 0–14.9)
TROPONIN I SERPL HS-MCNC: 38.3 PG/ML (ref 0–14.9)
TROPONIN I SERPL HS-MCNC: 53.6 PG/ML (ref 0–14.9)
URN SPEC COLLECT METH UR: ABNORMAL
UROBILINOGEN UR STRIP-ACNC: NEGATIVE EU/DL
WBC # BLD AUTO: 7.53 K/UL (ref 3.9–12.7)
WBC #/AREA URNS HPF: 4 /HPF (ref 0–5)

## 2024-07-01 PROCEDURE — 84484 ASSAY OF TROPONIN QUANT: CPT | Mod: 91 | Performed by: EMERGENCY MEDICINE

## 2024-07-01 PROCEDURE — 96361 HYDRATE IV INFUSION ADD-ON: CPT

## 2024-07-01 PROCEDURE — G0378 HOSPITAL OBSERVATION PER HR: HCPCS

## 2024-07-01 PROCEDURE — 25000003 PHARM REV CODE 250: Performed by: NURSE PRACTITIONER

## 2024-07-01 PROCEDURE — 36415 COLL VENOUS BLD VENIPUNCTURE: CPT | Performed by: HOSPITALIST

## 2024-07-01 PROCEDURE — 25000003 PHARM REV CODE 250: Performed by: HOSPITALIST

## 2024-07-01 PROCEDURE — 83880 ASSAY OF NATRIURETIC PEPTIDE: CPT | Performed by: EMERGENCY MEDICINE

## 2024-07-01 PROCEDURE — 99285 EMERGENCY DEPT VISIT HI MDM: CPT | Mod: 25

## 2024-07-01 PROCEDURE — 85379 FIBRIN DEGRADATION QUANT: CPT | Performed by: HOSPITALIST

## 2024-07-01 PROCEDURE — 83690 ASSAY OF LIPASE: CPT | Performed by: EMERGENCY MEDICINE

## 2024-07-01 PROCEDURE — 25500020 PHARM REV CODE 255: Performed by: EMERGENCY MEDICINE

## 2024-07-01 PROCEDURE — 25000003 PHARM REV CODE 250: Performed by: EMERGENCY MEDICINE

## 2024-07-01 PROCEDURE — 84484 ASSAY OF TROPONIN QUANT: CPT | Mod: 91 | Performed by: HOSPITALIST

## 2024-07-01 PROCEDURE — 97530 THERAPEUTIC ACTIVITIES: CPT

## 2024-07-01 PROCEDURE — 63600175 PHARM REV CODE 636 W HCPCS: Performed by: EMERGENCY MEDICINE

## 2024-07-01 PROCEDURE — 83605 ASSAY OF LACTIC ACID: CPT | Performed by: EMERGENCY MEDICINE

## 2024-07-01 PROCEDURE — 63600175 PHARM REV CODE 636 W HCPCS: Performed by: NURSE PRACTITIONER

## 2024-07-01 PROCEDURE — 96375 TX/PRO/DX INJ NEW DRUG ADDON: CPT

## 2024-07-01 PROCEDURE — 85025 COMPLETE CBC W/AUTO DIFF WBC: CPT | Performed by: EMERGENCY MEDICINE

## 2024-07-01 PROCEDURE — 96372 THER/PROPH/DIAG INJ SC/IM: CPT | Mod: 59 | Performed by: NURSE PRACTITIONER

## 2024-07-01 PROCEDURE — 96365 THER/PROPH/DIAG IV INF INIT: CPT

## 2024-07-01 PROCEDURE — 97162 PT EVAL MOD COMPLEX 30 MIN: CPT

## 2024-07-01 PROCEDURE — 80053 COMPREHEN METABOLIC PANEL: CPT | Performed by: EMERGENCY MEDICINE

## 2024-07-01 PROCEDURE — 81001 URINALYSIS AUTO W/SCOPE: CPT | Performed by: EMERGENCY MEDICINE

## 2024-07-01 PROCEDURE — 82565 ASSAY OF CREATININE: CPT

## 2024-07-01 RX ORDER — LANOLIN ALCOHOL/MO/W.PET/CERES
800 CREAM (GRAM) TOPICAL
Status: DISCONTINUED | OUTPATIENT
Start: 2024-07-01 | End: 2024-07-05 | Stop reason: HOSPADM

## 2024-07-01 RX ORDER — AMOXICILLIN 250 MG
1 CAPSULE ORAL DAILY PRN
Status: DISCONTINUED | OUTPATIENT
Start: 2024-07-01 | End: 2024-07-05 | Stop reason: HOSPADM

## 2024-07-01 RX ORDER — KETOROLAC TROMETHAMINE 30 MG/ML
15 INJECTION, SOLUTION INTRAMUSCULAR; INTRAVENOUS ONCE
Status: COMPLETED | OUTPATIENT
Start: 2024-07-01 | End: 2024-07-01

## 2024-07-01 RX ORDER — SYRING-NEEDL,DISP,INSUL,0.3 ML 29 G X1/2"
296 SYRINGE, EMPTY DISPOSABLE MISCELLANEOUS
Status: COMPLETED | OUTPATIENT
Start: 2024-07-01 | End: 2024-07-01

## 2024-07-01 RX ORDER — ENOXAPARIN SODIUM 100 MG/ML
40 INJECTION SUBCUTANEOUS EVERY 24 HOURS
Status: DISCONTINUED | OUTPATIENT
Start: 2024-07-01 | End: 2024-07-05 | Stop reason: HOSPADM

## 2024-07-01 RX ORDER — SODIUM CHLORIDE 0.9 % (FLUSH) 0.9 %
2 SYRINGE (ML) INJECTION EVERY 8 HOURS PRN
Status: DISCONTINUED | OUTPATIENT
Start: 2024-07-01 | End: 2024-07-05 | Stop reason: HOSPADM

## 2024-07-01 RX ORDER — ONDANSETRON HYDROCHLORIDE 2 MG/ML
4 INJECTION, SOLUTION INTRAVENOUS
Status: COMPLETED | OUTPATIENT
Start: 2024-07-01 | End: 2024-07-01

## 2024-07-01 RX ORDER — ASPIRIN 325 MG
325 TABLET ORAL
Status: COMPLETED | OUTPATIENT
Start: 2024-07-01 | End: 2024-07-01

## 2024-07-01 RX ORDER — ACETAMINOPHEN 325 MG/1
650 TABLET ORAL EVERY 4 HOURS PRN
Status: DISCONTINUED | OUTPATIENT
Start: 2024-07-01 | End: 2024-07-05 | Stop reason: HOSPADM

## 2024-07-01 RX ORDER — TALC
6 POWDER (GRAM) TOPICAL NIGHTLY PRN
Status: DISCONTINUED | OUTPATIENT
Start: 2024-07-01 | End: 2024-07-05 | Stop reason: HOSPADM

## 2024-07-01 RX ORDER — SODIUM,POTASSIUM PHOSPHATES 280-250MG
2 POWDER IN PACKET (EA) ORAL
Status: DISCONTINUED | OUTPATIENT
Start: 2024-07-01 | End: 2024-07-05 | Stop reason: HOSPADM

## 2024-07-01 RX ORDER — ACETAMINOPHEN 325 MG/1
650 TABLET ORAL EVERY 8 HOURS PRN
Status: DISCONTINUED | OUTPATIENT
Start: 2024-07-01 | End: 2024-07-05 | Stop reason: HOSPADM

## 2024-07-01 RX ORDER — AMLODIPINE BESYLATE 5 MG/1
5 TABLET ORAL
Status: COMPLETED | OUTPATIENT
Start: 2024-07-01 | End: 2024-07-01

## 2024-07-01 RX ORDER — PANTOPRAZOLE SODIUM 40 MG/1
40 TABLET, DELAYED RELEASE ORAL DAILY PRN
Status: DISCONTINUED | OUTPATIENT
Start: 2024-07-01 | End: 2024-07-05 | Stop reason: HOSPADM

## 2024-07-01 RX ORDER — IBUPROFEN 200 MG
16 TABLET ORAL
Status: DISCONTINUED | OUTPATIENT
Start: 2024-07-01 | End: 2024-07-05 | Stop reason: HOSPADM

## 2024-07-01 RX ORDER — TOPIRAMATE 25 MG/1
25 TABLET ORAL DAILY
Status: DISCONTINUED | OUTPATIENT
Start: 2024-07-01 | End: 2024-07-05 | Stop reason: HOSPADM

## 2024-07-01 RX ORDER — NALOXONE HCL 0.4 MG/ML
0.02 VIAL (ML) INJECTION
Status: DISCONTINUED | OUTPATIENT
Start: 2024-07-01 | End: 2024-07-05 | Stop reason: HOSPADM

## 2024-07-01 RX ORDER — GLUCAGON 1 MG
1 KIT INJECTION
Status: DISCONTINUED | OUTPATIENT
Start: 2024-07-01 | End: 2024-07-05 | Stop reason: HOSPADM

## 2024-07-01 RX ORDER — POTASSIUM CHLORIDE 20 MEQ/1
40 TABLET, EXTENDED RELEASE ORAL ONCE
Status: COMPLETED | OUTPATIENT
Start: 2024-07-01 | End: 2024-07-01

## 2024-07-01 RX ORDER — ALUMINUM HYDROXIDE, MAGNESIUM HYDROXIDE, AND SIMETHICONE 1200; 120; 1200 MG/30ML; MG/30ML; MG/30ML
30 SUSPENSION ORAL 4 TIMES DAILY PRN
Status: DISCONTINUED | OUTPATIENT
Start: 2024-07-01 | End: 2024-07-05 | Stop reason: HOSPADM

## 2024-07-01 RX ORDER — IBUPROFEN 200 MG
24 TABLET ORAL
Status: DISCONTINUED | OUTPATIENT
Start: 2024-07-01 | End: 2024-07-05 | Stop reason: HOSPADM

## 2024-07-01 RX ORDER — HYDRALAZINE HYDROCHLORIDE 25 MG/1
25 TABLET, FILM COATED ORAL EVERY 8 HOURS PRN
Status: DISCONTINUED | OUTPATIENT
Start: 2024-07-01 | End: 2024-07-05 | Stop reason: HOSPADM

## 2024-07-01 RX ORDER — ONDANSETRON HYDROCHLORIDE 2 MG/ML
4 INJECTION, SOLUTION INTRAVENOUS EVERY 6 HOURS PRN
Status: DISCONTINUED | OUTPATIENT
Start: 2024-07-01 | End: 2024-07-05 | Stop reason: HOSPADM

## 2024-07-01 RX ORDER — SODIUM CHLORIDE 9 MG/ML
INJECTION, SOLUTION INTRAVENOUS CONTINUOUS
Status: ACTIVE | OUTPATIENT
Start: 2024-07-01 | End: 2024-07-02

## 2024-07-01 RX ORDER — MORPHINE SULFATE 2 MG/ML
2 INJECTION, SOLUTION INTRAMUSCULAR; INTRAVENOUS
Status: COMPLETED | OUTPATIENT
Start: 2024-07-01 | End: 2024-07-01

## 2024-07-01 RX ORDER — PSEUDOEPHEDRINE/ACETAMINOPHEN 30MG-500MG
100 TABLET ORAL
Status: COMPLETED | OUTPATIENT
Start: 2024-07-01 | End: 2024-07-01

## 2024-07-01 RX ORDER — LEVOTHYROXINE SODIUM 25 UG/1
25 TABLET ORAL EVERY MORNING
Status: DISCONTINUED | OUTPATIENT
Start: 2024-07-02 | End: 2024-07-04

## 2024-07-01 RX ORDER — ISOSORBIDE DINITRATE 10 MG/1
30 TABLET ORAL DAILY
Status: DISCONTINUED | OUTPATIENT
Start: 2024-07-01 | End: 2024-07-05 | Stop reason: HOSPADM

## 2024-07-01 RX ORDER — MECLIZINE HCL 12.5 MG 12.5 MG/1
25 TABLET ORAL DAILY
Status: DISCONTINUED | OUTPATIENT
Start: 2024-07-01 | End: 2024-07-05 | Stop reason: HOSPADM

## 2024-07-01 RX ORDER — ASPIRIN 81 MG/1
162 TABLET ORAL DAILY
Status: DISCONTINUED | OUTPATIENT
Start: 2024-07-01 | End: 2024-07-05 | Stop reason: HOSPADM

## 2024-07-01 RX ORDER — POLYETHYLENE GLYCOL 3350 17 G/17G
17 POWDER, FOR SOLUTION ORAL DAILY
Status: DISCONTINUED | OUTPATIENT
Start: 2024-07-01 | End: 2024-07-05 | Stop reason: HOSPADM

## 2024-07-01 RX ADMIN — Medication 100 ML: at 12:07

## 2024-07-01 RX ADMIN — IOHEXOL 100 ML: 350 INJECTION, SOLUTION INTRAVENOUS at 08:07

## 2024-07-01 RX ADMIN — SODIUM CHLORIDE, POTASSIUM CHLORIDE, SODIUM LACTATE AND CALCIUM CHLORIDE 1000 ML: 600; 310; 30; 20 INJECTION, SOLUTION INTRAVENOUS at 06:07

## 2024-07-01 RX ADMIN — MORPHINE SULFATE 2 MG: 2 INJECTION, SOLUTION INTRAMUSCULAR; INTRAVENOUS at 06:07

## 2024-07-01 RX ADMIN — TOPIRAMATE 25 MG: 25 TABLET, FILM COATED ORAL at 01:07

## 2024-07-01 RX ADMIN — KETOROLAC TROMETHAMINE 15 MG: 30 INJECTION, SOLUTION INTRAMUSCULAR at 02:07

## 2024-07-01 RX ADMIN — SODIUM CHLORIDE 500 ML: 9 INJECTION, SOLUTION INTRAVENOUS at 12:07

## 2024-07-01 RX ADMIN — POLYETHYLENE GLYCOL 3350 17 G: 17 POWDER, FOR SOLUTION ORAL at 01:07

## 2024-07-01 RX ADMIN — MAGESIUM CITRATE 296 ML: 1.75 LIQUID ORAL at 09:07

## 2024-07-01 RX ADMIN — MECLIZINE HYDROCHLORIDE 25 MG: 12.5 TABLET ORAL at 04:07

## 2024-07-01 RX ADMIN — ASPIRIN 325 MG ORAL TABLET 325 MG: 325 PILL ORAL at 01:07

## 2024-07-01 RX ADMIN — POTASSIUM CHLORIDE 40 MEQ: 1500 TABLET, EXTENDED RELEASE ORAL at 10:07

## 2024-07-01 RX ADMIN — ENOXAPARIN SODIUM 40 MG: 40 INJECTION SUBCUTANEOUS at 04:07

## 2024-07-01 RX ADMIN — AMLODIPINE BESYLATE 5 MG: 5 TABLET ORAL at 10:07

## 2024-07-01 RX ADMIN — SODIUM CHLORIDE: 9 INJECTION, SOLUTION INTRAVENOUS at 09:07

## 2024-07-01 RX ADMIN — ISOSORBIDE DINITRATE 30 MG: 10 TABLET ORAL at 01:07

## 2024-07-01 RX ADMIN — CEFTRIAXONE SODIUM 2 G: 2 INJECTION, POWDER, FOR SOLUTION INTRAMUSCULAR; INTRAVENOUS at 01:07

## 2024-07-01 RX ADMIN — ONDANSETRON 4 MG: 2 INJECTION INTRAMUSCULAR; INTRAVENOUS at 06:07

## 2024-07-01 NOTE — ASSESSMENT & PLAN NOTE
Patient has a current diagnosis of hypertensive urgency (without evidence of end organ damage) which is controlled.  Latest blood pressure and vitals reviewed-   Temp:  [97.8 °F (36.6 °C)-98 °F (36.7 °C)]   Pulse:  []   Resp:  [16-32]   BP: (101-216)/()   SpO2:  [87 %-98 %] .   Patient currently off IV antihypertensives.   Home meds for hypertension were reviewed and noted below.   Hypertension Medications               isosorbide dinitrate (ISORDIL) 30 MG Tab Take 30 mg by mouth once daily.             Medication adjustment for hospital antihypertensives is as follows- add hydralazine PRN SBP > 180 q8hrs    Will aim for controlled BP reduction by medications noted above. Monitor and mitigate end organ damage as indicated.    Improved - continue current medications  Monitor blood pressure and adjust meds as indicated

## 2024-07-01 NOTE — ED PROVIDER NOTES
Encounter Date: 7/1/2024       History     Chief Complaint   Patient presents with    Abdominal Pain     RLQ / RUQ tenderness    Constipation     89-year-old female with past medical history of appendectomy, hysterectomy, ureteral calculus, back surgery, presents emergency department with abdominal pain and constipation.  Patient reports that she has not had a bowel movement in 4 days.  She is passing occasional gas.  She has had pain in her lower abdominal region right and left lower quadrant region.  This has been present for the last 4 days.  Patient has had some intermittent vomiting 2 days ago but this has since stopped.  She has had decreased p.o. intake per the daughter.  She has not wanted to drink as much.  No reported fevers or chills.  She denies any chest pain or any shortness of breath.  History limited by the patient's age in that she is in pain.  History provided by the patient's daughter who is present at bedside.      Review of patient's allergies indicates:  No Known Allergies  Past Medical History:   Diagnosis Date    Macula lutea degeneration     Squamous cell carcinoma of skin      Past Surgical History:   Procedure Laterality Date    APPENDECTOMY      BACK SURGERY  1975    HYSTERECTOMY      URETEROSCOPIC REMOVAL OF URETERIC CALCULUS Left 5/28/2024    Procedure: REMOVAL, CALCULUS, URETER, URETEROSCOPIC;  Surgeon: Juhi Mosher Jr., MD;  Location: [facility];  Service: Urology;  Laterality: Left;     Family History   Problem Relation Name Age of Onset    Melanoma Neg Hx      Psoriasis Neg Hx      Lupus Neg Hx      Eczema Neg Hx       Social History     Tobacco Use    Smoking status: Former     Current packs/day: 1.00     Average packs/day: 1 pack/day for 20.0 years (20.0 ttl pk-yrs)     Types: Cigarettes    Smokeless tobacco: Never   Substance Use Topics    Alcohol use: No    Drug use: No     Review of Systems   Unable to perform ROS: Age       Physical Exam     Initial Vitals [07/01/24 0529]   BP  Pulse Resp Temp SpO2   (!) 214/100 89 17 98 °F (36.7 °C) 98 %      MAP       --         Physical Exam    Nursing note and vitals reviewed.  Constitutional: She appears well-developed and well-nourished. No distress.   HENT:   Head: Normocephalic and atraumatic.   Mouth/Throat: No oropharyngeal exudate.   Eyes: Conjunctivae and EOM are normal. Pupils are equal, round, and reactive to light.   Neck: Neck supple. No tracheal deviation present.   Normal range of motion.  Cardiovascular:  Normal rate, regular rhythm, normal heart sounds and intact distal pulses.           No murmur heard.  Pulmonary/Chest: Breath sounds normal. No stridor. No respiratory distress. She has no wheezes. She has no rhonchi. She has no rales.   Abdominal: Abdomen is soft. She exhibits distension. There is abdominal tenderness (Severe tenderness to palpation right lower quadrant, suprapubic and left lower quadrant region.).   Decreased bowel sounds but still present There is no rebound.   Genitourinary:    Genitourinary Comments: Rectal exam,: Chaperoned by Loida LÓPEZ there is minimal stool in the rectal vault.  There is no fecal impaction.     Musculoskeletal:         General: No tenderness or edema. Normal range of motion.      Cervical back: Normal range of motion and neck supple.     Neurological: She is alert. She has normal strength. No cranial nerve deficit or sensory deficit.   Confused, able to tell me her name but not where she is, or the month or the year.   Skin: Skin is warm and dry. Capillary refill takes less than 2 seconds. No rash noted. No erythema. No pallor.   Psychiatric: She has a normal mood and affect. Her behavior is normal. Judgment and thought content normal.         ED Course   Procedures  Labs Reviewed   CBC W/ AUTO DIFFERENTIAL - Abnormal; Notable for the following components:       Result Value    Hematocrit 36.8 (*)     Gran % 73.9 (*)     Lymph % 17.4 (*)     All other components within normal limits    COMPREHENSIVE METABOLIC PANEL - Abnormal; Notable for the following components:    Sodium 135 (*)     Potassium 3.2 (*)     Glucose 119 (*)     ALT 8 (*)     eGFR 58.1 (*)     All other components within normal limits   TROPONIN I HIGH SENSITIVITY - Abnormal; Notable for the following components:    Troponin I High Sensitivity 38.3 (*)     All other components within normal limits   B-TYPE NATRIURETIC PEPTIDE - Abnormal; Notable for the following components:     (*)     All other components within normal limits   URINALYSIS, REFLEX TO URINE CULTURE - Abnormal; Notable for the following components:    Color, UA Colorless (*)     Ketones, UA 1+ (*)     Nitrite, UA Positive (*)     All other components within normal limits    Narrative:     Specimen Source->Urine   URINALYSIS MICROSCOPIC - Abnormal; Notable for the following components:    RBC, UA 5 (*)     All other components within normal limits    Narrative:     Specimen Source->Urine   TROPONIN I HIGH SENSITIVITY - Abnormal; Notable for the following components:    Troponin I High Sensitivity 53.6 (*)     All other components within normal limits   LIPASE   LACTIC ACID, PLASMA   TROPONIN I HIGH SENSITIVITY   ISTAT CREATININE   POCT CREATININE     Results for orders placed or performed during the hospital encounter of 07/01/24   CBC auto differential   Result Value Ref Range    WBC 7.53 3.90 - 12.70 K/uL    RBC 4.11 4.00 - 5.40 M/uL    Hemoglobin 12.3 12.0 - 16.0 g/dL    Hematocrit 36.8 (L) 37.0 - 48.5 %    MCV 90 82 - 98 fL    MCH 29.9 27.0 - 31.0 pg    MCHC 33.4 32.0 - 36.0 g/dL    RDW 13.6 11.5 - 14.5 %    Platelets 159 150 - 450 K/uL    MPV 11.3 9.2 - 12.9 fL    Immature Granulocytes 0.4 0.0 - 0.5 %    Gran # (ANC) 5.6 1.8 - 7.7 K/uL    Immature Grans (Abs) 0.03 0.00 - 0.04 K/uL    Lymph # 1.3 1.0 - 4.8 K/uL    Mono # 0.5 0.3 - 1.0 K/uL    Eos # 0.1 0.0 - 0.5 K/uL    Baso # 0.05 0.00 - 0.20 K/uL    nRBC 0 0 /100 WBC    Gran % 73.9 (H) 38.0 - 73.0 %     Lymph % 17.4 (L) 18.0 - 48.0 %    Mono % 6.4 4.0 - 15.0 %    Eosinophil % 1.2 0.0 - 8.0 %    Basophil % 0.7 0.0 - 1.9 %    Differential Method Automated    Comprehensive metabolic panel   Result Value Ref Range    Sodium 135 (L) 136 - 145 mmol/L    Potassium 3.2 (L) 3.5 - 5.1 mmol/L    Chloride 103 95 - 110 mmol/L    CO2 23 23 - 29 mmol/L    Glucose 119 (H) 70 - 110 mg/dL    BUN 21 10 - 30 mg/dL    Creatinine 0.9 0.5 - 1.4 mg/dL    Calcium 9.0 8.7 - 10.5 mg/dL    Total Protein 6.5 6.0 - 8.4 g/dL    Albumin 3.8 3.5 - 5.2 g/dL    Total Bilirubin 0.8 0.1 - 1.0 mg/dL    Alkaline Phosphatase 78 55 - 135 U/L    AST 17 10 - 40 U/L    ALT 8 (L) 10 - 44 U/L    eGFR 58.1 (A) >60 mL/min/1.73 m^2    Anion Gap 9 8 - 16 mmol/L   Lipase   Result Value Ref Range    Lipase 5 4 - 60 U/L   Troponin I High Sensitivity   Result Value Ref Range    Troponin I High Sensitivity 38.3 (H) 0.0 - 14.9 pg/mL   Brain Natriuertic Peptide (BNP)   Result Value Ref Range     (H) 0 - 99 pg/mL   Lactic acid, plasma   Result Value Ref Range    Lactate (Lactic Acid) 0.9 0.5 - 1.9 mmol/L   Urinalysis, Reflex to Urine Culture Urine, Clean Catch    Specimen: Urine, Clean Catch   Result Value Ref Range    Specimen UA Urine, Clean Catch     Color, UA Colorless (A) Yellow, Straw, Melodie    Appearance, UA Clear Clear    pH, UA 8.0 5.0 - 8.0    Specific Gravity, UA 1.020 1.005 - 1.030    Protein, UA Negative Negative    Glucose, UA Negative Negative    Ketones, UA 1+ (A) Negative    Bilirubin (UA) Negative Negative    Occult Blood UA Negative Negative    Nitrite, UA Positive (A) Negative    Urobilinogen, UA Negative Negative EU/dL    Leukocytes, UA Negative Negative   Urinalysis Microscopic   Result Value Ref Range    RBC, UA 5 (H) 0 - 4 /hpf    WBC, UA 4 0 - 5 /hpf    Bacteria Rare None-Occ /hpf    Squam Epithel, UA 1 /hpf    Microscopic Comment SEE COMMENT    Troponin I High Sensitivity   Result Value Ref Range    Troponin I High Sensitivity 53.6 (HH)  0.0 - 14.9 pg/mL   EKG 12-lead   Result Value Ref Range    QRS Duration 88 ms    [facility] QTC Calculation 452 ms   ISTAT CREATININE   Result Value Ref Range    POC Creatinine 0.9 0.5 - 1.4 mg/dL    Sample VENOUS           ECG Results              EKG 12-lead (In process)        Collection Time Result Time QRS Duration [facility] QTC Calculation    07/01/24 06:21:08 07/01/24 06:49:36 88 452                     In process by Interface, Lab In [facility] (07/01/24 06:49:46)                   Narrative:    Test Reason : R10.32,    Vent. Rate : 091 BPM     Atrial Rate : 091 BPM     P-R Int : 192 ms          QRS Dur : 088 ms      QT Int : 368 ms       P-R-T Axes : 066 -38 030 degrees     QTc Int : 452 ms    Normal sinus rhythm  Left axis deviation  Moderate voltage criteria for LVH, may be normal variant  Abnormal ECG  When compared with ECG of 27-JUN-2024 08:52,  No significant change was found    Referred By: [email or web address]   SELF           Confirmed By:                                   Results for orders placed or performed during the hospital encounter of 07/01/24   CBC auto differential   Result Value Ref Range    WBC 7.53 3.90 - 12.70 K/uL    RBC 4.11 4.00 - 5.40 M/uL    Hemoglobin 12.3 12.0 - 16.0 g/dL    Hematocrit 36.8 (L) 37.0 - 48.5 %    MCV 90 82 - 98 fL    MCH 29.9 27.0 - 31.0 pg    MCHC 33.4 32.0 - 36.0 g/dL    RDW 13.6 11.5 - 14.5 %    Platelets 159 150 - 450 K/uL    MPV 11.3 9.2 - 12.9 fL    Immature Granulocytes 0.4 0.0 - 0.5 %    Gran # (ANC) 5.6 1.8 - 7.7 K/uL    Immature Grans (Abs) 0.03 0.00 - 0.04 K/uL    Lymph # 1.3 1.0 - 4.8 K/uL    Mono # 0.5 0.3 - 1.0 K/uL    Eos # 0.1 0.0 - 0.5 K/uL    Baso # 0.05 0.00 - 0.20 K/uL    nRBC 0 0 /100 WBC    Gran % 73.9 (H) 38.0 - 73.0 %    Lymph % 17.4 (L) 18.0 - 48.0 %    Mono % 6.4 4.0 - 15.0 %    Eosinophil % 1.2 0.0 - 8.0 %    Basophil % 0.7 0.0 - 1.9 %    Differential Method Automated    Comprehensive metabolic panel   Result Value Ref Range    Sodium 135 (L) 136 - 145 mmol/L     Potassium 3.2 (L) 3.5 - 5.1 mmol/L    Chloride 103 95 - 110 mmol/L    CO2 23 23 - 29 mmol/L    Glucose 119 (H) 70 - 110 mg/dL    BUN 21 10 - 30 mg/dL    Creatinine 0.9 0.5 - 1.4 mg/dL    Calcium 9.0 8.7 - 10.5 mg/dL    Total Protein 6.5 6.0 - 8.4 g/dL    Albumin 3.8 3.5 - 5.2 g/dL    Total Bilirubin 0.8 0.1 - 1.0 mg/dL    Alkaline Phosphatase 78 55 - 135 U/L    AST 17 10 - 40 U/L    ALT 8 (L) 10 - 44 U/L    eGFR 58.1 (A) >60 mL/min/1.73 m^2    Anion Gap 9 8 - 16 mmol/L   Lipase   Result Value Ref Range    Lipase 5 4 - 60 U/L   Troponin I High Sensitivity   Result Value Ref Range    Troponin I High Sensitivity 38.3 (H) 0.0 - 14.9 pg/mL   Brain Natriuertic Peptide (BNP)   Result Value Ref Range     (H) 0 - 99 pg/mL   Lactic acid, plasma   Result Value Ref Range    Lactate (Lactic Acid) 0.9 0.5 - 1.9 mmol/L   Urinalysis, Reflex to Urine Culture Urine, Clean Catch    Specimen: Urine, Clean Catch   Result Value Ref Range    Specimen UA Urine, Clean Catch     Color, UA Colorless (A) Yellow, Straw, Melodie    Appearance, UA Clear Clear    pH, UA 8.0 5.0 - 8.0    Specific Gravity, UA 1.020 1.005 - 1.030    Protein, UA Negative Negative    Glucose, UA Negative Negative    Ketones, UA 1+ (A) Negative    Bilirubin (UA) Negative Negative    Occult Blood UA Negative Negative    Nitrite, UA Positive (A) Negative    Urobilinogen, UA Negative Negative EU/dL    Leukocytes, UA Negative Negative   Urinalysis Microscopic   Result Value Ref Range    RBC, UA 5 (H) 0 - 4 /hpf    WBC, UA 4 0 - 5 /hpf    Bacteria Rare None-Occ /hpf    Squam Epithel, UA 1 /hpf    Microscopic Comment SEE COMMENT    Troponin I High Sensitivity   Result Value Ref Range    Troponin I High Sensitivity 53.6 (HH) 0.0 - 14.9 pg/mL   EKG 12-lead   Result Value Ref Range    QRS Duration 88 ms    OHS QTC Calculation 452 ms   ISTAT CREATININE   Result Value Ref Range    POC Creatinine 0.9 0.5 - 1.4 mg/dL    Sample VENOUS        Imaging Results              X-Ray  Chest 1 View (Final result)  Result time 07/01/24 10:01:38      Final result by Ab Lam MD (07/01/24 10:01:38)                   Impression:      No acute cardiac or pulmonary process.      Electronically signed by: Ab Lam  Date:    07/01/2024  Time:    10:01               Narrative:    CLINICAL HISTORY:  ([id number] y/o  (10/2/1926) F    Other malaise    TECHNIQUE:  (A#[id number], exam time 7/1/2024 9:59)    XR CHEST 1 VIEW [id number]    COMPARISON:  Radiographs from 06/28/2020    FINDINGS:  The lungs are clear except for some vascular crowding bilaterally  likely related to a suboptimal inspiration. No pneumothorax is identified. The heart is top normal in size. Atheromatous calcifications are seen at the aortic arch. Osseous structures appear unchanged. The visualized upper abdomen is unremarkable.                                       CT Abdomen Pelvis With IV Contrast NO Oral Contrast (Final result)  Result time 07/01/24 08:21:54      Final result by Zachariah Morton DO (07/01/24 08:21:54)                   Impression:      1. Diverticulosis of the colon.  2. Mild prominence of the left ureter with no ureteral obstruction observed.  No nephrolithiasis observed bilaterally.  3. Bilateral trace pleural effusions and subsegmental atelectasis of the lung bases.  4. Small hiatal hernia.      Electronically signed by: Zachariah Morton  Date:    07/01/2024  Time:    08:21               Narrative:      CMS MANDATED QUALITY DATA - CT RADIATION - 436    All CT scans at this facility utilize dose modulation, iterative reconstruction, and/or weight based dosing when appropriate to reduce radiation dose to as low as reasonably achievable.    EXAMINATION:  CT ABDOMEN PELVIS WITH IV CONTRAST    CLINICAL HISTORY:  Abdominal pain, acute, nonlocalized;Bowel obstruction suspected;Nausea/vomiting;    TECHNIQUE:  CT abdomen and pelvis with IV contrast.  100 mL Omnipaque 350.    COMPARISON:  CT abdomen pelvis  04/16/2024    FINDINGS:  Bilateral trace pleural effusions noted.  There is subsegmental atelectasis of the lung bases.  Heart size is within normal limits.  Calcifications of the mitral valve annulus noted.    Liver is normal size.  There are no hepatic lesions.  The gallbladder is and common bile duct within normal limits.  The pancreas, spleen and adrenal glands are unremarkable.  The kidneys are normal size.  Small bilateral renal cysts are noted.  No enhancing renal lesions identified.  There is no hydronephrosis or nephrolithiasis.  There is mild prominence of the left ureter.  No ureteral obstruction is observed.  The bladder is fluid filled with no focal wall abnormalities.  Uterus and adnexal structures have been removed.  No free fluid in the pelvis.  Multiple pelvic phleboliths noted.    There is diverticulosis of the colon.  There is no bowel wall thickening or inflammatory changes.  Appendix is not definitively identified.  Stomach is mostly decompressed.  There is a small hiatal hernia.    The abdominal aorta is normal caliber with calcified plaque formation.  No enlarged intra-abdominal lymph nodes.  No mesenteric fat stranding or free fluid.  Ventral abdominal wall is unremarkable.  There are degenerative changes of the spine.  There is no acute osseous abnormality.                                       Medications   cefTRIAXone (ROCEPHIN) 2 g in dextrose 5 % 100 mL IVPB (ready to mix) (2 g Intravenous New Bag 7/1/24 1342)   aspirin EC tablet 162 mg (162 mg Oral Not Given 7/1/24 1400)   isosorbide dinitrate tablet 30 mg (30 mg Oral Given 7/1/24 1342)   levothyroxine tablet 25 mcg (has no administration in time range)   meclizine tablet 25 mg (has no administration in time range)   pantoprazole EC tablet 40 mg (has no administration in time range)   polyethylene glycol packet 17 g (17 g Oral Given 7/1/24 1341)   topiramate tablet 25 mg (25 mg Oral Given 7/1/24 1342)   sodium chloride 0.9% flush 2 mL  (has no administration in time range)   melatonin tablet 6 mg (has no administration in time range)   ondansetron injection 4 mg (has no administration in time range)   senna-docusate 8.6-50 mg per tablet 1 tablet (has no administration in time range)   acetaminophen tablet 650 mg (has no administration in time range)   aluminum-magnesium hydroxide-simethicone 200-200-20 mg/5 mL suspension 30 mL (has no administration in time range)   acetaminophen tablet 650 mg (has no administration in time range)   naloxone 0.4 mg/mL injection 0.02 mg (has no administration in time range)   potassium bicarbonate disintegrating tablet 50 mEq (has no administration in time range)   potassium bicarbonate disintegrating tablet 35 mEq (has no administration in time range)   potassium bicarbonate disintegrating tablet 60 mEq (has no administration in time range)   magnesium oxide tablet 800 mg (has no administration in time range)   magnesium oxide tablet 800 mg (has no administration in time range)   potassium, sodium phosphates 280-160-250 mg packet 2 packet (has no administration in time range)   potassium, sodium phosphates 280-160-250 mg packet 2 packet (has no administration in time range)   potassium, sodium phosphates 280-160-250 mg packet 2 packet (has no administration in time range)   glucose chewable tablet 16 g (has no administration in time range)   glucose chewable tablet 24 g (has no administration in time range)   dextrose 50% injection 12.5 g (has no administration in time range)   dextrose 50% injection 25 g (has no administration in time range)   glucagon (human recombinant) injection 1 mg (has no administration in time range)   enoxaparin injection 40 mg (has no administration in time range)   ketorolac injection 15 mg (has no administration in time range)   ondansetron injection 4 mg (4 mg Intravenous Given 7/1/24 0632)   morphine injection 2 mg (2 mg Intravenous Given 7/1/24 0632)   lactated ringers bolus 1,000  mL (0 mLs Intravenous Stopped 7/1/24 1053)   iohexoL (OMNIPAQUE 350) injection 100 mL (100 mLs Intravenous Given 7/1/24 0806)   glycerin 99.5% topical solution 100 mL (100 mLs Rectal Given 7/1/24 1218)     And   magnesium citrate solution 296 mL (296 mLs Rectal Given 7/1/24 0900)     And   sodium chloride 0.9% bolus 500 mL 500 mL (0 mLs Rectal Stopped 7/1/24 1226)   potassium chloride SA CR tablet 40 mEq (40 mEq Oral Given 7/1/24 1052)   amLODIPine tablet 5 mg (5 mg Oral Given 7/1/24 1052)   aspirin tablet 325 mg (325 mg Oral Given 7/1/24 1342)     Medical Decision Making  Differential includes but not limited to peptic ulcer disease, pancreatitis, splenic infarct, kidney stone, pyelonephritis, urinary tract infection, bowel obstruction, volvulus, intussusception, mesenteric adenitis, mesenteric ischemia, intra-abdominal abscess, intra-abdominal hemorrhage, ACS, pneumonia, constipation, perforation, appendicitis, cholecystitis, cholelithiasis, cholangitis, cancer    Emergent evaluation of a 89-year-old female presents emergency department with the above-mentioned complaints.  Patient emergency department initially with abdominal pain constipation.  Patient had workup for this which does not reveal any evidence of any acute abnormality as far as her abdominal pain.  Patient was significantly tender.  Patient emergency department with a history of constipation without a bowel movement in 4 days.  Enema has been ordered after obstruction has been ruled out.  Patient daughter also states that she has had difficulty with confusion and also some trouble swallowing as well.  Patient with questionable early UTI.  Will give Rocephin for this.  Patient has a troponin which is up trending.  EKG does not show a STEMI.  She will need to be admitted for further trending/evaluation of this troponin, altered mental status etcetera.    A dictation software program was used for this note.  Please expect some simple typographical   errors in this note.         Amount and/or Complexity of Data Reviewed  External Data Reviewed: labs and notes.  Labs: ordered. Decision-making details documented in ED Course.  Radiology: ordered and independent interpretation performed. Decision-making details documented in ED Course.  ECG/medicine tests: ordered and independent interpretation performed. Decision-making details documented in ED Course.    Risk  OTC drugs.  Prescription drug management.  Decision regarding hospitalization.               ED Course as of 07/01/24 1408   Mon Jul 01, 2024   0624 EKG 6:21 a.m. normal sinus rhythm rate of 91, left axis deviation, ventricular hypertrophy.  No STEMI.  EKG interpreted independently by me. [JR]   1244 Kyara from [facility] will admit the patient. [JR]      ED Course User Index  [JR] Felipe Bashir DO                           Clinical Impression:  Final diagnoses:  [R10.32] LLQ pain  [R53.81] Malaise  [R79.89] Elevated troponin I level (Primary)  [R41.0] Confusion  [N39.0] Urinary tract infection without hematuria, site unspecified  [I16.0] Hypertensive urgency          ED Disposition Condition    Observation Stable                Felipe Bashir DO  07/01/24 1403

## 2024-07-01 NOTE — SUBJECTIVE & OBJECTIVE
Past Medical History:   Diagnosis Date    Macula lutea degeneration     Squamous cell carcinoma of skin        Past Surgical History:   Procedure Laterality Date    APPENDECTOMY      BACK SURGERY  1975    HYSTERECTOMY      URETEROSCOPIC REMOVAL OF URETERIC CALCULUS Left 5/28/2024    Procedure: REMOVAL, CALCULUS, URETER, URETEROSCOPIC;  Surgeon: Juhi Mosher Jr., MD;  Location: [facility];  Service: Urology;  Laterality: Left;       Review of patient's allergies indicates:  No Known Allergies    Current Facility-Administered Medications on File Prior to Encounter   Medication    triamcinolone acetonide injection 40 mg     Current Outpatient Medications on File Prior to Encounter   Medication Sig    aspirin (ECOTRIN) 81 MG EC tablet Take 162 mg by mouth once daily.    isosorbide dinitrate (ISORDIL) 30 MG Tab Take 30 mg by mouth once daily.     topiramate (TOPAMAX) 25 MG tablet Take 25 mg by mouth once daily.     zolpidem (AMBIEN) 10 mg Tab Take 1 tablet (10 mg total) by mouth nightly as needed (insomnia).    acetaminophen (TYLENOL) 325 MG tablet Take 0.5 tablets by mouth every 6 (six) hours as needed for Pain.    HYDROcodone-acetaminophen (NORCO) 5-325 mg per tablet Take 1 tablet by mouth every 6 (six) hours as needed for Pain. (Patient taking differently: Take 1 tablet by mouth every 6 (six) hours as needed for Pain. NEW Rx - NOT YET STARTED)    levothyroxine (SYNTHROID) 25 MCG tablet Take 25 mcg by mouth every morning.     meclizine (ANTIVERT) 12.5 mg tablet Take 2 tablets by mouth Daily.    pantoprazole (PROTONIX) 40 MG tablet Take 1 tablet by mouth daily as needed (Heartburn).    polyethylene glycol (GLYCOLAX) 17 gram/dose powder Take 17 g by mouth every evening.    [DISCONTINUED] amLODIPine (NORVASC) 2.5 MG tablet Take 2.5 mg by mouth once daily.     Family History    None       Tobacco Use    Smoking status: Former     Current packs/day: 1.00     Average packs/day: 1 pack/day for 20.0 years (20.0 ttl pk-yrs)      Types: Cigarettes    Smokeless tobacco: Never   Substance and Sexual Activity    Alcohol use: No    Drug use: No    Sexual activity: Not on file     Review of Systems   HENT:  Positive for trouble swallowing.    Gastrointestinal:  Positive for abdominal pain.   Psychiatric/Behavioral:  Positive for confusion (at times per daughter).      Objective:     Vital Signs (Most Recent):  Temp: 97.8 °F (36.6 °C) (07/01/24 0657)  Pulse: 91 (07/01/24 1307)  Resp: (!) 23 (07/01/24 1307)  BP: (!) 203/98 (07/01/24 1229)  SpO2: (!) 89 % (07/01/24 1259) Vital Signs (24h Range):  Temp:  [97.8 °F (36.6 °C)-98 °F (36.7 °C)] 97.8 °F (36.6 °C)  Pulse:  [] 91  Resp:  [16-32] 23  SpO2:  [87 %-98 %] 89 %  BP: (155-216)/() 203/98     Weight: 70.3 kg (155 lb)  Body mass index is 26.61 kg/m².     Physical Exam  Vitals reviewed.   Constitutional:       General: She is not in acute distress.     Appearance: Normal appearance. She is not toxic-appearing.   HENT:      Head: Normocephalic and atraumatic.      Mouth/Throat:      Mouth: Mucous membranes are moist.      Pharynx: Oropharynx is clear.   Eyes:      Extraocular Movements: Extraocular movements intact.      Pupils: Pupils are equal, round, and reactive to light.   Cardiovascular:      Rate and Rhythm: Normal rate and regular rhythm.      Pulses: Normal pulses.      Heart sounds: Normal heart sounds.   Pulmonary:      Effort: Pulmonary effort is normal.      Breath sounds: Normal breath sounds.   Abdominal:      General: Bowel sounds are normal. There is no distension.      Palpations: Abdomen is soft.      Tenderness: There is abdominal tenderness (RLQ).   Genitourinary:     Comments: Dark colored urine + for Nitrites  Musculoskeletal:         General: No swelling. Normal range of motion.      Cervical back: Normal range of motion and neck supple.   Skin:     General: Skin is warm and dry.      Capillary Refill: Capillary refill takes less than 2 seconds.   Neurological:       General: No focal deficit present.      Mental Status: She is alert and oriented to person, place, and time. Mental status is at baseline.   Psychiatric:         Mood and Affect: Mood normal.         Behavior: Behavior normal.         Judgment: Judgment normal.              CRANIAL NERVES     CN III, IV, VI   Pupils are equal, round, and reactive to light.       Significant Labs: All pertinent labs within the past 24 hours have been reviewed.  Recent Lab Results         07/01/24  1054   07/01/24  0848   07/01/24  0624   07/01/24  0623        Albumin     3.8         ALP     78         ALT     8         Anion Gap     9         Appearance, UA   Clear           AST     17         Bacteria, UA   Rare           Baso #     0.05         Basophil %     0.7         Bilirubin (UA)   Negative           BILIRUBIN TOTAL     0.8  Comment: For infants and newborns, interpretation of results should be based  on gestational age, weight and in agreement with clinical  observations.    Premature Infant recommended reference ranges:  Up to 24 hours.............<8.0 mg/dL  Up to 48 hours............<12.0 mg/dL  3-5 days..................<15.0 mg/dL  6-29 days.................<15.0 mg/dL           BNP     153  Comment: Values of less than 100 pg/ml are consistent with non-CHF populations.         BUN     21         Calcium     9.0         Chloride     103         CO2     23         Color, UA   Colorless           Creatinine     0.9         Differential Method     Automated         eGFR     58.1         Eos #     0.1         Eos %     1.2         Glucose     119         Glucose, UA   Negative           Gran # (ANC)     5.6         Gran %     73.9         Hematocrit     36.8         Hemoglobin     12.3         Immature Grans (Abs)     0.03  Comment: Mild elevation in immature granulocytes is non specific and   can be seen in a variety of conditions including stress response,   acute inflammation, trauma and pregnancy. Correlation with other    laboratory and clinical findings is essential.           Immature Granulocytes     0.4         Ketones, UA   1+           Lactic Acid Level     0.9  Comment: Falsely low lactic acid results can be found in samples   containing >=13.0 mg/dL total bilirubin and/or >=3.5 mg/dL   direct bilirubin.           Leukocyte Esterase, UA   Negative           Lipase     5         Lymph #     1.3         Lymph %     17.4         MCH     29.9         MCHC     33.4         MCV     90         Microscopic Comment   SEE COMMENT  Comment: Other formed elements not mentioned in the report are not   present in the microscopic examination.              Mono #     0.5         Mono %     6.4         MPV     11.3         NITRITE UA   Positive           nRBC     0         Blood, UA   Negative           pH, UA   8.0           Platelet Count     159         POC Creatinine       0.9       Potassium     3.2         PROTEIN TOTAL     6.5         Protein, UA   Negative  Comment: Recommend a 24 hour urine protein or a urine   protein/creatinine ratio if globulin induced proteinuria is  clinically suspected.             RBC     4.11         RBC, UA   5           RDW     13.6         Sample       VENOUS       Sodium     135         Spec Grav UA   1.020           Specimen UA   Urine, Clean Catch           Squam Epithel, UA   1           Troponin I High Sensitivity 53.6  Comment: Troponin results differ between methods. Do not use   results between Troponin methods interchangeably.    Alkaline Phospatase levels above 400 U/L may   cause false positive results.    Access hsTnI should not be used for patients taking   Asfotase raul (Strensiq).  Critical result TNIHS 53.6 pg/mL called to and read back by Dr. Bashir/ed  at 01-Jul-2024 12:[facility]  Result Rechecked       38.3  Comment: Troponin results differ between methods. Do not use   results between Troponin methods interchangeably.    Alkaline Phospatase levels above 400 U/L may    cause false positive results.    Access hsTnI should not be used for patients taking   Asfotase raul (Strensiq).           UROBILINOGEN UA   Negative           WBC, UA   4           WBC     7.53                 Significant Imaging: I have reviewed all pertinent imaging results/findings within the past 24 hours.    X-Ray Chest 1 View  Narrative: CLINICAL HISTORY:  ([id number])89 y/o  (10/2/1926) F    Other malaise    TECHNIQUE:  (A#[id number], exam time 7/1/2024 9:59)    XR CHEST 1 VIEW [id number]    COMPARISON:  Radiographs from 06/28/2020    FINDINGS:  The lungs are clear except for some vascular crowding bilaterally  likely related to a suboptimal inspiration. No pneumothorax is identified. The heart is top normal in size. Atheromatous calcifications are seen at the aortic arch. Osseous structures appear unchanged. The visualized upper abdomen is unremarkable.  Impression: No acute cardiac or pulmonary process.    Electronically signed by: Ab Lam  Date:    07/01/2024  Time:    10:01  CT Abdomen Pelvis With IV Contrast NO Oral Contrast  Narrative: CMS MANDATED QUALITY DATA - CT RADIATION - 436    All CT scans at this facility utilize dose modulation, iterative reconstruction, and/or weight based dosing when appropriate to reduce radiation dose to as low as reasonably achievable.    EXAMINATION:  CT ABDOMEN PELVIS WITH IV CONTRAST    CLINICAL HISTORY:  Abdominal pain, acute, nonlocalized;Bowel obstruction suspected;Nausea/vomiting;    TECHNIQUE:  CT abdomen and pelvis with IV contrast.  100 mL Omnipaque 350.    COMPARISON:  CT abdomen pelvis 04/16/2024    FINDINGS:  Bilateral trace pleural effusions noted.  There is subsegmental atelectasis of the lung bases.  Heart size is within normal limits.  Calcifications of the mitral valve annulus noted.    Liver is normal size.  There are no hepatic lesions.  The gallbladder is and common bile duct within normal limits.  The pancreas, spleen and adrenal glands are  unremarkable.  The kidneys are normal size.  Small bilateral renal cysts are noted.  No enhancing renal lesions identified.  There is no hydronephrosis or nephrolithiasis.  There is mild prominence of the left ureter.  No ureteral obstruction is observed.  The bladder is fluid filled with no focal wall abnormalities.  Uterus and adnexal structures have been removed.  No free fluid in the pelvis.  Multiple pelvic phleboliths noted.    There is diverticulosis of the colon.  There is no bowel wall thickening or inflammatory changes.  Appendix is not definitively identified.  Stomach is mostly decompressed.  There is a small hiatal hernia.    The abdominal aorta is normal caliber with calcified plaque formation.  No enlarged intra-abdominal lymph nodes.  No mesenteric fat stranding or free fluid.  Ventral abdominal wall is unremarkable.  There are degenerative changes of the spine.  There is no acute osseous abnormality.  Impression: 1. Diverticulosis of the colon.  2. Mild prominence of the left ureter with no ureteral obstruction observed.  No nephrolithiasis observed bilaterally.  3. Bilateral trace pleural effusions and subsegmental atelectasis of the lung bases.  4. Small hiatal hernia.    Electronically signed by: Zachariah Morton  Date:    07/01/2024  Time:    08:21

## 2024-07-01 NOTE — ASSESSMENT & PLAN NOTE
Patient has a current diagnosis of hypertensive urgency (without evidence of end organ damage) which is controlled.  Latest blood pressure and vitals reviewed-   Temp:  [97.8 °F (36.6 °C)-98 °F (36.7 °C)]   Pulse:  []   Resp:  [16-32]   BP: (155-216)/()   SpO2:  [87 %-98 %] .   Patient currently off IV antihypertensives.   Home meds for hypertension were reviewed and noted below.   Hypertension Medications               isosorbide dinitrate (ISORDIL) 30 MG Tab Take 30 mg by mouth once daily.             Medication adjustment for hospital antihypertensives is as follows- add hydralazine PRN SBP > 180 q8hrs    Will aim for controlled BP reduction by medications noted above. Monitor and mitigate end organ damage as indicated.

## 2024-07-01 NOTE — HPI
89 y.o. female with past medical history of high blood pressure, macular degeneration, headaches, reflux, hypothyroidism, TIA, and kidney stones who presents to ED via EMS with c/o RUQ and RLQ abdominal pain. Patient recently Dc'd from the facility on 6/28/24. Patient is independent lives alone. Family reports that patient had not had a BM Since Thursday of last week now 4 days. Daughter at the bedside reports that she is concerned about her mother's ability to swallow as she is spitting up. SPO2 98% on room air. The patient was with 214/100 BP in triage.  On evaluation of blood work patient with sodium 135, potassium 3.2, glucose 119, hematocrit 36.8, initial troponin 38.3 with a repeat of 53.6.  Patient's urine was extremely dark positive for nitrates however WBCs normal at a level of 4 patient given Rocephin 2 g in ED to prevent further urinary tract infection. Patient to be admitted for further evaluation.

## 2024-07-01 NOTE — ASSESSMENT & PLAN NOTE
- Patient with RUQ/RLQ pain - resolved  - CT scan w Abdomen with diverticulosis.  Mild prominence of left ureter no ureteral obstruction.  No nephrolithiasis.  Bilateral trace pleural effusions and subsegmental   atelectasis at the lung bases.  Small hiatal hernia  - Digital Exam in ED with no stool in rectal vault  - Enema in the ED with brown liquid stool  - Daughter reports that her mother has not eaten much over the last few days  - Miralax Daily

## 2024-07-01 NOTE — PT/OT/SLP EVAL
Physical Therapy Evaluation    Patient Name:  Erich Encarnacion   MRN:  [id number]    Recommendations:     Discharge Recommendations: Low Intensity Therapy (With 24/7 supervision)  Discharge Equipment Recommendations: none   Barriers to discharge:  Increased caregiver burden of care    Assessment:     Erich Encarnacion is a 89 y.o. female admitted with a medical diagnosis of Hypertensive urgency.  She presents with the following impairments/functional limitations: weakness, impaired endurance, impaired functional mobility, gait instability, visual deficits, impaired balance, pain, decreased safety awareness (impaired hearing) .    Pt was seen by PT in the ED. Pt was alert and oriented to person and place.  She is hard of hearing and visually impaired.  Pt reported low back pain from a fall 4 days ago. Pt t/f'ed supine to sit with Min A and sit to stand with close SBA. Ambulation was limited due to equipment ,but she took steps in the room with RW and  CGA.      Rehab Prognosis: Good and Fair; patient would benefit from acute skilled PT services to address these deficits and reach maximum level of function.    Recent Surgery: * No surgery found *      Plan:     During this hospitalization, patient to be seen 5 x/week to address the identified rehab impairments via gait training, therapeutic activities, therapeutic exercises and progress toward the following goals:    Plan of Care Expires:  08/01/24    Subjective     Chief Complaint: back pain   Patient/Family Comments/goals: Pt's return home with increased assistance provided   Pain/Comfort:  Pain Rating 1:  (Did not rate low back pain)  Location - Side 1: Bilateral  Location - Orientation 1: lower  Location 1: back  Pain Addressed 1: Reposition, Cessation of Activity, Distraction    Patients cultural, spiritual, Druze conflicts given the current situation:      Living Environment:  Pt lives at home alone in a [facility] [address] with B railing. Her daughter spends much time  with pt and assists  with bathing and dressing, meals.   Prior to admission, patients level of function was Mod I with RW.  .  Equipment used at home: walker, rolling.  DME owned (not currently used): none.  Upon discharge, patient will have assistance from her daughter.    Objective:     Communicated with nuurse  prior to session.  Patient found HOB elevated with PureWick, oxygen, telemetry, blood pressure cuff  upon PT entry to room.    General Precautions: Standard, fall  Orthopedic Precautions:    Braces:    Respiratory Status: Nasal cannula, flow 2 L/min    Exams:  Cognitive Exam:  Patient is oriented to Person and Place  LUE Strength: WFL  RLE ROM: WFL  RLE Strength: WFL  LLE ROM: WFL    Functional Mobility:  Bed Mobility:     Supine to Sit: minimum assistance  Sit to Supine: minimum assistance  Transfers:     Sit to Stand:  stand by assistance with rolling walker  Gait: 10 steps RW and CGA   Balance: unsupported sitting. CGA for standing with RW      AM-PAC 6 CLICK MOBILITY  Total Score:17       Treatment & Education:  Pt was educated on safety, use of call light, functional mobility as indicated above    Patient left supine with all lines intact, call button in reach, bed alarm on, and her grandson  present.    GOALS:   Multidisciplinary Problems       Physical Therapy Goals          Problem: Physical Therapy    Goal Priority Disciplines Outcome Goal Variances Interventions   Physical Therapy Goal     PT, PT/OT      Description: Goals to be met by: 2024     Patient will increase functional independence with mobility by performin. Supine to sit with Stand-by Assistance  2. Sit to stand transfer with Stand-by Assistance  3. Gait  x 100 feet with Supervision using Rolling Walker.                          History:     Past Medical History:   Diagnosis Date    Macula lutea degeneration     Squamous cell carcinoma of skin        Past Surgical History:   Procedure Laterality Date    APPENDECTOMY       BACK SURGERY  1975    HYSTERECTOMY      URETEROSCOPIC REMOVAL OF URETERIC CALCULUS Left 5/28/2024    Procedure: REMOVAL, CALCULUS, URETER, URETEROSCOPIC;  Surgeon: Juhi Mosher Jr., MD;  Location: [facility];  Service: Urology;  Laterality: Left;       Time Tracking:     PT Received On: 07/01/24  PT Start Time: 1433     PT Stop Time: 1500  PT Total Time (min): 27 min     Billable Minutes: Evaluation 15 minutes and Therapeutic Activity 12 minutes      07/01/2024

## 2024-07-01 NOTE — ASSESSMENT & PLAN NOTE
- Initial troponin 38.3 with repeat  of 53.6 repeat at 1600  - Trend  - Tight BP control  Cardiology consult pending

## 2024-07-01 NOTE — SUBJECTIVE & OBJECTIVE
Interval Note: Sitting up on edge of bed with daughter at bedside. Patient is oreineted to person and place. She denies any complaints on exam. Currently eating a muffin with no difficulty. She denies any pain. She reports some chronic back pain with movement. She denies any chest pain, shortness of breath, dizziness. Daughter at bedside reports patient to be at baseline mental status. She is on 2L NC with O2 sat of 99% on assessment. No respiratory distress noted.   PT/OT/ST to eval and treat, Wean O2 as able. Home O2 eval in am.   Repeat labs in am.     Past Medical History:   Diagnosis Date    Hypertension     Macula lutea degeneration     Squamous cell carcinoma of skin        Past Surgical History:   Procedure Laterality Date    APPENDECTOMY      BACK SURGERY  1975    HYSTERECTOMY      URETEROSCOPIC REMOVAL OF URETERIC CALCULUS Left 5/28/2024    Procedure: REMOVAL, CALCULUS, URETER, URETEROSCOPIC;  Surgeon: Juhi Mosher Jr., MD;  Location: [facility];  Service: Urology;  Laterality: Left;       Review of patient's allergies indicates:  No Known Allergies    Current Facility-Administered Medications on File Prior to Encounter   Medication    triamcinolone acetonide injection 40 mg     Current Outpatient Medications on File Prior to Encounter   Medication Sig    aspirin (ECOTRIN) 81 MG EC tablet Take 162 mg by mouth once daily.    isosorbide dinitrate (ISORDIL) 30 MG Tab Take 30 mg by mouth once daily.     topiramate (TOPAMAX) 25 MG tablet Take 25 mg by mouth once daily.     zolpidem (AMBIEN) 10 mg Tab Take 1 tablet (10 mg total) by mouth nightly as needed (insomnia).    acetaminophen (TYLENOL) 325 MG tablet Take 0.5 tablets by mouth every 6 (six) hours as needed for Pain.    HYDROcodone-acetaminophen (NORCO) 5-325 mg per tablet Take 1 tablet by mouth every 6 (six) hours as needed for Pain. (Patient taking differently: Take 1 tablet by mouth every 6 (six) hours as needed for Pain. NEW Rx - NOT YET STARTED)     levothyroxine (SYNTHROID) 25 MCG tablet Take 25 mcg by mouth every morning.     meclizine (ANTIVERT) 12.5 mg tablet Take 2 tablets by mouth Daily.    pantoprazole (PROTONIX) 40 MG tablet Take 1 tablet by mouth daily as needed (Heartburn).    polyethylene glycol (GLYCOLAX) 17 gram/dose powder Take 17 g by mouth every evening.    [DISCONTINUED] amLODIPine (NORVASC) 2.5 MG tablet Take 2.5 mg by mouth once daily.     Family History    None       Tobacco Use    Smoking status: Former     Current packs/day: 1.00     Average packs/day: 1 pack/day for 20.0 years (20.0 ttl pk-yrs)     Types: Cigarettes    Smokeless tobacco: Never   Substance and Sexual Activity    Alcohol use: No    Drug use: No    Sexual activity: Not on file     Review of Systems   Constitutional:  Negative for chills and fever.   HENT:  Negative for ear pain, sinus pain and sore throat.    Respiratory:  Negative for cough and shortness of breath.    Cardiovascular:  Negative for chest pain and palpitations.   Gastrointestinal:  Negative for abdominal pain.   Genitourinary:  Negative for dysuria.   Musculoskeletal:  Positive for back pain. Negative for gait problem.   Neurological:  Negative for dizziness and headaches.   Psychiatric/Behavioral:  Confusion: at times per daughter.      Objective:     Vital Signs (Most Recent):  Temp: 97.8 °F (36.6 °C) (07/01/24 0657)  Pulse: 97 (07/01/24 1422)  Resp: (!) 22 (07/01/24 1422)  BP: 101/61 (07/01/24 1359)  SpO2: (!) 93 % (07/01/24 1422) Vital Signs (24h Range):  Temp:  [97.8 °F (36.6 °C)-98 °F (36.7 °C)] 97.8 °F (36.6 °C)  Pulse:  [] 97  Resp:  [16-32] 22  SpO2:  [87 %-98 %] 93 %  BP: (101-216)/() 101/61     Weight: 70.3 kg (155 lb)  Body mass index is 26.61 kg/m².     Physical Exam  Vitals reviewed.   Constitutional:       General: She is not in acute distress.     Appearance: Normal appearance. She is not toxic-appearing.   HENT:      Head: Normocephalic and atraumatic.      Mouth/Throat:       Mouth: Mucous membranes are moist.      Pharynx: Oropharynx is clear.   Eyes:      Extraocular Movements: Extraocular movements intact.      Pupils: Pupils are equal, round, and reactive to light.   Cardiovascular:      Rate and Rhythm: Normal rate and regular rhythm.      Pulses: Normal pulses.      Heart sounds: Normal heart sounds.   Pulmonary:      Effort: Pulmonary effort is normal.      Breath sounds: Normal breath sounds.   Abdominal:      General: Bowel sounds are normal. There is no distension.      Palpations: Abdomen is soft.      Tenderness: There is no abdominal tenderness (RLQ).   Musculoskeletal:         General: No swelling. Normal range of motion.      Cervical back: Normal range of motion and neck supple.   Skin:     General: Skin is warm and dry.      Capillary Refill: Capillary refill takes less than 2 seconds.   Neurological:      General: No focal deficit present.      Mental Status: She is alert and oriented to person, place, and time. Mental status is at baseline.   Psychiatric:         Mood and Affect: Mood normal.         Behavior: Behavior normal.         Judgment: Judgment normal.              CRANIAL NERVES     CN III, IV, VI   Pupils are equal, round, and reactive to light.       Significant Labs: All pertinent labs within the past 24 hours have been reviewed.  Recent Lab Results         07/01/24  1054   07/01/24  0848   07/01/24  0624   07/01/24  0623        Albumin     3.8         ALP     78         ALT     8         Anion Gap     9         Appearance, UA   Clear           AST     17         Bacteria, UA   Rare           Baso #     0.05         Basophil %     0.7         Bilirubin (UA)   Negative           BILIRUBIN TOTAL     0.8  Comment: For infants and newborns, interpretation of results should be based  on gestational age, weight and in agreement with clinical  observations.    Premature Infant recommended reference ranges:  Up to 24 hours.............<8.0 mg/dL  Up to 48  hours............<12.0 mg/dL  3-5 days..................<15.0 mg/dL  6-29 days.................<15.0 mg/dL           BNP     153  Comment: Values of less than 100 pg/ml are consistent with non-CHF populations.         BUN     21         Calcium     9.0         Chloride     103         CO2     23         Color, UA   Colorless           Creatinine     0.9         Differential Method     Automated         eGFR     58.1         Eos #     0.1         Eos %     1.2         Glucose     119         Glucose, UA   Negative           Gran # (ANC)     5.6         Gran %     73.9         Hematocrit     36.8         Hemoglobin     12.3         Immature Grans (Abs)     0.03  Comment: Mild elevation in immature granulocytes is non specific and   can be seen in a variety of conditions including stress response,   acute inflammation, trauma and pregnancy. Correlation with other   laboratory and clinical findings is essential.           Immature Granulocytes     0.4         Ketones, UA   1+           Lactic Acid Level     0.9  Comment: Falsely low lactic acid results can be found in samples   containing >=13.0 mg/dL total bilirubin and/or >=3.5 mg/dL   direct bilirubin.           Leukocyte Esterase, UA   Negative           Lipase     5         Lymph #     1.3         Lymph %     17.4         MCH     29.9         MCHC     33.4         MCV     90         Microscopic Comment   SEE COMMENT  Comment: Other formed elements not mentioned in the report are not   present in the microscopic examination.              Mono #     0.5         Mono %     6.4         MPV     11.3         NITRITE UA   Positive           nRBC     0         Blood, UA   Negative           pH, UA   8.0           Platelet Count     159         POC Creatinine       0.9       Potassium     3.2         PROTEIN TOTAL     6.5         Protein, UA   Negative  Comment: Recommend a 24 hour urine protein or a urine   protein/creatinine ratio if globulin induced proteinuria  is  clinically suspected.             RBC     4.11         RBC, UA   5           RDW     13.6         Sample       VENOUS       Sodium     135         Spec Grav UA   1.020           Specimen UA   Urine, Clean Catch           Squam Epithel, UA   1           Troponin I High Sensitivity 53.6  Comment: Troponin results differ between methods. Do not use   results between Troponin methods interchangeably.    Alkaline Phospatase levels above 400 U/L may   cause false positive results.    Access hsTnI should not be used for patients taking   Asfotase raul (Strensiq).  Critical result TNIHS 53.6 pg/mL called to and read back by Dr. Bashir/ed  at 01-Jul-2024 12:20 [facility].  Result Rechecked       38.3  Comment: Troponin results differ between methods. Do not use   results between Troponin methods interchangeably.    Alkaline Phospatase levels above 400 U/L may   cause false positive results.    Access hsTnI should not be used for patients taking   Asfotase raul (Strensiq).           UROBILINOGEN UA   Negative           WBC, UA   4           WBC     7.53                 Significant Imaging: I have reviewed all pertinent imaging results/findings within the past 24 hours.    X-Ray Chest 1 View  Narrative: CLINICAL HISTORY:  ([id number] y/o  (10/2/1926) F    Other malaise    TECHNIQUE:  (A#[id number], exam time 7/1/2024 9:59)    XR CHEST 1 VIEW [id number]    COMPARISON:  Radiographs from 06/28/2020    FINDINGS:  The lungs are clear except for some vascular crowding bilaterally  likely related to a suboptimal inspiration. No pneumothorax is identified. The heart is top normal in size. Atheromatous calcifications are seen at the aortic arch. Osseous structures appear unchanged. The visualized upper abdomen is unremarkable.  Impression: No acute cardiac or pulmonary process.    Electronically signed by: Ab Lam  Date:    07/01/2024  Time:    10:01  CT Abdomen Pelvis With IV Contrast NO Oral Contrast  Narrative: CMS  MANDATED QUALITY DATA - CT RADIATION - 436    All CT scans at this facility utilize dose modulation, iterative reconstruction, and/or weight based dosing when appropriate to reduce radiation dose to as low as reasonably achievable.    EXAMINATION:  CT ABDOMEN PELVIS WITH IV CONTRAST    CLINICAL HISTORY:  Abdominal pain, acute, nonlocalized;Bowel obstruction suspected;Nausea/vomiting;    TECHNIQUE:  CT abdomen and pelvis with IV contrast.  100 mL Omnipaque 350.    COMPARISON:  CT abdomen pelvis 04/16/2024    FINDINGS:  Bilateral trace pleural effusions noted.  There is subsegmental atelectasis of the lung bases.  Heart size is within normal limits.  Calcifications of the mitral valve annulus noted.    Liver is normal size.  There are no hepatic lesions.  The gallbladder is and common bile duct within normal limits.  The pancreas, spleen and adrenal glands are unremarkable.  The kidneys are normal size.  Small bilateral renal cysts are noted.  No enhancing renal lesions identified.  There is no hydronephrosis or nephrolithiasis.  There is mild prominence of the left ureter.  No ureteral obstruction is observed.  The bladder is fluid filled with no focal wall abnormalities.  Uterus and adnexal structures have been removed.  No free fluid in the pelvis.  Multiple pelvic phleboliths noted.    There is diverticulosis of the colon.  There is no bowel wall thickening or inflammatory changes.  Appendix is not definitively identified.  Stomach is mostly decompressed.  There is a small hiatal hernia.    The abdominal aorta is normal caliber with calcified plaque formation.  No enlarged intra-abdominal lymph nodes.  No mesenteric fat stranding or free fluid.  Ventral abdominal wall is unremarkable.  There are degenerative changes of the spine.  There is no acute osseous abnormality.  Impression: 1. Diverticulosis of the colon.  2. Mild prominence of the left ureter with no ureteral obstruction observed.  No nephrolithiasis  observed bilaterally.  3. Bilateral trace pleural effusions and subsegmental atelectasis of the lung bases.  4. Small hiatal hernia.    Electronically signed by: Zachariah Morton  Date:    07/01/2024  Time:    08:21

## 2024-07-01 NOTE — ASSESSMENT & PLAN NOTE
- Patient with RUQ/RLQ pain  - CT scan w Abdomen with diverticulosis.  Mild prominence of left ureter no ureteral obstruction.  No nephrolithiasis.  Bilateral trace pleural effusions and subsegmental   atelectasis at the lung bases.  Small hiatal hernia  - Digital Exam in ED with no stool in rectal vault  - Enema in the ED with brown liquid stool  - Daughter reports that her mother has not eaten much over the last few days  - Miralax Daily

## 2024-07-02 ENCOUNTER — CLINICAL SUPPORT (OUTPATIENT)
Dept: CARDIOLOGY | Facility: HOSPITAL | Age: 89
End: 2024-07-02
Attending: HOSPITALIST
Payer: MEDICARE

## 2024-07-02 VITALS — BODY MASS INDEX: 26.98 KG/M2 | HEIGHT: 64 IN | WEIGHT: 158 LBS

## 2024-07-02 LAB
ALBUMIN SERPL BCP-MCNC: 3.3 G/DL (ref 3.5–5.2)
ALP SERPL-CCNC: 70 U/L (ref 55–135)
ALT SERPL W/O P-5'-P-CCNC: 6 U/L (ref 10–44)
ANION GAP SERPL CALC-SCNC: 9 MMOL/L (ref 8–16)
AORTIC ROOT ANNULUS: 3.2 CM
AORTIC VALVE CUSP SEPERATION: 1.6 CM
APICAL FOUR CHAMBER EJECTION FRACTION: 44 %
AST SERPL-CCNC: 13 U/L (ref 10–40)
AV INDEX (PROSTH): 0.52
AV MEAN GRADIENT: 9 MMHG
AV PEAK GRADIENT: 16 MMHG
AV VALVE AREA BY VELOCITY RATIO: 1.58 CM²
AV VALVE AREA: 1.64 CM²
AV VELOCITY RATIO: 0.5
BASOPHILS # BLD AUTO: 0.05 K/UL (ref 0–0.2)
BASOPHILS NFR BLD: 0.8 % (ref 0–1.9)
BILIRUB SERPL-MCNC: 0.4 MG/DL (ref 0.1–1)
BSA FOR ECHO PROCEDURE: 1.8 M2
BUN SERPL-MCNC: 23 MG/DL (ref 10–30)
CALCIUM SERPL-MCNC: 8.4 MG/DL (ref 8.7–10.5)
CHLORIDE SERPL-SCNC: 105 MMOL/L (ref 95–110)
CO2 SERPL-SCNC: 23 MMOL/L (ref 23–29)
CREAT SERPL-MCNC: 0.9 MG/DL (ref 0.5–1.4)
CV ECHO LV RWT: 1.24 CM
DIFFERENTIAL METHOD BLD: ABNORMAL
DOP CALC AO PEAK VEL: 2.01 M/S
DOP CALC AO VTI: 38.1 CM
DOP CALC LVOT AREA: 3.1 CM2
DOP CALC LVOT DIAMETER: 2 CM
DOP CALC LVOT PEAK VEL: 1.01 M/S
DOP CALC LVOT STROKE VOLUME: 62.49 CM3
DOP CALC MV VTI: 31.2 CM
DOP CALCLVOT PEAK VEL VTI: 19.9 CM
E WAVE DECELERATION TIME: 232 MSEC
E/A RATIO: 0.73
E/E' RATIO: 16 M/S
ECHO LV POSTERIOR WALL: 2.1 CM (ref 0.6–1.1)
EOSINOPHIL # BLD AUTO: 0.2 K/UL (ref 0–0.5)
EOSINOPHIL NFR BLD: 3.1 % (ref 0–8)
ERYTHROCYTE [DISTWIDTH] IN BLOOD BY AUTOMATED COUNT: 13.8 % (ref 11.5–14.5)
EST. GFR  (NO RACE VARIABLE): 58.1 ML/MIN/1.73 M^2
FRACTIONAL SHORTENING: 21 % (ref 28–44)
GLUCOSE SERPL-MCNC: 95 MG/DL (ref 70–110)
HCT VFR BLD AUTO: 34 % (ref 37–48.5)
HGB BLD-MCNC: 11 G/DL (ref 12–16)
IMM GRANULOCYTES # BLD AUTO: 0.01 K/UL (ref 0–0.04)
IMM GRANULOCYTES NFR BLD AUTO: 0.2 % (ref 0–0.5)
INTERVENTRICULAR SEPTUM: 2.1 CM (ref 0.6–1.1)
LEFT INTERNAL DIMENSION IN SYSTOLE: 2.7 CM (ref 2.1–4)
LEFT VENTRICLE DIASTOLIC VOLUME INDEX: 26.78 ML/M2
LEFT VENTRICLE DIASTOLIC VOLUME: 47.4 ML
LEFT VENTRICLE END DIASTOLIC VOLUME APICAL 4 CHAMBER: 52.8 ML
LEFT VENTRICLE MASS INDEX: 188 G/M2
LEFT VENTRICLE SYSTOLIC VOLUME INDEX: 15.3 ML/M2
LEFT VENTRICLE SYSTOLIC VOLUME: 27 ML
LEFT VENTRICULAR INTERNAL DIMENSION IN DIASTOLE: 3.4 CM (ref 3.5–6)
LEFT VENTRICULAR MASS: 333.13 G
LV LATERAL E/E' RATIO: 19.2 M/S
LV SEPTAL E/E' RATIO: 13.71 M/S
LVED V (TEICH): 47.4 ML
LVES V (TEICH): 27 ML
LVOT MG: 2 MMHG
LVOT MV: 0.68 CM/S
LYMPHOCYTES # BLD AUTO: 1.6 K/UL (ref 1–4.8)
LYMPHOCYTES NFR BLD: 25.2 % (ref 18–48)
MAGNESIUM SERPL-MCNC: 2 MG/DL (ref 1.6–2.6)
MCH RBC QN AUTO: 29.1 PG (ref 27–31)
MCHC RBC AUTO-ENTMCNC: 32.4 G/DL (ref 32–36)
MCV RBC AUTO: 90 FL (ref 82–98)
MONOCYTES # BLD AUTO: 0.7 K/UL (ref 0.3–1)
MONOCYTES NFR BLD: 10.2 % (ref 4–15)
MV MEAN GRADIENT: 4 MMHG
MV PEAK A VEL: 1.32 M/S
MV PEAK E VEL: 0.96 M/S
MV PEAK GRADIENT: 9 MMHG
MV STENOSIS PRESSURE HALF TIME: 85 MS
MV VALVE AREA BY CONTINUITY EQUATION: 2 CM2
MV VALVE AREA P 1/2 METHOD: 2.59 CM2
NEUTROPHILS # BLD AUTO: 3.9 K/UL (ref 1.8–7.7)
NEUTROPHILS NFR BLD: 60.5 % (ref 38–73)
NRBC BLD-RTO: 0 /100 WBC
PISA MRMAX VEL: 4.39 M/S
PISA TR MAX VEL: 2.78 M/S
PLATELET # BLD AUTO: 155 K/UL (ref 150–450)
PMV BLD AUTO: 11.3 FL (ref 9.2–12.9)
POTASSIUM SERPL-SCNC: 3.7 MMOL/L (ref 3.5–5.1)
PROT SERPL-MCNC: 5.6 G/DL (ref 6–8.4)
PV MV: 0.63 M/S
PV PEAK GRADIENT: 4 MMHG
PV PEAK VELOCITY: 0.95 M/S
RBC # BLD AUTO: 3.78 M/UL (ref 4–5.4)
SODIUM SERPL-SCNC: 137 MMOL/L (ref 136–145)
TDI LATERAL: 0.05 M/S
TDI SEPTAL: 0.07 M/S
TDI: 0.06 M/S
TR MAX PG: 31 MMHG
TRICUSPID ANNULAR PLANE SYSTOLIC EXCURSION: 1.41 CM
TROPONIN I SERPL HS-MCNC: 77.7 PG/ML (ref 0–14.9)
WBC # BLD AUTO: 6.47 K/UL (ref 3.9–12.7)
Z-SCORE OF LEFT VENTRICULAR DIMENSION IN END DIASTOLE: -3.64
Z-SCORE OF LEFT VENTRICULAR DIMENSION IN END SYSTOLE: -0.9

## 2024-07-02 PROCEDURE — 97165 OT EVAL LOW COMPLEX 30 MIN: CPT

## 2024-07-02 PROCEDURE — 85025 COMPLETE CBC W/AUTO DIFF WBC: CPT | Performed by: NURSE PRACTITIONER

## 2024-07-02 PROCEDURE — 94761 N-INVAS EAR/PLS OXIMETRY MLT: CPT

## 2024-07-02 PROCEDURE — 93306 TTE W/DOPPLER COMPLETE: CPT

## 2024-07-02 PROCEDURE — 99223 1ST HOSP IP/OBS HIGH 75: CPT | Mod: ,,, | Performed by: GENERAL PRACTICE

## 2024-07-02 PROCEDURE — 92610 EVALUATE SWALLOWING FUNCTION: CPT

## 2024-07-02 PROCEDURE — 25000003 PHARM REV CODE 250: Performed by: NURSE PRACTITIONER

## 2024-07-02 PROCEDURE — 27000221 HC OXYGEN, UP TO 24 HOURS

## 2024-07-02 PROCEDURE — 84484 ASSAY OF TROPONIN QUANT: CPT | Performed by: NURSE PRACTITIONER

## 2024-07-02 PROCEDURE — 99900035 HC TECH TIME PER 15 MIN (STAT)

## 2024-07-02 PROCEDURE — 83735 ASSAY OF MAGNESIUM: CPT | Performed by: NURSE PRACTITIONER

## 2024-07-02 PROCEDURE — 94799 UNLISTED PULMONARY SVC/PX: CPT

## 2024-07-02 PROCEDURE — 63600175 PHARM REV CODE 636 W HCPCS: Performed by: NURSE PRACTITIONER

## 2024-07-02 PROCEDURE — 21400001 HC TELEMETRY ROOM

## 2024-07-02 PROCEDURE — 80053 COMPREHEN METABOLIC PANEL: CPT | Performed by: NURSE PRACTITIONER

## 2024-07-02 PROCEDURE — 36415 COLL VENOUS BLD VENIPUNCTURE: CPT | Performed by: NURSE PRACTITIONER

## 2024-07-02 RX ORDER — METHOCARBAMOL 500 MG/1
500 TABLET, FILM COATED ORAL 3 TIMES DAILY
Status: DISCONTINUED | OUTPATIENT
Start: 2024-07-02 | End: 2024-07-05 | Stop reason: HOSPADM

## 2024-07-02 RX ADMIN — LEVOTHYROXINE SODIUM 25 MCG: 0.03 TABLET ORAL at 09:07

## 2024-07-02 RX ADMIN — POTASSIUM BICARBONATE 50 MEQ: 977.5 TABLET, EFFERVESCENT ORAL at 06:07

## 2024-07-02 RX ADMIN — ENOXAPARIN SODIUM 40 MG: 40 INJECTION SUBCUTANEOUS at 05:07

## 2024-07-02 RX ADMIN — TOPIRAMATE 25 MG: 25 TABLET, FILM COATED ORAL at 09:07

## 2024-07-02 RX ADMIN — ISOSORBIDE DINITRATE 30 MG: 10 TABLET ORAL at 09:07

## 2024-07-02 RX ADMIN — METHOCARBAMOL 500 MG: 500 TABLET ORAL at 08:07

## 2024-07-02 RX ADMIN — ASPIRIN 162 MG: 81 TABLET, COATED ORAL at 09:07

## 2024-07-02 NOTE — NURSING
Nurses Note -- 4 Eyes      7/1/2024   10:59 PM      Skin assessed during: Admit      [x] No Altered Skin Integrity Present    []Prevention Measures Documented      [] Yes- Altered Skin Integrity Present or Discovered   [] LDA Added if Not in Epic (Describe Wound)   [] New Altered Skin Integrity was Present on Admit and Documented in LDA   [] Wound Image Taken    Wound Care Consulted? No    Attending Nurse:  Sherron Dobbs RN/Staff Member:   MARIBEL Liriano

## 2024-07-02 NOTE — HOSPITAL COURSE
Patient was monitored overnight, patient had CT head/ cervical spine/ T-spine/ L-spine which is negative for acute changes, no apparently trauma, lab work is significant, patient lives alone and I offered for possible go to rehab facility, patient refused, patient would like to go home desperately.  Patient will be discharged home with home health.

## 2024-07-02 NOTE — ASSESSMENT & PLAN NOTE
Chronic, controlled. Latest blood pressure and vitals reviewed-      .   Home meds for hypertension were reviewed and noted below.   Hypertension Medications               amLODIPine (NORVASC) 2.5 MG tablet Take 2.5 mg by mouth once daily.    isosorbide dinitrate (ISORDIL) 30 MG Tab Take 30 mg by mouth once daily.             While in the hospital, will manage blood pressure as follows; Continue home antihypertensive regimen    Will utilize p.r.n. blood pressure medication only if patient's blood pressure greater than 140/90 and she develops symptoms such as worsening chest pain or shortness of breath.

## 2024-07-02 NOTE — HOSPITAL COURSE
Ms. Encarnacion has been monitored closely during her hospitalization. She was admitted on 7/1/24 with abd pain. CT abd/pelvis revealed T12 compression frx and follow up MRI confirmed. NSGY was consulted and recommended conservative management with TSLO brace, pain control, and PT/OT. Her BP was elevated on arrival and troponins were mildly elevated and trended down. Cardiology was consulted. D dimer elevated and CTA chest negative. EKG with no acute ischemic changes. Echo EF 60-65% grade I diastolic dysfunction PASP 31 mmHg with moderate aortic valve sclerosis. She's developed delirium and sundowning and delirium prxn ordered on 7/4/24. Discussed medication options for delirium with daughter and through shared decision making with family and provider, she was started on low dose seroquel with improvement of sleep and mentation on 7/5. Her last BM was in the ED on 7/1. KUB on 7/4 c/w constipation and lactulose ordered, but pt may have vomited some of this, so mineral oil enema ordered. Urine with positive nitrites and she completed 3 days of IV rocephin. She had a BM on 7/5. Initial discharge plan was to SNF, but daughter opted to bring her home with home health. She was seen and examined on the date of discharge. Strict return prxn provided.

## 2024-07-02 NOTE — PT/OT/SLP EVAL
Occupational Therapy   Evaluation    Name: Erich Encarnacion  MRN: [id number]  Admitting Diagnosis: Hypertensive urgency  Recent Surgery: * No surgery found *      Recommendations:     Discharge Recommendations: Low Intensity Therapy (with 24/7 assist at home)  Discharge Equipment Recommendations:  none  Barriers to discharge:  None    Assessment:     Erich Encarnacion is a 89 y.o. female with a medical diagnosis of Hypertensive urgency.  Performance deficits affecting function: weakness, impaired endurance, impaired self care skills, impaired functional mobility, gait instability, impaired balance, decreased safety awareness, impaired cardiopulmonary response to activity.      Rehab Prognosis: Fair; patient would benefit from acute skilled OT services to address these deficits and reach maximum level of function.       Plan:     Patient to be seen 3 x/week to address the above listed problems via self-care/home management, therapeutic activities, therapeutic exercises  Plan of Care Expires: 08/01/24  Plan of Care Reviewed with: patient, daughter    Subjective       Occupational Profile:  Living Environment: Lives alone in a [facility] with 3STE; daughter assists with bathing, dressing, and meals.    Previous level of function: Mod (I) with functional mobility using rollator; daughter assists with bathing/dressing; daughter also assists with IADLs  Roles and Routines: limited homemaker   Equipment Used at Home: rollator, bedside commode  Assistance upon Discharge: daughter     Pain/Comfort:  Pain Rating 1: 0/10  Pain Rating Post-Intervention 1: 0/10    Objective:     Communicated with: nurse prior to session.  Patient found supine with telemetry upon OT entry to room.    General Precautions: Standard, fall, vision impaired  Respiratory Status: Nasal cannula, flow 2 L/min    Occupational Performance:    Bed Mobility:    Patient completed Supine to Sit with stand by assistance    Functional Mobility/Transfers:  Patient completed  Sit <> Stand Transfer with contact guard assistance  with  rolling walker   Patient completed Bed <> Chair Transfer using Stand Pivot technique with contact guard assistance with rolling walker    Activities of Daily Living:  Lower Body Dressing: total assistance to don socks  Toileting: pure wick in place      Cognitive/Visual Perceptual:  Cognitive/Psychosocial Skills:     -       Follows Commands/attention:Follows two-step commands  -       Communication: clear/fluent    Physical Exam:  Upper Extremity Range of Motion:     -       Right Upper Extremity: WFL  -       Left Upper Extremity: WFL  Upper Extremity Strength:    -       Right Upper Extremity: WFL  -       Left Upper Extremity: WFL   Strength:    -       Right Upper Extremity: WFL  -       Left Upper Extremity: WFL  Fine Motor Coordination:    -       Intact    AMPAC 6 Click ADL:  AMPAC Total Score: 18    Treatment & Education:  Therapist provided facilitation and instruction of proper body mechanics and fall prevention strategies during tasks listed above.   Instructed patient to sit in bedside chair as tolerated daily to increase OOB/activity tolerance.   Instructed patient to use call light to have nursing staff assist with needs/transfers.   Discussed OT POC and answered all questions within OT scope of practice.    Patient left up in chair with all lines intact, call button in reach, and daughter present    GOALS:   Multidisciplinary Problems       Occupational Therapy Goals          Problem: Occupational Therapy    Goal Priority Disciplines Outcome Interventions   Occupational Therapy Goal     OT, PT/OT     Description: Goals to be met by: 8/2/24     Patient will increase functional independence with ADLs by performing:    UE Dressing with Set-up Assistance.  LE Dressing with Moderate Assistance.  Grooming while seated with Supervision.  Toileting from bedside commode with Stand-by Assistance for hygiene and clothing management.   Toilet  transfer to bedside commode with Stand-by Assistance.                         History:     Past Medical History:   Diagnosis Date    Hypertension     Macula lutea degeneration     Squamous cell carcinoma of skin          Past Surgical History:   Procedure Laterality Date    APPENDECTOMY      BACK SURGERY  1975    HYSTERECTOMY      URETEROSCOPIC REMOVAL OF URETERIC CALCULUS Left 5/28/2024    Procedure: REMOVAL, CALCULUS, URETER, URETEROSCOPIC;  Surgeon: Juhi Mosher Jr., MD;  Location: [facility];  Service: Urology;  Laterality: Left;       Time Tracking:     OT Date of Treatment: 07/02/24  OT Start Time: 1138  OT Stop Time: 1150  OT Total Time (min): 12 min    Billable Minutes:Evaluation 12    7/2/2024

## 2024-07-02 NOTE — PLAN OF CARE
Formerly [facility], later [facility]  Initial Discharge Assessment       Primary Care Provider: Se Rios MD    Admission Diagnosis: Hypertensive urgency [I16.0]  Elevated troponin I level [R79.89]    Admission Date: 7/1/2024    DC assessment completed with patient and daughter at bedside.  Information verified as correct on facesheet. Lives alone, does not want to move in with daughter.  Daughter does live nearby and assists daily.  Patient denies HPOA.  Denies HD/Coumadin.  Active with [facility].  Uses rollator, grab bars, and bedside commode.  Patient states she needs a new larger bedside commode due to one she has now tipping over and being a safety issue.  Patient will also need home oxygen eval prior to discharge.  Patient independent in ADL's.  DC plan is to resume home health. Daughter to provide transport on discharge.        Transition of Care Barriers: None    Payor: [employer] [facility] / Plan: [employer] CHOICES / Product Type: Medicare Advantage /     Extended Emergency Contact Information  Primary Emergency Contact: Court Pringle  Address: [address] [place] [address] [id number] [place] [address]  Home Phone: [phone]  Mobile Phone: [phone]  Relation: Daughter  Preferred language: English   needed? No    Discharge Plan A: Home Health  Discharge Plan B: Home with family      [address] [address] [place] [address]  Phone: [phone] Fax: [phone]      Initial Assessment (most recent)       Adult Discharge Assessment - 07/02/24 1514          Discharge Assessment    Confirmed/corrected address, phone number and insurance Yes     Confirmed Demographics Correct on Facesheet     Source of Information patient;family     Does patient/caregiver understand observation status Yes     Communicated ARGELIA with patient/caregiver Yes     Reason For Admission hypertensive  emergency     People in Home alone     Facility Arrived From: home     Do you expect to return to your current living situation? Yes     Do you have help at home or someone to help you manage your care at home? No     Current cognitive status: Alert/Oriented     Equipment Currently Used at Home rollator;grab bar;bedside commode     Readmission within 30 days? Yes     Patient currently being followed by outpatient case management? No     Do you currently have service(s) that help you manage your care at home? Yes     Name and Contact number of agency [facility]     Is the pt/caregiver preference to resume services with current agency Yes     Do you take prescription medications? Yes     Do you have prescription coverage? Yes     Do you have any problems affording any of your prescribed medications? No     Is the patient taking medications as prescribed? yes     Who is going to help you get home at discharge? daughter     How do you get to doctors appointments? family or friend will provide     Are you on dialysis? No     Do you take coumadin? No     Discharge Plan A Home Health     Discharge Plan B Home with family     DME Needed Upon Discharge  bedside commode     Discharge Plan discussed with: Patient;Adult children     Transition of Care Barriers None

## 2024-07-02 NOTE — CONSULTS
"[facility]  Department of Cardiology  Consult Note      PATIENT NAME: Erich Encarnacion  MRN: [id number]  TODAY'S DATE: 07/02/2024  ADMIT DATE: 7/1/2024                          CONSULT REQUESTED BY: Renetta Khalil MD    SUBJECTIVE     PRINCIPAL PROBLEM: Hypertensive urgency      REASON FOR CONSULT:  "Elevated troponin"      HPI:  Patient is 89-year-old female with PMH hypertension, TIA, SVT, AT, hypothyroidism, macular degeneration who reported to the emergency room with complaints of abdominal pain.  Patient's daughter at bedside and states that she fell Thursday, landing on her back.  She had x-rays that showed no acute findings however she has had some ongoing back pain since the fall.  Daughter also states she had an episode where she could not breathe and daughter feels like she was acting like she was having a panic attack.  Her systolic blood pressure at the time was around 216.  Patient denies chest pain today.  Her breathing is comfortable.  Patient's daughter reports that she takes isosorbide for many years and is compliant with this but no other blood pressure medicine.  Her blood pressure is usually well controlled.  She sees cardiologist Dr. Ureña.    Further history per EMR:   "89 y.o. female with past medical history of high blood pressure, macular degeneration, headaches, reflux, hypothyroidism, TIA, and kidney stones who presents to ED via EMS with c/o RUQ and RLQ abdominal pain. Patient recently Dc'd from the facility on 6/28/24. Patient is independent lives alone. Family reports that patient had not had a BM Since Thursday of last week now 4 days. Daughter at the bedside reports that she is concerned about her mother's ability to swallow as she is spitting up. SPO2 98% on room air. The patient was with 214/100 BP in triage.  On evaluation of blood work patient with sodium 135, potassium 3.2, glucose 119, hematocrit 36.8, initial troponin 38.3 with a repeat of 53.6.  Patient's urine " "was extremely dark positive for nitrates however WBCs normal at a level of 4 patient given Rocephin 2 g in ED to prevent further urinary tract infection. Patient to be admitted for further evaluation".     Review of patient's allergies indicates:  No Known Allergies    Past Medical History:   Diagnosis Date    Hypertension     Macula lutea degeneration     Squamous cell carcinoma of skin      Past Surgical History:   Procedure Laterality Date    APPENDECTOMY      BACK SURGERY  1975    HYSTERECTOMY      URETEROSCOPIC REMOVAL OF URETERIC CALCULUS Left 5/28/2024    Procedure: REMOVAL, CALCULUS, URETER, URETEROSCOPIC;  Surgeon: Juhi Mosher Jr., MD;  Location: [facility];  Service: Urology;  Laterality: Left;     Social History     Tobacco Use    Smoking status: Former     Current packs/day: 1.00     Average packs/day: 1 pack/day for 20.0 years (20.0 ttl pk-yrs)     Types: Cigarettes    Smokeless tobacco: Never   Substance Use Topics    Alcohol use: No    Drug use: No        REVIEW OF SYSTEMS  Negative except as mentioned in HPI    OBJECTIVE     VITAL SIGNS (Most Recent)  Temp: 97.7 °F (36.5 °C) (07/02/24 0745)  Pulse: 74 (07/02/24 0745)  Resp: 19 (07/02/24 0745)  BP: 134/73 (07/02/24 0745)  SpO2: 98 % (07/02/24 0745)    VENTILATION STATUS  Resp: 19 (07/02/24 0745)  SpO2: 98 % (07/02/24 0745)           I & O (Last 24H):  Intake/Output Summary (Last 24 hours) at 7/2/2024 0846  Last data filed at 7/1/2024 2346  Gross per 24 hour   Intake 1840 ml   Output --   Net 1840 ml       WEIGHTS  Wt Readings from Last 3 Encounters:   07/01/24 2145 71.5 kg (157 lb 10.1 oz)   07/01/24 0529 70.3 kg (155 lb)   06/27/24 1715 70.4 kg (155 lb 3.3 oz)   06/27/24 0711 74.8 kg (165 lb)   05/29/24 2230 74.8 kg (164 lb 14.5 oz)   05/29/24 1802 73.5 kg (162 lb)       PHYSICAL EXAM    GENERAL:  89-year-old female in good physical condition out of bed to the chair in no apparent distress.  HEENT: Normocephalic.    NECK: No JVD.   CARDIAC: Regular " rate and rhythm. S1 is normal.S2 is normal.No gallops, clicks or murmurs noted at this time.  CHEST ANATOMY: normal.   LUNGS: Clear to auscultation. No wheezing or rhonchi..   ABDOMEN: Soft .  Normal bowel sounds.    EXTREMITIES: No edema  CENTRAL NERVOUS SYSTEM: AAO x 3  SKIN: No rash     HOME MEDICATIONS:  Current Facility-Administered Medications on File Prior to Encounter   Medication Dose Route Frequency Provider Last Rate Last Admin    triamcinolone acetonide injection 40 mg  40 mg Intra-articular  Kenneth Nevarez MD   40 mg at 06/03/22 1030     Current Outpatient Medications on File Prior to Encounter   Medication Sig Dispense Refill    aspirin (ECOTRIN) 81 MG EC tablet Take 162 mg by mouth once daily.      isosorbide dinitrate (ISORDIL) 30 MG Tab Take 30 mg by mouth once daily.       topiramate (TOPAMAX) 25 MG tablet Take 25 mg by mouth once daily.   0    zolpidem (AMBIEN) 10 mg Tab Take 1 tablet (10 mg total) by mouth nightly as needed (insomnia).      acetaminophen (TYLENOL) 325 MG tablet Take 0.5 tablets by mouth every 6 (six) hours as needed for Pain.      HYDROcodone-acetaminophen (NORCO) 5-325 mg per tablet Take 1 tablet by mouth every 6 (six) hours as needed for Pain. (Patient taking differently: Take 1 tablet by mouth every 6 (six) hours as needed for Pain. NEW Rx - NOT YET STARTED) 15 tablet 0    levothyroxine (SYNTHROID) 25 MCG tablet Take 25 mcg by mouth every morning.       meclizine (ANTIVERT) 12.5 mg tablet Take 2 tablets by mouth Daily.      pantoprazole (PROTONIX) 40 MG tablet Take 1 tablet by mouth daily as needed (Heartburn).      polyethylene glycol (GLYCOLAX) 17 gram/dose powder Take 17 g by mouth every evening.         SCHEDULED MEDS:   aspirin  162 mg Oral Daily    enoxparin  40 mg Subcutaneous Daily    isosorbide dinitrate  30 mg Oral Daily    levothyroxine  25 mcg Oral QAM    meclizine  25 mg Oral Daily    polyethylene glycol  17 g Oral Daily    topiramate  25 mg Oral Daily  "      CONTINUOUS INFUSIONS:    PRN MEDS:  Current Facility-Administered Medications:     acetaminophen, 650 mg, Oral, Q8H PRN    acetaminophen, 650 mg, Oral, Q4H PRN    aluminum-magnesium hydroxide-simethicone, 30 mL, Oral, QID PRN    dextrose 50%, 12.5 g, Intravenous, PRN    dextrose 50%, 25 g, Intravenous, PRN    glucagon (human recombinant), 1 mg, Intramuscular, PRN    glucose, 16 g, Oral, PRN    glucose, 24 g, Oral, PRN    hydrALAZINE, 25 mg, Oral, Q8H PRN    magnesium oxide, 800 mg, Oral, PRN    magnesium oxide, 800 mg, Oral, PRN    melatonin, 6 mg, Oral, Nightly PRN    naloxone, 0.02 mg, Intravenous, PRN    ondansetron, 4 mg, Intravenous, Q6H PRN    pantoprazole, 40 mg, Oral, Daily PRN    potassium bicarbonate, 35 mEq, Oral, PRN    potassium bicarbonate, 50 mEq, Oral, PRN    potassium bicarbonate, 60 mEq, Oral, PRN    potassium, sodium phosphates, 2 packet, Oral, PRN    potassium, sodium phosphates, 2 packet, Oral, PRN    potassium, sodium phosphates, 2 packet, Oral, PRN    senna-docusate 8.6-50 mg, 1 tablet, Oral, Daily PRN    sodium chloride 0.9%, 2 mL, Intravenous, Q8H PRN    LABS AND DIAGNOSTICS     CBC LAST 3 DAYS  Recent Labs   Lab 06/28/24  0400 07/01/24  0624 07/02/24  0432   WBC 6.15 7.53 6.47   RBC 3.53* 4.11 3.78*   HGB 10.4* 12.3 11.0*   HCT 31.5* 36.8* 34.0*   MCV 89 90 90   MCH 29.5 29.9 29.1   MCHC 33.0 33.4 32.4   RDW 13.7 13.6 13.8   * 159 155   MPV 11.3 11.3 11.3   GRAN 64.1  3.9 73.9*  5.6 60.5  3.9   LYMPH 24.2  1.5 17.4*  1.3 25.2  1.6   MONO 7.3  0.5 6.4  0.5 10.2  0.7   BASO 0.05 0.05 0.05   NRBC 0 0 0       COAGULATION LAST 3 DAYS  No results for input(s): "LABPT", "INR", "APTT" in the last 168 hours.    CHEMISTRY LAST 3 DAYS  Recent Labs   Lab 06/27/24  0856 06/28/24  0400 07/01/24  0624 07/02/24  0432    141 135* 137   K 3.4* 3.5 3.2* 3.7    110 103 105   CO2 22* 24 23 23   ANIONGAP 10 7* 9 9   BUN 19 16 21 23   CREATININE 0.9 0.8 0.9 0.9   * 102 " "119* 95   CALCIUM 9.2 8.5* 9.0 8.4*   MG 2.2 2.0  --  2.0   ALBUMIN 4.1 3.3* 3.8 3.3*   PROT 6.9 5.5* 6.5 5.6*   ALKPHOS 88 69 78 70   ALT 9* 6* 8* 6*   AST 15 11 17 13   BILITOT 0.9 0.8 0.8 0.4       CARDIAC PROFILE LAST 3 DAYS  Recent Labs   Lab 06/27/24  0856 07/01/24  0624   * 153*       ENDOCRINE LAST 3 DAYS  No results for input(s): "TSH", "PROCAL" in the last 168 hours.    LAST ARTERIAL BLOOD GAS  ABG  No results for input(s): "PH", "PO2", "PCO2", "HCO3", "BE" in the last 168 hours.    LAST 7 DAYS MICROBIOLOGY   Microbiology Results (last 7 days)       ** No results found for the last 168 hours. **            MOST RECENT IMAGING  CTA Chest Non-Coronary (PE Studies)  Narrative: EXAMINATION:  CTA CHEST NON CORONARY (PE STUDIES)    CLINICAL HISTORY:  Pulmonary embolism (PE) suspected, high prob;    TECHNIQUE:  Low dose axial images, sagittal and coronal reformations were obtained from the thoracic inlet to the lung bases following the IV administration of 100 mL of Omnipaque 350.  Contrast timing was optimized to evaluate the pulmonary arteries.  MIP images were performed.    COMPARISON:  CTA chest 05/09/2024    FINDINGS:  The visualized soft tissue structures at the base of the neck appear within normal limits allowing from streak artifact from dense contrast bolus.    The thoracic aorta maintains normal caliber, contour, and course with moderate atherosclerotic calcification within its course.  There is no evidence of aneurysmal dilation or dissection. The heart is mildly enlarged and there is trace pericardial fluid.  There is 3 vessel coronary artery calcification.  There is mitral annular calcification.  The esophagus maintains a normal course and caliber. There is no bulky axillary or mediastinal lymph node enlargement appreciated.    The trachea is midline and the proximal airways are patent. Detailed evaluation of the lung parenchyma is limited by respiratory motion artifact. There is no " pneumothorax. There are a few scattered pulmonary micronodules, similar to prior examination.  There are scattered bandlike opacities suggesting atelectasis or scarring, similar to prior study.  There is trace pleural fluid at the lung bases with adjacent atelectasis, right greater left..    No evidence of filling defect to indicate pulmonary thromboembolism.    Limited images of the upper abdomen obtained during the course of this dedicated thoracic CT demonstrate no acute abnormalities.  There is prominent atherosclerosis of the abdominal aorta and visualized major branch vessels..    The osseous structures demonstrate a recent appearing superior endplate compression fracture deformity of the T12 vertebral body, new from prior thoracic spine CT of 06/27/2024.  There is slight retropulsion of the posterior cortex with at most mild spinal canal stenosis.  There are moderate multilevel degenerative change of the remaining visualized spine..  Impression: No definite evidence of pulmonary thromboembolism.    Trace bilateral pleural effusions with adjacent atelectasis, right greater than left.    Recent appearing mild superior endplate compression fracture deformity of the T12 vertebral body, new from prior thoracic spine CT examination of 06/27/2024.  There is slight retropulsion of the posterior cortex with at most mild spinal canal stenosis.    Additional findings as above.    Electronically signed by: Remy Nowak MD  Date:    07/01/2024  Time:    19:49  X-Ray Chest 1 View  Narrative: CLINICAL HISTORY:  ([id number] y/o  (10/2/1926) F    Other malaise    TECHNIQUE:  (A#[id number], exam time 7/1/2024 9:59)    XR CHEST 1 VIEW [id number]    COMPARISON:  Radiographs from 06/28/2020    FINDINGS:  The lungs are clear except for some vascular crowding bilaterally  likely related to a suboptimal inspiration. No pneumothorax is identified. The heart is top normal in size. Atheromatous calcifications are seen at the aortic  arch. Osseous structures appear unchanged. The visualized upper abdomen is unremarkable.  Impression: No acute cardiac or pulmonary process.    Electronically signed by: Ab Lam  Date:    07/01/2024  Time:    10:01  CT Abdomen Pelvis With IV Contrast NO Oral Contrast  Narrative: CMS MANDATED QUALITY DATA - CT RADIATION - 436    All CT scans at this facility utilize dose modulation, iterative reconstruction, and/or weight based dosing when appropriate to reduce radiation dose to as low as reasonably achievable.    EXAMINATION:  CT ABDOMEN PELVIS WITH IV CONTRAST    CLINICAL HISTORY:  Abdominal pain, acute, nonlocalized;Bowel obstruction suspected;Nausea/vomiting;    TECHNIQUE:  CT abdomen and pelvis with IV contrast.  100 mL Omnipaque 350.    COMPARISON:  CT abdomen pelvis 04/16/2024    FINDINGS:  Bilateral trace pleural effusions noted.  There is subsegmental atelectasis of the lung bases.  Heart size is within normal limits.  Calcifications of the mitral valve annulus noted.    Liver is normal size.  There are no hepatic lesions.  The gallbladder is and common bile duct within normal limits.  The pancreas, spleen and adrenal glands are unremarkable.  The kidneys are normal size.  Small bilateral renal cysts are noted.  No enhancing renal lesions identified.  There is no hydronephrosis or nephrolithiasis.  There is mild prominence of the left ureter.  No ureteral obstruction is observed.  The bladder is fluid filled with no focal wall abnormalities.  Uterus and adnexal structures have been removed.  No free fluid in the pelvis.  Multiple pelvic phleboliths noted.    There is diverticulosis of the colon.  There is no bowel wall thickening or inflammatory changes.  Appendix is not definitively identified.  Stomach is mostly decompressed.  There is a small hiatal hernia.    The abdominal aorta is normal caliber with calcified plaque formation.  No enlarged intra-abdominal lymph nodes.  No mesenteric fat  stranding or free fluid.  Ventral abdominal wall is unremarkable.  There are degenerative changes of the spine.  There is no acute osseous abnormality.  Impression: 1. Diverticulosis of the colon.  2. Mild prominence of the left ureter with no ureteral obstruction observed.  No nephrolithiasis observed bilaterally.  3. Bilateral trace pleural effusions and subsegmental atelectasis of the lung bases.  4. Small hiatal hernia.    Electronically signed by: Zachariah Morton  Date:    07/01/2024  Time:    08:21      ECHOCARDIOGRAM RESULTS (last 5)  Results for orders placed during the hospital encounter of 10/17/21    Echo    Interpretation Summary  · Concentric hypertrophy and normal systolic function.  · The estimated ejection fraction is 65%.  · Grade I left ventricular diastolic dysfunction.  · Normal right ventricular size with normal right ventricular systolic function.  · The aortic root is mildly dilated.  · Trivial pericardial effusion.      CURRENT/PREVIOUS VISIT EKG  Results for orders placed or performed during the hospital encounter of 07/01/24   EKG 12-lead    Collection Time: 07/01/24  6:21 AM   Result Value Ref Range    QRS Duration 88 ms    [facility] QTC Calculation 452 ms    Narrative    Test Reason : R10.32,    Vent. Rate : 091 BPM     Atrial Rate : 091 BPM     P-R Int : 192 ms          QRS Dur : 088 ms      QT Int : 368 ms       P-R-T Axes : 066 -38 030 degrees     QTc Int : 452 ms    Normal sinus rhythm  Left axis deviation  Moderate voltage criteria for LVH, may be normal variant  Abnormal ECG  When compared with ECG of 27-JUN-2024 08:52,  No significant change was found    Referred By: [email or web address]   SELF           Confirmed By:            ASSESSMENT/PLAN:     Active Hospital Problems    Diagnosis    *Hypertensive urgency    Demand ischemia    Chronic idiopathic constipation    UTI (urinary tract infection)    Advanced age       ASSESSMENT & PLAN:   Hypertensive urgency  Elevated troponin  UTI  Abdominal  pain  Advanced age  S/p fall  Back pain    RECOMMENDATIONS:  Mild troponin elevation with downward trend x2 noted  Troponin elevation likely secondary to hypertensive episode   Patient denied chest pain  EKG showed no ischemic changes  Obtain echocardiogram, await read  BNP was 153  D-dimer was positive, CTA of chest was negative for PE  Blood pressure is now well controlled on trend review  Continue Imdur 30 mg daily and aspirin as at home  Conservative medical management given patient's advanced age  Further recommendations to follow      Florencia Norton NP  [facility]  Department of Cardiology  Date of Service: 07/02/2024        I have personally interviewed and examined the patient, I have reviewed the Nurse Practitioner's history and physical, assessment, and plan. I agree with the findings and plan.    Patient presents with a panic attack and elevated blood pressure.  Cardiology is consulted secondary to mild increase in her troponins which do not appear to be clinically significant  She sees Dr. Ureña as an outpatient  Echo reveals moderate left ventricular hypertrophy which indicates her blood pressure may not be as well controlled as she thinks.  Current her blood pressure is 1/9 over 63  Continue medical management  Aspirin isosorbide follow-up with Dr. Niranjan Mosher M.D.  [facility]  Department of Cardiology  Date of Service: 07/02/2024  8:46 AM

## 2024-07-02 NOTE — DISCHARGE SUMMARY
[facility]  Discharge Summary      Patient Name: Erich Encarnacion  MRN: [id number]  VIANNEY: [id number]  Patient Class: OP- Observation  Admission Date: 6/27/2024  Hospital Length of Stay: 0 days  Discharge Date and Time: 6/28/2024 12:03 PM  Attending Physician: No att. providers found   Discharging Provider: Renetta Khalil MD  Primary Care Provider: Se Rios MD    Primary Care Team: Networked reference to record PCT     HPI:   89 year old pt getting admitted with fall and elevated troponin  Pt lives alone and is an independent person  She awakened from sleep early AM and found herself lying on the floor  Pt doesn't know , how this happened  She was able to get up and she continued sleeping back in bed  Pt hit her head and was c/o back pain when her daughter visited her today AM  Pt was brought to ER and got admitted     * No surgery found *      Hospital Course:   Patient was monitored overnight, patient had CT head/ cervical spine/ T-spine/ L-spine which is negative for acute changes, no apparently trauma, lab work is significant, patient lives alone and I offered for possible go to rehab facility, patient refused, patient would like to go home desperately.  Patient will be discharged home with home health.      Goals of Care Treatment Preferences:  Code Status: Full Code      Consults:     Cardiac/Vascular  Troponin level elevated  Troponin ordered from ER on higher range  EKG WNL  Pt denies chest pain  Trend troponin's      Essential hypertension  Chronic, controlled. Latest blood pressure and vitals reviewed-      .   Home meds for hypertension were reviewed and noted below.   Hypertension Medications               amLODIPine (NORVASC) 2.5 MG tablet Take 2.5 mg by mouth once daily.    isosorbide dinitrate (ISORDIL) 30 MG Tab Take 30 mg by mouth once daily.             While in the hospital, will manage blood pressure as follows; Continue home antihypertensive regimen    Will  utilize p.r.n. blood pressure medication only if patient's blood pressure greater than 140/90 and she develops symptoms such as worsening chest pain or shortness of breath.    Orthopedic  * Fall  All investigations normal  PT/OT      Other  Advanced age  Aware         Final Active Diagnoses:    Diagnosis Date Noted POA    PRINCIPAL PROBLEM:  Fall [W19.XXXA] 06/27/2024 Yes    Troponin level elevated [R79.89] 06/27/2024 Yes    Advanced age [R54] 10/17/2021 Yes    Essential hypertension [I10] 10/17/2021 Yes      Problems Resolved During this Admission:       Discharged Condition: stable    [place] [facility]    [place]:   Follow-up Information       Se Rios MD. Go on 6/28/2024.    Specialty: Family Medicine  Why: hospital f/u 07/03/2024 @ 10:00 AM  Contact information:  [address] [facility] [place] [address]  [phone]               [facility] Follow up.    Specialties: Home Health Services, Home Therapy Services, Home Living Aide Services  Contact information:  [address] [address] [address]  [phone]                         Patient Instructions:      Ambulatory referral/consult to Home Health   Standing Status: Future   Referral Priority: Routine Referral Type: Home Health   Referral Reason: Specialty Services Required   Requested Specialty: Home Health Services   Number of Visits Requested: 1       Significant Diagnostic Studies: Labs: CMP   Recent Labs   Lab 07/01/24  0624 07/02/24  0432   * 137   K 3.2* 3.7    105   CO2 23 23   * 95   BUN 21 23   CREATININE 0.9 0.9   CALCIUM 9.0 8.4*   PROT 6.5 5.6*   ALBUMIN 3.8 3.3*   BILITOT 0.8 0.4   ALKPHOS 78 70   AST 17 13   ALT 8* 6*   ANIONGAP 9 9    and CBC   Recent Labs   Lab 07/01/24  0624 07/02/24  0432   WBC 7.53 6.47   HGB 12.3 11.0*   HCT 36.8* 34.0*    155       Pending Diagnostic Studies:       None           Medications:  Reconciled Home Medications:      Medication  List        START taking these medications      HYDROcodone-acetaminophen 5-325 mg per tablet  Commonly known as: NORCO  Take 1 tablet by mouth every 6 (six) hours as needed for Pain.            CONTINUE taking these medications      acetaminophen 325 MG tablet  Commonly known as: TYLENOL  Take 0.5 tablets by mouth every 6 (six) hours as needed for Pain.     aspirin 81 MG EC tablet  Commonly known as: ECOTRIN  Take 162 mg by mouth once daily.     isosorbide dinitrate 30 MG Tab  Commonly known as: ISORDIL  Take 30 mg by mouth once daily.     levothyroxine 25 MCG tablet  Commonly known as: SYNTHROID  Take 25 mcg by mouth every morning.     meclizine 12.5 mg tablet  Commonly known as: ANTIVERT  Take 2 tablets by mouth Daily.     pantoprazole 40 MG tablet  Commonly known as: PROTONIX  Take 1 tablet by mouth daily as needed (Heartburn).     polyethylene glycol 17 gram/dose powder  Commonly known as: GLYCOLAX  Take 17 g by mouth every evening.     topiramate 25 MG tablet  Commonly known as: TOPAMAX  Take 25 mg by mouth once daily.     zolpidem 10 mg Tab  Commonly known as: AMBIEN  Take 1 tablet (10 mg total) by mouth nightly as needed (insomnia).            CTA Chest Non-Coronary (PE Studies)    Result Date: 7/1/2024  EXAMINATION: CTA CHEST NON CORONARY (PE STUDIES) CLINICAL HISTORY: Pulmonary embolism (PE) suspected, high prob; TECHNIQUE: Low dose axial images, sagittal and coronal reformations were obtained from the thoracic inlet to the lung bases following the IV administration of 100 mL of Omnipaque 350.  Contrast timing was optimized to evaluate the pulmonary arteries.  MIP images were performed. COMPARISON: CTA chest 05/09/2024 FINDINGS: The visualized soft tissue structures at the base of the neck appear within normal limits allowing from streak artifact from dense contrast bolus. The thoracic aorta maintains normal caliber, contour, and course with moderate atherosclerotic calcification within its course.  There  is no evidence of aneurysmal dilation or dissection. The heart is mildly enlarged and there is trace pericardial fluid.  There is 3 vessel coronary artery calcification.  There is mitral annular calcification.  The esophagus maintains a normal course and caliber. There is no bulky axillary or mediastinal lymph node enlargement appreciated. The trachea is midline and the proximal airways are patent. Detailed evaluation of the lung parenchyma is limited by respiratory motion artifact. There is no pneumothorax. There are a few scattered pulmonary micronodules, similar to prior examination.  There are scattered bandlike opacities suggesting atelectasis or scarring, similar to prior study.  There is trace pleural fluid at the lung bases with adjacent atelectasis, right greater left.. No evidence of filling defect to indicate pulmonary thromboembolism. Limited images of the upper abdomen obtained during the course of this dedicated thoracic CT demonstrate no acute abnormalities.  There is prominent atherosclerosis of the abdominal aorta and visualized major branch vessels.. The osseous structures demonstrate a recent appearing superior endplate compression fracture deformity of the T12 vertebral body, new from prior thoracic spine CT of 06/27/2024.  There is slight retropulsion of the posterior cortex with at most mild spinal canal stenosis.  There are moderate multilevel degenerative change of the remaining visualized spine..     No definite evidence of pulmonary thromboembolism. Trace bilateral pleural effusions with adjacent atelectasis, right greater than left. Recent appearing mild superior endplate compression fracture deformity of the T12 vertebral body, new from prior thoracic spine CT examination of 06/27/2024.  There is slight retropulsion of the posterior cortex with at most mild spinal canal stenosis. Additional findings as above. Electronically signed by: Remy Nowak MD Date:    07/01/2024  Time:    19:49    X-Ray Chest 1 View    Result Date: 7/1/2024  CLINICAL HISTORY: ([id number])89 y/o  (10/2/1926) F Other malaise TECHNIQUE: (A#[id number], exam time 7/1/2024 9:59) XR CHEST 1 VIEW [id number] COMPARISON: Radiographs from 06/28/2020 FINDINGS: The lungs are clear except for some vascular crowding bilaterally  likely related to a suboptimal inspiration. No pneumothorax is identified. The heart is top normal in size. Atheromatous calcifications are seen at the aortic arch. Osseous structures appear unchanged. The visualized upper abdomen is unremarkable.     No acute cardiac or pulmonary process. Electronically signed by: Ab Lam Date:    07/01/2024 Time:    10:01    CT Abdomen Pelvis With IV Contrast NO Oral Contrast    Result Date: 7/1/2024  CMS MANDATED QUALITY DATA - CT RADIATION - 436 All CT scans at this facility utilize dose modulation, iterative reconstruction, and/or weight based dosing when appropriate to reduce radiation dose to as low as reasonably achievable. EXAMINATION: CT ABDOMEN PELVIS WITH IV CONTRAST CLINICAL HISTORY: Abdominal pain, acute, nonlocalized;Bowel obstruction suspected;Nausea/vomiting; TECHNIQUE: CT abdomen and pelvis with IV contrast.  100 mL Omnipaque 350. COMPARISON: CT abdomen pelvis 04/16/2024 FINDINGS: Bilateral trace pleural effusions noted.  There is subsegmental atelectasis of the lung bases.  Heart size is within normal limits.  Calcifications of the mitral valve annulus noted. Liver is normal size.  There are no hepatic lesions.  The gallbladder is and common bile duct within normal limits.  The pancreas, spleen and adrenal glands are unremarkable.  The kidneys are normal size.  Small bilateral renal cysts are noted.  No enhancing renal lesions identified.  There is no hydronephrosis or nephrolithiasis.  There is mild prominence of the left ureter.  No ureteral obstruction is observed.  The bladder is fluid filled with no focal wall abnormalities.  Uterus and  adnexal structures have been removed.  No free fluid in the pelvis.  Multiple pelvic phleboliths noted. There is diverticulosis of the colon.  There is no bowel wall thickening or inflammatory changes.  Appendix is not definitively identified.  Stomach is mostly decompressed.  There is a small hiatal hernia. The abdominal aorta is normal caliber with calcified plaque formation.  No enlarged intra-abdominal lymph nodes.  No mesenteric fat stranding or free fluid.  Ventral abdominal wall is unremarkable.  There are degenerative changes of the spine.  There is no acute osseous abnormality.     1. Diverticulosis of the colon. 2. Mild prominence of the left ureter with no ureteral obstruction observed.  No nephrolithiasis observed bilaterally. 3. Bilateral trace pleural effusions and subsegmental atelectasis of the lung bases. 4. Small hiatal hernia. Electronically signed by: Zachariah Morton Date:    07/01/2024 Time:    08:21    X-Ray Chest AP Portable    Result Date: 6/28/2024  EXAMINATION: XR CHEST AP PORTABLE CLINICAL HISTORY: fall; COMPARISON: 06/27/2024 FINDINGS: Cardiac silhouette size is stable compared to prior.  Mitral annular calcification noted.  Atherosclerotic calcification of the aorta.  No pulmonary edema.  No confluent airspace disease.  No large pleural effusion or pneumothorax.     No acute pulmonary process. Electronically signed by: Mri Elmore Date:    06/28/2024 Time:    07:57    CT Lumbar Spine Without Contrast    Result Date: 6/27/2024  EXAMINATION: CT LUMBAR SPINE WITHOUT CONTRAST CLINICAL HISTORY: Low back pain, trauma; COMPARISON: CT abdomen and pelvis April 2024 FINDINGS: Thin-section axial images were obtained, with reformatted imaging in the sagittal and coronal planes. There is a transitional lumbosacral segment with unilateral right-sided pseudoarthrosis.  For the purposes of this examination, the transitional segment is designated as S1, with rudimentary ribs at L1. Moderate inferior  endplate compression fracture of L3 is chronic and unchanged.  There is no evidence of acute fracture or subluxation.  Post laminectomy changes are noted in the lower lumbar spine. Changes of multilevel lumbar degenerative disc and facet disease are noted.  Findings are most pronounced at the L4-5 level, where bilateral facet hypertrophy and broad-based disc bulge combine to result in probable moderate spinal stenosis.     1. Chronic findings as above.  No acute CT abnormalities. Electronically signed by: Cyrus Bejarano Date:    06/27/2024 Time:    10:15    CT Thoracic Spine Without Contrast    Result Date: 6/27/2024  EXAMINATION: CT THORACIC SPINE WITHOUT CONTRAST CLINICAL HISTORY: Mid-back pain, compression fracture suspected; COMPARISON: February 2023 FINDINGS: Thin-section axial images were obtained, with reformatted imaging in the sagittal and coronal planes. There is no evidence of acute thoracic fracture or subluxation.  No osseous destructive lesion is identified. There is moderate multilevel intervertebral disc space narrowing compatible with degenerative disc disease.  Paraspinous soft tissues are unremarkable. Incidentally noted is dense multifocal coronary artery atherosclerotic calcification.     1. Chronic findings as above.  No evidence of acute thoracic fracture or subluxation. Electronically signed by: Cyrus Bejarano Date:    06/27/2024 Time:    10:09    XR Ribs Min 3 views w/PA Chest Left    Result Date: 6/27/2024  EXAMINATION: XR RIBS MIN 3 VIEWS W/ PA CHEST LEFT CLINICAL HISTORY: fall; FINDINGS: The heart is mildly enlarged.  There is vascular calcification of the aorta.  The lungs are clear. Multiple views of the left ribs show no fractures or acute osseous abnormalities.  There are degenerative changes of the lower thoracic spine.     Negative left ribs Mild cardiomegaly Degenerative changes of the thoracic spine Electronically signed by: Mehnaz Lin Date:    06/27/2024  Time:    10:00    CT Cervical Spine Without Contrast    Result Date: 6/27/2024  EXAMINATION: CT CERVICAL SPINE WITHOUT CONTRAST CLINICAL HISTORY: Neck trauma (Age >= 65y);. TECHNIQUE: Thin axial imaging was performed without contrast, with sagittal and coronal reformatted images reviewed. COMPARISON: February 2023 there is no evidence of acute cervical fracture.  3 mm C4 anterolisthesis is chronic and unchanged, with normal alignment at all other levels.  Prevertebral soft tissues are normal.  The odontoid is intact. Changes of multilevel cervical degenerative disc and moderate to severe degenerative facet disease are noted.  There is resultant significant multilevel foraminal stenosis.  Specifically, there is severe narrowing of the right C4 foramen, with moderate narrowing of the left C6 and right C7 foramina. FINDINGS: 1. No acute CT abnormalities. 2. Multilevel cervical degenerative disc/facet disease. 3. Minimal C4 anterolisthesis, chronic and unchanged.     . Electronically signed by: Cyrus Bejarano Date:    06/27/2024 Time:    10:00    X-Ray Hip 2 or 3 views Left with Pelvis when performed    Result Date: 6/27/2024  CLINICAL HISTORY: (MRN [id number])89 y/o  (10/2/1926) F Unspecified fall, initial encounter TECHNIQUE: (A# [id number], Exam time 6/27/2024 9:55) CIS496 XR HIP WITH PELVIS WHEN PERFORMED 2 OR 3 VIEWS LEFT  view(s) obtained. COMPARISON: 11/20/2020 FINDINGS: AP pelvis and two views of the left hip show no fractures, dislocations or acute osseous abnormalities.  There is mild joint space narrowing of both hips.  The SI joints are congruent.  The soft tissues are normal.  There are degenerative changes of the lower lumbar spine.     Mild joint space narrowing of both hips with no acute osseous abnormality Degenerative changes of the lower lumbar spine Electronically signed by: Mehnaz Lin Date:    06/27/2024 Time:    09:59    CT Head Without Contrast    Result Date: 6/27/2024  EXAMINATION: CT HEAD  WITHOUT CONTRAST CLINICAL HISTORY: Head trauma, minor (Age >= 65y);. TECHNIQUE: Noninfusion images were obtained from the skull base to the vertex. All CT scans at this facility utilize dose modulation, iterative reconstruction, and/or weight based dosing when appropriate to reduce radiation dose to as low as reasonably achievable CMS MANDATED QUALITY DATA - CT RADIATION - 436 COMPARISON: February 2024 FINDINGS: There is no intracranial mass, hemorrhage, or midline shift.  Ventricles are within normal limits.  The sulci and extra-axial CSF spaces are prominent compatible with atrophy, stable. There is no evidence of cortical ischemic change. Changes of mild-moderate chronic microvascular white matter disease are noted, stable.  Cerebellum and brainstem are normal. The calvarium is intact.  Small mucous retention cyst or polyp is noted in the left maxillary sinus, with mild mucoperiosteal thickening demonstrated in both maxillary sinuses.     No acute intracranial abnormalities. Chronic findings as discussed above. Electronically signed by: Cyrus Bejarano Date:    06/27/2024 Time:    09:51  - pulls last radiology orders    Indwelling Lines/Drains at time of discharge:   Lines/Drains/Airways       None                   Time spent on the discharge of patient: 35 minutes         Renetta Khalil MD  Department of Hospital Medicine  [facility]

## 2024-07-02 NOTE — SUBJECTIVE & OBJECTIVE
Interval History: Sitting up on edge of bed with patients daughter at bedside. Patient eating a muffin with no acute distress noted. She denies any chest pain, SOB, abdominal pain, dizziness. She is oriented and answers all question appropriately. She denies any abdominal pain. She is on 2L NC with O2 sat of 99% on assessment.   PT/OT to eval and treat. Fall precautions.  ST eval pending. Aspiration precautions.   Continue meds as ordered   Repeat labs in am   Home O2 eval in am.   Cardiology consult pending.     Review of Systems   HENT:  Negative for sore throat and trouble swallowing.    Respiratory:  Negative for shortness of breath.    Cardiovascular:  Negative for chest pain.   Gastrointestinal:  Negative for abdominal pain, diarrhea, nausea and vomiting.   Genitourinary:  Negative for dysuria.   Musculoskeletal:  Positive for back pain.   Neurological:  Negative for dizziness, light-headedness and headaches.   Psychiatric/Behavioral:  Negative for confusion (at times per daughter) and hallucinations.      Objective:     Vital Signs (Most Recent):  Temp: 97.7 °F (36.5 °C) (07/02/24 1215)  Pulse: 90 (07/02/24 1215)  Resp: 19 (07/02/24 1215)  BP: (!) 99/53 (07/02/24 1215)  SpO2: 97 % (07/02/24 1215) Vital Signs (24h Range):  Temp:  [97.6 °F (36.4 °C)-98.1 °F (36.7 °C)] 97.7 °F (36.5 °C)  Pulse:  [71-99] 90  Resp:  [18-29] 19  SpO2:  [89 %-98 %] 97 %  BP: ()/(53-79) 99/53     Weight: 71.5 kg (157 lb 10.1 oz)  Body mass index is 27.06 kg/m².    Intake/Output Summary (Last 24 hours) at 7/2/2024 1350  Last data filed at 7/1/2024 2346  Gross per 24 hour   Intake 340 ml   Output --   Net 340 ml         Physical Exam  Vitals reviewed.   Constitutional:       General: She is not in acute distress.     Appearance: Normal appearance. She is not toxic-appearing.   HENT:      Head: Normocephalic and atraumatic.      Mouth/Throat:      Mouth: Mucous membranes are moist.      Pharynx: Oropharynx is clear.   Eyes:       Extraocular Movements: Extraocular movements intact.      Pupils: Pupils are equal, round, and reactive to light.   Cardiovascular:      Rate and Rhythm: Normal rate and regular rhythm.      Pulses: Normal pulses.      Heart sounds: Normal heart sounds.   Pulmonary:      Effort: Pulmonary effort is normal.      Breath sounds: Normal breath sounds.   Abdominal:      General: Bowel sounds are normal. There is no distension.      Palpations: Abdomen is soft.      Tenderness: There is no abdominal tenderness (RLQ).   Genitourinary:     Comments: Dark colored urine + for Nitrites  Musculoskeletal:         General: No swelling. Normal range of motion.      Cervical back: Normal range of motion and neck supple.   Skin:     General: Skin is warm and dry.      Capillary Refill: Capillary refill takes less than 2 seconds.   Neurological:      General: No focal deficit present.      Mental Status: She is alert and oriented to person, place, and time. Mental status is at baseline.   Psychiatric:         Mood and Affect: Mood normal.         Behavior: Behavior normal.         Judgment: Judgment normal.             Significant Labs: All pertinent labs within the past 24 hours have been reviewed.  CBC:   Recent Labs   Lab 07/01/24  0624 07/02/24  0432   WBC 7.53 6.47   HGB 12.3 11.0*   HCT 36.8* 34.0*    155     CMP:   Recent Labs   Lab 07/01/24  0624 07/02/24  0432   * 137   K 3.2* 3.7    105   CO2 23 23   * 95   BUN 21 23   CREATININE 0.9 0.9   CALCIUM 9.0 8.4*   PROT 6.5 5.6*   ALBUMIN 3.8 3.3*   BILITOT 0.8 0.4   ALKPHOS 78 70   AST 17 13   ALT 8* 6*   ANIONGAP 9 9       Significant Imaging: I have reviewed all pertinent imaging results/findings within the past 24 hours.

## 2024-07-02 NOTE — PT/OT/SLP EVAL
Speech Language Pathology Evaluation  Bedside Swallow    Patient Name:  Erich Encarnacion   MRN:  [id number]  Admitting Diagnosis: Hypertensive urgency    Recommendations:                 General Recommendations:  Follow-up not indicated  Diet recommendations:  Soft & Bite Sized Diet - IDDSI Level 6, Thin   Aspiration Precautions: No straws   General Precautions: Standard, vision impaired, fall  Communication strategies:  provide increased time to answer and go to room if call light pushed    Assessment:     Erich Encarnacion is a 89 y.o. female with an SLP diagnosis of mild oral prep Dysphagia.  She does not eat or drink any cold foods (refrigerated) , requires extra gravy or broth to moisten all foods on every tray, and at home refuses to take most pills (daughter crushes them and hides in food).  Coughing noted with straw use during eval. Pt has hx small hiatal hernia (also noted on CXR) and heartburn. Recommend IDDSI ^ textures with thin liquids, NO STRAWS.  Pills in puree.  Assure meds prescribed for home are crushable.  No tx.     History:     Past Medical History:   Diagnosis Date    Hypertension     Macula lutea degeneration     Squamous cell carcinoma of skin        Past Surgical History:   Procedure Laterality Date    APPENDECTOMY      BACK SURGERY  1975    HYSTERECTOMY      URETEROSCOPIC REMOVAL OF URETERIC CALCULUS Left 5/28/2024    Procedure: REMOVAL, CALCULUS, URETER, URETEROSCOPIC;  Surgeon: Juhi Mosher Jr., MD;  Location: [facility];  Service: Urology;  Laterality: Left;       Social History: Patient lives with daughter.    Prior Intubation HX:  none this admission    Modified Barium Swallow: none in Epic    Chest X-Rays: No acute cardiac or pulmonary process     CT Cervical Spine - Changes of multilevel cervical degenerative disc and moderate to severe degenerative facet disease are noted.  There is resultant significant multilevel foraminal stenosis.  Specifically, there is severe narrowing of the  right C4 foramen, with moderate narrowing of the left C6 and right C7 foramina.    daughter There is no evidence of cortical ischemic change. Changes of mild-moderate chronic microvascular white matter disease are noted, stable. Cerebellum and brainstem are normal.      Prior diet: modified regular textures.    Subjective     What is that?  Applesauce  Patient goals: comfort     Objective:     Oral Musculature Evaluation  Oral Musculature: WFL  Dentition: present and adequate  Secretion Management: adequate  Mucosal Quality: adequate  Mandibular Strength and Mobility: WFL  Oral Labial Strength and Mobility: WFL  Lingual Strength and Mobility: WFL  Velar Elevation: WFL  Buccal Strength and Mobility: WFL  Volitional Swallow: rise and tilt palpated  Voice Prior to PO Intake: clear, no dyssarthria    Bedside Swallow Eval:   Consistencies Assessed:  Thin liquids via cup and straw  Puree applesauce  Mixed consistencies peaches in thin juice  Solids francisco cracker      Oral Phase:   WFL    Pharyngeal Phase:   Thin liquids via straw  -Coughing on every trial  Puree, soft solids, cracker, thin liquids via cup sip  -no s/s pharngeal dysphagia      Compensatory Strategies  None    Treatment: Education to pt and daughter re impressions & recommendations    Goals:   Multidisciplinary Problems       SLP Goals       Not on file                    Plan:     Patient to be seen:      Plan of Care expires:     Plan of Care reviewed with:  patient, daughter   SLP Follow-Up:  No       Discharge recommendations:  No Therapy Indicated   Barriers to Discharge:  None    Time Tracking:     SLP Treatment Date:   07/02/24  Speech Start Time:  1238  Speech Stop Time:  1249     Speech Total Time (min):  11 min    Billable Minutes: Eval Swallow and Oral Function 11    07/02/2024

## 2024-07-02 NOTE — PROGRESS NOTES
[facility] Medicine  Progress Note    Patient Name: Erich Encarnacion  MRN: [id number]  Patient Class: OP- Observation   Admission Date: 7/1/2024  Length of Stay: 0 days  Attending Physician: Renetta Khalil MD  Primary Care Provider: Se Rios MD        Subjective:     Principal Problem:Hypertensive urgency        HPI:  89 y.o. female with past medical history of high blood pressure, macular degeneration, headaches, reflux, hypothyroidism, TIA, and kidney stones who presents to ED via EMS with c/o RUQ and RLQ abdominal pain. Patient recently Dc'd from the facility on 6/28/24. Patient is independent lives alone. Family reports that patient had not had a BM Since Thursday of last week now 4 days. Daughter at the bedside reports that she is concerned about her mother's ability to swallow as she is spitting up. SPO2 98% on room air. The patient was with 214/100 BP in triage.  On evaluation of blood work patient with sodium 135, potassium 3.2, glucose 119, hematocrit 36.8, initial troponin 38.3 with a repeat of 53.6.  Patient's urine was extremely dark positive for nitrates however WBCs normal at a level of 4 patient given Rocephin 2 g in ED to prevent further urinary tract infection. Patient to be admitted for further evaluation.    Overview/Hospital Course:  Patient admitted for evaluation. Requiring O2 on admission and CTA chest was done that was negative for PE, trace bilateral pleural effusions.     Interval History: Sitting up on edge of bed with patients daughter at bedside. Patient eating a muffin with no acute distress noted. She denies any chest pain, SOB, abdominal pain, dizziness. She is oriented and answers all question appropriately. She denies any abdominal pain. She is on 2L NC with O2 sat of 99% on assessment.   PT/OT to eval and treat. Fall precautions.  ST eval pending. Aspiration precautions.   Continue meds as ordered   Repeat labs in am   Home O2 eval in am.    Cardiology consult pending.     Review of Systems   HENT:  Negative for sore throat and trouble swallowing.    Respiratory:  Negative for shortness of breath.    Cardiovascular:  Negative for chest pain.   Gastrointestinal:  Negative for abdominal pain, diarrhea, nausea and vomiting.   Genitourinary:  Negative for dysuria.   Musculoskeletal:  Positive for back pain.   Neurological:  Negative for dizziness, light-headedness and headaches.   Psychiatric/Behavioral:  Negative for confusion (at times per daughter) and hallucinations.      Objective:     Vital Signs (Most Recent):  Temp: 97.7 °F (36.5 °C) (07/02/24 1215)  Pulse: 90 (07/02/24 1215)  Resp: 19 (07/02/24 1215)  BP: (!) 99/53 (07/02/24 1215)  SpO2: 97 % (07/02/24 1215) Vital Signs (24h Range):  Temp:  [97.6 °F (36.4 °C)-98.1 °F (36.7 °C)] 97.7 °F (36.5 °C)  Pulse:  [71-99] 90  Resp:  [18-29] 19  SpO2:  [89 %-98 %] 97 %  BP: ()/(53-79) 99/53     Weight: 71.5 kg (157 lb 10.1 oz)  Body mass index is 27.06 kg/m².    Intake/Output Summary (Last 24 hours) at 7/2/2024 1350  Last data filed at 7/1/2024 2346  Gross per 24 hour   Intake 340 ml   Output --   Net 340 ml         Physical Exam  Vitals reviewed.   Constitutional:       General: She is not in acute distress.     Appearance: Normal appearance. She is not toxic-appearing.   HENT:      Head: Normocephalic and atraumatic.      Mouth/Throat:      Mouth: Mucous membranes are moist.      Pharynx: Oropharynx is clear.   Eyes:      Extraocular Movements: Extraocular movements intact.      Pupils: Pupils are equal, round, and reactive to light.   Cardiovascular:      Rate and Rhythm: Normal rate and regular rhythm.      Pulses: Normal pulses.      Heart sounds: Normal heart sounds.   Pulmonary:      Effort: Pulmonary effort is normal.      Breath sounds: Normal breath sounds.   Abdominal:      General: Bowel sounds are normal. There is no distension.      Palpations: Abdomen is soft.      Tenderness: There is  no abdominal tenderness (RLQ).   Genitourinary:     Comments: Dark colored urine + for Nitrites  Musculoskeletal:         General: No swelling. Normal range of motion.      Cervical back: Normal range of motion and neck supple.   Skin:     General: Skin is warm and dry.      Capillary Refill: Capillary refill takes less than 2 seconds.   Neurological:      General: No focal deficit present.      Mental Status: She is alert and oriented to person, place, and time. Mental status is at baseline.   Psychiatric:         Mood and Affect: Mood normal.         Behavior: Behavior normal.         Judgment: Judgment normal.             Significant Labs: All pertinent labs within the past 24 hours have been reviewed.  CBC:   Recent Labs   Lab 07/01/24 0624 07/02/24 0432   WBC 7.53 6.47   HGB 12.3 11.0*   HCT 36.8* 34.0*    155     CMP:   Recent Labs   Lab 07/01/24 0624 07/02/24 0432   * 137   K 3.2* 3.7    105   CO2 23 23   * 95   BUN 21 23   CREATININE 0.9 0.9   CALCIUM 9.0 8.4*   PROT 6.5 5.6*   ALBUMIN 3.8 3.3*   BILITOT 0.8 0.4   ALKPHOS 78 70   AST 17 13   ALT 8* 6*   ANIONGAP 9 9       Significant Imaging: I have reviewed all pertinent imaging results/findings within the past 24 hours.    Assessment/Plan:      * Hypertensive urgency  Patient has a current diagnosis of hypertensive urgency (without evidence of end organ damage) which is controlled.  Latest blood pressure and vitals reviewed-   Temp:  [97.8 °F (36.6 °C)-98 °F (36.7 °C)]   Pulse:  []   Resp:  [16-32]   BP: (101-216)/()   SpO2:  [87 %-98 %] .   Patient currently off IV antihypertensives.   Home meds for hypertension were reviewed and noted below.   Hypertension Medications               isosorbide dinitrate (ISORDIL) 30 MG Tab Take 30 mg by mouth once daily.             Medication adjustment for hospital antihypertensives is as follows- add hydralazine PRN SBP > 180 q8hrs    Will aim for controlled BP reduction by  medications noted above. Monitor and mitigate end organ damage as indicated.    Improved - continue current medications  Monitor blood pressure and adjust meds as indicated     Chronic idiopathic constipation  - Patient with RUQ/RLQ pain - resolved  - CT scan w Abdomen with diverticulosis.  Mild prominence of left ureter no ureteral obstruction.  No nephrolithiasis.  Bilateral trace pleural effusions and subsegmental   atelectasis at the lung bases.  Small hiatal hernia  - Digital Exam in ED with no stool in rectal vault  - Enema in the ED with brown liquid stool  - Daughter reports that her mother has not eaten much over the last few days  - Miralax Daily      Demand ischemia  - Initial troponin 38.3 with repeat  of 53.6 repeat at 1600  - Trend  - Tight BP control  Cardiology consult pending     UTI (urinary tract infection)  Possible start of UTI  - Dark urine with WBC 4, +Nitrites given 2gm rocephin in ED      Advanced age  - PT/OT to assess and treat  - consulted for discharge planning consider SNF        VTE Risk Mitigation (From admission, onward)           Ordered     enoxaparin injection 40 mg  Daily         07/01/24 1300     IP VTE HIGH RISK PATIENT  Once         07/01/24 1300     Place sequential compression device  Until discontinued         07/01/24 1300                    Discharge Planning   ARGELIA: 7/3/2024     Code Status: Full Code   Is the patient medically ready for discharge?:     Reason for patient still in hospital (select all that apply): Treatment               Suzy Sierra NP  Department of Hospital Medicine   [facility]

## 2024-07-02 NOTE — PLAN OF CARE
THOMAS explained to patient.  Patient verbalized understanding and signed THOMAS form.       07/02/24 1519   THOMAS Message   Medicare Outpatient and Observation Notification regarding financial responsibility Given to patient/caregiver;Explained to patient/caregiver;Signed/date by patient/caregiver   Date THOMAS was signed 07/02/24   Time THOMAS was signed 1500

## 2024-07-03 PROBLEM — S32.010A COMPRESSION FRACTURE OF L1 VERTEBRA: Status: ACTIVE | Noted: 2024-07-03

## 2024-07-03 LAB
ALBUMIN SERPL BCP-MCNC: 3.4 G/DL (ref 3.5–5.2)
ALP SERPL-CCNC: 73 U/L (ref 55–135)
ALT SERPL W/O P-5'-P-CCNC: 6 U/L (ref 10–44)
ANION GAP SERPL CALC-SCNC: 5 MMOL/L (ref 8–16)
AST SERPL-CCNC: 10 U/L (ref 10–40)
BASOPHILS # BLD AUTO: 0.04 K/UL (ref 0–0.2)
BASOPHILS NFR BLD: 0.6 % (ref 0–1.9)
BILIRUB SERPL-MCNC: 0.5 MG/DL (ref 0.1–1)
BUN SERPL-MCNC: 18 MG/DL (ref 10–30)
CALCIUM SERPL-MCNC: 8.8 MG/DL (ref 8.7–10.5)
CHLORIDE SERPL-SCNC: 108 MMOL/L (ref 95–110)
CO2 SERPL-SCNC: 25 MMOL/L (ref 23–29)
CREAT SERPL-MCNC: 0.8 MG/DL (ref 0.5–1.4)
DIFFERENTIAL METHOD BLD: ABNORMAL
EOSINOPHIL # BLD AUTO: 0.2 K/UL (ref 0–0.5)
EOSINOPHIL NFR BLD: 3 % (ref 0–8)
ERYTHROCYTE [DISTWIDTH] IN BLOOD BY AUTOMATED COUNT: 13.8 % (ref 11.5–14.5)
EST. GFR  (NO RACE VARIABLE): >60 ML/MIN/1.73 M^2
GLUCOSE SERPL-MCNC: 116 MG/DL (ref 70–110)
HCT VFR BLD AUTO: 34.1 % (ref 37–48.5)
HGB BLD-MCNC: 11.4 G/DL (ref 12–16)
IMM GRANULOCYTES # BLD AUTO: 0.01 K/UL (ref 0–0.04)
IMM GRANULOCYTES NFR BLD AUTO: 0.2 % (ref 0–0.5)
LYMPHOCYTES # BLD AUTO: 2 K/UL (ref 1–4.8)
LYMPHOCYTES NFR BLD: 31.8 % (ref 18–48)
MAGNESIUM SERPL-MCNC: 2 MG/DL (ref 1.6–2.6)
MCH RBC QN AUTO: 29.7 PG (ref 27–31)
MCHC RBC AUTO-ENTMCNC: 33.4 G/DL (ref 32–36)
MCV RBC AUTO: 89 FL (ref 82–98)
MONOCYTES # BLD AUTO: 0.5 K/UL (ref 0.3–1)
MONOCYTES NFR BLD: 7.2 % (ref 4–15)
NEUTROPHILS # BLD AUTO: 3.7 K/UL (ref 1.8–7.7)
NEUTROPHILS NFR BLD: 57.2 % (ref 38–73)
NRBC BLD-RTO: 0 /100 WBC
PLATELET # BLD AUTO: 181 K/UL (ref 150–450)
PMV BLD AUTO: 11.1 FL (ref 9.2–12.9)
POTASSIUM SERPL-SCNC: 3.9 MMOL/L (ref 3.5–5.1)
PROT SERPL-MCNC: 5.8 G/DL (ref 6–8.4)
RBC # BLD AUTO: 3.84 M/UL (ref 4–5.4)
SODIUM SERPL-SCNC: 138 MMOL/L (ref 136–145)
WBC # BLD AUTO: 6.41 K/UL (ref 3.9–12.7)

## 2024-07-03 PROCEDURE — 86580 TB INTRADERMAL TEST: CPT

## 2024-07-03 PROCEDURE — 97535 SELF CARE MNGMENT TRAINING: CPT

## 2024-07-03 PROCEDURE — 63600175 PHARM REV CODE 636 W HCPCS: Performed by: NURSE PRACTITIONER

## 2024-07-03 PROCEDURE — 25000003 PHARM REV CODE 250

## 2024-07-03 PROCEDURE — 25000003 PHARM REV CODE 250: Performed by: NURSE PRACTITIONER

## 2024-07-03 PROCEDURE — 99233 SBSQ HOSP IP/OBS HIGH 50: CPT | Mod: ,,, | Performed by: GENERAL PRACTICE

## 2024-07-03 PROCEDURE — 21400001 HC TELEMETRY ROOM

## 2024-07-03 PROCEDURE — 83735 ASSAY OF MAGNESIUM: CPT | Performed by: NURSE PRACTITIONER

## 2024-07-03 PROCEDURE — 36415 COLL VENOUS BLD VENIPUNCTURE: CPT | Performed by: NURSE PRACTITIONER

## 2024-07-03 PROCEDURE — 80053 COMPREHEN METABOLIC PANEL: CPT | Performed by: NURSE PRACTITIONER

## 2024-07-03 PROCEDURE — 30200315 PPD INTRADERMAL TEST REV CODE 302

## 2024-07-03 PROCEDURE — 94618 PULMONARY STRESS TESTING: CPT

## 2024-07-03 PROCEDURE — 99900035 HC TECH TIME PER 15 MIN (STAT)

## 2024-07-03 PROCEDURE — 85025 COMPLETE CBC W/AUTO DIFF WBC: CPT | Performed by: NURSE PRACTITIONER

## 2024-07-03 PROCEDURE — 63600175 PHARM REV CODE 636 W HCPCS

## 2024-07-03 PROCEDURE — 94761 N-INVAS EAR/PLS OXIMETRY MLT: CPT

## 2024-07-03 PROCEDURE — 63600175 PHARM REV CODE 636 W HCPCS: Performed by: INTERNAL MEDICINE

## 2024-07-03 PROCEDURE — 97116 GAIT TRAINING THERAPY: CPT | Mod: CQ

## 2024-07-03 RX ORDER — MORPHINE SULFATE 2 MG/ML
1 INJECTION, SOLUTION INTRAMUSCULAR; INTRAVENOUS ONCE
Status: COMPLETED | OUTPATIENT
Start: 2024-07-03 | End: 2024-07-03

## 2024-07-03 RX ORDER — AMLODIPINE BESYLATE 5 MG/1
5 TABLET ORAL DAILY
Status: DISCONTINUED | OUTPATIENT
Start: 2024-07-03 | End: 2024-07-03

## 2024-07-03 RX ORDER — LORAZEPAM 2 MG/ML
1 INJECTION INTRAMUSCULAR ONCE AS NEEDED
Status: DISCONTINUED | OUTPATIENT
Start: 2024-07-03 | End: 2024-07-04

## 2024-07-03 RX ADMIN — ASPIRIN 162 MG: 81 TABLET, COATED ORAL at 11:07

## 2024-07-03 RX ADMIN — POLYETHYLENE GLYCOL 3350 17 G: 17 POWDER, FOR SOLUTION ORAL at 11:07

## 2024-07-03 RX ADMIN — LEVOTHYROXINE SODIUM 25 MCG: 0.03 TABLET ORAL at 11:07

## 2024-07-03 RX ADMIN — METHOCARBAMOL 500 MG: 500 TABLET ORAL at 09:07

## 2024-07-03 RX ADMIN — ENOXAPARIN SODIUM 40 MG: 40 INJECTION SUBCUTANEOUS at 05:07

## 2024-07-03 RX ADMIN — CEFTRIAXONE SODIUM 1 G: 1 INJECTION, POWDER, FOR SOLUTION INTRAMUSCULAR; INTRAVENOUS at 12:07

## 2024-07-03 RX ADMIN — TUBERCULIN PURIFIED PROTEIN DERIVATIVE 5 UNITS: 5 INJECTION, SOLUTION INTRADERMAL at 02:07

## 2024-07-03 RX ADMIN — ISOSORBIDE DINITRATE 30 MG: 10 TABLET ORAL at 11:07

## 2024-07-03 RX ADMIN — METHOCARBAMOL 500 MG: 500 TABLET ORAL at 05:07

## 2024-07-03 RX ADMIN — TOPIRAMATE 25 MG: 25 TABLET, FILM COATED ORAL at 11:07

## 2024-07-03 RX ADMIN — Medication 6 MG: at 09:07

## 2024-07-03 RX ADMIN — MORPHINE SULFATE 1 MG: 2 INJECTION, SOLUTION INTRAMUSCULAR; INTRAVENOUS at 09:07

## 2024-07-03 RX ADMIN — METHOCARBAMOL 500 MG: 500 TABLET ORAL at 11:07

## 2024-07-03 NOTE — PLAN OF CARE
Hospital f/u scheduled with PCP and details added to AVS.       07/03/24 0936   Post-Acute Status   Hospital Resources/Appts/Education Provided Appointments scheduled and added to AVS

## 2024-07-03 NOTE — PROGRESS NOTES
[facility] Medicine  Progress Note    Patient Name: Erich Encarnacion  MRN: [id number]  Patient Class: IP- Inpatient   Admission Date: 7/1/2024  Length of Stay: 1 days  Attending Physician: Renetta Khalil MD  Primary Care Provider: Se Rios MD        Subjective:     Principal Problem:Hypertensive urgency        HPI:  89 y.o. female with past medical history of high blood pressure, macular degeneration, headaches, reflux, hypothyroidism, TIA, and kidney stones who presents to ED via EMS with c/o RUQ and RLQ abdominal pain. Patient recently Dc'd from the facility on 6/28/24. Patient is independent lives alone. Family reports that patient had not had a BM Since Thursday of last week now 4 days. Daughter at the bedside reports that she is concerned about her mother's ability to swallow as she is spitting up. SPO2 98% on room air. The patient was with 214/100 BP in triage.  On evaluation of blood work patient with sodium 135, potassium 3.2, glucose 119, hematocrit 36.8, initial troponin 38.3 with a repeat of 53.6.  Patient's urine was extremely dark positive for nitrates however WBCs normal at a level of 4 patient given Rocephin 2 g in ED to prevent further urinary tract infection. Patient to be admitted for further evaluation.    Overview/Hospital Course:  Patient admitted for evaluation. Requiring O2 on admission and CTA chest was done that was negative for PE, trace bilateral pleural effusions.     Interval History:   Patient seen and examined.  Patient in no acute distress.  Daughter at bedside.  MRI positive for new compression fracture at L1.  Neurosurgery consulted for recommendations.  Family would like to go to skilled nursing facility at discharge.  Case management following for discharge planning.  Review of Systems   Constitutional:  Negative for chills and fever.   HENT:  Negative for sore throat and trouble swallowing.    Respiratory:  Negative for shortness of breath.     Cardiovascular:  Negative for chest pain.   Gastrointestinal:  Positive for constipation. Negative for abdominal pain, diarrhea, nausea and vomiting.   Genitourinary:  Negative for dysuria.   Musculoskeletal:  Positive for back pain.   Neurological:  Negative for dizziness, light-headedness and headaches.   Psychiatric/Behavioral:  Negative for confusion and hallucinations.      Objective:     Vital Signs (Most Recent):  Temp: 97.7 °F (36.5 °C) (07/03/24 0715)  Pulse: 83 (07/03/24 0850)  Resp: 18 (07/03/24 0850)  BP: (!) 168/76 (07/03/24 0715)  SpO2: 96 % (07/03/24 0850) Vital Signs (24h Range):  Temp:  [97.6 °F (36.4 °C)-98.4 °F (36.9 °C)] 97.7 °F (36.5 °C)  Pulse:  [79-90] 83  Resp:  [17-19] 18  SpO2:  [95 %-98 %] 96 %  BP: ()/(53-94) 168/76     Weight: 71.5 kg (157 lb 10.1 oz)  Body mass index is 27.06 kg/m².    Intake/Output Summary (Last 24 hours) at 7/3/2024 1118  Last data filed at 7/3/2024 0951  Gross per 24 hour   Intake --   Output 1800 ml   Net -1800 ml         Physical Exam  Vitals reviewed.   Constitutional:       General: She is not in acute distress.     Appearance: Normal appearance. She is not toxic-appearing.   HENT:      Head: Normocephalic and atraumatic.      Mouth/Throat:      Mouth: Mucous membranes are moist.      Pharynx: Oropharynx is clear.   Eyes:      Extraocular Movements: Extraocular movements intact.      Pupils: Pupils are equal, round, and reactive to light.   Cardiovascular:      Rate and Rhythm: Normal rate and regular rhythm.      Pulses: Normal pulses.      Heart sounds: Normal heart sounds.   Pulmonary:      Effort: Pulmonary effort is normal.      Breath sounds: Normal breath sounds.   Abdominal:      General: Bowel sounds are normal. There is no distension.      Palpations: Abdomen is soft.      Tenderness: There is abdominal tenderness (RLQ).      Hernia: Hernia: BLQ.   Genitourinary:     Comments: Dark colored urine + for Nitrites  Musculoskeletal:         General:  "No swelling. Normal range of motion.      Cervical back: Normal range of motion and neck supple.   Skin:     General: Skin is warm and dry.      Capillary Refill: Capillary refill takes less than 2 seconds.   Neurological:      General: No focal deficit present.      Mental Status: She is alert and oriented to person, place, and time. Mental status is at baseline.   Psychiatric:         Mood and Affect: Mood normal.         Behavior: Behavior normal.         Judgment: Judgment normal.             Significant Labs: All pertinent labs within the past 24 hours have been reviewed.  Blood Culture: No results for input(s): "LABBLOO" in the last 48 hours.  CBC:   Recent Labs   Lab 07/02/24 0432 07/03/24  0530   WBC 6.47 6.41   HGB 11.0* 11.4*   HCT 34.0* 34.1*    181     CMP:   Recent Labs   Lab 07/02/24 0432 07/03/24  0530    138   K 3.7 3.9    108   CO2 23 25   GLU 95 116*   BUN 23 18   CREATININE 0.9 0.8   CALCIUM 8.4* 8.8   PROT 5.6* 5.8*   ALBUMIN 3.3* 3.4*   BILITOT 0.4 0.5   ALKPHOS 70 73   AST 13 10   ALT 6* 6*   ANIONGAP 9 5*     Magnesium:   Recent Labs   Lab 07/02/24 0432 07/03/24  0530   MG 2.0 2.0         Specimen UA          Urine...    Specimen UA     Color, UA          Color...    Color, UA     Appearance, UA          Clear    Appearance, UA     Spec Grav UA          1.020    Spec Grav UA     pH, UA          8.0    pH, UA     Protein, UA          Negat...    Protein, UA     Glucose, UA          Negat...    Glucose, UA     Ketones, UA          1+    Ketones, UA     Blood, UA          Negat...    Blood, UA     NITRITE UA          Posit...    NITRITE UA     UROBILINOGEN UA          Negat...    UROBILINOGEN UA     Bilirubin (UA)          Negat...    Bilirubin (UA)     Leukocyte Esterase, UA          Negat...    Leukocyte Esterase, UA     RBC, UA          5    RBC, UA     WBC, UA          4    WBC, UA     Bacteria, UA          Rare    Bacteria, UA     Squam Epithel, UA          1    Squam " Iron, UA     Microscopic Comment          SEE C...               Significant Imaging: I have reviewed all pertinent imaging results/findings within the past 24 hours.  Imaging Results              CTA Chest Non-Coronary (PE Studies) (Final result)  Result time 07/01/24 19:49:33      Final result by Remy Nowak MD (07/01/24 19:49:33)                   Impression:      No definite evidence of pulmonary thromboembolism.    Trace bilateral pleural effusions with adjacent atelectasis, right greater than left.    Recent appearing mild superior endplate compression fracture deformity of the T12 vertebral body, new from prior thoracic spine CT examination of 06/27/2024.  There is slight retropulsion of the posterior cortex with at most mild spinal canal stenosis.    Additional findings as above.      Electronically signed by: Remy Nowak MD  Date:    07/01/2024  Time:    19:49               Narrative:    EXAMINATION:  CTA CHEST NON CORONARY (PE STUDIES)    CLINICAL HISTORY:  Pulmonary embolism (PE) suspected, high prob;    TECHNIQUE:  Low dose axial images, sagittal and coronal reformations were obtained from the thoracic inlet to the lung bases following the IV administration of 100 mL of Omnipaque 350.  Contrast timing was optimized to evaluate the pulmonary arteries.  MIP images were performed.    COMPARISON:  CTA chest 05/09/2024    FINDINGS:  The visualized soft tissue structures at the base of the neck appear within normal limits allowing from streak artifact from dense contrast bolus.    The thoracic aorta maintains normal caliber, contour, and course with moderate atherosclerotic calcification within its course.  There is no evidence of aneurysmal dilation or dissection. The heart is mildly enlarged and there is trace pericardial fluid.  There is 3 vessel coronary artery calcification.  There is mitral annular calcification.  The esophagus maintains a normal course and caliber. There is no bulky  axillary or mediastinal lymph node enlargement appreciated.    The trachea is midline and the proximal airways are patent. Detailed evaluation of the lung parenchyma is limited by respiratory motion artifact. There is no pneumothorax. There are a few scattered pulmonary micronodules, similar to prior examination.  There are scattered bandlike opacities suggesting atelectasis or scarring, similar to prior study.  There is trace pleural fluid at the lung bases with adjacent atelectasis, right greater left..    No evidence of filling defect to indicate pulmonary thromboembolism.    Limited images of the upper abdomen obtained during the course of this dedicated thoracic CT demonstrate no acute abnormalities.  There is prominent atherosclerosis of the abdominal aorta and visualized major branch vessels..    The osseous structures demonstrate a recent appearing superior endplate compression fracture deformity of the T12 vertebral body, new from prior thoracic spine CT of 06/27/2024.  There is slight retropulsion of the posterior cortex with at most mild spinal canal stenosis.  There are moderate multilevel degenerative change of the remaining visualized spine..                                       X-Ray Chest 1 View (Final result)  Result time 07/01/24 10:01:38      Final result by Ab Lam MD (07/01/24 10:01:38)                   Impression:      No acute cardiac or pulmonary process.      Electronically signed by: Ab Lam  Date:    07/01/2024  Time:    10:01               Narrative:    CLINICAL HISTORY:  ([id number] y/o  (10/2/1926) F    Other malaise    TECHNIQUE:  (A#[id number], exam time 7/1/2024 9:59)    XR CHEST 1 VIEW [id number]    COMPARISON:  Radiographs from 06/28/2020    FINDINGS:  The lungs are clear except for some vascular crowding bilaterally  likely related to a suboptimal inspiration. No pneumothorax is identified. The heart is top normal in size. Atheromatous calcifications are seen at  the aortic arch. Osseous structures appear unchanged. The visualized upper abdomen is unremarkable.                                       CT Abdomen Pelvis With IV Contrast NO Oral Contrast (Final result)  Result time 07/01/24 08:21:54      Final result by Zachariah Morton DO (07/01/24 08:21:54)                   Impression:      1. Diverticulosis of the colon.  2. Mild prominence of the left ureter with no ureteral obstruction observed.  No nephrolithiasis observed bilaterally.  3. Bilateral trace pleural effusions and subsegmental atelectasis of the lung bases.  4. Small hiatal hernia.      Electronically signed by: Zachariah Morton  Date:    07/01/2024  Time:    08:21               Narrative:      CMS MANDATED QUALITY DATA - CT RADIATION - 436    All CT scans at this facility utilize dose modulation, iterative reconstruction, and/or weight based dosing when appropriate to reduce radiation dose to as low as reasonably achievable.    EXAMINATION:  CT ABDOMEN PELVIS WITH IV CONTRAST    CLINICAL HISTORY:  Abdominal pain, acute, nonlocalized;Bowel obstruction suspected;Nausea/vomiting;    TECHNIQUE:  CT abdomen and pelvis with IV contrast.  100 mL Omnipaque 350.    COMPARISON:  CT abdomen pelvis 04/16/2024    FINDINGS:  Bilateral trace pleural effusions noted.  There is subsegmental atelectasis of the lung bases.  Heart size is within normal limits.  Calcifications of the mitral valve annulus noted.    Liver is normal size.  There are no hepatic lesions.  The gallbladder is and common bile duct within normal limits.  The pancreas, spleen and adrenal glands are unremarkable.  The kidneys are normal size.  Small bilateral renal cysts are noted.  No enhancing renal lesions identified.  There is no hydronephrosis or nephrolithiasis.  There is mild prominence of the left ureter.  No ureteral obstruction is observed.  The bladder is fluid filled with no focal wall abnormalities.  Uterus and adnexal structures have been  removed.  No free fluid in the pelvis.  Multiple pelvic phleboliths noted.    There is diverticulosis of the colon.  There is no bowel wall thickening or inflammatory changes.  Appendix is not definitively identified.  Stomach is mostly decompressed.  There is a small hiatal hernia.    The abdominal aorta is normal caliber with calcified plaque formation.  No enlarged intra-abdominal lymph nodes.  No mesenteric fat stranding or free fluid.  Ventral abdominal wall is unremarkable.  There are degenerative changes of the spine.  There is no acute osseous abnormality.                                        Assessment/Plan:      * Hypertensive urgency  Patient has a current diagnosis of hypertensive urgency (without evidence of end organ damage) which is controlled.  Latest blood pressure and vitals reviewed-   Temp:  [97.6 °F (36.4 °C)-98.4 °F (36.9 °C)]   Pulse:  [79-90]   Resp:  [17-19]   BP: ()/(53-94)   SpO2:  [95 %-98 %] .   Patient currently off IV antihypertensives.   Home meds for hypertension were reviewed and noted below.   Hypertension Medications               isosorbide dinitrate (ISORDIL) 30 MG Tab Take 30 mg by mouth once daily.           Likely 2/2 pain MRI revealed new spinal compression fracture at L1  Medication adjustment for hospital antihypertensives is as follows- add hydralazine PRN SBP > 180 q8hrs    Will aim for controlled BP reduction by medications noted above. Monitor and mitigate end organ damage as indicated.    Improved - continue current medications  Monitor blood pressure and adjust meds as indicated     Compression fracture of L1 vertebra  Neurosurgery consulted  PT/OT  Case management following for SNF placement      Chronic idiopathic constipation  - Patient with RLQ/LLQ  - CT scan w Abdomen with diverticulosis.  Mild prominence of left ureter no ureteral obstruction.  No nephrolithiasis.  Bilateral trace pleural effusions and subsegmental   atelectasis at the lung bases.   Small hiatal hernia  - Digital Exam in ED with no stool in rectal vault  - Enema in the ED with brown liquid stool  - Daughter reports that her mother has not eaten much over the last few days  - Miralax Daily  -p.r.n. suppository      Demand ischemia  -2/2 hypertension  - Initial troponin 38.3 with repeat  of 53.6 repeat at 1600  - Trend  - Tight BP control  Cardiology following: Recommend conservative medication management  Recent Labs   Lab 07/02/24  0432   TROPONINIHS 77.7*         UTI (urinary tract infection)  - Dark urine with WBC 4, +Nitrites given 2gm rocephin in ED  -began Rocephin 1 g q.day x3 doses      Advanced age  - PT/OT to assess and treat  - consulted for discharge planning consider SNF        VTE Risk Mitigation (From admission, onward)           Ordered     enoxaparin injection 40 mg  Daily         07/01/24 1300     IP VTE HIGH RISK PATIENT  Once         07/01/24 1300     Place sequential compression device  Until discontinued         07/01/24 1300                    Discharge Planning   ARGELIA: 7/3/2024     Code Status: Full Code   Is the patient medically ready for discharge?:     Reason for patient still in hospital (select all that apply): Patient trending condition, Treatment, Consult recommendations, and PT / OT recommendations  Discharge Plan A: Home Health                  Dony Terry NP  Department of Hospital Medicine   [facility]

## 2024-07-03 NOTE — PLAN OF CARE
BRENDA called and SAMY faxed to the Office of Aging. ([phone]).     1401- PHN auth approved.     1436- XAVIER spoke with Micheline with [facility] and notified her pt's choice is [facility]. XAVIER talked to Dena and she stated they are still reviewing pt's.     1501- Auth number is [id number]. XAVIER is notified Dena with [facility].

## 2024-07-03 NOTE — PLAN OF CARE
SW sent HALEY to [facility] via [employer]. XAVIER will cont to follow.        07/03/24 0944   Post-Acute Status   Post-Acute Authorization Home Health   Home Health Status Pending medical clearance/testing   Discharge Plan   Discharge Plan A Home Health

## 2024-07-03 NOTE — ASSESSMENT & PLAN NOTE
- Patient with RLQ/LLQ  - CT scan w Abdomen with diverticulosis.  Mild prominence of left ureter no ureteral obstruction.  No nephrolithiasis.  Bilateral trace pleural effusions and subsegmental   atelectasis at the lung bases.  Small hiatal hernia  - Digital Exam in ED with no stool in rectal vault  - Enema in the ED with brown liquid stool  - Daughter reports that her mother has not eaten much over the last few days  - Miralax Daily  -p.r.n. suppository

## 2024-07-03 NOTE — RESPIRATORY THERAPY
07/03/24 1104   Home Oxygen Qualification   $ Home O2 Qualification Pulmonary Stress Test/6 min walk;Tech time 15 minutes   Room Air SpO2 At Rest 96 %   Room Air SpO2 During Ambulation 94 %   Home O2 Eval Comments   (Pt did not qualify for home o2 at this time.)

## 2024-07-03 NOTE — PROGRESS NOTES
"[facility]  Department of Cardiology  Progress Note      PATIENT NAME: Erich Encarnacion  MRN: [id number]  TODAY'S DATE: 07/03/2024  ADMIT DATE: 7/1/2024                          CONSULT REQUESTED BY: Renetta Khalil MD    SUBJECTIVE     PRINCIPAL PROBLEM: Hypertensive urgency      REASON FOR CONSULT:  "Elevated troponin"    Interval history:  07/03/2024  Patient's daughter reports hallucinations, nurse reports confusion  Rhythm has been stable, normal sinus rhythm, blood pressure trend has been stable, echo was completed    HPI:  Patient is 89-year-old female with PMH hypertension, TIA, SVT, AT, hypothyroidism, macular degeneration who reported to the emergency room with complaints of abdominal pain.  Patient's daughter at bedside and states that she fell Thursday, landing on her back.  She had x-rays that showed no acute findings however she has had some ongoing back pain since the fall.  Daughter also states she had an episode where she could not breathe and daughter feels like she was acting like she was having a panic attack.  Her systolic blood pressure at the time was around 216.  Patient denies chest pain today.  Her breathing is comfortable.  Patient's daughter reports that she takes isosorbide for many years and is compliant with this but no other blood pressure medicine.  Her blood pressure is usually well controlled.  She sees cardiologist Dr. Ureña.    Further history per EMR:   "89 y.o. female with past medical history of high blood pressure, macular degeneration, headaches, reflux, hypothyroidism, TIA, and kidney stones who presents to ED via EMS with c/o RUQ and RLQ abdominal pain. Patient recently Dc'd from the facility on 6/28/24. Patient is independent lives alone. Family reports that patient had not had a BM Since Thursday of last week now 4 days. Daughter at the bedside reports that she is concerned about her mother's ability to swallow as she is spitting up. SPO2 98% on room air. The " "patient was with 214/100 BP in triage.  On evaluation of blood work patient with sodium 135, potassium 3.2, glucose 119, hematocrit 36.8, initial troponin 38.3 with a repeat of 53.6.  Patient's urine was extremely dark positive for nitrates however WBCs normal at a level of 4 patient given Rocephin 2 g in ED to prevent further urinary tract infection. Patient to be admitted for further evaluation".     Review of patient's allergies indicates:  No Known Allergies    Past Medical History:   Diagnosis Date    Hypertension     Macula lutea degeneration     Squamous cell carcinoma of skin      Past Surgical History:   Procedure Laterality Date    APPENDECTOMY      BACK SURGERY  1975    HYSTERECTOMY      URETEROSCOPIC REMOVAL OF URETERIC CALCULUS Left 5/28/2024    Procedure: REMOVAL, CALCULUS, URETER, URETEROSCOPIC;  Surgeon: Juhi Mosher Jr., MD;  Location: [facility];  Service: Urology;  Laterality: Left;     Social History     Tobacco Use    Smoking status: Former     Current packs/day: 1.00     Average packs/day: 1 pack/day for 20.0 years (20.0 ttl pk-yrs)     Types: Cigarettes    Smokeless tobacco: Never   Substance Use Topics    Alcohol use: No    Drug use: No        REVIEW OF SYSTEMS  Negative except as mentioned in HPI    OBJECTIVE     VITAL SIGNS (Most Recent)  Temp: 97.7 °F (36.5 °C) (07/03/24 0715)  Pulse: 81 (07/03/24 0715)  Resp: 17 (07/03/24 0715)  BP: (!) 168/76 (07/03/24 0715)  SpO2: 95 % (07/03/24 0715)    VENTILATION STATUS  Resp: 17 (07/03/24 0715)  SpO2: 95 % (07/03/24 0715)           I & O (Last 24H):  Intake/Output Summary (Last 24 hours) at 7/3/2024 0908  Last data filed at 7/3/2024 0318  Gross per 24 hour   Intake --   Output 900 ml   Net -900 ml       WEIGHTS  Wt Readings from Last 3 Encounters:   07/01/24 2145 71.5 kg (157 lb 10.1 oz)   07/01/24 0529 70.3 kg (155 lb)   07/02/24 0957 71.7 kg (158 lb)   06/27/24 1715 70.4 kg (155 lb 3.3 oz)   06/27/24 0711 74.8 kg (165 lb)       PHYSICAL " EXAM    GENERAL:  89-year-old female resting comfortably in bed   HEENT: Normocephalic.    NECK: No JVD.   CARDIAC: Regular rate and rhythm. S1 is normal.S2 is normal.No gallops, clicks or murmurs noted at this time.  CHEST ANATOMY: normal.   LUNGS: Clear to auscultation. No wheezing or rhonchi..   ABDOMEN: Soft .  Normal bowel sounds.    EXTREMITIES: No edema  CENTRAL NERVOUS SYSTEM: AAO x 3  SKIN: No rash     HOME MEDICATIONS:  Current Facility-Administered Medications on File Prior to Encounter   Medication Dose Route Frequency Provider Last Rate Last Admin    triamcinolone acetonide injection 40 mg  40 mg Intra-articular  Kenneth Nevarez MD   40 mg at 06/03/22 1030     Current Outpatient Medications on File Prior to Encounter   Medication Sig Dispense Refill    aspirin (ECOTRIN) 81 MG EC tablet Take 162 mg by mouth once daily.      isosorbide dinitrate (ISORDIL) 30 MG Tab Take 30 mg by mouth once daily.       topiramate (TOPAMAX) 25 MG tablet Take 25 mg by mouth once daily.   0    zolpidem (AMBIEN) 10 mg Tab Take 1 tablet (10 mg total) by mouth nightly as needed (insomnia).      acetaminophen (TYLENOL) 325 MG tablet Take 0.5 tablets by mouth every 6 (six) hours as needed for Pain.      HYDROcodone-acetaminophen (NORCO) 5-325 mg per tablet Take 1 tablet by mouth every 6 (six) hours as needed for Pain. (Patient taking differently: Take 1 tablet by mouth every 6 (six) hours as needed for Pain. NEW Rx - NOT YET STARTED) 15 tablet 0    levothyroxine (SYNTHROID) 25 MCG tablet Take 25 mcg by mouth every morning.       meclizine (ANTIVERT) 12.5 mg tablet Take 2 tablets by mouth Daily.      pantoprazole (PROTONIX) 40 MG tablet Take 1 tablet by mouth daily as needed (Heartburn).      polyethylene glycol (GLYCOLAX) 17 gram/dose powder Take 17 g by mouth every evening.         SCHEDULED MEDS:   aspirin  162 mg Oral Daily    enoxparin  40 mg Subcutaneous Daily    isosorbide dinitrate  30 mg Oral Daily    levothyroxine   "25 mcg Oral QAM    meclizine  25 mg Oral Daily    methocarbamoL  500 mg Oral TID    polyethylene glycol  17 g Oral Daily    topiramate  25 mg Oral Daily       CONTINUOUS INFUSIONS:    PRN MEDS:  Current Facility-Administered Medications:     acetaminophen, 650 mg, Oral, Q8H PRN    acetaminophen, 650 mg, Oral, Q4H PRN    aluminum-magnesium hydroxide-simethicone, 30 mL, Oral, QID PRN    dextrose 50%, 12.5 g, Intravenous, PRN    dextrose 50%, 25 g, Intravenous, PRN    glucagon (human recombinant), 1 mg, Intramuscular, PRN    glucose, 16 g, Oral, PRN    glucose, 24 g, Oral, PRN    hydrALAZINE, 25 mg, Oral, Q8H PRN    magnesium oxide, 800 mg, Oral, PRN    magnesium oxide, 800 mg, Oral, PRN    melatonin, 6 mg, Oral, Nightly PRN    naloxone, 0.02 mg, Intravenous, PRN    ondansetron, 4 mg, Intravenous, Q6H PRN    pantoprazole, 40 mg, Oral, Daily PRN    potassium bicarbonate, 35 mEq, Oral, PRN    potassium bicarbonate, 50 mEq, Oral, PRN    potassium bicarbonate, 60 mEq, Oral, PRN    potassium, sodium phosphates, 2 packet, Oral, PRN    potassium, sodium phosphates, 2 packet, Oral, PRN    potassium, sodium phosphates, 2 packet, Oral, PRN    senna-docusate 8.6-50 mg, 1 tablet, Oral, Daily PRN    sodium chloride 0.9%, 2 mL, Intravenous, Q8H PRN    LABS AND DIAGNOSTICS     CBC LAST 3 DAYS  Recent Labs   Lab 07/01/24  0624 07/02/24  0432 07/03/24  0530   WBC 7.53 6.47 6.41   RBC 4.11 3.78* 3.84*   HGB 12.3 11.0* 11.4*   HCT 36.8* 34.0* 34.1*   MCV 90 90 89   MCH 29.9 29.1 29.7   MCHC 33.4 32.4 33.4   RDW 13.6 13.8 13.8    155 181   MPV 11.3 11.3 11.1   GRAN 73.9*  5.6 60.5  3.9 57.2  3.7   LYMPH 17.4*  1.3 25.2  1.6 31.8  2.0   MONO 6.4  0.5 10.2  0.7 7.2  0.5   BASO 0.05 0.05 0.04   NRBC 0 0 0       COAGULATION LAST 3 DAYS  No results for input(s): "LABPT", "INR", "APTT" in the last 168 hours.    CHEMISTRY LAST 3 DAYS  Recent Labs   Lab 06/28/24  0400 07/01/24  0624 07/02/24  0432 07/03/24  0530    135* 137 " "138   K 3.5 3.2* 3.7 3.9    103 105 108   CO2 24 23 23 25   ANIONGAP 7* 9 9 5*   BUN 16 21 23 18   CREATININE 0.8 0.9 0.9 0.8    119* 95 116*   CALCIUM 8.5* 9.0 8.4* 8.8   MG 2.0  --  2.0 2.0   ALBUMIN 3.3* 3.8 3.3* 3.4*   PROT 5.5* 6.5 5.6* 5.8*   ALKPHOS 69 78 70 73   ALT 6* 8* 6* 6*   AST 11 17 13 10   BILITOT 0.8 0.8 0.4 0.5       CARDIAC PROFILE LAST 3 DAYS  Recent Labs   Lab 06/27/24  0856 07/01/24  0624   * 153*       ENDOCRINE LAST 3 DAYS  No results for input(s): "TSH", "PROCAL" in the last 168 hours.    LAST ARTERIAL BLOOD GAS  ABG  No results for input(s): "PH", "PO2", "PCO2", "HCO3", "BE" in the last 168 hours.    LAST 7 DAYS MICROBIOLOGY   Microbiology Results (last 7 days)       ** No results found for the last 168 hours. **            MOST RECENT IMAGING  Echo    Left Ventricle: The left ventricle is normal in size. Moderately   increased wall thickness. There is concentric hypertrophy. There is normal   systolic function with a visually estimated ejection fraction of 60 - 65%.   Grade I diastolic dysfunction.    Right Ventricle: Normal right ventricular cavity size. Systolic   function is normal.    Left Atrium: Left atrium is severely dilated.    Aortic Valve: There is moderate aortic valve sclerosis. Aortic valve   area by velocity is 1.58 cm². Aortic valve peak velocity is 2.01 m/s. Mean   gradient is 9 mmHg. Aortic Valve peak gradient is 16 mmHg.    Mitral Valve: There is moderate mitral annular calcification present.   There is mild regurgitation.    Tricuspid Valve: There is mild to moderate regurgitation. The estimated   PA systolic pressure is at least 31 mmHg.      ECHOCARDIOGRAM RESULTS (last 5)  Results for orders placed during the hospital encounter of 10/17/21    Echo    Interpretation Summary  · Concentric hypertrophy and normal systolic function.  · The estimated ejection fraction is 65%.  · Grade I left ventricular diastolic dysfunction.  · Normal right ventricular " size with normal right ventricular systolic function.  · The aortic root is mildly dilated.  · Trivial pericardial effusion.      CURRENT/PREVIOUS VISIT EKG  Results for orders placed or performed during the hospital encounter of 07/01/24   EKG 12-lead    Collection Time: 07/01/24  6:21 AM   Result Value Ref Range    QRS Duration 88 ms    [facility] QTC Calculation 452 ms    Narrative    Test Reason : R10.32,    Vent. Rate : 091 BPM     Atrial Rate : 091 BPM     P-R Int : 192 ms          QRS Dur : 088 ms      QT Int : 368 ms       P-R-T Axes : 066 -38 030 degrees     QTc Int : 452 ms    Normal sinus rhythm  Left axis deviation  Moderate voltage criteria for LVH, may be normal variant  Abnormal ECG  When compared with ECG of 27-JUN-2024 08:52,  No significant change was found    Referred By: [email or web address]   SELF           Confirmed By:            ASSESSMENT/PLAN:     Active Hospital Problems    Diagnosis    *Hypertensive urgency    Demand ischemia    Chronic idiopathic constipation    UTI (urinary tract infection)    Advanced age       ASSESSMENT & PLAN:   Hypertensive urgency  Elevated troponin  UTI  Abdominal pain  Advanced age  S/p fall  Back pain    RECOMMENDATIONS:  Mild troponin elevation with downward trend x2 noted  Troponin elevation likely secondary to hypertensive episode   Patient denied chest pain  EKG showed no ischemic changes  Echocardiogram showed EF 60-65%    D-dimer was positive, CTA of chest was negative for PE  Blood pressure is now well controlled on trend review  Continue Imdur 30 mg daily and aspirin as at home  Conservative medical management given patient's advanced age        Florencia Norton NP  [facility]  Department of Cardiology  Date of Service: 07/03/2024        I have personally interviewed and examined the patient, I have reviewed the Nurse Practitioner's history and physical, assessment, and plan. I agree with the findings and plan.  07/02/2024  Patient presents with a  panic attack and elevated blood pressure.  Cardiology is consulted secondary to mild increase in her troponins which do not appear to be clinically significant  She sees Dr. Ureña as an outpatient  Echo reveals moderate left ventricular hypertrophy which indicates her blood pressure may not be as well controlled as she thinks.  Current her blood pressure is 149 over 63  Continue medical management  Aspirin isosorbide follow-up with Dr. Ureña    7/ 3/24  PATIENT IS CONFUSED AND HALLUCINATING  HER BLOOD PRESSURE IS SOMEWHAT LABILE FROM THE 90S TO SYSTOLIC 150, BUT STABLE  SHE IS BEING EVALUATED FOR TIA  ECHO REVEALS MODERATE LEFT VENTRICULAR HYPERTROPHY  NOTHING FURTHER TO ADD RECOMMEND CONTINUE BLOOD PRESSURE MONITORING AFTER DISCHARGE      Dr. Moiz Mosher M.D.  [facility]  Department of Cardiology  Date of Service: 07/03/2024  8:46 AM

## 2024-07-03 NOTE — PT/OT/SLP PROGRESS
Physical Therapy Treatment    Patient Name:  Erich Encarnacion   MRN:  [id number]    Recommendations:     Discharge Recommendations: Moderate Intensity Therapy  Discharge Equipment Recommendations: none  Barriers to discharge:  increased assist with mobility, decreased activity tolerance, decreased safety awareness    Assessment:     Erich Encarnacion is a 89 y.o. female admitted with a medical diagnosis of Hypertensive urgency.  She presents with the following impairments/functional limitations: weakness, impaired endurance, impaired self care skills, impaired functional mobility, gait instability, impaired balance, decreased safety awareness, impaired cardiopulmonary response to activity.    Nursing extender request assist to transfer pt to the bathroom for a shower per daughter's request.    Pt somewhat confused and intermittently speaking in [place] with daughter translating. Pt agreeable to visit.    Pt required min assist for bed mobility with most assist for weight shift into sitting. Pt required min assist for sit to stand transfer with RW. Pt ambulated 15' with RW and min to contact guard assist, most assist for RW management and navigation.    Pt sat on bedside commode in shower area with min assist for slow descent. Pt left with daughter and extender present.    Nurse practitioner notified therapist after visit via secure chat that pt has a new L1 compression fracture and will need neurosurgery eval and possible bracing.    Rehab Prognosis: Fair; patient would benefit from acute skilled PT services to address these deficits and reach maximum level of function.    Recent Surgery: * No surgery found *      Plan:     During this hospitalization, patient to be seen 6 x/week to address the identified rehab impairments via gait training, therapeutic activities, therapeutic exercises and progress toward the following goals:    Plan of Care Expires:  08/01/24    Subjective     Chief Complaint: pt somewhat confused and  became slightly agitated  when therapist went to remove her blankets but daughter was able to calm her down  Patient/Family Comments/goals: to get a shower  Pain/Comfort:  Pain Rating 1: 0/10      Objective:     Communicated with nurse prior to session.  Patient found HOB elevated with telemetry, bed alarm, PureWick upon PT entry to room.     General Precautions: Standard, fall  Orthopedic Precautions: N/A  Braces: N/A  Respiratory Status: Room air     Functional Mobility:  Bed Mobility:     Supine to Sit: minimum assistance  Transfers:     Sit to Stand:  minimum assistance with rolling walker  Gait: x 15' RW min-CGA for RW management and navigation      AM-PAC 6 CLICK MOBILITY          Treatment & Education:  Pt educated on importance of time OOB, importance of intermittent mobility, safe techniques for transfers/ambulation, discharge recommendations/options, and use of call light for assistance and fall prevention.      Patient left  sitting on BSC in shower  with  daughter and extender present..    GOALS:   Multidisciplinary Problems       Physical Therapy Goals          Problem: Physical Therapy    Goal Priority Disciplines Outcome Goal Variances Interventions   Physical Therapy Goal     PT, PT/OT      Description: Goals to be met by: 2024     Patient will increase functional independence with mobility by performin. Supine to sit with Stand-by Assistance  2. Sit to stand transfer with Stand-by Assistance  3. Gait  x 100 feet with Supervision using Rolling Walker.                          Time Tracking:     PT Received On: 24  PT Start Time: 1022     PT Stop Time: 1031  PT Total Time (min): 9 min     Billable Minutes: Gait Training 9    Treatment Type: Treatment  PT/PTA: PTA     Number of PTA visits since last PT visit: 2024

## 2024-07-03 NOTE — ASSESSMENT & PLAN NOTE
Patient has a current diagnosis of hypertensive urgency (without evidence of end organ damage) which is controlled.  Latest blood pressure and vitals reviewed-   Temp:  [97.6 °F (36.4 °C)-98.4 °F (36.9 °C)]   Pulse:  [79-90]   Resp:  [17-19]   BP: ()/(53-94)   SpO2:  [95 %-98 %] .   Patient currently off IV antihypertensives.   Home meds for hypertension were reviewed and noted below.   Hypertension Medications               isosorbide dinitrate (ISORDIL) 30 MG Tab Take 30 mg by mouth once daily.           Likely 2/2 pain MRI revealed new spinal compression fracture at L1  Medication adjustment for hospital antihypertensives is as follows- add hydralazine PRN SBP > 180 q8hrs    Will aim for controlled BP reduction by medications noted above. Monitor and mitigate end organ damage as indicated.    Improved - continue current medications  Monitor blood pressure and adjust meds as indicated

## 2024-07-03 NOTE — PT/OT/SLP PROGRESS
Occupational Therapy   Treatment    Name: Erich Encarnacion  MRN: [id number]  Admitting Diagnosis:  Hypertensive urgency       Recommendations:     Discharge Recommendations: Moderate Intensity Therapy  Discharge Equipment Recommendations:  none  Barriers to discharge:  Decreased caregiver support    Assessment:     Erich Encarnacion is a 89 y.o. female with a medical diagnosis of Hypertensive urgency.   Performance deficits affecting function are weakness, impaired endurance, impaired self care skills, impaired functional mobility, gait instability, impaired balance, orthopedic precautions, pain, decreased safety awareness.     New finding of lumbar compression fracture; limited to bed level session today due to this; pt c/o of only abdominal pain; d/c rec changed to SNF due to pt needing increased assistance with ADLs and daughter unable to provide needed assist to pt.      Rehab Prognosis:  Fair; patient would benefit from acute skilled OT services to address these deficits and reach maximum level of function.       Plan:     Patient to be seen 3 x/week to address the above listed problems via self-care/home management, therapeutic activities, therapeutic exercises  Plan of Care Expires: 08/01/24  Plan of Care Reviewed with: patient, daughter    Subjective     Pain/Comfort:  Pain Rating 1:  (pt c/o of only abdominal pain; not rated on scale)  Pain Rating Post-Intervention 1:  (no change)    Objective:     Communicated with: nurse prior to session.  Patient found supine with telemetry upon OT entry to room.    General Precautions: Standard, fall    Orthopedic Precautions:spinal precautions  Braces:  (awaiting recommendation from nsx)  Respiratory Status: Room air     Occupational Performance:     Bed Mobility:    Patient completed Scooting/Bridging with maximal assistance to scoot hips to HOB while supine     Activities of Daily Living:  Feeding:  set-up assist; pt had some confusion and was not sequencing tasks  correctly for self set-up of lunch tray       Treatment & Education:  Reviewed spinal precautions with pt/daughter; daughter verbalized understanding; pt will need continued education.    Patient left HOB elevated with all lines intact, call button in reach, and bed alarm on    GOALS:   Multidisciplinary Problems       Occupational Therapy Goals          Problem: Occupational Therapy    Goal Priority Disciplines Outcome Interventions   Occupational Therapy Goal     OT, PT/OT     Description: Goals to be met by: 8/2/24     Patient will increase functional independence with ADLs by performing:    UE Dressing with Set-up Assistance.  LE Dressing with Moderate Assistance.  Grooming while seated with Supervision.  Toileting from bedside commode with Stand-by Assistance for hygiene and clothing management.   Toilet transfer to bedside commode with Stand-by Assistance.                         Time Tracking:     OT Date of Treatment: 07/03/24  OT Start Time: 1216  OT Stop Time: 1224  OT Total Time (min): 8 min    Billable Minutes:Self Care/Home Management 08    OT/RACHEL: OT          7/3/2024

## 2024-07-03 NOTE — ASSESSMENT & PLAN NOTE
-2/2 hypertension  - Initial troponin 38.3 with repeat  of 53.6 repeat at 1600  - Trend  - Tight BP control  Cardiology following: Recommend conservative medication management  Recent Labs   Lab 07/02/24  0432   TROPONINIHS 77.7*

## 2024-07-03 NOTE — PLAN OF CARE
CM notified by provider of MRI results and states patient and family are agreeable to SNF placement now.  SW called in locet and awaiting 142 at this time.  RN CM spoke to patient and family at bedside and obtained patient choice form.  First choice is [facility], then [facility] and [facility].  Patient choice scanned into  and referrals sent via [employer].  Auth request sent to N via [facility] Fax.  Awaiting auth and accepting facility at this time.        1341- CM notified by NP that patient needs TLSO brace when out of bed.  CM called [facility] brace line and they will process the order and have it delivered at bedside today.      1621- 142 received and scanned into .        07/03/24 1215   Post-Acute Status   Post-Acute Authorization Placement   Post-Acute Placement Status Referrals Sent   Patient choice form signed by patient/caregiver List with quality metrics by geographic area provided   Discharge Delays (!) Waiting for Provider to Speak to Patient   Discharge Plan   Discharge Plan A Skilled Nursing Facility   Discharge Plan B Home Health

## 2024-07-03 NOTE — SUBJECTIVE & OBJECTIVE
Interval History:   Patient seen and examined.  Patient in no acute distress.  Daughter at bedside.  MRI positive for new compression fracture at L1.  Neurosurgery consulted for recommendations.  Family would like to go to skilled nursing facility at discharge.  Case management following for discharge planning.  Review of Systems   Constitutional:  Negative for chills and fever.   HENT:  Negative for sore throat and trouble swallowing.    Respiratory:  Negative for shortness of breath.    Cardiovascular:  Negative for chest pain.   Gastrointestinal:  Positive for constipation. Negative for abdominal pain, diarrhea, nausea and vomiting.   Genitourinary:  Negative for dysuria.   Musculoskeletal:  Positive for back pain.   Neurological:  Negative for dizziness, light-headedness and headaches.   Psychiatric/Behavioral:  Negative for confusion and hallucinations.      Objective:     Vital Signs (Most Recent):  Temp: 97.7 °F (36.5 °C) (07/03/24 0715)  Pulse: 83 (07/03/24 0850)  Resp: 18 (07/03/24 0850)  BP: (!) 168/76 (07/03/24 0715)  SpO2: 96 % (07/03/24 0850) Vital Signs (24h Range):  Temp:  [97.6 °F (36.4 °C)-98.4 °F (36.9 °C)] 97.7 °F (36.5 °C)  Pulse:  [79-90] 83  Resp:  [17-19] 18  SpO2:  [95 %-98 %] 96 %  BP: ()/(53-94) 168/76     Weight: 71.5 kg (157 lb 10.1 oz)  Body mass index is 27.06 kg/m².    Intake/Output Summary (Last 24 hours) at 7/3/2024 1118  Last data filed at 7/3/2024 0951  Gross per 24 hour   Intake --   Output 1800 ml   Net -1800 ml         Physical Exam  Vitals reviewed.   Constitutional:       General: She is not in acute distress.     Appearance: Normal appearance. She is not toxic-appearing.   HENT:      Head: Normocephalic and atraumatic.      Mouth/Throat:      Mouth: Mucous membranes are moist.      Pharynx: Oropharynx is clear.   Eyes:      Extraocular Movements: Extraocular movements intact.      Pupils: Pupils are equal, round, and reactive to light.   Cardiovascular:      Rate and  "Rhythm: Normal rate and regular rhythm.      Pulses: Normal pulses.      Heart sounds: Normal heart sounds.   Pulmonary:      Effort: Pulmonary effort is normal.      Breath sounds: Normal breath sounds.   Abdominal:      General: Bowel sounds are normal. There is no distension.      Palpations: Abdomen is soft.      Tenderness: There is abdominal tenderness (RLQ).      Hernia: Hernia: BLQ.   Genitourinary:     Comments: Dark colored urine + for Nitrites  Musculoskeletal:         General: No swelling. Normal range of motion.      Cervical back: Normal range of motion and neck supple.   Skin:     General: Skin is warm and dry.      Capillary Refill: Capillary refill takes less than 2 seconds.   Neurological:      General: No focal deficit present.      Mental Status: She is alert and oriented to person, place, and time. Mental status is at baseline.   Psychiatric:         Mood and Affect: Mood normal.         Behavior: Behavior normal.         Judgment: Judgment normal.             Significant Labs: All pertinent labs within the past 24 hours have been reviewed.  Blood Culture: No results for input(s): "LABBLOO" in the last 48 hours.  CBC:   Recent Labs   Lab 07/02/24  0432 07/03/24  0530   WBC 6.47 6.41   HGB 11.0* 11.4*   HCT 34.0* 34.1*    181     CMP:   Recent Labs   Lab 07/02/24 0432 07/03/24  0530    138   K 3.7 3.9    108   CO2 23 25   GLU 95 116*   BUN 23 18   CREATININE 0.9 0.8   CALCIUM 8.4* 8.8   PROT 5.6* 5.8*   ALBUMIN 3.3* 3.4*   BILITOT 0.4 0.5   ALKPHOS 70 73   AST 13 10   ALT 6* 6*   ANIONGAP 9 5*     Magnesium:   Recent Labs   Lab 07/02/24  0432 07/03/24  0530   MG 2.0 2.0         Specimen UA          Urine...    Specimen UA     Color, UA          Color...    Color, UA     Appearance, UA          Clear    Appearance, UA     Spec Grav UA          1.020    Spec Grav UA     pH, UA          8.0    pH, UA     Protein, UA          Negat...    Protein, UA     Glucose, UA          " Negat...    Glucose, UA     Ketones, UA          1+    Ketones, UA     Blood, UA          Negat...    Blood, UA     NITRITE UA          Posit...    NITRITE UA     UROBILINOGEN UA          Negat...    UROBILINOGEN UA     Bilirubin (UA)          Negat...    Bilirubin (UA)     Leukocyte Esterase, UA          Negat...    Leukocyte Esterase, UA     RBC, UA          5    RBC, UA     WBC, UA          4    WBC, UA     Bacteria, UA          Rare    Bacteria, UA     Squam Epithel, UA          1    Squam Epithel, UA     Microscopic Comment          SEE C...               Significant Imaging: I have reviewed all pertinent imaging results/findings within the past 24 hours.  Imaging Results              CTA Chest Non-Coronary (PE Studies) (Final result)  Result time 07/01/24 19:49:33      Final result by Remy Nowak MD (07/01/24 19:49:33)                   Impression:      No definite evidence of pulmonary thromboembolism.    Trace bilateral pleural effusions with adjacent atelectasis, right greater than left.    Recent appearing mild superior endplate compression fracture deformity of the T12 vertebral body, new from prior thoracic spine CT examination of 06/27/2024.  There is slight retropulsion of the posterior cortex with at most mild spinal canal stenosis.    Additional findings as above.      Electronically signed by: Remy Nowak MD  Date:    07/01/2024  Time:    19:49               Narrative:    EXAMINATION:  CTA CHEST NON CORONARY (PE STUDIES)    CLINICAL HISTORY:  Pulmonary embolism (PE) suspected, high prob;    TECHNIQUE:  Low dose axial images, sagittal and coronal reformations were obtained from the thoracic inlet to the lung bases following the IV administration of 100 mL of Omnipaque 350.  Contrast timing was optimized to evaluate the pulmonary arteries.  MIP images were performed.    COMPARISON:  CTA chest 05/09/2024    FINDINGS:  The visualized soft tissue structures at the base of the neck appear  within normal limits allowing from streak artifact from dense contrast bolus.    The thoracic aorta maintains normal caliber, contour, and course with moderate atherosclerotic calcification within its course.  There is no evidence of aneurysmal dilation or dissection. The heart is mildly enlarged and there is trace pericardial fluid.  There is 3 vessel coronary artery calcification.  There is mitral annular calcification.  The esophagus maintains a normal course and caliber. There is no bulky axillary or mediastinal lymph node enlargement appreciated.    The trachea is midline and the proximal airways are patent. Detailed evaluation of the lung parenchyma is limited by respiratory motion artifact. There is no pneumothorax. There are a few scattered pulmonary micronodules, similar to prior examination.  There are scattered bandlike opacities suggesting atelectasis or scarring, similar to prior study.  There is trace pleural fluid at the lung bases with adjacent atelectasis, right greater left..    No evidence of filling defect to indicate pulmonary thromboembolism.    Limited images of the upper abdomen obtained during the course of this dedicated thoracic CT demonstrate no acute abnormalities.  There is prominent atherosclerosis of the abdominal aorta and visualized major branch vessels..    The osseous structures demonstrate a recent appearing superior endplate compression fracture deformity of the T12 vertebral body, new from prior thoracic spine CT of 06/27/2024.  There is slight retropulsion of the posterior cortex with at most mild spinal canal stenosis.  There are moderate multilevel degenerative change of the remaining visualized spine..                                       X-Ray Chest 1 View (Final result)  Result time 07/01/24 10:01:38      Final result by Ab Lam MD (07/01/24 10:01:38)                   Impression:      No acute cardiac or pulmonary process.      Electronically signed  by: Ab Lam  Date:    07/01/2024  Time:    10:01               Narrative:    CLINICAL HISTORY:  ([id number] y/o  (10/2/1926) F    Other malaise    TECHNIQUE:  (A#[id number], exam time 7/1/2024 9:59)    XR CHEST 1 VIEW [id number]    COMPARISON:  Radiographs from 06/28/2020    FINDINGS:  The lungs are clear except for some vascular crowding bilaterally  likely related to a suboptimal inspiration. No pneumothorax is identified. The heart is top normal in size. Atheromatous calcifications are seen at the aortic arch. Osseous structures appear unchanged. The visualized upper abdomen is unremarkable.                                       CT Abdomen Pelvis With IV Contrast NO Oral Contrast (Final result)  Result time 07/01/24 08:21:54      Final result by Zachariah Morton DO (07/01/24 08:21:54)                   Impression:      1. Diverticulosis of the colon.  2. Mild prominence of the left ureter with no ureteral obstruction observed.  No nephrolithiasis observed bilaterally.  3. Bilateral trace pleural effusions and subsegmental atelectasis of the lung bases.  4. Small hiatal hernia.      Electronically signed by: Zachariah Morton  Date:    07/01/2024  Time:    08:21               Narrative:      CMS MANDATED QUALITY DATA - CT RADIATION - 436    All CT scans at this facility utilize dose modulation, iterative reconstruction, and/or weight based dosing when appropriate to reduce radiation dose to as low as reasonably achievable.    EXAMINATION:  CT ABDOMEN PELVIS WITH IV CONTRAST    CLINICAL HISTORY:  Abdominal pain, acute, nonlocalized;Bowel obstruction suspected;Nausea/vomiting;    TECHNIQUE:  CT abdomen and pelvis with IV contrast.  100 mL Omnipaque 350.    COMPARISON:  CT abdomen pelvis 04/16/2024    FINDINGS:  Bilateral trace pleural effusions noted.  There is subsegmental atelectasis of the lung bases.  Heart size is within normal limits.  Calcifications of the mitral valve annulus noted.    Liver is normal size.   There are no hepatic lesions.  The gallbladder is and common bile duct within normal limits.  The pancreas, spleen and adrenal glands are unremarkable.  The kidneys are normal size.  Small bilateral renal cysts are noted.  No enhancing renal lesions identified.  There is no hydronephrosis or nephrolithiasis.  There is mild prominence of the left ureter.  No ureteral obstruction is observed.  The bladder is fluid filled with no focal wall abnormalities.  Uterus and adnexal structures have been removed.  No free fluid in the pelvis.  Multiple pelvic phleboliths noted.    There is diverticulosis of the colon.  There is no bowel wall thickening or inflammatory changes.  Appendix is not definitively identified.  Stomach is mostly decompressed.  There is a small hiatal hernia.    The abdominal aorta is normal caliber with calcified plaque formation.  No enlarged intra-abdominal lymph nodes.  No mesenteric fat stranding or free fluid.  Ventral abdominal wall is unremarkable.  There are degenerative changes of the spine.  There is no acute osseous abnormality.

## 2024-07-03 NOTE — PLAN OF CARE
NSGY NOTE    HPI:  89 yo F s/p fall onto her back on Thursday who was admitted for BP control.  She reports continued back pain.  MRI showed compression fracture.    EXAM:  No focal deficits    IMAGING:  Lumbar MRI, 7/3/24:  T12 compression   Good lordosis and aligment  No nerve root compression  Diffuse degenerative changes    CT abdo/pelvis, 7/1/24:  Prior L5-S1 lamis  T12 comp fx (approx 20% MARCELINO)    A/P:  Mild T12 compression fracture (approx 20% MARCELINO) s/p recent fall.  Good alignment and lordosis, does not appear unstable and no compression of nerve roots.    - Rec LSO brace whenever out of bed  - F/u in neurosurgery clinic in 8 wks with standing lumbar xray

## 2024-07-04 LAB
ALBUMIN SERPL BCP-MCNC: 3.6 G/DL (ref 3.5–5.2)
ALP SERPL-CCNC: 80 U/L (ref 55–135)
ALT SERPL W/O P-5'-P-CCNC: 6 U/L (ref 10–44)
ANION GAP SERPL CALC-SCNC: 6 MMOL/L (ref 8–16)
AST SERPL-CCNC: 10 U/L (ref 10–40)
BASOPHILS # BLD AUTO: 0.07 K/UL (ref 0–0.2)
BASOPHILS NFR BLD: 1.2 % (ref 0–1.9)
BILIRUB SERPL-MCNC: 0.4 MG/DL (ref 0.1–1)
BUN SERPL-MCNC: 12 MG/DL (ref 10–30)
CALCIUM SERPL-MCNC: 8.7 MG/DL (ref 8.7–10.5)
CHLORIDE SERPL-SCNC: 108 MMOL/L (ref 95–110)
CO2 SERPL-SCNC: 25 MMOL/L (ref 23–29)
CREAT SERPL-MCNC: 0.7 MG/DL (ref 0.5–1.4)
DIFFERENTIAL METHOD BLD: ABNORMAL
EOSINOPHIL # BLD AUTO: 0.2 K/UL (ref 0–0.5)
EOSINOPHIL NFR BLD: 4 % (ref 0–8)
ERYTHROCYTE [DISTWIDTH] IN BLOOD BY AUTOMATED COUNT: 13.8 % (ref 11.5–14.5)
EST. GFR  (NO RACE VARIABLE): >60 ML/MIN/1.73 M^2
GLUCOSE SERPL-MCNC: 118 MG/DL (ref 70–110)
HCT VFR BLD AUTO: 35.6 % (ref 37–48.5)
HGB BLD-MCNC: 11.8 G/DL (ref 12–16)
IMM GRANULOCYTES # BLD AUTO: 0.02 K/UL (ref 0–0.04)
IMM GRANULOCYTES NFR BLD AUTO: 0.3 % (ref 0–0.5)
LYMPHOCYTES # BLD AUTO: 1.4 K/UL (ref 1–4.8)
LYMPHOCYTES NFR BLD: 23.1 % (ref 18–48)
MAGNESIUM SERPL-MCNC: 2 MG/DL (ref 1.6–2.6)
MCH RBC QN AUTO: 29.4 PG (ref 27–31)
MCHC RBC AUTO-ENTMCNC: 33.1 G/DL (ref 32–36)
MCV RBC AUTO: 89 FL (ref 82–98)
MONOCYTES # BLD AUTO: 0.5 K/UL (ref 0.3–1)
MONOCYTES NFR BLD: 8.7 % (ref 4–15)
NEUTROPHILS # BLD AUTO: 3.8 K/UL (ref 1.8–7.7)
NEUTROPHILS NFR BLD: 62.7 % (ref 38–73)
NRBC BLD-RTO: 0 /100 WBC
PLATELET # BLD AUTO: 184 K/UL (ref 150–450)
PMV BLD AUTO: 10.7 FL (ref 9.2–12.9)
POTASSIUM SERPL-SCNC: 3.6 MMOL/L (ref 3.5–5.1)
PROT SERPL-MCNC: 6.2 G/DL (ref 6–8.4)
RBC # BLD AUTO: 4.01 M/UL (ref 4–5.4)
SODIUM SERPL-SCNC: 139 MMOL/L (ref 136–145)
T4 FREE SERPL-MCNC: 0.89 NG/DL (ref 0.71–1.51)
TROPONIN I SERPL HS-MCNC: 48.9 PG/ML (ref 0–14.9)
TSH SERPL DL<=0.005 MIU/L-ACNC: 6.88 UIU/ML (ref 0.34–5.6)
WBC # BLD AUTO: 6.06 K/UL (ref 3.9–12.7)

## 2024-07-04 PROCEDURE — 84439 ASSAY OF FREE THYROXINE: CPT | Performed by: NURSE PRACTITIONER

## 2024-07-04 PROCEDURE — 36415 COLL VENOUS BLD VENIPUNCTURE: CPT | Performed by: NURSE PRACTITIONER

## 2024-07-04 PROCEDURE — 83735 ASSAY OF MAGNESIUM: CPT | Performed by: NURSE PRACTITIONER

## 2024-07-04 PROCEDURE — 25000003 PHARM REV CODE 250

## 2024-07-04 PROCEDURE — 25000003 PHARM REV CODE 250: Performed by: NURSE PRACTITIONER

## 2024-07-04 PROCEDURE — 21400001 HC TELEMETRY ROOM

## 2024-07-04 PROCEDURE — 84443 ASSAY THYROID STIM HORMONE: CPT | Performed by: NURSE PRACTITIONER

## 2024-07-04 PROCEDURE — 99900031 HC PATIENT EDUCATION (STAT)

## 2024-07-04 PROCEDURE — 63600175 PHARM REV CODE 636 W HCPCS

## 2024-07-04 PROCEDURE — 63600175 PHARM REV CODE 636 W HCPCS: Performed by: NURSE PRACTITIONER

## 2024-07-04 PROCEDURE — 84484 ASSAY OF TROPONIN QUANT: CPT | Performed by: NURSE PRACTITIONER

## 2024-07-04 PROCEDURE — 85025 COMPLETE CBC W/AUTO DIFF WBC: CPT | Performed by: NURSE PRACTITIONER

## 2024-07-04 PROCEDURE — 80053 COMPREHEN METABOLIC PANEL: CPT | Performed by: NURSE PRACTITIONER

## 2024-07-04 PROCEDURE — 94761 N-INVAS EAR/PLS OXIMETRY MLT: CPT

## 2024-07-04 RX ORDER — LACTULOSE 10 G/15ML
20 SOLUTION ORAL ONCE
Status: COMPLETED | OUTPATIENT
Start: 2024-07-04 | End: 2024-07-04

## 2024-07-04 RX ORDER — LEVOTHYROXINE SODIUM 25 UG/1
50 TABLET ORAL EVERY MORNING
Status: DISCONTINUED | OUTPATIENT
Start: 2024-07-05 | End: 2024-07-05 | Stop reason: HOSPADM

## 2024-07-04 RX ORDER — QUETIAPINE FUMARATE 25 MG/1
25 TABLET, FILM COATED ORAL NIGHTLY
Status: DISCONTINUED | OUTPATIENT
Start: 2024-07-04 | End: 2024-07-05 | Stop reason: HOSPADM

## 2024-07-04 RX ORDER — DEXTROMETHORPHAN POLISTIREX 30 MG/5 ML
1 SUSPENSION, EXTENDED RELEASE 12 HR ORAL ONCE
Status: COMPLETED | OUTPATIENT
Start: 2024-07-04 | End: 2024-07-04

## 2024-07-04 RX ADMIN — METHOCARBAMOL 500 MG: 500 TABLET ORAL at 04:07

## 2024-07-04 RX ADMIN — ISOSORBIDE DINITRATE 30 MG: 10 TABLET ORAL at 09:07

## 2024-07-04 RX ADMIN — HYDRALAZINE HYDROCHLORIDE 25 MG: 25 TABLET ORAL at 04:07

## 2024-07-04 RX ADMIN — METHOCARBAMOL 500 MG: 500 TABLET ORAL at 08:07

## 2024-07-04 RX ADMIN — LEVOTHYROXINE SODIUM 25 MCG: 0.03 TABLET ORAL at 05:07

## 2024-07-04 RX ADMIN — POTASSIUM BICARBONATE 50 MEQ: 977.5 TABLET, EFFERVESCENT ORAL at 09:07

## 2024-07-04 RX ADMIN — ASPIRIN 162 MG: 81 TABLET, COATED ORAL at 09:07

## 2024-07-04 RX ADMIN — METHOCARBAMOL 500 MG: 500 TABLET ORAL at 09:07

## 2024-07-04 RX ADMIN — ENOXAPARIN SODIUM 40 MG: 40 INJECTION SUBCUTANEOUS at 04:07

## 2024-07-04 RX ADMIN — CEFTRIAXONE SODIUM 1 G: 1 INJECTION, POWDER, FOR SOLUTION INTRAMUSCULAR; INTRAVENOUS at 11:07

## 2024-07-04 RX ADMIN — POLYETHYLENE GLYCOL 3350 17 G: 17 POWDER, FOR SOLUTION ORAL at 09:07

## 2024-07-04 RX ADMIN — MINERAL OIL 1 ENEMA: 100 ENEMA RECTAL at 06:07

## 2024-07-04 RX ADMIN — QUETIAPINE 25 MG: 25 TABLET ORAL at 08:07

## 2024-07-04 RX ADMIN — MECLIZINE HYDROCHLORIDE 25 MG: 12.5 TABLET ORAL at 04:07

## 2024-07-04 RX ADMIN — TOPIRAMATE 25 MG: 25 TABLET, FILM COATED ORAL at 09:07

## 2024-07-04 RX ADMIN — HYDRALAZINE HYDROCHLORIDE 25 MG: 25 TABLET ORAL at 08:07

## 2024-07-04 RX ADMIN — LACTULOSE 20 G: 20 SOLUTION ORAL at 01:07

## 2024-07-04 NOTE — NURSING
Nurses Note -- 4 Eyes      7/4/2024   3:42 PM      Skin assessed during: Daily Assessment      [x] No Altered Skin Integrity Present    []Prevention Measures Documented      [] Yes- Altered Skin Integrity Present or Discovered   [] LDA Added if Not in Epic (Describe Wound)   [] New Altered Skin Integrity was Present on Admit and Documented in LDA   [] Wound Image Taken    Wound Care Consulted? No    Attending Nurse:  Esvin Dobbs RN/Staff Member:   Domitila Castañeda

## 2024-07-04 NOTE — RESPIRATORY THERAPY
07/04/24 0750   Patient Assessment/Suction   Level of Consciousness (AVPU) alert   Respiratory Effort Normal;Unlabored   Expansion/Accessory Muscles/Retractions no use of accessory muscles;no retractions;expansion symmetric   Rhythm/Pattern, Respiratory pattern regular;depth regular;no shortness of breath reported   Cough Frequency no cough   PRE-TX-O2   Device (Oxygen Therapy) room air   SpO2 96 %   Pulse Oximetry Type Intermittent   $ Pulse Oximetry - Multiple Charge Pulse Oximetry - Multiple   Pulse 86   Resp 18   Education   $ Education 15 min;Oxygen

## 2024-07-04 NOTE — ASSESSMENT & PLAN NOTE
-2/2 hypertension  - Initial troponin 38.3 with repeat  of 53.6 repeat at 1600  - Trend  - Tight BP control  Cardiology following: Recommend conservative medication management  Recent Labs   Lab 07/04/24  1038   TROPONINIHS 48.9*

## 2024-07-04 NOTE — SUBJECTIVE & OBJECTIVE
Interval History: abd pain today and chest pain that is reproducible on exam, troponin is mildly elevated, but decreased from previous. Likely delirium from hospitalization and sleep disturbance    Review of Systems   Cardiovascular:  Positive for chest pain.   Gastrointestinal:  Positive for abdominal pain.   Psychiatric/Behavioral:  Positive for agitation and confusion.      Objective:     Vital Signs (Most Recent):  Temp: 97.5 °F (36.4 °C) (07/04/24 1545)  Pulse: 86 (07/04/24 1545)  Resp: 18 (07/04/24 1545)  BP: (!) 182/97 (07/04/24 1545)  SpO2: 96 % (07/04/24 1545) Vital Signs (24h Range):  Temp:  [97.5 °F (36.4 °C)-98.3 °F (36.8 °C)] 97.5 °F (36.4 °C)  Pulse:  [70-92] 86  Resp:  [17-20] 18  SpO2:  [94 %-96 %] 96 %  BP: (150-188)/(76-97) 182/97     Weight: 71.5 kg (157 lb 10.1 oz)  Body mass index is 27.06 kg/m².    Intake/Output Summary (Last 24 hours) at 7/4/2024 1559  Last data filed at 7/4/2024 0911  Gross per 24 hour   Intake 120 ml   Output 1200 ml   Net -1080 ml         Physical Exam  Vitals and nursing note reviewed.   Constitutional:       Comments: Advanced age   HENT:      Head: Normocephalic and atraumatic.      Nose: Nose normal.      Mouth/Throat:      Mouth: Mucous membranes are moist.      Pharynx: Oropharynx is clear.   Eyes:      Extraocular Movements: Extraocular movements intact.      Pupils: Pupils are equal, round, and reactive to light.   Cardiovascular:      Rate and Rhythm: Normal rate and regular rhythm.      Pulses: Normal pulses.      Heart sounds: Murmur heard.   Pulmonary:      Effort: Pulmonary effort is normal.      Comments: Diminished in bases  Abdominal:      General: Bowel sounds are normal.      Palpations: Abdomen is soft.      Comments: Tenderness in RLQ and LLQ with deep palpation   Musculoskeletal:         General: Tenderness present. Normal range of motion.      Cervical back: Normal range of motion and neck supple.      Comments: Left chest wall tenderness with  palpation   Skin:     General: Skin is warm and dry.      Capillary Refill: Capillary refill takes less than 2 seconds.      Findings: Bruising present.   Neurological:      Mental Status: She is alert.      Comments: AAOx3, intermittently confused   Psychiatric:         Mood and Affect: Mood normal.         Behavior: Behavior normal.             Significant Labs: All pertinent labs within the past 24 hours have been reviewed.  CBC:   Recent Labs   Lab 07/03/24  0530 07/04/24  0508   WBC 6.41 6.06   HGB 11.4* 11.8*   HCT 34.1* 35.6*    184     CMP:   Recent Labs   Lab 07/03/24  0530 07/04/24  0508    139   K 3.9 3.6    108   CO2 25 25   * 118*   BUN 18 12   CREATININE 0.8 0.7   CALCIUM 8.8 8.7   PROT 5.8* 6.2   ALBUMIN 3.4* 3.6   BILITOT 0.5 0.4   ALKPHOS 73 80   AST 10 10   ALT 6* 6*   ANIONGAP 5* 6*       Significant Imaging: I have reviewed all pertinent imaging results/findings within the past 24 hours.  X-Ray Abdomen AP 1 View    Result Date: 7/4/2024  EXAMINATION: XR ABDOMEN AP 1 VIEW CLINICAL HISTORY: abdominal pain, constipation; FINDINGS: Two AP abdominal radiographs demonstrate gas and stool distended loops of bowel.  No gross organomegaly.  Lung bases are clear.  There are degenerative changes of the spine, pelvis and hips.     Gas and stool distended loops of bowel. Electronically signed by: Zachariah Morton Date:    07/04/2024 Time:    15:22    MRI Lumbar Spine Without Contrast    Result Date: 7/3/2024  EXAMINATION: MRI LUMBAR SPINE WITHOUT CONTRAST CLINICAL HISTORY: Compression fracture, lumbar; TECHNIQUE: Multiplanar, multisequence MR images were acquired from the thoracolumbar junction to the sacrum without the administration of contrast. COMPARISON: CT dated 06/27/2024 FINDINGS: Alignment: Normal. Vertebrae: There is a transitional lumbosacral segment with unilateral right-sided pseudoarthrosis.  For the purposes of this examination the transitional segment is designated S1  There is stable moderate inferior compression fracture of the L3 vertebral body with no marrow edema. There is new acute mild compression fracture of the superior endplate of L1 with mild marrow edema and linear low signal fracture line.  The remainder the lumbar vertebral bodies are of normal height. Discs: There is diffuse disc desiccation.  Disc space narrowing at L5-S1 Cord: Normal.  Conus terminates at L1 Degenerative findings: T12-L1: No significant abnormality L1-L2: Shallow disc bulge and facet hypertrophy without central canal stenosis or foraminal narrowing L2-L3: Shallow disc bulge and facet hypertrophy resulting in mild foraminal narrowing L3-L4: Broad-based disc bulge with facet hypertrophy ligamentum flavum hypertrophy resulting in mild to moderate central canal stenosis and bilateral foraminal narrowing L4-L5: Broad-based disc bulge with moderate facet osteoarthrosis and ligamentum flavum hypertrophy resulting in severe central canal stenosis, lateral recess and foraminal narrowing.  Correlation with bilateral L4 and L5 radicular symptoms is recommended L5-S1: Disc space narrowing with facet hypertrophy and prior laminectomy resulting in mild foraminal narrowing. Paraspinal muscles & soft tissues: There is fatty atrophy of the posterior musculature.     New acute mild compression fracture of the superior endplate of L1 without retropulsion Chronic compression fracture of the inferior endplate of L3 Transitional lumbosacral segment labeled S1 Severe central canal stenosis, lateral recess and foraminal narrowing at L4-5 secondary to facet arthropathy and broad-based disc bulge.  Correlation with bilateral L4 and L5 radicular symptoms is recommended Mild to moderate central canal stenosis and foraminal narrowing at L3-4 Prior laminectomy at L5-S1 with mild foraminal narrowing Electronically signed by: Mehnaz Lin Date:    07/03/2024 Time:    10:35    Echo    Result Date: 7/2/2024    Left Ventricle:  The left ventricle is normal in size. Moderately increased wall thickness. There is concentric hypertrophy. There is normal systolic function with a visually estimated ejection fraction of 60 - 65%. Grade I diastolic dysfunction.   Right Ventricle: Normal right ventricular cavity size. Systolic function is normal.   Left Atrium: Left atrium is severely dilated.   Aortic Valve: There is moderate aortic valve sclerosis. Aortic valve area by velocity is 1.58 cm². Aortic valve peak velocity is 2.01 m/s. Mean gradient is 9 mmHg. Aortic Valve peak gradient is 16 mmHg.   Mitral Valve: There is moderate mitral annular calcification present. There is mild regurgitation.   Tricuspid Valve: There is mild to moderate regurgitation. The estimated PA systolic pressure is at least 31 mmHg.     CTA Chest Non-Coronary (PE Studies)    Result Date: 7/1/2024  EXAMINATION: CTA CHEST NON CORONARY (PE STUDIES) CLINICAL HISTORY: Pulmonary embolism (PE) suspected, high prob; TECHNIQUE: Low dose axial images, sagittal and coronal reformations were obtained from the thoracic inlet to the lung bases following the IV administration of 100 mL of Omnipaque 350.  Contrast timing was optimized to evaluate the pulmonary arteries.  MIP images were performed. COMPARISON: CTA chest 05/09/2024 FINDINGS: The visualized soft tissue structures at the base of the neck appear within normal limits allowing from streak artifact from dense contrast bolus. The thoracic aorta maintains normal caliber, contour, and course with moderate atherosclerotic calcification within its course.  There is no evidence of aneurysmal dilation or dissection. The heart is mildly enlarged and there is trace pericardial fluid.  There is 3 vessel coronary artery calcification.  There is mitral annular calcification.  The esophagus maintains a normal course and caliber. There is no bulky axillary or mediastinal lymph node enlargement appreciated. The trachea is midline and the  proximal airways are patent. Detailed evaluation of the lung parenchyma is limited by respiratory motion artifact. There is no pneumothorax. There are a few scattered pulmonary micronodules, similar to prior examination.  There are scattered bandlike opacities suggesting atelectasis or scarring, similar to prior study.  There is trace pleural fluid at the lung bases with adjacent atelectasis, right greater left.. No evidence of filling defect to indicate pulmonary thromboembolism. Limited images of the upper abdomen obtained during the course of this dedicated thoracic CT demonstrate no acute abnormalities.  There is prominent atherosclerosis of the abdominal aorta and visualized major branch vessels.. The osseous structures demonstrate a recent appearing superior endplate compression fracture deformity of the T12 vertebral body, new from prior thoracic spine CT of 06/27/2024.  There is slight retropulsion of the posterior cortex with at most mild spinal canal stenosis.  There are moderate multilevel degenerative change of the remaining visualized spine..     No definite evidence of pulmonary thromboembolism. Trace bilateral pleural effusions with adjacent atelectasis, right greater than left. Recent appearing mild superior endplate compression fracture deformity of the T12 vertebral body, new from prior thoracic spine CT examination of 06/27/2024.  There is slight retropulsion of the posterior cortex with at most mild spinal canal stenosis. Additional findings as above. Electronically signed by: Remy Nowak MD Date:    07/01/2024 Time:    19:49    X-Ray Chest 1 View    Result Date: 7/1/2024  CLINICAL HISTORY: ([id number])89 y/o  (10/2/1926) F Other malaise TECHNIQUE: (A#[id number], exam time 7/1/2024 9:59) XR CHEST 1 VIEW [id number] COMPARISON: Radiographs from 06/28/2020 FINDINGS: The lungs are clear except for some vascular crowding bilaterally  likely related to a suboptimal inspiration. No pneumothorax is  identified. The heart is top normal in size. Atheromatous calcifications are seen at the aortic arch. Osseous structures appear unchanged. The visualized upper abdomen is unremarkable.     No acute cardiac or pulmonary process. Electronically signed by: Ab Lam Date:    07/01/2024 Time:    10:01    CT Abdomen Pelvis With IV Contrast NO Oral Contrast    Result Date: 7/1/2024  CMS MANDATED QUALITY DATA - CT RADIATION - 436 All CT scans at this facility utilize dose modulation, iterative reconstruction, and/or weight based dosing when appropriate to reduce radiation dose to as low as reasonably achievable. EXAMINATION: CT ABDOMEN PELVIS WITH IV CONTRAST CLINICAL HISTORY: Abdominal pain, acute, nonlocalized;Bowel obstruction suspected;Nausea/vomiting; TECHNIQUE: CT abdomen and pelvis with IV contrast.  100 mL Omnipaque 350. COMPARISON: CT abdomen pelvis 04/16/2024 FINDINGS: Bilateral trace pleural effusions noted.  There is subsegmental atelectasis of the lung bases.  Heart size is within normal limits.  Calcifications of the mitral valve annulus noted. Liver is normal size.  There are no hepatic lesions.  The gallbladder is and common bile duct within normal limits.  The pancreas, spleen and adrenal glands are unremarkable.  The kidneys are normal size.  Small bilateral renal cysts are noted.  No enhancing renal lesions identified.  There is no hydronephrosis or nephrolithiasis.  There is mild prominence of the left ureter.  No ureteral obstruction is observed.  The bladder is fluid filled with no focal wall abnormalities.  Uterus and adnexal structures have been removed.  No free fluid in the pelvis.  Multiple pelvic phleboliths noted. There is diverticulosis of the colon.  There is no bowel wall thickening or inflammatory changes.  Appendix is not definitively identified.  Stomach is mostly decompressed.  There is a small hiatal hernia. The abdominal aorta is normal caliber with calcified plaque formation.   No enlarged intra-abdominal lymph nodes.  No mesenteric fat stranding or free fluid.  Ventral abdominal wall is unremarkable.  There are degenerative changes of the spine.  There is no acute osseous abnormality.     1. Diverticulosis of the colon. 2. Mild prominence of the left ureter with no ureteral obstruction observed.  No nephrolithiasis observed bilaterally. 3. Bilateral trace pleural effusions and subsegmental atelectasis of the lung bases. 4. Small hiatal hernia. Electronically signed by: Zachariah Morton Date:    07/01/2024 Time:    08:21    X-Ray Chest AP Portable    Result Date: 6/28/2024  EXAMINATION: XR CHEST AP PORTABLE CLINICAL HISTORY: fall; COMPARISON: 06/27/2024 FINDINGS: Cardiac silhouette size is stable compared to prior.  Mitral annular calcification noted.  Atherosclerotic calcification of the aorta.  No pulmonary edema.  No confluent airspace disease.  No large pleural effusion or pneumothorax.     No acute pulmonary process. Electronically signed by: Mir Elmore Date:    06/28/2024 Time:    07:57    CT Lumbar Spine Without Contrast    Result Date: 6/27/2024  EXAMINATION: CT LUMBAR SPINE WITHOUT CONTRAST CLINICAL HISTORY: Low back pain, trauma; COMPARISON: CT abdomen and pelvis April 2024 FINDINGS: Thin-section axial images were obtained, with reformatted imaging in the sagittal and coronal planes. There is a transitional lumbosacral segment with unilateral right-sided pseudoarthrosis.  For the purposes of this examination, the transitional segment is designated as S1, with rudimentary ribs at L1. Moderate inferior endplate compression fracture of L3 is chronic and unchanged.  There is no evidence of acute fracture or subluxation.  Post laminectomy changes are noted in the lower lumbar spine. Changes of multilevel lumbar degenerative disc and facet disease are noted.  Findings are most pronounced at the L4-5 level, where bilateral facet hypertrophy and broad-based disc bulge combine to result  in probable moderate spinal stenosis.     1. Chronic findings as above.  No acute CT abnormalities. Electronically signed by: Cyrus Bejarano Date:    06/27/2024 Time:    10:15    CT Thoracic Spine Without Contrast    Result Date: 6/27/2024  EXAMINATION: CT THORACIC SPINE WITHOUT CONTRAST CLINICAL HISTORY: Mid-back pain, compression fracture suspected; COMPARISON: February 2023 FINDINGS: Thin-section axial images were obtained, with reformatted imaging in the sagittal and coronal planes. There is no evidence of acute thoracic fracture or subluxation.  No osseous destructive lesion is identified. There is moderate multilevel intervertebral disc space narrowing compatible with degenerative disc disease.  Paraspinous soft tissues are unremarkable. Incidentally noted is dense multifocal coronary artery atherosclerotic calcification.     1. Chronic findings as above.  No evidence of acute thoracic fracture or subluxation. Electronically signed by: Cyrus Bejarano Date:    06/27/2024 Time:    10:09    XR Ribs Min 3 views w/PA Chest Left    Result Date: 6/27/2024  EXAMINATION: XR RIBS MIN 3 VIEWS W/ PA CHEST LEFT CLINICAL HISTORY: fall; FINDINGS: The heart is mildly enlarged.  There is vascular calcification of the aorta.  The lungs are clear. Multiple views of the left ribs show no fractures or acute osseous abnormalities.  There are degenerative changes of the lower thoracic spine.     Negative left ribs Mild cardiomegaly Degenerative changes of the thoracic spine Electronically signed by: Mehnaz Lin Date:    06/27/2024 Time:    10:00    CT Cervical Spine Without Contrast    Result Date: 6/27/2024  EXAMINATION: CT CERVICAL SPINE WITHOUT CONTRAST CLINICAL HISTORY: Neck trauma (Age >= 65y);. TECHNIQUE: Thin axial imaging was performed without contrast, with sagittal and coronal reformatted images reviewed. COMPARISON: February 2023 there is no evidence of acute cervical fracture.  3 mm C4 anterolisthesis is  chronic and unchanged, with normal alignment at all other levels.  Prevertebral soft tissues are normal.  The odontoid is intact. Changes of multilevel cervical degenerative disc and moderate to severe degenerative facet disease are noted.  There is resultant significant multilevel foraminal stenosis.  Specifically, there is severe narrowing of the right C4 foramen, with moderate narrowing of the left C6 and right C7 foramina. FINDINGS: 1. No acute CT abnormalities. 2. Multilevel cervical degenerative disc/facet disease. 3. Minimal C4 anterolisthesis, chronic and unchanged.     . Electronically signed by: Cyrus Bejarano Date:    06/27/2024 Time:    10:00    X-Ray Hip 2 or 3 views Left with Pelvis when performed    Result Date: 6/27/2024  CLINICAL HISTORY: (MRN [id number])89 y/o  (10/2/1926) F Unspecified fall, initial encounter TECHNIQUE: (A# [id number], Exam time 6/27/2024 9:55) [id number] XR HIP WITH PELVIS WHEN PERFORMED 2 OR 3 VIEWS LEFT  view(s) obtained. COMPARISON: 11/20/2020 FINDINGS: AP pelvis and two views of the left hip show no fractures, dislocations or acute osseous abnormalities.  There is mild joint space narrowing of both hips.  The SI joints are congruent.  The soft tissues are normal.  There are degenerative changes of the lower lumbar spine.     Mild joint space narrowing of both hips with no acute osseous abnormality Degenerative changes of the lower lumbar spine Electronically signed by: Mehnaz Lin Date:    06/27/2024 Time:    09:59    CT Head Without Contrast    Result Date: 6/27/2024  EXAMINATION: CT HEAD WITHOUT CONTRAST CLINICAL HISTORY: Head trauma, minor (Age >= 65y);. TECHNIQUE: Noninfusion images were obtained from the skull base to the vertex. All CT scans at this facility utilize dose modulation, iterative reconstruction, and/or weight based dosing when appropriate to reduce radiation dose to as low as reasonably achievable CMS MANDATED QUALITY DATA - CT RADIATION - 436 COMPARISON:  February 2024 FINDINGS: There is no intracranial mass, hemorrhage, or midline shift.  Ventricles are within normal limits.  The sulci and extra-axial CSF spaces are prominent compatible with atrophy, stable. There is no evidence of cortical ischemic change. Changes of mild-moderate chronic microvascular white matter disease are noted, stable.  Cerebellum and brainstem are normal. The calvarium is intact.  Small mucous retention cyst or polyp is noted in the left maxillary sinus, with mild mucoperiosteal thickening demonstrated in both maxillary sinuses.     No acute intracranial abnormalities. Chronic findings as discussed above. Electronically signed by: Cyrus Bejarano Date:    06/27/2024 Time:    09:51

## 2024-07-04 NOTE — ASSESSMENT & PLAN NOTE
- Patient with RLQ/LLQ  - CT scan w Abdomen with diverticulosis.  Mild prominence of left ureter no ureteral obstruction.  No nephrolithiasis.  Bilateral trace pleural effusions and subsegmental   atelectasis at the lung bases.  Small hiatal hernia  - Digital Exam in ED with no stool in rectal vault  - Enema in the ED with brown liquid stool  - lactulose  -repeat enema

## 2024-07-04 NOTE — ASSESSMENT & PLAN NOTE
Patient has a current diagnosis of hypertensive urgency (without evidence of end organ damage) which is controlled.  Latest blood pressure and vitals reviewed-   Temp:  [97.5 °F (36.4 °C)-98.3 °F (36.8 °C)]   Pulse:  [70-92]   Resp:  [17-20]   BP: (150-188)/(76-97)   SpO2:  [94 %-96 %] .   Patient currently off IV antihypertensives.   Home meds for hypertension were reviewed and noted below.   Hypertension Medications               isosorbide dinitrate (ISORDIL) 30 MG Tab Take 30 mg by mouth once daily.           Likely 2/2 pain MRI revealed new spinal compression fracture at L1  Medication adjustment for hospital antihypertensives is as follows- add hydralazine PRN SBP > 180 q8hrs    Will aim for controlled BP reduction by medications noted above. Monitor and mitigate end organ damage as indicated.    Improved - continue current medications  Monitor blood pressure and adjust meds as indicated

## 2024-07-04 NOTE — CARE UPDATE
07/03/24 5949   Patient Assessment/Suction   Level of Consciousness (AVPU) alert   Respiratory Effort Unlabored   Expansion/Accessory Muscles/Retractions no use of accessory muscles   All Lung Fields Breath Sounds clear;diminished   Rhythm/Pattern, Respiratory no shortness of breath reported   Cough Frequency no cough   PRE-TX-O2   Device (Oxygen Therapy) room air   SpO2 96 %   Pulse Oximetry Type Intermittent   $ Pulse Oximetry - Multiple Charge Pulse Oximetry - Multiple   Pulse 88   Resp 18   Positioning   Head of Bed (HOB) Positioning HOB elevated;HOB at 30 degrees

## 2024-07-04 NOTE — PROGRESS NOTES
[facility] Medicine  Progress Note    Patient Name: Erich Encarnacion  MRN: [id number]  Patient Class: IP- Inpatient   Admission Date: 7/1/2024  Length of Stay: 2 days  Attending Physician: Renetta Khalil MD  Primary Care Provider: Se Rios MD        Subjective:     Principal Problem:Hypertensive urgency        HPI:  89 y.o. female with past medical history of high blood pressure, macular degeneration, headaches, reflux, hypothyroidism, TIA, and kidney stones who presents to ED via EMS with c/o RUQ and RLQ abdominal pain. Patient recently Dc'd from the facility on 6/28/24. Patient is independent lives alone. Family reports that patient had not had a BM Since Thursday of last week now 4 days. Daughter at the bedside reports that she is concerned about her mother's ability to swallow as she is spitting up. SPO2 98% on room air. The patient was with 214/100 BP in triage.  On evaluation of blood work patient with sodium 135, potassium 3.2, glucose 119, hematocrit 36.8, initial troponin 38.3 with a repeat of 53.6.  Patient's urine was extremely dark positive for nitrates however WBCs normal at a level of 4 patient given Rocephin 2 g in ED to prevent further urinary tract infection. Patient to be admitted for further evaluation.    Overview/Hospital Course:  Ms. Encarnacion has been monitored closely during her hospitalization. She was admitted on 7/1/24 with abd pain. CT abd/pelvis revealed T12 compression frx and follow up MRI confirmed. NSGY was consulted and recommended conservative management with TSLO brace, pain control, and PT/OT. Her BP was elevated on arrival and troponins were mildly elevated and trended down. Cardiology was consulted. D dimer elevated and CTA chest negative. EKG with no acute ischemic changes. Echo EF 60-65% grade I diastolic dysfunction PASP 31 mmHg with moderate aortic valve sclerosis. She's developed delirium and sundowning and delirium prxn ordered on 7/4/24.  Discussed medication options for delirium with daughter and through shared decision making with family and provider, will trial low dose seroquel tonight and limit night wakings. Her last BM was in the ED on 7/1. KUB on 7/4 c/w constipation and lactulose ordered, but pt may have vomited some of this, so mineral oil enema ordered. Urine with positive nitrites and rocephin started on 7/3 for UTI. PT/OT consulted and pt is awaiting acceptance for SNF.    Interval History: abd pain today and chest pain that is reproducible on exam, troponin is mildly elevated, but decreased from previous. Likely delirium from hospitalization and sleep disturbance    Review of Systems   Cardiovascular:  Positive for chest pain.   Gastrointestinal:  Positive for abdominal pain.   Psychiatric/Behavioral:  Positive for agitation and confusion.      Objective:     Vital Signs (Most Recent):  Temp: 97.5 °F (36.4 °C) (07/04/24 1545)  Pulse: 86 (07/04/24 1545)  Resp: 18 (07/04/24 1545)  BP: (!) 182/97 (07/04/24 1545)  SpO2: 96 % (07/04/24 1545) Vital Signs (24h Range):  Temp:  [97.5 °F (36.4 °C)-98.3 °F (36.8 °C)] 97.5 °F (36.4 °C)  Pulse:  [70-92] 86  Resp:  [17-20] 18  SpO2:  [94 %-96 %] 96 %  BP: (150-188)/(76-97) 182/97     Weight: 71.5 kg (157 lb 10.1 oz)  Body mass index is 27.06 kg/m².    Intake/Output Summary (Last 24 hours) at 7/4/2024 4639  Last data filed at 7/4/2024 0911  Gross per 24 hour   Intake 120 ml   Output 1200 ml   Net -1080 ml         Physical Exam  Vitals and nursing note reviewed.   Constitutional:       Comments: Advanced age   HENT:      Head: Normocephalic and atraumatic.      Nose: Nose normal.      Mouth/Throat:      Mouth: Mucous membranes are moist.      Pharynx: Oropharynx is clear.   Eyes:      Extraocular Movements: Extraocular movements intact.      Pupils: Pupils are equal, round, and reactive to light.   Cardiovascular:      Rate and Rhythm: Normal rate and regular rhythm.      Pulses: Normal pulses.       Heart sounds: Murmur heard.   Pulmonary:      Effort: Pulmonary effort is normal.      Comments: Diminished in bases  Abdominal:      General: Bowel sounds are normal.      Palpations: Abdomen is soft.      Comments: Tenderness in RLQ and LLQ with deep palpation   Musculoskeletal:         General: Tenderness present. Normal range of motion.      Cervical back: Normal range of motion and neck supple.      Comments: Left chest wall tenderness with palpation   Skin:     General: Skin is warm and dry.      Capillary Refill: Capillary refill takes less than 2 seconds.      Findings: Bruising present.   Neurological:      Mental Status: She is alert.      Comments: AAOx3, intermittently confused   Psychiatric:         Mood and Affect: Mood normal.         Behavior: Behavior normal.             Significant Labs: All pertinent labs within the past 24 hours have been reviewed.  CBC:   Recent Labs   Lab 07/03/24  0530 07/04/24  0508   WBC 6.41 6.06   HGB 11.4* 11.8*   HCT 34.1* 35.6*    184     CMP:   Recent Labs   Lab 07/03/24  0530 07/04/24  0508    139   K 3.9 3.6    108   CO2 25 25   * 118*   BUN 18 12   CREATININE 0.8 0.7   CALCIUM 8.8 8.7   PROT 5.8* 6.2   ALBUMIN 3.4* 3.6   BILITOT 0.5 0.4   ALKPHOS 73 80   AST 10 10   ALT 6* 6*   ANIONGAP 5* 6*       Significant Imaging: I have reviewed all pertinent imaging results/findings within the past 24 hours.  X-Ray Abdomen AP 1 View    Result Date: 7/4/2024  EXAMINATION: XR ABDOMEN AP 1 VIEW CLINICAL HISTORY: abdominal pain, constipation; FINDINGS: Two AP abdominal radiographs demonstrate gas and stool distended loops of bowel.  No gross organomegaly.  Lung bases are clear.  There are degenerative changes of the spine, pelvis and hips.     Gas and stool distended loops of bowel. Electronically signed by: Zachariah Morton Date:    07/04/2024 Time:    15:22    MRI Lumbar Spine Without Contrast    Result Date: 7/3/2024  EXAMINATION: MRI LUMBAR SPINE  WITHOUT CONTRAST CLINICAL HISTORY: Compression fracture, lumbar; TECHNIQUE: Multiplanar, multisequence MR images were acquired from the thoracolumbar junction to the sacrum without the administration of contrast. COMPARISON: CT dated 06/27/2024 FINDINGS: Alignment: Normal. Vertebrae: There is a transitional lumbosacral segment with unilateral right-sided pseudoarthrosis.  For the purposes of this examination the transitional segment is designated S1 There is stable moderate inferior compression fracture of the L3 vertebral body with no marrow edema. There is new acute mild compression fracture of the superior endplate of L1 with mild marrow edema and linear low signal fracture line.  The remainder the lumbar vertebral bodies are of normal height. Discs: There is diffuse disc desiccation.  Disc space narrowing at L5-S1 Cord: Normal.  Conus terminates at L1 Degenerative findings: T12-L1: No significant abnormality L1-L2: Shallow disc bulge and facet hypertrophy without central canal stenosis or foraminal narrowing L2-L3: Shallow disc bulge and facet hypertrophy resulting in mild foraminal narrowing L3-L4: Broad-based disc bulge with facet hypertrophy ligamentum flavum hypertrophy resulting in mild to moderate central canal stenosis and bilateral foraminal narrowing L4-L5: Broad-based disc bulge with moderate facet osteoarthrosis and ligamentum flavum hypertrophy resulting in severe central canal stenosis, lateral recess and foraminal narrowing.  Correlation with bilateral L4 and L5 radicular symptoms is recommended L5-S1: Disc space narrowing with facet hypertrophy and prior laminectomy resulting in mild foraminal narrowing. Paraspinal muscles & soft tissues: There is fatty atrophy of the posterior musculature.     New acute mild compression fracture of the superior endplate of L1 without retropulsion Chronic compression fracture of the inferior endplate of L3 Transitional lumbosacral segment labeled S1 Severe central  canal stenosis, lateral recess and foraminal narrowing at L4-5 secondary to facet arthropathy and broad-based disc bulge.  Correlation with bilateral L4 and L5 radicular symptoms is recommended Mild to moderate central canal stenosis and foraminal narrowing at L3-4 Prior laminectomy at L5-S1 with mild foraminal narrowing Electronically signed by: Mehnaz Lin Date:    07/03/2024 Time:    10:35    Echo    Result Date: 7/2/2024    Left Ventricle: The left ventricle is normal in size. Moderately increased wall thickness. There is concentric hypertrophy. There is normal systolic function with a visually estimated ejection fraction of 60 - 65%. Grade I diastolic dysfunction.   Right Ventricle: Normal right ventricular cavity size. Systolic function is normal.   Left Atrium: Left atrium is severely dilated.   Aortic Valve: There is moderate aortic valve sclerosis. Aortic valve area by velocity is 1.58 cm². Aortic valve peak velocity is 2.01 m/s. Mean gradient is 9 mmHg. Aortic Valve peak gradient is 16 mmHg.   Mitral Valve: There is moderate mitral annular calcification present. There is mild regurgitation.   Tricuspid Valve: There is mild to moderate regurgitation. The estimated PA systolic pressure is at least 31 mmHg.     CTA Chest Non-Coronary (PE Studies)    Result Date: 7/1/2024  EXAMINATION: CTA CHEST NON CORONARY (PE STUDIES) CLINICAL HISTORY: Pulmonary embolism (PE) suspected, high prob; TECHNIQUE: Low dose axial images, sagittal and coronal reformations were obtained from the thoracic inlet to the lung bases following the IV administration of 100 mL of Omnipaque 350.  Contrast timing was optimized to evaluate the pulmonary arteries.  MIP images were performed. COMPARISON: CTA chest 05/09/2024 FINDINGS: The visualized soft tissue structures at the base of the neck appear within normal limits allowing from streak artifact from dense contrast bolus. The thoracic aorta maintains normal caliber, contour, and  course with moderate atherosclerotic calcification within its course.  There is no evidence of aneurysmal dilation or dissection. The heart is mildly enlarged and there is trace pericardial fluid.  There is 3 vessel coronary artery calcification.  There is mitral annular calcification.  The esophagus maintains a normal course and caliber. There is no bulky axillary or mediastinal lymph node enlargement appreciated. The trachea is midline and the proximal airways are patent. Detailed evaluation of the lung parenchyma is limited by respiratory motion artifact. There is no pneumothorax. There are a few scattered pulmonary micronodules, similar to prior examination.  There are scattered bandlike opacities suggesting atelectasis or scarring, similar to prior study.  There is trace pleural fluid at the lung bases with adjacent atelectasis, right greater left.. No evidence of filling defect to indicate pulmonary thromboembolism. Limited images of the upper abdomen obtained during the course of this dedicated thoracic CT demonstrate no acute abnormalities.  There is prominent atherosclerosis of the abdominal aorta and visualized major branch vessels.. The osseous structures demonstrate a recent appearing superior endplate compression fracture deformity of the T12 vertebral body, new from prior thoracic spine CT of 06/27/2024.  There is slight retropulsion of the posterior cortex with at most mild spinal canal stenosis.  There are moderate multilevel degenerative change of the remaining visualized spine..     No definite evidence of pulmonary thromboembolism. Trace bilateral pleural effusions with adjacent atelectasis, right greater than left. Recent appearing mild superior endplate compression fracture deformity of the T12 vertebral body, new from prior thoracic spine CT examination of 06/27/2024.  There is slight retropulsion of the posterior cortex with at most mild spinal canal stenosis. Additional findings as above.  Electronically signed by: Remy Nowak MD Date:    07/01/2024 Time:    19:49    X-Ray Chest 1 View    Result Date: 7/1/2024  CLINICAL HISTORY: ([id number] y/o  (10/2/1926) F Other malaise TECHNIQUE: (A#[id number], exam time 7/1/2024 9:59) XR CHEST 1 VIEW [id number] COMPARISON: Radiographs from 06/28/2020 FINDINGS: The lungs are clear except for some vascular crowding bilaterally  likely related to a suboptimal inspiration. No pneumothorax is identified. The heart is top normal in size. Atheromatous calcifications are seen at the aortic arch. Osseous structures appear unchanged. The visualized upper abdomen is unremarkable.     No acute cardiac or pulmonary process. Electronically signed by: Ab Lam Date:    07/01/2024 Time:    10:01    CT Abdomen Pelvis With IV Contrast NO Oral Contrast    Result Date: 7/1/2024  CMS MANDATED QUALITY DATA - CT RADIATION - 436 All CT scans at this facility utilize dose modulation, iterative reconstruction, and/or weight based dosing when appropriate to reduce radiation dose to as low as reasonably achievable. EXAMINATION: CT ABDOMEN PELVIS WITH IV CONTRAST CLINICAL HISTORY: Abdominal pain, acute, nonlocalized;Bowel obstruction suspected;Nausea/vomiting; TECHNIQUE: CT abdomen and pelvis with IV contrast.  100 mL Omnipaque 350. COMPARISON: CT abdomen pelvis 04/16/2024 FINDINGS: Bilateral trace pleural effusions noted.  There is subsegmental atelectasis of the lung bases.  Heart size is within normal limits.  Calcifications of the mitral valve annulus noted. Liver is normal size.  There are no hepatic lesions.  The gallbladder is and common bile duct within normal limits.  The pancreas, spleen and adrenal glands are unremarkable.  The kidneys are normal size.  Small bilateral renal cysts are noted.  No enhancing renal lesions identified.  There is no hydronephrosis or nephrolithiasis.  There is mild prominence of the left ureter.  No ureteral obstruction is observed.  The bladder  is fluid filled with no focal wall abnormalities.  Uterus and adnexal structures have been removed.  No free fluid in the pelvis.  Multiple pelvic phleboliths noted. There is diverticulosis of the colon.  There is no bowel wall thickening or inflammatory changes.  Appendix is not definitively identified.  Stomach is mostly decompressed.  There is a small hiatal hernia. The abdominal aorta is normal caliber with calcified plaque formation.  No enlarged intra-abdominal lymph nodes.  No mesenteric fat stranding or free fluid.  Ventral abdominal wall is unremarkable.  There are degenerative changes of the spine.  There is no acute osseous abnormality.     1. Diverticulosis of the colon. 2. Mild prominence of the left ureter with no ureteral obstruction observed.  No nephrolithiasis observed bilaterally. 3. Bilateral trace pleural effusions and subsegmental atelectasis of the lung bases. 4. Small hiatal hernia. Electronically signed by: Zachariah Morton Date:    07/01/2024 Time:    08:21    X-Ray Chest AP Portable    Result Date: 6/28/2024  EXAMINATION: XR CHEST AP PORTABLE CLINICAL HISTORY: fall; COMPARISON: 06/27/2024 FINDINGS: Cardiac silhouette size is stable compared to prior.  Mitral annular calcification noted.  Atherosclerotic calcification of the aorta.  No pulmonary edema.  No confluent airspace disease.  No large pleural effusion or pneumothorax.     No acute pulmonary process. Electronically signed by: Mir Elmore Date:    06/28/2024 Time:    07:57    CT Lumbar Spine Without Contrast    Result Date: 6/27/2024  EXAMINATION: CT LUMBAR SPINE WITHOUT CONTRAST CLINICAL HISTORY: Low back pain, trauma; COMPARISON: CT abdomen and pelvis April 2024 FINDINGS: Thin-section axial images were obtained, with reformatted imaging in the sagittal and coronal planes. There is a transitional lumbosacral segment with unilateral right-sided pseudoarthrosis.  For the purposes of this examination, the transitional segment is  designated as S1, with rudimentary ribs at L1. Moderate inferior endplate compression fracture of L3 is chronic and unchanged.  There is no evidence of acute fracture or subluxation.  Post laminectomy changes are noted in the lower lumbar spine. Changes of multilevel lumbar degenerative disc and facet disease are noted.  Findings are most pronounced at the L4-5 level, where bilateral facet hypertrophy and broad-based disc bulge combine to result in probable moderate spinal stenosis.     1. Chronic findings as above.  No acute CT abnormalities. Electronically signed by: Cyrus Bejarano Date:    06/27/2024 Time:    10:15    CT Thoracic Spine Without Contrast    Result Date: 6/27/2024  EXAMINATION: CT THORACIC SPINE WITHOUT CONTRAST CLINICAL HISTORY: Mid-back pain, compression fracture suspected; COMPARISON: February 2023 FINDINGS: Thin-section axial images were obtained, with reformatted imaging in the sagittal and coronal planes. There is no evidence of acute thoracic fracture or subluxation.  No osseous destructive lesion is identified. There is moderate multilevel intervertebral disc space narrowing compatible with degenerative disc disease.  Paraspinous soft tissues are unremarkable. Incidentally noted is dense multifocal coronary artery atherosclerotic calcification.     1. Chronic findings as above.  No evidence of acute thoracic fracture or subluxation. Electronically signed by: Cyrus Bejarano Date:    06/27/2024 Time:    10:09    XR Ribs Min 3 views w/PA Chest Left    Result Date: 6/27/2024  EXAMINATION: XR RIBS MIN 3 VIEWS W/ PA CHEST LEFT CLINICAL HISTORY: fall; FINDINGS: The heart is mildly enlarged.  There is vascular calcification of the aorta.  The lungs are clear. Multiple views of the left ribs show no fractures or acute osseous abnormalities.  There are degenerative changes of the lower thoracic spine.     Negative left ribs Mild cardiomegaly Degenerative changes of the thoracic spine  Electronically signed by: Mehnaz Lin Date:    06/27/2024 Time:    10:00    CT Cervical Spine Without Contrast    Result Date: 6/27/2024  EXAMINATION: CT CERVICAL SPINE WITHOUT CONTRAST CLINICAL HISTORY: Neck trauma (Age >= 65y);. TECHNIQUE: Thin axial imaging was performed without contrast, with sagittal and coronal reformatted images reviewed. COMPARISON: February 2023 there is no evidence of acute cervical fracture.  3 mm C4 anterolisthesis is chronic and unchanged, with normal alignment at all other levels.  Prevertebral soft tissues are normal.  The odontoid is intact. Changes of multilevel cervical degenerative disc and moderate to severe degenerative facet disease are noted.  There is resultant significant multilevel foraminal stenosis.  Specifically, there is severe narrowing of the right C4 foramen, with moderate narrowing of the left C6 and right C7 foramina. FINDINGS: 1. No acute CT abnormalities. 2. Multilevel cervical degenerative disc/facet disease. 3. Minimal C4 anterolisthesis, chronic and unchanged.     . Electronically signed by: Cyrus Bejarano Date:    06/27/2024 Time:    10:00    X-Ray Hip 2 or 3 views Left with Pelvis when performed    Result Date: 6/27/2024  CLINICAL HISTORY: (MRN [id number])89 y/o  (10/2/1926) F Unspecified fall, initial encounter TECHNIQUE: (A# [id number], Exam time 6/27/2024 9:55) [id number] XR HIP WITH PELVIS WHEN PERFORMED 2 OR 3 VIEWS LEFT  view(s) obtained. COMPARISON: 11/20/2020 FINDINGS: AP pelvis and two views of the left hip show no fractures, dislocations or acute osseous abnormalities.  There is mild joint space narrowing of both hips.  The SI joints are congruent.  The soft tissues are normal.  There are degenerative changes of the lower lumbar spine.     Mild joint space narrowing of both hips with no acute osseous abnormality Degenerative changes of the lower lumbar spine Electronically signed by: Mehnaz Lin Date:    06/27/2024 Time:    09:59    CT Head  Without Contrast    Result Date: 6/27/2024  EXAMINATION: CT HEAD WITHOUT CONTRAST CLINICAL HISTORY: Head trauma, minor (Age >= 65y);. TECHNIQUE: Noninfusion images were obtained from the skull base to the vertex. All CT scans at this facility utilize dose modulation, iterative reconstruction, and/or weight based dosing when appropriate to reduce radiation dose to as low as reasonably achievable CMS MANDATED QUALITY DATA - CT RADIATION - 436 COMPARISON: February 2024 FINDINGS: There is no intracranial mass, hemorrhage, or midline shift.  Ventricles are within normal limits.  The sulci and extra-axial CSF spaces are prominent compatible with atrophy, stable. There is no evidence of cortical ischemic change. Changes of mild-moderate chronic microvascular white matter disease are noted, stable.  Cerebellum and brainstem are normal. The calvarium is intact.  Small mucous retention cyst or polyp is noted in the left maxillary sinus, with mild mucoperiosteal thickening demonstrated in both maxillary sinuses.     No acute intracranial abnormalities. Chronic findings as discussed above. Electronically signed by: Cyrus Bejarano Date:    06/27/2024 Time:    09:51       Assessment/Plan:      * Hypertensive urgency  Patient has a current diagnosis of hypertensive urgency (without evidence of end organ damage) which is controlled.  Latest blood pressure and vitals reviewed-   Temp:  [97.5 °F (36.4 °C)-98.3 °F (36.8 °C)]   Pulse:  [70-92]   Resp:  [17-20]   BP: (150-188)/(76-97)   SpO2:  [94 %-96 %] .   Patient currently off IV antihypertensives.   Home meds for hypertension were reviewed and noted below.   Hypertension Medications               isosorbide dinitrate (ISORDIL) 30 MG Tab Take 30 mg by mouth once daily.           Likely 2/2 pain MRI revealed new spinal compression fracture at L1  Medication adjustment for hospital antihypertensives is as follows- add hydralazine PRN SBP > 180 q8hrs    Will aim for controlled BP  reduction by medications noted above. Monitor and mitigate end organ damage as indicated.    Improved - continue current medications  Monitor blood pressure and adjust meds as indicated     Compression fracture of L1 vertebra  Neurosurgery consulted - DELROY brace and NSGY f/u in 8 weeks  PT/OT  Case management following for SNF placement      Chronic idiopathic constipation  - Patient with RLQ/LLQ  - CT scan w Abdomen with diverticulosis.  Mild prominence of left ureter no ureteral obstruction.  No nephrolithiasis.  Bilateral trace pleural effusions and subsegmental   atelectasis at the lung bases.  Small hiatal hernia  - Digital Exam in ED with no stool in rectal vault  - Enema in the ED with brown liquid stool  - lactulose  -repeat enema      Demand ischemia  -2/2 hypertension  - Initial troponin 38.3 with repeat  of 53.6 repeat at 1600  - Trend  - Tight BP control  Cardiology following: Recommend conservative medication management  Recent Labs   Lab 07/04/24  1038   TROPONINIHS 48.9*           UTI (urinary tract infection)  - Dark urine with WBC 4, +Nitrites given 2gm rocephin in ED  -began Rocephin 1 g q.day x3 doses      Advanced age  - PT/OT to assess and treat  - consulted for discharge planning consider SNF        VTE Risk Mitigation (From admission, onward)           Ordered     enoxaparin injection 40 mg  Daily         07/01/24 1300     IP VTE HIGH RISK PATIENT  Once         07/01/24 1300     Place sequential compression device  Until discontinued         07/01/24 1300                    Discharge Planning   ARGELIA: 7/8/2024     Code Status: Full Code   Is the patient medically ready for discharge?:     Reason for patient still in hospital (select all that apply): Patient trending condition, Treatment, and Pending disposition  Discharge Plan A: Skilled Nursing Facility   Discharge Delays: (!) Waiting for Provider to Speak to Patient              Alka Ashley NP  Department of Hospital Medicine    [facility]

## 2024-07-04 NOTE — NURSING
Nurses Note -- 4 Eyes      7/4/2024   3:06 AM      Skin assessed during: Daily Assessment      [x] No Altered Skin Integrity Present    []Prevention Measures Documented      [] Yes- Altered Skin Integrity Present or Discovered   [] LDA Added if Not in Epic (Describe Wound)   [] New Altered Skin Integrity was Present on Admit and Documented in LDA   [] Wound Image Taken    Wound Care Consulted? No    Attending Nurse:  Jeannette Dobbs RN/Staff Member:   [id number]

## 2024-07-04 NOTE — ASSESSMENT & PLAN NOTE
Neurosurgery consulted - MILAGROLO brace and NSGY f/u in 8 weeks  PT/OT  Case management following for SNF placement

## 2024-07-05 VITALS
TEMPERATURE: 98 F | OXYGEN SATURATION: 95 % | SYSTOLIC BLOOD PRESSURE: 108 MMHG | HEART RATE: 95 BPM | RESPIRATION RATE: 16 BRPM | BODY MASS INDEX: 26.91 KG/M2 | DIASTOLIC BLOOD PRESSURE: 58 MMHG | WEIGHT: 157.63 LBS | HEIGHT: 64 IN

## 2024-07-05 LAB
ALBUMIN SERPL BCP-MCNC: 3.7 G/DL (ref 3.5–5.2)
ALP SERPL-CCNC: 89 U/L (ref 55–135)
ALT SERPL W/O P-5'-P-CCNC: 6 U/L (ref 10–44)
ANION GAP SERPL CALC-SCNC: 6 MMOL/L (ref 8–16)
AST SERPL-CCNC: 13 U/L (ref 10–40)
BASOPHILS # BLD AUTO: 0.07 K/UL (ref 0–0.2)
BASOPHILS NFR BLD: 1 % (ref 0–1.9)
BILIRUB SERPL-MCNC: 0.4 MG/DL (ref 0.1–1)
BUN SERPL-MCNC: 11 MG/DL (ref 10–30)
CALCIUM SERPL-MCNC: 8.9 MG/DL (ref 8.7–10.5)
CHLORIDE SERPL-SCNC: 106 MMOL/L (ref 95–110)
CO2 SERPL-SCNC: 26 MMOL/L (ref 23–29)
CREAT SERPL-MCNC: 0.8 MG/DL (ref 0.5–1.4)
DIFFERENTIAL METHOD BLD: NORMAL
EOSINOPHIL # BLD AUTO: 0.2 K/UL (ref 0–0.5)
EOSINOPHIL NFR BLD: 3.5 % (ref 0–8)
ERYTHROCYTE [DISTWIDTH] IN BLOOD BY AUTOMATED COUNT: 14 % (ref 11.5–14.5)
EST. GFR  (NO RACE VARIABLE): >60 ML/MIN/1.73 M^2
GLUCOSE SERPL-MCNC: 114 MG/DL (ref 70–110)
HCT VFR BLD AUTO: 37.9 % (ref 37–48.5)
HGB BLD-MCNC: 12.3 G/DL (ref 12–16)
IMM GRANULOCYTES # BLD AUTO: 0.03 K/UL (ref 0–0.04)
IMM GRANULOCYTES NFR BLD AUTO: 0.4 % (ref 0–0.5)
LYMPHOCYTES # BLD AUTO: 1.7 K/UL (ref 1–4.8)
LYMPHOCYTES NFR BLD: 24.8 % (ref 18–48)
MAGNESIUM SERPL-MCNC: 2.1 MG/DL (ref 1.6–2.6)
MCH RBC QN AUTO: 29.1 PG (ref 27–31)
MCHC RBC AUTO-ENTMCNC: 32.5 G/DL (ref 32–36)
MCV RBC AUTO: 90 FL (ref 82–98)
MONOCYTES # BLD AUTO: 0.6 K/UL (ref 0.3–1)
MONOCYTES NFR BLD: 8.3 % (ref 4–15)
NEUTROPHILS # BLD AUTO: 4.3 K/UL (ref 1.8–7.7)
NEUTROPHILS NFR BLD: 62 % (ref 38–73)
NRBC BLD-RTO: 0 /100 WBC
PLATELET # BLD AUTO: 217 K/UL (ref 150–450)
PMV BLD AUTO: 11.3 FL (ref 9.2–12.9)
POTASSIUM SERPL-SCNC: 3.8 MMOL/L (ref 3.5–5.1)
PROT SERPL-MCNC: 6.3 G/DL (ref 6–8.4)
RBC # BLD AUTO: 4.22 M/UL (ref 4–5.4)
SODIUM SERPL-SCNC: 138 MMOL/L (ref 136–145)
WBC # BLD AUTO: 6.86 K/UL (ref 3.9–12.7)

## 2024-07-05 PROCEDURE — 36415 COLL VENOUS BLD VENIPUNCTURE: CPT | Performed by: NURSE PRACTITIONER

## 2024-07-05 PROCEDURE — 99900031 HC PATIENT EDUCATION (STAT)

## 2024-07-05 PROCEDURE — 85025 COMPLETE CBC W/AUTO DIFF WBC: CPT | Performed by: NURSE PRACTITIONER

## 2024-07-05 PROCEDURE — 63600175 PHARM REV CODE 636 W HCPCS

## 2024-07-05 PROCEDURE — 83735 ASSAY OF MAGNESIUM: CPT | Performed by: NURSE PRACTITIONER

## 2024-07-05 PROCEDURE — 80053 COMPREHEN METABOLIC PANEL: CPT | Performed by: NURSE PRACTITIONER

## 2024-07-05 PROCEDURE — 25000003 PHARM REV CODE 250: Performed by: NURSE PRACTITIONER

## 2024-07-05 PROCEDURE — 25000003 PHARM REV CODE 250: Performed by: HOSPITALIST

## 2024-07-05 PROCEDURE — 97530 THERAPEUTIC ACTIVITIES: CPT | Mod: CQ

## 2024-07-05 PROCEDURE — 25000003 PHARM REV CODE 250

## 2024-07-05 PROCEDURE — 94761 N-INVAS EAR/PLS OXIMETRY MLT: CPT

## 2024-07-05 PROCEDURE — 97535 SELF CARE MNGMENT TRAINING: CPT

## 2024-07-05 RX ORDER — HYDROCODONE BITARTRATE AND ACETAMINOPHEN 5; 325 MG/1; MG/1
1 TABLET ORAL EVERY 6 HOURS PRN
Qty: 20 TABLET | Refills: 0 | Status: SHIPPED | OUTPATIENT
Start: 2024-07-05 | End: 2024-07-05 | Stop reason: HOSPADM

## 2024-07-05 RX ORDER — METHOCARBAMOL 500 MG/1
500 TABLET, FILM COATED ORAL 4 TIMES DAILY
Qty: 40 TABLET | Refills: 0 | Status: SHIPPED | OUTPATIENT
Start: 2024-07-05 | End: 2024-07-15

## 2024-07-05 RX ORDER — LEVOTHYROXINE SODIUM 50 UG/1
50 TABLET ORAL EVERY MORNING
Qty: 30 TABLET | Refills: 11 | Status: SHIPPED | OUTPATIENT
Start: 2024-07-06 | End: 2025-07-06

## 2024-07-05 RX ORDER — QUETIAPINE FUMARATE 25 MG/1
25 TABLET, FILM COATED ORAL NIGHTLY
Qty: 30 TABLET | Refills: 0 | Status: SHIPPED | OUTPATIENT
Start: 2024-07-05 | End: 2024-07-05 | Stop reason: HOSPADM

## 2024-07-05 RX ORDER — ACETAMINOPHEN 325 MG/1
650 TABLET ORAL EVERY 6 HOURS PRN
Qty: 40 TABLET | Refills: 0 | Status: SHIPPED | OUTPATIENT
Start: 2024-07-05 | End: 2024-07-15

## 2024-07-05 RX ORDER — METHOCARBAMOL 500 MG/1
500 TABLET, FILM COATED ORAL 3 TIMES DAILY
Qty: 30 TABLET | Refills: 0 | Status: SHIPPED | OUTPATIENT
Start: 2024-07-05 | End: 2024-07-05 | Stop reason: HOSPADM

## 2024-07-05 RX ORDER — BISACODYL 10 MG/1
10 SUPPOSITORY RECTAL ONCE
Status: DISCONTINUED | OUTPATIENT
Start: 2024-07-05 | End: 2024-07-05 | Stop reason: HOSPADM

## 2024-07-05 RX ORDER — HYDROCODONE BITARTRATE AND ACETAMINOPHEN 5; 325 MG/1; MG/1
1 TABLET ORAL EVERY 6 HOURS PRN
Qty: 20 TABLET | Refills: 0 | Status: SHIPPED | OUTPATIENT
Start: 2024-07-05 | End: 2024-07-10

## 2024-07-05 RX ORDER — ADHESIVE BANDAGE
30 BANDAGE TOPICAL ONCE
Status: COMPLETED | OUTPATIENT
Start: 2024-07-05 | End: 2024-07-05

## 2024-07-05 RX ORDER — QUETIAPINE FUMARATE 25 MG/1
25 TABLET, FILM COATED ORAL NIGHTLY
Qty: 30 TABLET | Refills: 0 | Status: SHIPPED | OUTPATIENT
Start: 2024-07-05 | End: 2024-08-04

## 2024-07-05 RX ADMIN — LEVOTHYROXINE SODIUM 50 MCG: 0.03 TABLET ORAL at 06:07

## 2024-07-05 RX ADMIN — MAGNESIUM HYDROXIDE 2400 MG: 400 SUSPENSION ORAL at 12:07

## 2024-07-05 RX ADMIN — ISOSORBIDE DINITRATE 30 MG: 10 TABLET ORAL at 09:07

## 2024-07-05 RX ADMIN — ALUMINUM HYDROXIDE, MAGNESIUM HYDROXIDE, AND SIMETHICONE 30 ML: 200; 200; 20 SUSPENSION ORAL at 11:07

## 2024-07-05 RX ADMIN — CEFTRIAXONE SODIUM 1 G: 1 INJECTION, POWDER, FOR SOLUTION INTRAMUSCULAR; INTRAVENOUS at 12:07

## 2024-07-05 RX ADMIN — TOPIRAMATE 25 MG: 25 TABLET, FILM COATED ORAL at 09:07

## 2024-07-05 RX ADMIN — POLYETHYLENE GLYCOL 3350 17 G: 17 POWDER, FOR SOLUTION ORAL at 09:07

## 2024-07-05 RX ADMIN — ASPIRIN 162 MG: 81 TABLET, COATED ORAL at 09:07

## 2024-07-05 RX ADMIN — METHOCARBAMOL 500 MG: 500 TABLET ORAL at 09:07

## 2024-07-05 NOTE — NURSING
Nurses Note -- 4 Eyes      7/5/2024   2:31 AM      Skin assessed during: Q Shift Change      [x] No Altered Skin Integrity Present    [x]Prevention Measures Documented      [] Yes- Altered Skin Integrity Present or Discovered   [] LDA Added if Not in Epic (Describe Wound)   [] New Altered Skin Integrity was Present on Admit and Documented in LDA   [] Wound Image Taken    Wound Care Consulted? No    Attending Nurse:  Latoya Dobbs RN/Staff Member:   Reba Luo

## 2024-07-05 NOTE — CARE UPDATE
07/05/24 0815   Patient Assessment/Suction   Level of Consciousness (AVPU) alert   Respiratory Effort Unlabored   Expansion/Accessory Muscles/Retractions no use of accessory muscles   All Lung Fields Breath Sounds Anterior:;Lateral:;clear;diminished   Rhythm/Pattern, Respiratory unlabored;pattern regular;depth regular   Cough Frequency no cough   PRE-TX-O2   Device (Oxygen Therapy) room air   SpO2 96 %   Pulse Oximetry Type Intermittent   $ Pulse Oximetry - Multiple Charge Pulse Oximetry - Multiple   Pulse 83   Resp 16   Education   $ Education 15 min;DME Oxygen

## 2024-07-05 NOTE — PT/OT/SLP PROGRESS
"Physical Therapy Treatment    Patient Name:  Erich Encarnacion   MRN:  [id number]    Recommendations:     Discharge Recommendations: Moderate Intensity Therapy  Discharge Equipment Recommendations: none  Barriers to discharge:  increased assist with mobility, decreased activity tolerance, pain, sternal precautions    Assessment:     Erich Encarnacion is a 89 y.o. female admitted with a medical diagnosis of Hypertensive urgency.  She presents with the following impairments/functional limitations: weakness, impaired endurance, impaired self care skills, impaired functional mobility, gait instability, impaired balance, decreased safety awareness, impaired cardiopulmonary response to activity.    Pt up in chair with LSO on. Daughter present. Pt trying to remove LSO secondary to abdominal pain, "its squeezing".    Pt educated that she needs to keep the brace on when she is out of bed. Pt agreeable to return to bed to have a break from wearing the brace.    Pt required min assist for sit to stand transfer with RW. Pt performed step transfer from chair to bed with RW and min assist.    LSO removed with pt sitting EOB. Pt required contact guard assist for sit to supine transfer. Daughter assisted with scooting pt up in bed mod assist x 2.    Rehab Prognosis: Fair; patient would benefit from acute skilled PT services to address these deficits and reach maximum level of function.    Recent Surgery: * No surgery found *      Plan:     During this hospitalization, patient to be seen 6 x/week to address the identified rehab impairments via gait training, therapeutic activities, therapeutic exercises and progress toward the following goals:    Plan of Care Expires:  08/01/24    Subjective     Chief Complaint: LSO squeezing her abdomen  Patient/Family Comments/goals: to get better  Pain/Comfort:  Pain Rating 1: other (see comments) (not rated)  Location 1: abdomen ("from LSO brace")  Pain Addressed 1: Reposition  Pain Rating " Post-Intervention 1: other (see comments) (not rated)      Objective:     Communicated with nurse prior to session.  Patient found up in chair with telemetry, chair check (LSO) upon PT entry to room.     General Precautions: Standard, fall  Orthopedic Precautions: spinal precautions  Braces: LSO  Respiratory Status: Room air     Functional Mobility:  Bed Mobility:     Scooting: moderate assistance and of 2 persons  Sit to Supine: contact guard assistance  Transfers:     Sit to Stand:  minimum assistance with rolling walker  Bed to Chair: minimum assistance with  rolling walker  using  Step Transfer      AM-PAC 6 CLICK MOBILITY          Treatment & Education:  Pt educated on importance of time OOB, importance of intermittent mobility, safe techniques for transfers/ambulation, discharge recommendations/options, and use of call light for assistance and fall prevention.      Patient left HOB elevated with all lines intact, call button in reach, bed alarm on, nurse notified, and daughter present..    GOALS:   Multidisciplinary Problems       Physical Therapy Goals          Problem: Physical Therapy    Goal Priority Disciplines Outcome Goal Variances Interventions   Physical Therapy Goal     PT, PT/OT      Description: Goals to be met by: 2024     Patient will increase functional independence with mobility by performin. Supine to sit with Stand-by Assistance  2. Sit to stand transfer with Stand-by Assistance  3. Gait  x 100 feet with Supervision using Rolling Walker.                          Time Tracking:     PT Received On: 24  PT Start Time: 1153     PT Stop Time: 1201  PT Total Time (min): 8 min     Billable Minutes: Therapeutic Activity 8    Treatment Type: Treatment  PT/PTA: PTA     Number of PTA visits since last PT visit: 2     2024

## 2024-07-05 NOTE — NURSING
1520- Discharge instructions reviewed with pt and daughter. Pt and daughter voices understanding of instructions. All questions answered All belongings sent with pt. IV removed. Pt discharged with daughter.

## 2024-07-05 NOTE — CARE UPDATE
07/04/24 0040   Patient Assessment/Suction   Level of Consciousness (AVPU) alert   Respiratory Effort Unlabored   Expansion/Accessory Muscles/Retractions no use of accessory muscles   All Lung Fields Breath Sounds clear;diminished   Rhythm/Pattern, Respiratory no shortness of breath reported   Cough Frequency no cough   PRE-TX-O2   Device (Oxygen Therapy) room air   SpO2 98 %   Pulse Oximetry Type Intermittent   $ Pulse Oximetry - Multiple Charge Pulse Oximetry - Multiple   Pulse 85   Resp 19   Positioning   Head of Bed (HOB) Positioning HOB elevated;HOB at 30 degrees

## 2024-07-05 NOTE — PT/OT/SLP PROGRESS
Occupational Therapy   Treatment    Name: Erich Encarnacion  MRN: [id number]  Admitting Diagnosis:  Hypertensive urgency       Recommendations:     Discharge Recommendations: Moderate Intensity Therapy  Discharge Equipment Recommendations:  none  Barriers to discharge:  Decreased caregiver support    Assessment:     Erich Encarnacion is a 89 y.o. female with a medical diagnosis of Hypertensive urgency.  She presents with c/o back pain with bed mobility. Patient required assist with donning LSO prior to mobility out of bed. Patient able to perform grooming tasks while seated in chair requiring SBA. Performance deficits affecting function are weakness, impaired endurance, impaired self care skills, impaired functional mobility, gait instability, impaired balance, decreased coordination, decreased lower extremity function, pain, decreased ROM, orthopedic precautions.     Rehab Prognosis:  Fair; patient would benefit from acute skilled OT services to address these deficits and reach maximum level of function.       Plan:     Patient to be seen 3 x/week to address the above listed problems via self-care/home management, therapeutic activities, therapeutic exercises  Plan of Care Expires: 08/01/24  Plan of Care Reviewed with: patient, daughter    Subjective     Chief Complaint: back pain  Patient/Family Comments/goals: none  Pain/Comfort:  Pain Rating 1:  (not rated)  Location - Orientation 1: lower  Location 1: back  Pain Addressed 1: Reposition, Distraction  Pain Rating Post-Intervention 1:  (not rated)    Objective:     Communicated with: nurse Mcallister prior to session.  Patient found HOB elevated with bed alarm, telemetry upon OT entry to room.    General Precautions: Standard, fall    Orthopedic Precautions:spinal precautions  Braces: LSO  Respiratory Status: Room air     Occupational Performance:     Bed Mobility:    Patient completed Scooting/Bridging with minimum assistance  Patient completed Supine to Sit with minimum  assistance     Functional Mobility/Transfers:  Patient completed Sit <> Stand Transfer with moderate assistance  with  rolling walker   Patient completed Bed <> Chair Transfer using Stand Pivot technique with moderate assistance with rolling walker    Activities of Daily Living:  Grooming: stand by assistance with oral and facial hygiene  Lower Body Dressing: maximal assistance to don/doff socks at EOB      [facility] 6 Click ADL:      Treatment & Education:  OT ed patient on safety with walker use for functional mobility with cues for hand placement & sequencing.       Patient left up in chair with all lines intact, call button in reach, chair alarm on, and daughter present    GOALS:   Multidisciplinary Problems       Occupational Therapy Goals          Problem: Occupational Therapy    Goal Priority Disciplines Outcome Interventions   Occupational Therapy Goal     OT, PT/OT     Description: Goals to be met by: 8/2/24     Patient will increase functional independence with ADLs by performing:    UE Dressing with Set-up Assistance.  LE Dressing with Moderate Assistance.  Grooming while seated with Supervision.  Toileting from bedside commode with Stand-by Assistance for hygiene and clothing management.   Toilet transfer to bedside commode with Stand-by Assistance.                         Time Tracking:     OT Date of Treatment: 07/05/24  OT Start Time: 1045  OT Stop Time: 1108  OT Total Time (min): 23 min    Billable Minutes:Self Care/Home Management 23    OT/RACHEL: OT          7/5/2024

## 2024-07-05 NOTE — PLAN OF CARE
DC orders and chart reviewed. No discharge needs noted.  Patient cleared for discharge from .    Patient is discharging to home and will resume home health services with [facility].  HALEY date will be tomorrow, 7/6.   notified patient's daughter about bedside commode not being covered due to patient receiving one in 2023.  Per daughter, they will purchase new one on their own.  TLSO brace delivered and is at bedside.  FU appointment scheduled and added to AVS.        07/05/24 1316   Final Note   Assessment Type Final Discharge Note   Anticipated Discharge Disposition Home-Health   What phone number can be called within the next 1-3 days to see how you are doing after discharge? [phone]   Hospital Resources/Appts/Education Provided Post-Acute resouces added to AVS;Appointments scheduled and added to AVS;Provided patient/caregiver with written discharge plan information   Post-Acute Status   Post-Acute Authorization Home Health   Post-Acute Placement Status Discharge Plan Changed   Home Health Status Set-up Complete/Auth obtained   Patient choice form signed by patient/caregiver List with quality metrics by geographic area provided   Discharge Delays None known at this time

## 2024-07-06 NOTE — DISCHARGE SUMMARY
[facility] Medicine  Discharge Summary      Patient Name: Erich Encarnacion  MRN: [id number]  VIANNEY: [id number]  Patient Class: IP- Inpatient  Admission Date: 7/1/2024  Hospital Length of Stay: 3 days  Discharge Date and Time: 7/5/2024  3:20 PM  Attending Physician: No att. providers found   Discharging Provider: Alka Ashley NP  Primary Care Provider: Se Rios MD    Primary Care Team: Networked reference to record PCT     HPI:   89 y.o. female with past medical history of high blood pressure, macular degeneration, headaches, reflux, hypothyroidism, TIA, and kidney stones who presents to ED via EMS with c/o RUQ and RLQ abdominal pain. Patient recently Dc'd from the facility on 6/28/24. Patient is independent lives alone. Family reports that patient had not had a BM Since Thursday of last week now 4 days. Daughter at the bedside reports that she is concerned about her mother's ability to swallow as she is spitting up. SPO2 98% on room air. The patient was with 214/100 BP in triage.  On evaluation of blood work patient with sodium 135, potassium 3.2, glucose 119, hematocrit 36.8, initial troponin 38.3 with a repeat of 53.6.  Patient's urine was extremely dark positive for nitrates however WBCs normal at a level of 4 patient given Rocephin 2 g in ED to prevent further urinary tract infection. Patient to be admitted for further evaluation.    * No surgery found *      Hospital Course:   Ms. Encarnacion has been monitored closely during her hospitalization. She was admitted on 7/1/24 with abd pain. CT abd/pelvis revealed T12 compression frx and follow up MRI confirmed. NSGY was consulted and recommended conservative management with TSLO brace, pain control, and PT/OT. Her BP was elevated on arrival and troponins were mildly elevated and trended down. Cardiology was consulted. D dimer elevated and CTA chest negative. EKG with no acute ischemic changes. Echo EF 60-65% grade I diastolic  dysfunction PASP 31 mmHg with moderate aortic valve sclerosis. She's developed delirium and sundowning and delirium prxn ordered on 7/4/24. Discussed medication options for delirium with daughter and through shared decision making with family and provider, she was started on low dose seroquel with improvement of sleep and mentation on 7/5. Her last BM was in the ED on 7/1. KUB on 7/4 c/w constipation and lactulose ordered, but pt may have vomited some of this, so mineral oil enema ordered. Urine with positive nitrites and she completed 3 days of IV rocephin. She had a BM on 7/5. Initial discharge plan was to SNF, but daughter opted to bring her home with home health. She was seen and examined on the date of discharge. Strict return prxn provided.     Goals of Care Treatment Preferences:  Code Status: Full Code      Consults:   Consults (From admission, onward)          Status Ordering Provider     Inpatient consult to Cardiology  Once        Provider:  Adarsh Leavitt MD    Completed DEIDRE PEREZ     Case Management/  Once        Provider:  (Not yet assigned)    Completed ANKIT NAIK new Assessment & Plan notes have been filed under this hospital service since the last note was generated.  Service: Hospital Medicine    Final Active Diagnoses:    Diagnosis Date Noted POA    PRINCIPAL PROBLEM:  Hypertensive urgency [I16.0] 07/01/2024 Yes    Compression fracture of L1 vertebra [S32.010A] 07/03/2024 Yes    Demand ischemia [I24.89] 07/01/2024 Yes    Chronic idiopathic constipation [K59.04] 07/01/2024 Yes    UTI (urinary tract infection) [N39.0] 05/30/2024 Yes    Advanced age [R54] 10/17/2021 Yes      Problems Resolved During this Admission:       Discharged Condition: stable    Disposition: Home-Health Care [facility]    Follow Up:   Follow-up Information       Se Rios MD. Go on 7/9/2024.    Specialty: Family Medicine  Why: hospital f/u 07/09/2024 @ 9:00 AM  Contact  "information:  [address]  [place] [address]  [phone]               Eddie Davila, DO Follow up in 8 week(s).    Specialty: Neurosurgery  Contact information:  [address] [place] [address] DR  [address]  [place] [address]  [phone]               [facility] Follow up.    Specialty: Home Health Services  Contact information:  [address]   [address]  [facility] [address]  [phone]                           Patient Instructions:      Back/Cervical Brace For Home Use     Order Specific Question Answer Comments   Height: 5' 4" (1.626 m)    Weight: 71.5 kg (157 lb 10.1 oz)    Length of need (1-99 months): 99    Product(s) ordered: TLSO BRACE    Does patient have medical equipment at home? rollator    Does patient have medical equipment at home? grab bar    Does patient have medical equipment at home? bedside commode      COMMODE FOR HOME USE     Order Specific Question Answer Comments   Type: Heavy duty drop arm    Height: 5' 4" (1.626 m)    Weight: 71.5 kg (157 lb 10.1 oz)    Does patient have medical equipment at home? rollator    Does patient have medical equipment at home? grab bar    Does patient have medical equipment at home? bedside commode    Length of need (1-99 months): 99      Ambulatory referral/consult to Home Health   Standing Status: Future   Referral Priority: Routine Referral Type: Home Health   Referral Reason: Specialty Services Required   Requested Specialty: Home Health Services   Number of Visits Requested: 1     Diet Adult Regular     Notify your health care provider if you experience any of the following:  temperature >100.4     Notify your health care provider if you experience any of the following:  persistent nausea and vomiting or diarrhea     Notify your health care provider if you experience any of the following:  severe uncontrolled pain     Activity as tolerated   Order Comments: With TSLO brace on       Significant Diagnostic Studies: Labs: CMP   Recent Labs   Lab " 07/04/24  0508 07/05/24  0450    138   K 3.6 3.8    106   CO2 25 26   * 114*   BUN 12 11   CREATININE 0.7 0.8   CALCIUM 8.7 8.9   PROT 6.2 6.3   ALBUMIN 3.6 3.7   BILITOT 0.4 0.4   ALKPHOS 80 89   AST 10 13   ALT 6* 6*   ANIONGAP 6* 6*    and CBC   Recent Labs   Lab 07/04/24  0508 07/05/24  0450   WBC 6.06 6.86   HGB 11.8* 12.3   HCT 35.6* 37.9    217     X-Ray Abdomen AP 1 View    Result Date: 7/4/2024  EXAMINATION: XR ABDOMEN AP 1 VIEW CLINICAL HISTORY: abdominal pain, constipation; FINDINGS: Two AP abdominal radiographs demonstrate gas and stool distended loops of bowel.  No gross organomegaly.  Lung bases are clear.  There are degenerative changes of the spine, pelvis and hips.     Gas and stool distended loops of bowel. Electronically signed by: Zachariah Morton Date:    07/04/2024 Time:    15:22    MRI Lumbar Spine Without Contrast    Result Date: 7/3/2024  EXAMINATION: MRI LUMBAR SPINE WITHOUT CONTRAST CLINICAL HISTORY: Compression fracture, lumbar; TECHNIQUE: Multiplanar, multisequence MR images were acquired from the thoracolumbar junction to the sacrum without the administration of contrast. COMPARISON: CT dated 06/27/2024 FINDINGS: Alignment: Normal. Vertebrae: There is a transitional lumbosacral segment with unilateral right-sided pseudoarthrosis.  For the purposes of this examination the transitional segment is designated S1 There is stable moderate inferior compression fracture of the L3 vertebral body with no marrow edema. There is new acute mild compression fracture of the superior endplate of L1 with mild marrow edema and linear low signal fracture line.  The remainder the lumbar vertebral bodies are of normal height. Discs: There is diffuse disc desiccation.  Disc space narrowing at L5-S1 Cord: Normal.  Conus terminates at L1 Degenerative findings: T12-L1: No significant abnormality L1-L2: Shallow disc bulge and facet hypertrophy without central canal stenosis or foraminal  narrowing L2-L3: Shallow disc bulge and facet hypertrophy resulting in mild foraminal narrowing L3-L4: Broad-based disc bulge with facet hypertrophy ligamentum flavum hypertrophy resulting in mild to moderate central canal stenosis and bilateral foraminal narrowing L4-L5: Broad-based disc bulge with moderate facet osteoarthrosis and ligamentum flavum hypertrophy resulting in severe central canal stenosis, lateral recess and foraminal narrowing.  Correlation with bilateral L4 and L5 radicular symptoms is recommended L5-S1: Disc space narrowing with facet hypertrophy and prior laminectomy resulting in mild foraminal narrowing. Paraspinal muscles & soft tissues: There is fatty atrophy of the posterior musculature.     New acute mild compression fracture of the superior endplate of L1 without retropulsion Chronic compression fracture of the inferior endplate of L3 Transitional lumbosacral segment labeled S1 Severe central canal stenosis, lateral recess and foraminal narrowing at L4-5 secondary to facet arthropathy and broad-based disc bulge.  Correlation with bilateral L4 and L5 radicular symptoms is recommended Mild to moderate central canal stenosis and foraminal narrowing at L3-4 Prior laminectomy at L5-S1 with mild foraminal narrowing Electronically signed by: Mehnaz Lin Date:    07/03/2024 Time:    10:35    Echo    Result Date: 7/2/2024    Left Ventricle: The left ventricle is normal in size. Moderately increased wall thickness. There is concentric hypertrophy. There is normal systolic function with a visually estimated ejection fraction of 60 - 65%. Grade I diastolic dysfunction.   Right Ventricle: Normal right ventricular cavity size. Systolic function is normal.   Left Atrium: Left atrium is severely dilated.   Aortic Valve: There is moderate aortic valve sclerosis. Aortic valve area by velocity is 1.58 cm². Aortic valve peak velocity is 2.01 m/s. Mean gradient is 9 mmHg. Aortic Valve peak gradient is 16  mmHg.   Mitral Valve: There is moderate mitral annular calcification present. There is mild regurgitation.   Tricuspid Valve: There is mild to moderate regurgitation. The estimated PA systolic pressure is at least 31 mmHg.     CTA Chest Non-Coronary (PE Studies)    Result Date: 7/1/2024  EXAMINATION: CTA CHEST NON CORONARY (PE STUDIES) CLINICAL HISTORY: Pulmonary embolism (PE) suspected, high prob; TECHNIQUE: Low dose axial images, sagittal and coronal reformations were obtained from the thoracic inlet to the lung bases following the IV administration of 100 mL of Omnipaque 350.  Contrast timing was optimized to evaluate the pulmonary arteries.  MIP images were performed. COMPARISON: CTA chest 05/09/2024 FINDINGS: The visualized soft tissue structures at the base of the neck appear within normal limits allowing from streak artifact from dense contrast bolus. The thoracic aorta maintains normal caliber, contour, and course with moderate atherosclerotic calcification within its course.  There is no evidence of aneurysmal dilation or dissection. The heart is mildly enlarged and there is trace pericardial fluid.  There is 3 vessel coronary artery calcification.  There is mitral annular calcification.  The esophagus maintains a normal course and caliber. There is no bulky axillary or mediastinal lymph node enlargement appreciated. The trachea is midline and the proximal airways are patent. Detailed evaluation of the lung parenchyma is limited by respiratory motion artifact. There is no pneumothorax. There are a few scattered pulmonary micronodules, similar to prior examination.  There are scattered bandlike opacities suggesting atelectasis or scarring, similar to prior study.  There is trace pleural fluid at the lung bases with adjacent atelectasis, right greater left.. No evidence of filling defect to indicate pulmonary thromboembolism. Limited images of the upper abdomen obtained during the course of this dedicated  thoracic CT demonstrate no acute abnormalities.  There is prominent atherosclerosis of the abdominal aorta and visualized major branch vessels.. The osseous structures demonstrate a recent appearing superior endplate compression fracture deformity of the T12 vertebral body, new from prior thoracic spine CT of 06/27/2024.  There is slight retropulsion of the posterior cortex with at most mild spinal canal stenosis.  There are moderate multilevel degenerative change of the remaining visualized spine..     No definite evidence of pulmonary thromboembolism. Trace bilateral pleural effusions with adjacent atelectasis, right greater than left. Recent appearing mild superior endplate compression fracture deformity of the T12 vertebral body, new from prior thoracic spine CT examination of 06/27/2024.  There is slight retropulsion of the posterior cortex with at most mild spinal canal stenosis. Additional findings as above. Electronically signed by: Remy Nowak MD Date:    07/01/2024 Time:    19:49    X-Ray Chest 1 View    Result Date: 7/1/2024  CLINICAL HISTORY: ([id number])89 y/o  (10/2/1926) F Other malaise TECHNIQUE: (A#[id number], exam time 7/1/2024 9:59) XR CHEST 1 VIEW [id number] COMPARISON: Radiographs from 06/28/2020 FINDINGS: The lungs are clear except for some vascular crowding bilaterally  likely related to a suboptimal inspiration. No pneumothorax is identified. The heart is top normal in size. Atheromatous calcifications are seen at the aortic arch. Osseous structures appear unchanged. The visualized upper abdomen is unremarkable.     No acute cardiac or pulmonary process. Electronically signed by: Ab Lam Date:    07/01/2024 Time:    10:01    CT Abdomen Pelvis With IV Contrast NO Oral Contrast    Result Date: 7/1/2024  CMS MANDATED QUALITY DATA - CT RADIATION - 436 All CT scans at this facility utilize dose modulation, iterative reconstruction, and/or weight based dosing when appropriate to reduce  radiation dose to as low as reasonably achievable. EXAMINATION: CT ABDOMEN PELVIS WITH IV CONTRAST CLINICAL HISTORY: Abdominal pain, acute, nonlocalized;Bowel obstruction suspected;Nausea/vomiting; TECHNIQUE: CT abdomen and pelvis with IV contrast.  100 mL Omnipaque 350. COMPARISON: CT abdomen pelvis 04/16/2024 FINDINGS: Bilateral trace pleural effusions noted.  There is subsegmental atelectasis of the lung bases.  Heart size is within normal limits.  Calcifications of the mitral valve annulus noted. Liver is normal size.  There are no hepatic lesions.  The gallbladder is and common bile duct within normal limits.  The pancreas, spleen and adrenal glands are unremarkable.  The kidneys are normal size.  Small bilateral renal cysts are noted.  No enhancing renal lesions identified.  There is no hydronephrosis or nephrolithiasis.  There is mild prominence of the left ureter.  No ureteral obstruction is observed.  The bladder is fluid filled with no focal wall abnormalities.  Uterus and adnexal structures have been removed.  No free fluid in the pelvis.  Multiple pelvic phleboliths noted. There is diverticulosis of the colon.  There is no bowel wall thickening or inflammatory changes.  Appendix is not definitively identified.  Stomach is mostly decompressed.  There is a small hiatal hernia. The abdominal aorta is normal caliber with calcified plaque formation.  No enlarged intra-abdominal lymph nodes.  No mesenteric fat stranding or free fluid.  Ventral abdominal wall is unremarkable.  There are degenerative changes of the spine.  There is no acute osseous abnormality.     1. Diverticulosis of the colon. 2. Mild prominence of the left ureter with no ureteral obstruction observed.  No nephrolithiasis observed bilaterally. 3. Bilateral trace pleural effusions and subsegmental atelectasis of the lung bases. 4. Small hiatal hernia. Electronically signed by: Zachariah Morton Date:    07/01/2024 Time:    08:21    X-Ray Chest AP  Portable    Result Date: 6/28/2024  EXAMINATION: XR CHEST AP PORTABLE CLINICAL HISTORY: fall; COMPARISON: 06/27/2024 FINDINGS: Cardiac silhouette size is stable compared to prior.  Mitral annular calcification noted.  Atherosclerotic calcification of the aorta.  No pulmonary edema.  No confluent airspace disease.  No large pleural effusion or pneumothorax.     No acute pulmonary process. Electronically signed by: Mir Elmore Date:    06/28/2024 Time:    07:57    CT Lumbar Spine Without Contrast    Result Date: 6/27/2024  EXAMINATION: CT LUMBAR SPINE WITHOUT CONTRAST CLINICAL HISTORY: Low back pain, trauma; COMPARISON: CT abdomen and pelvis April 2024 FINDINGS: Thin-section axial images were obtained, with reformatted imaging in the sagittal and coronal planes. There is a transitional lumbosacral segment with unilateral right-sided pseudoarthrosis.  For the purposes of this examination, the transitional segment is designated as S1, with rudimentary ribs at L1. Moderate inferior endplate compression fracture of L3 is chronic and unchanged.  There is no evidence of acute fracture or subluxation.  Post laminectomy changes are noted in the lower lumbar spine. Changes of multilevel lumbar degenerative disc and facet disease are noted.  Findings are most pronounced at the L4-5 level, where bilateral facet hypertrophy and broad-based disc bulge combine to result in probable moderate spinal stenosis.     1. Chronic findings as above.  No acute CT abnormalities. Electronically signed by: Cyrus Bejarano Date:    06/27/2024 Time:    10:15    CT Thoracic Spine Without Contrast    Result Date: 6/27/2024  EXAMINATION: CT THORACIC SPINE WITHOUT CONTRAST CLINICAL HISTORY: Mid-back pain, compression fracture suspected; COMPARISON: February 2023 FINDINGS: Thin-section axial images were obtained, with reformatted imaging in the sagittal and coronal planes. There is no evidence of acute thoracic fracture or subluxation.  No osseous  destructive lesion is identified. There is moderate multilevel intervertebral disc space narrowing compatible with degenerative disc disease.  Paraspinous soft tissues are unremarkable. Incidentally noted is dense multifocal coronary artery atherosclerotic calcification.     1. Chronic findings as above.  No evidence of acute thoracic fracture or subluxation. Electronically signed by: Cyrus Bejarano Date:    06/27/2024 Time:    10:09    XR Ribs Min 3 views w/PA Chest Left    Result Date: 6/27/2024  EXAMINATION: XR RIBS MIN 3 VIEWS W/ PA CHEST LEFT CLINICAL HISTORY: fall; FINDINGS: The heart is mildly enlarged.  There is vascular calcification of the aorta.  The lungs are clear. Multiple views of the left ribs show no fractures or acute osseous abnormalities.  There are degenerative changes of the lower thoracic spine.     Negative left ribs Mild cardiomegaly Degenerative changes of the thoracic spine Electronically signed by: Mehnaz Lin Date:    06/27/2024 Time:    10:00    CT Cervical Spine Without Contrast    Result Date: 6/27/2024  EXAMINATION: CT CERVICAL SPINE WITHOUT CONTRAST CLINICAL HISTORY: Neck trauma (Age >= 65y);. TECHNIQUE: Thin axial imaging was performed without contrast, with sagittal and coronal reformatted images reviewed. COMPARISON: February 2023 there is no evidence of acute cervical fracture.  3 mm C4 anterolisthesis is chronic and unchanged, with normal alignment at all other levels.  Prevertebral soft tissues are normal.  The odontoid is intact. Changes of multilevel cervical degenerative disc and moderate to severe degenerative facet disease are noted.  There is resultant significant multilevel foraminal stenosis.  Specifically, there is severe narrowing of the right C4 foramen, with moderate narrowing of the left C6 and right C7 foramina. FINDINGS: 1. No acute CT abnormalities. 2. Multilevel cervical degenerative disc/facet disease. 3. Minimal C4 anterolisthesis, chronic and  unchanged.     . Electronically signed by: Cyrus Bejarano Date:    06/27/2024 Time:    10:00    X-Ray Hip 2 or 3 views Left with Pelvis when performed    Result Date: 6/27/2024  CLINICAL HISTORY: (MRN [id number])89 y/o  (10/2/1926) F Unspecified fall, initial encounter TECHNIQUE: (A# [id number], Exam time 6/27/2024 9:55) [id number] XR HIP WITH PELVIS WHEN PERFORMED 2 OR 3 VIEWS LEFT  view(s) obtained. COMPARISON: 11/20/2020 FINDINGS: AP pelvis and two views of the left hip show no fractures, dislocations or acute osseous abnormalities.  There is mild joint space narrowing of both hips.  The SI joints are congruent.  The soft tissues are normal.  There are degenerative changes of the lower lumbar spine.     Mild joint space narrowing of both hips with no acute osseous abnormality Degenerative changes of the lower lumbar spine Electronically signed by: Mehnaz Lin Date:    06/27/2024 Time:    09:59    CT Head Without Contrast    Result Date: 6/27/2024  EXAMINATION: CT HEAD WITHOUT CONTRAST CLINICAL HISTORY: Head trauma, minor (Age >= 65y);. TECHNIQUE: Noninfusion images were obtained from the skull base to the vertex. All CT scans at this facility utilize dose modulation, iterative reconstruction, and/or weight based dosing when appropriate to reduce radiation dose to as low as reasonably achievable CMS MANDATED QUALITY DATA - CT RADIATION - 436 COMPARISON: February 2024 FINDINGS: There is no intracranial mass, hemorrhage, or midline shift.  Ventricles are within normal limits.  The sulci and extra-axial CSF spaces are prominent compatible with atrophy, stable. There is no evidence of cortical ischemic change. Changes of mild-moderate chronic microvascular white matter disease are noted, stable.  Cerebellum and brainstem are normal. The calvarium is intact.  Small mucous retention cyst or polyp is noted in the left maxillary sinus, with mild mucoperiosteal thickening demonstrated in both maxillary sinuses.     No acute  intracranial abnormalities. Chronic findings as discussed above. Electronically signed by: Cyrus Bejarano Date:    06/27/2024 Time:    09:51     Pending Diagnostic Studies:       None           Medications:  Reconciled Home Medications:      Medication List        START taking these medications      methocarbamoL 500 MG Tab  Commonly known as: ROBAXIN  Take 1 tablet (500 mg total) by mouth 4 (four) times daily. for 10 days     QUEtiapine 25 MG Tab  Commonly known as: SEROQUEL  Take 1 tablet (25 mg total) by mouth every evening.            CHANGE how you take these medications      acetaminophen 325 MG tablet  Commonly known as: TYLENOL  Take 2 tablets (650 mg total) by mouth every 6 (six) hours as needed for Pain.  What changed: how much to take     HYDROcodone-acetaminophen 5-325 mg per tablet  Commonly known as: NORCO  Take 1 tablet by mouth every 6 (six) hours as needed for Pain.  What changed: additional instructions     levothyroxine 50 MCG tablet  Commonly known as: SYNTHROID  Take 1 tablet (50 mcg total) by mouth every morning.  Start taking on: July 6, 2024  What changed:   medication strength  how much to take            CONTINUE taking these medications      aspirin 81 MG EC tablet  Commonly known as: ECOTRIN  Take 162 mg by mouth once daily.     isosorbide dinitrate 30 MG Tab  Commonly known as: ISORDIL  Take 30 mg by mouth once daily.     meclizine 12.5 mg tablet  Commonly known as: ANTIVERT  Take 2 tablets by mouth Daily.     pantoprazole 40 MG tablet  Commonly known as: PROTONIX  Take 1 tablet by mouth daily as needed (Heartburn).     polyethylene glycol 17 gram/dose powder  Commonly known as: GLYCOLAX  Take 17 g by mouth every evening.     topiramate 25 MG tablet  Commonly known as: TOPAMAX  Take 25 mg by mouth once daily.            STOP taking these medications      zolpidem 10 mg Tab  Commonly known as: AMBIEN              Indwelling Lines/Drains at time of discharge:   Lines/Drains/Airways        None                   Time spent on the discharge of patient: 51 minutes         Alka Ashley NP  Department of Hospital Medicine  [facility]

## 2024-07-12 ENCOUNTER — PATIENT OUTREACH (OUTPATIENT)
Dept: ADMINISTRATIVE | Facility: CLINIC | Age: 89
End: 2024-07-12
Payer: MEDICARE

## 2024-07-12 NOTE — PROGRESS NOTES
C3 nurse attempted to contact Erich Encarnacion for a TCC post hospital discharge follow up call. No answer. Left voicemail with callback information. The patient has already had a scheduled HOSFU appointment with Se Rios MD on 07/09/24 @ 0900.

## 2024-07-12 NOTE — PROGRESS NOTES
C3 nurse spoke with Erich Encarnacion's daughter Court for a TCC post hospital discharge follow up call. Pt was unable to attend hospital follow up with PCP. Scheduled in home NP appt with Olga Azar NP on 07/18/24 @ 0800. Pt's daughter Court informed that 0800 is a placeholder time and she would be contacted prior to appt with a visit time

## 2024-07-18 ENCOUNTER — OFFICE VISIT (OUTPATIENT)
Dept: HOME HEALTH SERVICES | Facility: CLINIC | Age: 89
End: 2024-07-18
Payer: MEDICARE

## 2024-07-18 DIAGNOSIS — G47.00 INSOMNIA, UNSPECIFIED TYPE: Primary | ICD-10-CM
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
## 2024-07-18 DIAGNOSIS — I10 ESSENTIAL HYPERTENSION: ICD-10-CM

## 2024-07-18 DIAGNOSIS — N39.0 URINARY TRACT INFECTION WITHOUT HEMATURIA, SITE UNSPECIFIED: ICD-10-CM

## 2024-07-18 DIAGNOSIS — R54 ADVANCED AGE: ICD-10-CM

## 2024-07-18 DIAGNOSIS — I24.89 DEMAND ISCHEMIA: ICD-10-CM

## 2024-07-18 DIAGNOSIS — S32.010A COMPRESSION FRACTURE OF L1 VERTEBRA, INITIAL ENCOUNTER: ICD-10-CM

## 2024-07-18 PROCEDURE — 1159F MED LIST DOCD IN RCRD: CPT | Mod: CPTII,S$GLB,, | Performed by: NURSE PRACTITIONER

## 2024-07-18 PROCEDURE — 1160F RVW MEDS BY RX/DR IN RCRD: CPT | Mod: CPTII,S$GLB,, | Performed by: NURSE PRACTITIONER

## 2024-07-18 PROCEDURE — 1125F AMNT PAIN NOTED PAIN PRSNT: CPT | Mod: CPTII,S$GLB,, | Performed by: NURSE PRACTITIONER

## 2024-07-18 PROCEDURE — 3288F FALL RISK ASSESSMENT DOCD: CPT | Mod: CPTII,S$GLB,, | Performed by: NURSE PRACTITIONER

## 2024-07-18 PROCEDURE — 99349 HOME/RES VST EST MOD MDM 40: CPT | Mod: S$GLB,,, | Performed by: NURSE PRACTITIONER

## 2024-07-18 PROCEDURE — 1100F PTFALLS ASSESS-DOCD GE2>/YR: CPT | Mod: CPTII,S$GLB,, | Performed by: NURSE PRACTITIONER

## 2024-07-18 PROCEDURE — 1111F DSCHRG MED/CURRENT MED MERGE: CPT | Mod: CPTII,S$GLB,, | Performed by: NURSE PRACTITIONER

## 2024-07-19 VITALS
DIASTOLIC BLOOD PRESSURE: 70 MMHG | HEART RATE: 69 BPM | SYSTOLIC BLOOD PRESSURE: 110 MMHG | RESPIRATION RATE: 18 BRPM | TEMPERATURE: 99 F | OXYGEN SATURATION: 99 %

## 2024-07-19 PROBLEM — G47.00 INSOMNIA: Status: ACTIVE | Noted: 2024-07-19

## 2024-07-19 RX ORDER — QUETIAPINE FUMARATE 25 MG/1
25 TABLET, FILM COATED ORAL NIGHTLY
Qty: 30 TABLET | Refills: 3 | Status: SHIPPED | OUTPATIENT
Start: 2024-07-19

## 2024-07-19 NOTE — ASSESSMENT & PLAN NOTE
Controlled on current medication regimen. Daughter checking BP once daily.   Follow up with PCP.  Continue HH SN for monitoring/education.

## 2024-07-19 NOTE — ASSESSMENT & PLAN NOTE
Resolved from recent hospitalization.  No current s/s UTI reported.  Educated on s/s to report and encouraged to increase oral intake of fluids.  Monitor.

## 2024-07-19 NOTE — ASSESSMENT & PLAN NOTE
S/p fall prior to 7/1/24 hospitalization.  Neurosurgery consulted while hospitalized-no surgery recommended - TSLO brace and NSGY f/u in 8 weeks  HH PT/OT not tolerated due to pain.  Daughter to call Dr. Davila's office today to schedule follow up and report patient still having moderate to severe lower back pain with movement. Daily activities are minimal and appetite is decreased due to pain. Taking 1/2 hydrocodone 5-325 mg tid/prn-rx provided on hospital discharge. Encouraged to supplement meals with protein shakes, 1-2 per day. Monitor weight.

## 2024-07-19 NOTE — ASSESSMENT & PLAN NOTE
Daughter reports patient was on ambien for many years, recently discontinued med during hospitalization. Placed on seroquel 25 mg which is working well, requesting refill.  Monitor.

## 2024-07-19 NOTE — ASSESSMENT & PLAN NOTE
Stable, BP controlled. Denies any angina.  Continue isosorbide, asa.  Follow up with PCP/Cardiology.

## 2024-07-19 NOTE — ASSESSMENT & PLAN NOTE
Fall occurred prior to 7/1/24 hospitalization. Sustained L1 Vertebral compression fracture. Still have significant pain upon movement. Wearing TLSO brace, taking 1/2 tablet of hydrocodone 5-325mg tid/prn prescribed on hospital discharge.  HH PT/OT in place, not progressing however due to pain.  Fall precautions and safety advised.

## 2024-07-19 NOTE — ASSESSMENT & PLAN NOTE
Normally lives alone, daughter lives nearby but is currently staying with patient until she is healed from vertebral compression fracture.  Recent fall. Fall precautions and safety advised.  Monitor.

## 2024-07-19 NOTE — PROGRESS NOTES
[facility]  Transitional Care Management (TCM) Home Visit    Encounter Provider: Olga Azar   PCP: Se Rios MD  Consult Requested By: No ref. provider found  Admit Date: 7/1/24   IP Discharge Date: 7/5/24  Hospital Length of Stay:RRHLOS@ 4 days  Days since discharge (from IP or SNF): 13 days   [facility] Contact Note: 7/12/24  Hospital Diagnosis: Hypertensive Urgency, UTI, L1 Vertebral fracture    HISTORY OF PRESENT ILLNESS      Patient ID: Erich Encarnacion is a 89 y.o. female was recently admitted to the hospital, this is their TCM encounter.    Hospital Course Synopsis:  Admit: 7/1/24   Discharge: 7/5/24  HPI:   89 y.o. female with past medical history of high blood pressure, macular degeneration, headaches, reflux, hypothyroidism, TIA, and kidney stones who presents to ED via EMS with c/o RUQ and RLQ abdominal pain. Patient recently Dc'd from the facility on 6/28/24. Patient is independent lives alone. Family reports that patient had not had a BM Since Thursday of last week now 4 days. Daughter at the bedside reports that she is concerned about her mother's ability to swallow as she is spitting up. SPO2 98% on room air. The patient was with 214/100 BP in triage.  On evaluation of blood work patient with sodium 135, potassium 3.2, glucose 119, hematocrit 36.8, initial troponin 38.3 with a repeat of 53.6.  Patient's urine was extremely dark positive for nitrates however WBCs normal at a level of 4 patient given Rocephin 2 g in ED to prevent further urinary tract infection. Patient to be admitted for further evaluation.     * No surgery found *       Hospital Course:   Ms. Encarnacion has been monitored closely during her hospitalization. She was admitted on 7/1/24 with abd pain. CT abd/pelvis revealed T12 compression frx and follow up MRI confirmed. NSGY was consulted and recommended conservative management with TSLO brace, pain control, and PT/OT. Her BP was elevated on arrival and troponins  were mildly elevated and trended down. Cardiology was consulted. D dimer elevated and CTA chest negative. EKG with no acute ischemic changes. Echo EF 60-65% grade I diastolic dysfunction PASP 31 mmHg with moderate aortic valve sclerosis. She's developed delirium and sundowning and delirium prxn ordered on 7/4/24. Discussed medication options for delirium with daughter and through shared decision making with family and provider, she was started on low dose seroquel with improvement of sleep and mentation on 7/5. Her last BM was in the ED on 7/1. KUB on 7/4 c/w constipation and lactulose ordered, but pt may have vomited some of this, so mineral oil enema ordered. Urine with positive nitrites and she completed 3 days of IV rocephin. She had a BM on 7/5. Initial discharge plan was to SNF, but daughter opted to bring her home with home health. She was seen and examined on the date of discharge. Strict return prxn provided.        With this [facility] hospital follow up visit patient is found sitting in recliner chair. No pain at rest. But develops significant lower back pain with movement. Wearing TLSO with ambulation. Taking 1/2 hydrocodone tablet 2-3 times per day per daughter Isle who is staying with patient for now until she is better. Daughter to call Dr. Davila for follow up appt and request refill on pain medications.  Appetite decreased, not interested in eating. Last BM yesterday, taking miralax daily. Off of ambien now and taking seroquel which is working well for sleep. Requesting refill.     [facility] contact information provided to patient and left in home.      DECISION MAKING TODAY       Assessment & Plan:  1. Insomnia, unspecified type  Assessment & Plan:  Daughter reports patient was on ambien for many years, recently discontinued med during hospitalization. Placed on seroquel 25 mg which is working well, requesting refill.  Monitor.    Orders:  -     QUEtiapine  (SEROQUEL) 25 MG Tab; Take 1 tablet (25 mg total) by mouth every evening.  Dispense: 30 tablet; Refill: 3    2. Compression fracture of L1 vertebra, initial encounter  Assessment & Plan:  S/p fall prior to 7/1/24 hospitalization.  Neurosurgery consulted while hospitalized-no surgery recommended - TSLO brace and NSGY f/u in 8 weeks  HH PT/OT not tolerated due to pain.  Daughter to call Dr. Davila's office today to schedule follow up and report patient still having moderate to severe lower back pain with movement. Daily activities are minimal and appetite is decreased due to pain. Taking 1/2 hydrocodone 5-325 mg tid/prn-rx provided on hospital discharge. Encouraged to supplement meals with protein shakes, 1-2 per day. Monitor weight.         3. Demand ischemia  Assessment & Plan:  Stable, BP controlled. Denies any angina.  Continue isosorbide, asa.  Follow up with PCP/Cardiology.      4. Essential hypertension  Assessment & Plan:  Controlled on current medication regimen. Daughter checking BP once daily.   Follow up with PCP.  Continue [facility] for monitoring/education.       5. Urinary tract infection without hematuria, site unspecified  Assessment & Plan:  Resolved from recent hospitalization.  No current s/s UTI reported.  Educated on s/s to report and encouraged to increase oral intake of fluids.  Monitor.      6. Advanced age  Assessment & Plan:  Normally lives alone, daughter lives nearby but is currently staying with patient until she is healed from vertebral compression fracture.  Recent fall. Fall precautions and safety advised.  Monitor.      7. Fall, initial encounter  Assessment & Plan:  Fall occurred prior to 7/1/24 hospitalization. Sustained L1 Vertebral compression fracture. Still have significant pain upon movement. Wearing TLSO brace, taking 1/2 tablet of hydrocodone 5-325mg tid/prn prescribed on hospital discharge.  HH PT/OT in place, not progressing however due to pain.  Fall precautions and safety  advised.            Medication List on Discharge:     Medication List            Accurate as of July 18, 2024 11:59 PM. If you have any questions, ask your nurse or doctor.                CONTINUE taking these medications      aspirin 81 MG EC tablet  Commonly known as: ECOTRIN  Take 162 mg by mouth once daily.     isosorbide dinitrate 30 MG Tab  Commonly known as: ISORDIL  Take 30 mg by mouth once daily.     levothyroxine 50 MCG tablet  Commonly known as: SYNTHROID  Take 1 tablet (50 mcg total) by mouth every morning.     meclizine 12.5 mg tablet  Commonly known as: ANTIVERT  Take 2 tablets by mouth Daily.     pantoprazole 40 MG tablet  Commonly known as: PROTONIX  Take 1 tablet by mouth daily as needed (Heartburn).     polyethylene glycol 17 gram/dose powder  Commonly known as: GLYCOLAX  Take 17 g by mouth every evening.     QUEtiapine 25 MG Tab  Commonly known as: SEROQUEL  Take 1 tablet (25 mg total) by mouth every evening.     topiramate 25 MG tablet  Commonly known as: TOPAMAX  Take 25 mg by mouth once daily.              Medication Reconciliation:  Were medications changed on discharge? Yes  Were medications in the home? Yes  Is the patient taking the medications as directed? Yes  Does the patient understand the medications and changes? Yes  Does updated med list accurately reflects meds patient is currently taking? Yes    ENVIRONMENT OF CARE      Family and/or Caregiver present at visit?  Yes  Name of Caregiver: Cortez gutierrez  History provided by: patient and caregiver    Advance Care Planning   Advanced Care Planning Status:  Patient has had an ACP conversation  Living Will: No  Power of : No  [place]: No    Does Caregiver have HCPoA: No  Changes today: none  Is patient hospice appropriate: No  (If needed, use PPS <30 or FAST score >7)  Was referral to hospice placed: No       Impression upon entering the home:  Physical Dwelling: single family home   Appearance of home environment: cleaniness:  clean, walking pathways: clear, lighting: adequate, and home structure: sound structure  Functional Status: moderate assistance  Mobility: ambulatory with device  Nutritional access: adequate intake and access  Home Health: Yes, [facility]    DME/Supplies: rolling walker     Diagnostic tests reviewed/disposition: No diagnosic tests pending after this hospitalization.  Disease/illness education:  s/p fall, vertebral fracture, pain control  Establishment or re-establishment of referral orders for community resources: No other necessary community resources.   Discussion with other health care providers: No discussion with other health care providers necessary.   Does patient have a PCP at [place]? No   Repatriation plan with PCP? [facility] reason: mobility   Does patient have an ostomy (ileostomy, colostomy, suprapubic catheter, nephrostomy tube, tracheostomy, PEG tube, pleurex catheter, cholecystostomy, etc)? No  Were BPAs reviewed? Yes    Social History     Socioeconomic History    Marital status:    Tobacco Use    Smoking status: Former     Current packs/day: 1.00     Average packs/day: 1 pack/day for 20.0 years (20.0 ttl pk-yrs)     Types: Cigarettes    Smokeless tobacco: Never   Substance and Sexual Activity    Alcohol use: No    Drug use: No       OBJECTIVE:     Vital Signs:  Vitals:    07/18/24 1100   BP: 110/70   Pulse: 69   Resp: 18   Temp: 98.6 °F (37 °C)       Review of Systems   Constitutional:  Positive for activity change, appetite change, fatigue and fever.   HENT: Negative.     Eyes: Negative.    Respiratory: Negative.     Cardiovascular: Negative.    Gastrointestinal: Negative.    Endocrine: Negative.    Genitourinary: Negative.    Musculoskeletal:  Positive for arthralgias, back pain, gait problem and myalgias.   Skin: Negative.    Neurological:  Positive for weakness.   Psychiatric/Behavioral:  Positive for decreased concentration.        Physical Exam:  Physical  Exam  Constitutional:       Comments: Elderly, frail, female in distress (back pain) with movement   HENT:      Head: Normocephalic and atraumatic.      Right Ear: External ear normal.      Left Ear: External ear normal.      Nose: Nose normal.      Mouth/Throat:      Mouth: Mucous membranes are dry.      Pharynx: Oropharynx is clear.   Eyes:      Extraocular Movements: Extraocular movements intact.      Conjunctiva/sclera: Conjunctivae normal.      Pupils: Pupils are equal, round, and reactive to light.   Cardiovascular:      Rate and Rhythm: Normal rate and regular rhythm.      Pulses: Normal pulses.      Heart sounds: Normal heart sounds.   Pulmonary:      Effort: Pulmonary effort is normal.      Breath sounds: Normal breath sounds.   Abdominal:      General: Bowel sounds are normal. There is no distension.      Palpations: Abdomen is soft.      Tenderness: There is no abdominal tenderness.   Musculoskeletal:      Comments: Generalized weakness, antalgic gait, wearing TLSO brace   Skin:     General: Skin is warm and dry.      Capillary Refill: Capillary refill takes 2 to 3 seconds.      Coloration: Skin is pale.      Findings: Bruising present.   Neurological:      Mental Status: She is alert and oriented to person, place, and time.      Motor: Weakness present.      Gait: Gait abnormal.   Psychiatric:         Mood and Affect: Mood normal.         Behavior: Behavior normal.         Thought Content: Thought content normal.         Judgment: Judgment normal.         INSTRUCTIONS FOR PATIENT:     Scheduled Follow-up, Appts Reviewed with Modifications if Needed: Yes  Future Appointments   Date Time Provider Department [place]   7/22/2024 11:00 AM [facility] ULTRAS [place]   7/26/2024  1:45 PM Juhi Mosher Jr., MD [facility] UROLOGY [place]   8/28/2024 10:00 AM Jesse Phelan MD [facility] PAINMGT [place]             Signature: Olga Azar NP    Transition of Care Visit:  I have reviewed and updated the  history and problem list.  I have reconciled the medication list.  I have discussed the hospitalization and current medical issues, prognosis and plans with the patient/family.

## 2024-07-22 ENCOUNTER — HOSPITAL ENCOUNTER (OUTPATIENT)
Dept: RADIOLOGY | Facility: HOSPITAL | Age: 89
Discharge: HOME | End: 2024-07-22
Attending: UROLOGY
Payer: MEDICARE

## 2024-07-22 DIAGNOSIS — N20.0 STAGHORN CALCULUS: ICD-10-CM

## 2024-07-22 PROCEDURE — 76770 US EXAM ABDO BACK WALL COMP: CPT | Mod: 26,,, | Performed by: RADIOLOGY

## 2024-07-22 PROCEDURE — 76770 US EXAM ABDO BACK WALL COMP: CPT | Mod: TC

## 2024-07-26 ENCOUNTER — OFFICE VISIT (OUTPATIENT)
Dept: UROLOGY | Facility: CLINIC | Age: 89
End: 2024-07-26
Payer: MEDICARE

## 2024-07-26 VITALS — BODY MASS INDEX: 26.72 KG/M2 | HEIGHT: 64 IN | WEIGHT: 156.5 LBS

## 2024-07-26 DIAGNOSIS — N20.0 STAGHORN CALCULUS: Primary | ICD-10-CM

## 2024-07-26 PROCEDURE — 99999 PR PBB SHADOW E&M-EST. PATIENT-LVL II: CPT | Mod: PBBFAC,,, | Performed by: UROLOGY

## 2024-07-26 NOTE — PROGRESS NOTES
[facility] Established Patient/H&P:    Erich Encarnacion is a 89 y.o. female who presents for follow up for left staghorn calculus and left flank pain.     Patient with a history of skin cancer who presents for a left staghorn calculus. On review of records, she presented to the emergency department on 4/16/24 with left flank pain starting earlier int he day.      CT renal stone with an enlarging 2.2 cm staghorn calculus in the left kidney with mild dilation of the left renal pelvis and perinephric inflammation. Also with a small indeterminate left renal mass. Renal ultrasound on 5/7/24 with a 1.9 cm left renal stone without hydronephrosis.      She reports left flank pain intermittently for several weeks. Recently treated with Abx for Pseudomonas urinary tract infection.         Interval History     5/23/24: Virtual audio visit today for follow up. Patient and family have decided to proceed with left ureteroscopy and laser lithotripsy of her left staghorn given her persistent left flank pain. No new complaints today.     7/26/24: Here today for follow up. She is now s/p left ureteroscopy with laser lithotripsy and basket extraction with stent placement on 5/28/24. Stent removed at home one week after procedure. Renal ultrasound with no hydro and a possible 2 mm left renal stone on 7/22/24. Of note, she was admitted on 7/1/24 for a compression fracture after a fall at home and now reports significant back pain.      Denies any fever, chills, gross hematuria, bone pain, unintentional weight loss,  trauma or history of  malignancy.         Urine culture  Multi orgs 5/9/24  Pseudo            4/16/24    Past Medical History:   Diagnosis Date    Hypertension     Macula lutea degeneration     Squamous cell carcinoma of skin        Past Surgical History:   Procedure Laterality Date    APPENDECTOMY      BACK SURGERY  1975    HYSTERECTOMY      URETEROSCOPIC REMOVAL OF URETERIC CALCULUS Left 5/28/2024  "   Procedure: REMOVAL, CALCULUS, URETER, URETEROSCOPIC;  Surgeon: Juhi Mosher Jr., MD;  Location: [facility];  Service: Urology;  Laterality: Left;       Review of patient's allergies indicates:   Allergen Reactions    Topiramate Nausea And Vomiting       Medications Reviewed: see MAR      FOCUSED PHYSICAL EXAM:    There were no vitals filed for this visit.  Body mass index is 26.87 kg/m². Weight: 71 kg (156 lb 8.4 oz) Height: 5' 4" (162.6 cm)       General: Alert, cooperative, no distress, appears stated age  Abdomen: Soft, non-tender, no CVA tenderness, non-distended      LABS:    No results found for this or any previous visit (from the past 336 hour(s)).        Assessment/Diagnosis:    1. Staghorn calculus              Plans:    - I spent 30 minutes of the day of this encounter preparing for, treating and managing this patient. Extensive discussion with patient regarding the etiology and management of nephrolithiasis. We discussed the importance of decreased sodium intake and decreased animal protein, as well as an increase H2O intake for stone prevention.   - RTC as needed.         "

## 2024-08-16 NOTE — PLAN OF CARE
Problem: Adult Inpatient Plan of Care  Goal: Plan of Care Review  Outcome: Progressing  Goal: Optimal Comfort and Wellbeing  Outcome: Progressing     Problem: Fall Injury Risk  Goal: Absence of Fall and Fall-Related Injury  Outcome: Progressing     Problem: Skin Injury Risk Increased  Goal: Skin Health and Integrity  Outcome: Progressing      No

## 2024-09-02 PROBLEM — J96.01 ACUTE HYPOXEMIC RESPIRATORY FAILURE: Status: RESOLVED | Noted: 2023-02-26 | Resolved: 2024-09-02

## 2024-09-30 PROBLEM — N39.0 UTI (URINARY TRACT INFECTION): Status: RESOLVED | Noted: 2024-05-30 | Resolved: 2024-09-30

## 2025-03-26 ENCOUNTER — HOSPITAL ENCOUNTER (OUTPATIENT)
Dept: RADIOLOGY | Facility: HOSPITAL | Age: OVER 89
Discharge: HOME | End: 2025-03-26
Attending: NURSE PRACTITIONER
Payer: MEDICARE

## 2025-03-26 DIAGNOSIS — I67.9 CVD (CEREBROVASCULAR DISEASE): ICD-10-CM

## 2025-03-26 DIAGNOSIS — I43 HYPERTENSIVE CARDIOMYOPATHY, WITHOUT HEART FAILURE: ICD-10-CM

## 2025-03-26 DIAGNOSIS — R09.89 CHEST CONGESTION: ICD-10-CM

## 2025-03-26 DIAGNOSIS — I48.91 ATRIAL FIBRILLATION: ICD-10-CM

## 2025-03-26 DIAGNOSIS — F01.B18 MODERATE VASCULAR DEMENTIA WITH OTHER BEHAVIORAL DISTURBANCE: ICD-10-CM

## 2025-03-26 DIAGNOSIS — I11.9 HYPERTENSIVE CARDIOMYOPATHY, WITHOUT HEART FAILURE: ICD-10-CM

## 2025-03-26 DIAGNOSIS — E03.9 HYPOTHYROIDISM, UNSPECIFIED TYPE: ICD-10-CM

## 2025-03-26 DIAGNOSIS — I25.118 CORONARY ARTERY DISEASE OF NATIVE ARTERY OF NATIVE HEART WITH STABLE ANGINA PECTORIS: Primary | ICD-10-CM

## 2025-03-26 DIAGNOSIS — D64.9 CHRONIC ANEMIA: ICD-10-CM

## 2025-03-26 DIAGNOSIS — I25.118 CORONARY ARTERY DISEASE OF NATIVE ARTERY OF NATIVE HEART WITH STABLE ANGINA PECTORIS: ICD-10-CM

## 2025-03-26 DIAGNOSIS — R54 FRAIL ELDERLY: ICD-10-CM

## 2025-03-26 DIAGNOSIS — I51.7 LEFT ATRIAL DILATATION: ICD-10-CM

## 2025-03-26 DIAGNOSIS — F41.9 ANXIETY: ICD-10-CM

## 2025-03-26 PROCEDURE — 71046 X-RAY EXAM CHEST 2 VIEWS: CPT | Mod: TC,PO

## 2025-03-26 PROCEDURE — 71046 X-RAY EXAM CHEST 2 VIEWS: CPT | Mod: 26,,, | Performed by: RADIOLOGY

## 2025-03-31 ENCOUNTER — TELEPHONE (OUTPATIENT)
Dept: PULMONOLOGY | Facility: CLINIC | Age: OVER 89
End: 2025-03-31
Payer: MEDICARE

## 2025-03-31 NOTE — TELEPHONE ENCOUNTER
----- Message from Brandi sent at 3/31/2025  1:29 PM CDT -----  Type:  Sooner Appointment Request Caller is requesting a sooner appointment.  Caller declined first available appointment listed below.  Caller will not accept being placed on the waitlist and is requesting a message be sent to doctor. Name of Caller:PT  When is the first available appointment? 08/06 Symptoms:NA Would the patient rather a call back or a response via [facility]? Call Back Best Call Back Number:[phone] Additional Information: pt would like to bring mom in to be seen she is 89 years old an is having alot of congestion and mucus coming up and would like to be seen asap     Please call Back to advise. Thanks!

## 2025-04-09 ENCOUNTER — OFFICE VISIT (OUTPATIENT)
Dept: PULMONOLOGY | Facility: CLINIC | Age: OVER 89
End: 2025-04-09
Payer: MEDICARE

## 2025-04-09 VITALS
BODY MASS INDEX: 26.98 KG/M2 | WEIGHT: 158 LBS | HEART RATE: 130 BPM | DIASTOLIC BLOOD PRESSURE: 85 MMHG | HEIGHT: 64 IN | OXYGEN SATURATION: 97 % | SYSTOLIC BLOOD PRESSURE: 145 MMHG

## 2025-04-09 DIAGNOSIS — J42 CHRONIC BRONCHITIS, UNSPECIFIED CHRONIC BRONCHITIS TYPE: Primary | ICD-10-CM

## 2025-04-09 DIAGNOSIS — K21.9 GASTROESOPHAGEAL REFLUX DISEASE, UNSPECIFIED WHETHER ESOPHAGITIS PRESENT: ICD-10-CM

## 2025-04-09 DIAGNOSIS — R54 ADVANCED AGE: ICD-10-CM

## 2025-04-09 DIAGNOSIS — J30.9 ALLERGIC RHINITIS, UNSPECIFIED SEASONALITY, UNSPECIFIED TRIGGER: ICD-10-CM

## 2025-04-09 PROCEDURE — 1101F PT FALLS ASSESS-DOCD LE1/YR: CPT | Mod: CPTII,S$GLB,, | Performed by: NURSE PRACTITIONER

## 2025-04-09 PROCEDURE — 99999 PR PBB SHADOW E&M-EST. PATIENT-LVL IV: CPT | Mod: PBBFAC,,, | Performed by: NURSE PRACTITIONER

## 2025-04-09 PROCEDURE — 3288F FALL RISK ASSESSMENT DOCD: CPT | Mod: CPTII,S$GLB,, | Performed by: NURSE PRACTITIONER

## 2025-04-09 PROCEDURE — 99204 OFFICE O/P NEW MOD 45 MIN: CPT | Mod: S$GLB,,, | Performed by: NURSE PRACTITIONER

## 2025-04-09 PROCEDURE — 1126F AMNT PAIN NOTED NONE PRSNT: CPT | Mod: CPTII,S$GLB,, | Performed by: NURSE PRACTITIONER

## 2025-04-09 PROCEDURE — 1159F MED LIST DOCD IN RCRD: CPT | Mod: CPTII,S$GLB,, | Performed by: NURSE PRACTITIONER

## 2025-04-09 RX ORDER — BUDESONIDE 0.5 MG/2ML
0.5 INHALANT ORAL 2 TIMES DAILY
Qty: 120 ML | Refills: 5 | Status: SHIPPED | OUTPATIENT
Start: 2025-04-09 | End: 2026-04-09

## 2025-04-09 RX ORDER — ATORVASTATIN CALCIUM 10 MG/1
10 TABLET, FILM COATED ORAL DAILY
COMMUNITY
Start: 2025-01-14

## 2025-04-09 RX ORDER — TRAMADOL HYDROCHLORIDE 50 MG/1
50 TABLET ORAL EVERY 12 HOURS PRN
COMMUNITY
Start: 2025-03-23

## 2025-04-09 RX ORDER — AMLODIPINE BESYLATE 2.5 MG/1
2.5 TABLET ORAL
COMMUNITY
Start: 2025-01-24

## 2025-04-09 RX ORDER — CYCLOSPORINE 0.5 MG/ML
1 EMULSION OPHTHALMIC
COMMUNITY
Start: 2025-01-11

## 2025-04-09 NOTE — PATIENT INSTRUCTIONS
Will start her on Budesonide twice a day in nebulizer, rinse after you use it  Start Taking Allegra every morning  Take the Pantoprazole in the morning before she eats or drinks anything  Sleep elevated  No eating or drinking at least 2 hours before bed

## 2025-04-09 NOTE — PROGRESS NOTES
SUBJECTIVE:    Patient ID: Erich Encarnacion is a 89 y.o. female.    Chief Complaint: Establish Care, Shortness of Breath, Cough, and Sputum Production    HPI    Patient here today with her daughter to be evaluated for chronic bronchitis. She coughs a lot of mucous in the morning.  She is a former smoker quit over 30 years ago. She was recently diagnosed with paroxysmal atrial fib.  She does have some dysphagia and reflux. She does not take the Protonix regularly only as needed.      Past Medical History:   Diagnosis Date    Hypertension     Macula lutea degeneration     Paroxysmal atrial fibrillation     Squamous cell carcinoma of skin      Past Surgical History:   Procedure Laterality Date    APPENDECTOMY      BACK SURGERY  1975    HYSTERECTOMY      URETEROSCOPIC REMOVAL OF URETERIC CALCULUS Left 5/28/2024    Procedure: REMOVAL, CALCULUS, URETER, URETEROSCOPIC;  Surgeon: Juhi Mosher Jr., MD;  Location: [facility];  Service: Urology;  Laterality: Left;     Family History   Problem Relation Name Age of Onset    Melanoma Neg Hx      Psoriasis Neg Hx      Lupus Neg Hx      Eczema Neg Hx          Social History:   Marital Status:   Occupation: Data Unavailable  Alcohol History:  reports no history of alcohol use.  Tobacco History:  reports that she has quit smoking. Her smoking use included cigarettes. She has a 20 pack-year smoking history. She has never used smokeless tobacco.  Drug History:  reports no history of drug use.    Review of patient's allergies indicates:   Allergen Reactions    Topiramate Nausea And Vomiting       Current Medications[1]        Review of Systems  General: Feeling Well.  Eyes: Vision is good.  ENT:  No sinusitis or pharyngitis.   Heart:: No chest pain or palpitations.  Lungs: chronic bronchitis in the morning for 2 years   GI: gerd, dysphagia   : No dysuria, hesitancy, or nocturia.  Musculoskeletal: chronic neck pain   Skin: No lesions or rashes.  Neuro: No headaches or  "neuropathy.  Lymph: No edema or adenopathy.  Psych: No anxiety or depression.  Endo: No weight change.    OBJECTIVE:      BP (!) 145/85 (BP Location: Left arm, Patient Position: Sitting)   Pulse (!) 130   Ht 5' 4" (1.626 m)   Wt 71.7 kg (158 lb)   SpO2 97%   BMI 27.12 kg/m²     Physical Exam  GENERAL: Older patient in no distress.  HEENT: Pupils equal and reactive. Extraocular movements intact. Nose intact.      Pharynx moist.  NECK: Supple.   HEART: Regular rate and rhythm. No murmur or gallop auscultated.  LUNGS: some crackles to bases posteriorly.  Lung excursion symmetrical. No change in fremitus. No adventitial noises.  ABDOMEN: Bowel sounds present. Non-tender, no masses palpated.  EXTREMITIES: Normal muscle tone and joint movement, no cyanosis or clubbing.   LYMPHATICS: No adenopathy palpated, no edema.  SKIN: Dry, intact, no lesions.   NEURO: Cranial nerves II-XII intact. Motor strength 5/5 bilaterally, upper and lower extremities.  PSYCH: Appropriate affect.    Assessment:       1. Advanced age    2. Chronic bronchitis, unspecified chronic bronchitis type    3. Allergic rhinitis, unspecified seasonality, unspecified trigger    4. Gastroesophageal reflux disease, unspecified whether esophagitis present          Plan:       Advanced age    Chronic bronchitis, unspecified chronic bronchitis type    Allergic rhinitis, unspecified seasonality, unspecified trigger    Gastroesophageal reflux disease, unspecified whether esophagitis present    Other orders  -     budesonide (PULMICORT) 0.5 mg/2 mL nebulizer solution; Take 2 mLs (0.5 mg total) by nebulization 2 (two) times daily. Controller  Dispense: 120 mL; Refill: 5         Follow up in about 2 months (around 6/9/2025).      Will start her on Budesonide twice a day in nebulizer, rinse after you use it  Start Taking Allegra every morning  Take the Pantoprazole in the morning before she eats or drinks anything  Sleep elevated  No eating or drinking at least 2 " hours before bed          [1]   Current Outpatient Medications   Medication Sig Dispense Refill    amLODIPine (NORVASC) 2.5 MG tablet Take 2.5 mg by mouth.      atorvastatin (LIPITOR) 10 MG tablet Take 10 mg by mouth once daily.      isosorbide dinitrate (ISORDIL) 30 MG Tab Take 30 mg by mouth once daily.       levothyroxine (SYNTHROID) 50 MCG tablet Take 1 tablet (50 mcg total) by mouth every morning. 30 tablet 11    meclizine (ANTIVERT) 12.5 mg tablet Take 2 tablets by mouth Daily.      pantoprazole (PROTONIX) 40 MG tablet Take 1 tablet by mouth daily as needed (Heartburn).      QUEtiapine (SEROQUEL) 25 MG Tab Take 1 tablet (25 mg total) by mouth every evening. 30 tablet 3    RESTASIS 0.05 % ophthalmic emulsion 1 drop.      topiramate (TOPAMAX) 25 MG tablet Take 25 mg by mouth once daily.   0    traMADoL (ULTRAM) 50 mg tablet Take 50 mg by mouth every 12 (twelve) hours as needed.      aspirin (ECOTRIN) 81 MG EC tablet Take 162 mg by mouth once daily.      budesonide (PULMICORT) 0.5 mg/2 mL nebulizer solution Take 2 mLs (0.5 mg total) by nebulization 2 (two) times daily. Controller 120 mL 5    polyethylene glycol (GLYCOLAX) 17 gram/dose powder Take 17 g by mouth every evening.       No current facility-administered medications for this visit.     Facility-Administered Medications Ordered in Other Visits   Medication Dose Route Frequency Provider Last Rate Last Admin    triamcinolone acetonide injection 40 mg  40 mg Intra-articular  Kenneth Nevarez MD   40 mg at 06/03/22 2011

## 2025-04-23 ENCOUNTER — HOSPITAL ENCOUNTER (INPATIENT)
Facility: HOSPITAL | Age: OVER 89
LOS: 3 days | Discharge: HOME HEALTH | DRG: 291 | End: 2025-04-26
Attending: STUDENT IN AN ORGANIZED HEALTH CARE EDUCATION/TRAINING PROGRAM | Admitting: STUDENT IN AN ORGANIZED HEALTH CARE EDUCATION/TRAINING PROGRAM
Payer: MEDICARE

## 2025-04-23 DIAGNOSIS — I50.9 CHF (CONGESTIVE HEART FAILURE): ICD-10-CM

## 2025-04-23 DIAGNOSIS — M79.89 LEG SWELLING: ICD-10-CM

## 2025-04-23 DIAGNOSIS — R07.9 CHEST PAIN: ICD-10-CM

## 2025-04-23 DIAGNOSIS — S09.90XA TRAUMATIC INJURY OF HEAD, INITIAL ENCOUNTER: ICD-10-CM

## 2025-04-23 DIAGNOSIS — I50.9 ACUTE ON CHRONIC CONGESTIVE HEART FAILURE, UNSPECIFIED HEART FAILURE TYPE: Primary | ICD-10-CM

## 2025-04-23 DIAGNOSIS — I50.9 ACUTE CONGESTIVE HEART FAILURE: ICD-10-CM

## 2025-04-23 PROBLEM — R60.0 EDEMA OF BOTH LOWER EXTREMITIES: Status: ACTIVE | Noted: 2025-04-23

## 2025-04-23 PROBLEM — I48.91 ATRIAL FIBRILLATION: Status: ACTIVE | Noted: 2025-04-23

## 2025-04-23 LAB
ABSOLUTE EOSINOPHIL (SMH): 0.27 K/UL
ABSOLUTE MONOCYTE (SMH): 0.52 K/UL (ref 0.3–1)
ABSOLUTE NEUTROPHIL COUNT (SMH): 4.5 K/UL (ref 1.8–7.7)
ALBUMIN SERPL-MCNC: 3.7 G/DL (ref 3.5–5.2)
ALP SERPL-CCNC: 84 UNIT/L (ref 55–135)
ALT SERPL-CCNC: 14 UNIT/L (ref 10–44)
ANION GAP (SMH): 9 MMOL/L (ref 8–16)
AST SERPL-CCNC: 16 UNIT/L (ref 10–40)
BASOPHILS # BLD AUTO: 0.05 K/UL
BASOPHILS NFR BLD AUTO: 0.7 %
BILIRUB SERPL-MCNC: 0.8 MG/DL (ref 0.1–1)
BNP SERPL-MCNC: 337 PG/ML
BUN SERPL-MCNC: 18 MG/DL (ref 10–30)
CALCIUM SERPL-MCNC: 9 MG/DL (ref 8.7–10.5)
CHLORIDE SERPL-SCNC: 109 MMOL/L (ref 95–110)
CO2 SERPL-SCNC: 21 MMOL/L (ref 23–29)
CREAT SERPL-MCNC: 0.8 MG/DL (ref 0.5–1.4)
ERYTHROCYTE [DISTWIDTH] IN BLOOD BY AUTOMATED COUNT: 14.6 % (ref 11.5–14.5)
GFR SERPLBLD CREATININE-BSD FMLA CKD-EPI: >60 ML/MIN/1.73/M2
GLUCOSE SERPL-MCNC: 135 MG/DL (ref 70–110)
HCT VFR BLD AUTO: 33.3 % (ref 37–48.5)
HGB BLD-MCNC: 10.9 GM/DL (ref 12–16)
IMM GRANULOCYTES # BLD AUTO: 0.02 K/UL (ref 0–0.04)
IMM GRANULOCYTES NFR BLD AUTO: 0.3 % (ref 0–0.5)
LYMPHOCYTES # BLD AUTO: 1.68 K/UL (ref 1–4.8)
MAGNESIUM SERPL-MCNC: 2 MG/DL (ref 1.6–2.6)
MCH RBC QN AUTO: 29.8 PG (ref 27–31)
MCHC RBC AUTO-ENTMCNC: 32.7 G/DL (ref 32–36)
MCV RBC AUTO: 91 FL (ref 82–98)
NUCLEATED RBC (/100WBC) (SMH): 0 /100 WBC
PLATELET # BLD AUTO: 189 K/UL (ref 150–450)
PMV BLD AUTO: 10.9 FL (ref 9.2–12.9)
POTASSIUM SERPL-SCNC: 3.5 MMOL/L (ref 3.5–5.1)
PROT SERPL-MCNC: 6 GM/DL (ref 6–8.4)
RBC # BLD AUTO: 3.66 M/UL (ref 4–5.4)
RELATIVE EOSINOPHIL (SMH): 3.9 % (ref 0–8)
RELATIVE LYMPHOCYTE (SMH): 24 % (ref 18–48)
RELATIVE MONOCYTE (SMH): 7.4 % (ref 4–15)
RELATIVE NEUTROPHIL (SMH): 63.7 % (ref 38–73)
SODIUM SERPL-SCNC: 139 MMOL/L (ref 136–145)
TROPONIN HIGH SENSITIVE (SMH): 30.9 PG/ML
WBC # BLD AUTO: 7 K/UL (ref 3.9–12.7)

## 2025-04-23 PROCEDURE — 93005 ELECTROCARDIOGRAM TRACING: CPT | Performed by: INTERNAL MEDICINE

## 2025-04-23 PROCEDURE — 96374 THER/PROPH/DIAG INJ IV PUSH: CPT

## 2025-04-23 PROCEDURE — 25000003 PHARM REV CODE 250: Performed by: NURSE PRACTITIONER

## 2025-04-23 PROCEDURE — 12000002 HC ACUTE/MED SURGE SEMI-PRIVATE ROOM

## 2025-04-23 PROCEDURE — 93010 ELECTROCARDIOGRAM REPORT: CPT | Mod: ,,, | Performed by: INTERNAL MEDICINE

## 2025-04-23 PROCEDURE — 84484 ASSAY OF TROPONIN QUANT: CPT | Performed by: NURSE PRACTITIONER

## 2025-04-23 PROCEDURE — 83880 ASSAY OF NATRIURETIC PEPTIDE: CPT | Performed by: NURSE PRACTITIONER

## 2025-04-23 PROCEDURE — 99285 EMERGENCY DEPT VISIT HI MDM: CPT | Mod: 25

## 2025-04-23 PROCEDURE — 83735 ASSAY OF MAGNESIUM: CPT | Performed by: NURSE PRACTITIONER

## 2025-04-23 PROCEDURE — 63600175 PHARM REV CODE 636 W HCPCS

## 2025-04-23 PROCEDURE — 80053 COMPREHEN METABOLIC PANEL: CPT | Performed by: NURSE PRACTITIONER

## 2025-04-23 PROCEDURE — 85025 COMPLETE CBC W/AUTO DIFF WBC: CPT | Performed by: NURSE PRACTITIONER

## 2025-04-23 RX ORDER — AMLODIPINE BESYLATE 2.5 MG/1
2.5 TABLET ORAL DAILY
Status: DISCONTINUED | OUTPATIENT
Start: 2025-04-24 | End: 2025-04-24

## 2025-04-23 RX ORDER — ASPIRIN 81 MG/1
162 TABLET ORAL DAILY
Status: DISCONTINUED | OUTPATIENT
Start: 2025-04-24 | End: 2025-04-24

## 2025-04-23 RX ORDER — SODIUM CHLORIDE 0.9 % (FLUSH) 0.9 %
10 SYRINGE (ML) INJECTION
Status: DISCONTINUED | OUTPATIENT
Start: 2025-04-23 | End: 2025-04-26 | Stop reason: HOSPADM

## 2025-04-23 RX ORDER — ATORVASTATIN CALCIUM 10 MG/1
10 TABLET, FILM COATED ORAL DAILY
Status: DISCONTINUED | OUTPATIENT
Start: 2025-04-24 | End: 2025-04-26 | Stop reason: HOSPADM

## 2025-04-23 RX ORDER — ACETAMINOPHEN 325 MG/1
650 TABLET ORAL EVERY 4 HOURS PRN
Status: DISCONTINUED | OUTPATIENT
Start: 2025-04-23 | End: 2025-04-26 | Stop reason: HOSPADM

## 2025-04-23 RX ORDER — ENOXAPARIN SODIUM 100 MG/ML
1 INJECTION SUBCUTANEOUS EVERY 24 HOURS
Status: DISCONTINUED | OUTPATIENT
Start: 2025-04-24 | End: 2025-04-23

## 2025-04-23 RX ORDER — ENOXAPARIN SODIUM 100 MG/ML
40 INJECTION SUBCUTANEOUS EVERY 24 HOURS
Status: DISCONTINUED | OUTPATIENT
Start: 2025-04-24 | End: 2025-04-23

## 2025-04-23 RX ORDER — TRAMADOL HYDROCHLORIDE 50 MG/1
50 TABLET ORAL NIGHTLY
Status: DISCONTINUED | OUTPATIENT
Start: 2025-04-23 | End: 2025-04-26 | Stop reason: HOSPADM

## 2025-04-23 RX ORDER — ISOSORBIDE DINITRATE 10 MG/1
30 TABLET ORAL DAILY
Status: DISCONTINUED | OUTPATIENT
Start: 2025-04-24 | End: 2025-04-25

## 2025-04-23 RX ORDER — CARBOXYMETHYLCELLULOSE SODIUM 5 MG/ML
2 SOLUTION/ DROPS OPHTHALMIC
Status: DISCONTINUED | OUTPATIENT
Start: 2025-04-24 | End: 2025-04-26 | Stop reason: HOSPADM

## 2025-04-23 RX ORDER — FUROSEMIDE 10 MG/ML
40 INJECTION INTRAMUSCULAR; INTRAVENOUS
Status: COMPLETED | OUTPATIENT
Start: 2025-04-23 | End: 2025-04-23

## 2025-04-23 RX ORDER — FUROSEMIDE 10 MG/ML
40 INJECTION INTRAMUSCULAR; INTRAVENOUS EVERY 12 HOURS
Status: DISCONTINUED | OUTPATIENT
Start: 2025-04-24 | End: 2025-04-24

## 2025-04-23 RX ORDER — MECLIZINE HCL 12.5 MG 12.5 MG/1
25 TABLET ORAL DAILY PRN
Status: DISCONTINUED | OUTPATIENT
Start: 2025-04-24 | End: 2025-04-26 | Stop reason: HOSPADM

## 2025-04-23 RX ORDER — CARBOXYMETHYLCELLULOSE SODIUM 5 MG/ML
2 SOLUTION/ DROPS OPHTHALMIC 4 TIMES DAILY PRN
Status: DISCONTINUED | OUTPATIENT
Start: 2025-04-23 | End: 2025-04-26 | Stop reason: HOSPADM

## 2025-04-23 RX ORDER — QUETIAPINE FUMARATE 25 MG/1
25 TABLET, FILM COATED ORAL NIGHTLY
Status: DISCONTINUED | OUTPATIENT
Start: 2025-04-24 | End: 2025-04-26 | Stop reason: HOSPADM

## 2025-04-23 RX ORDER — POLYETHYLENE GLYCOL 3350 17 G/17G
17 POWDER, FOR SOLUTION ORAL DAILY
Status: DISCONTINUED | OUTPATIENT
Start: 2025-04-24 | End: 2025-04-26 | Stop reason: HOSPADM

## 2025-04-23 RX ORDER — ONDANSETRON HYDROCHLORIDE 2 MG/ML
4 INJECTION, SOLUTION INTRAVENOUS EVERY 6 HOURS PRN
Status: DISCONTINUED | OUTPATIENT
Start: 2025-04-23 | End: 2025-04-26 | Stop reason: HOSPADM

## 2025-04-23 RX ORDER — BUDESONIDE 0.5 MG/2ML
0.5 INHALANT ORAL 2 TIMES DAILY
Status: DISCONTINUED | OUTPATIENT
Start: 2025-04-24 | End: 2025-04-26 | Stop reason: HOSPADM

## 2025-04-23 RX ORDER — METOPROLOL TARTRATE 1 MG/ML
5 INJECTION, SOLUTION INTRAVENOUS ONCE
Status: COMPLETED | OUTPATIENT
Start: 2025-04-24 | End: 2025-04-24

## 2025-04-23 RX ORDER — LEVOTHYROXINE SODIUM 25 UG/1
50 TABLET ORAL EVERY MORNING
Status: DISCONTINUED | OUTPATIENT
Start: 2025-04-24 | End: 2025-04-26 | Stop reason: HOSPADM

## 2025-04-23 RX ORDER — ENOXAPARIN SODIUM 100 MG/ML
1 INJECTION SUBCUTANEOUS EVERY 12 HOURS
Status: DISCONTINUED | OUTPATIENT
Start: 2025-04-24 | End: 2025-04-24

## 2025-04-23 RX ADMIN — TRAMADOL HYDROCHLORIDE 50 MG: 50 TABLET, COATED ORAL at 11:04

## 2025-04-23 RX ADMIN — FUROSEMIDE 40 MG: 10 INJECTION, SOLUTION INTRAMUSCULAR; INTRAVENOUS at 09:04

## 2025-04-23 NOTE — FIRST PROVIDER EVALUATION
Emergency Department TeleTriage Encounter Note      CHIEF COMPLAINT    Chief Complaint   Patient presents with    Leg Swelling     BILAT LOWER X FEW DAYS, DENIES SOB       VITAL SIGNS   Initial Vitals [04/23/25 1820]   BP Pulse Resp Temp SpO2   (!) 157/99 (!) 111 20 98.7 °F (37.1 °C) 97 %      MAP       --            ALLERGIES    Review of patient's allergies indicates:   Allergen Reactions    Topiramate Nausea And Vomiting       PROVIDER TRIAGE NOTE  This is a teletriage evaluation of a 89 y.o. female presenting to the ED with c/o bilateral leg swelling, initially just ankles and then swelling worsened. No Shortness of Breath. Hx of Afib. Last EF from 7/2024 was 60-65%. Limited physical exam via telehealth: The patient is awake, alert, and is not in respiratory distress. Initial orders will be placed and care will be transferred to an alternate provider when patient is roomed for a full evaluation. Any additional orders and the final disposition will be determined by that provider.         ORDERS  Labs Reviewed   MAGNESIUM   CBC W/ AUTO DIFFERENTIAL    Narrative:     The following orders were created for panel order CBC auto differential.  Procedure                               Abnormality         Status                     ---------                               -----------         ------                     CBC with Differential[[id number]]                                                        Please view results for these tests on the individual orders.   COMPREHENSIVE METABOLIC PANEL   TROPONIN I HIGH SENSITIVITY   B-TYPE NATRIURETIC PEPTIDE   CBC WITH DIFFERENTIAL       ED Orders (720h ago, onward)      Start Ordered     Status Ordering Provider    04/23/25 1844 04/23/25 1843  Magnesium  STAT         Ordered JAVIER PINA PChaya    04/23/25 1844 04/23/25 1843  Vital signs  Every 15 min         Ordered JAVIER PINA PChaya    04/23/25 1844 04/23/25 1843  Cardiac Monitoring - Adult  Continuous        Comments: Notify  Physician If:    Ordered JAVIER PINA P.    04/23/25 1844 04/23/25 1843  Pulse Oximetry Continuous  Continuous         Ordered JAVIER PINA P.    04/23/25 1844 04/23/25 1843  Diet NPO  Diet effective now         Ordered JAVIER PINA P.    04/23/25 1844 04/23/25 1843  Saline lock IV  Once         Ordered YOVANI, JAVIER P.    04/23/25 1844 04/23/25 1843  EKG 12-lead  Once        Comments: Do not perform if previously done during this visit/ triage    Ordered JAVIER PINA P.    04/23/25 1844 04/23/25 1843  CBC auto differential  STAT         Ordered JAVIER PINA P.    04/23/25 1844 04/23/25 1843  Comprehensive metabolic panel  STAT         Ordered JAVIER PINA P.    04/23/25 1844 04/23/25 1843  Troponin I High Sensitivity #1  STAT         Ordered JAVIER PINA P.    04/23/25 1844 04/23/25 1843  B-Type natriuretic peptide (BNP)  STAT         Ordered JAVIER PINA P.    04/23/25 1844 04/23/25 1843  X-Ray Chest AP Portable  1 time imaging         Ordered JAVIER PINA P.    04/23/25 1844 04/23/25 1843  CBC with Differential  PROCEDURE ONCE         Ordered JAVIER PINA P.    04/23/25 1844 04/23/25 1843  Urinalysis, Reflex to Urine Culture  STAT         Ordered JAVIER PINA              Virtual Visit Note: The provider triage portion of this emergency department evaluation and documentation was performed via transOMIC, a HIPAA-compliant telemedicine application, in concert with a tele-presenter in the room. A face to face patient evaluation with one of my colleagues will occur once the patient is placed in an emergency department room.      DISCLAIMER: This note was prepared with [employer]ild[employer] voice recognition transcription software. Garbled syntax, mangled pronouns, and other bizarre constructions may be attributed to that software system.

## 2025-04-24 ENCOUNTER — CLINICAL SUPPORT (OUTPATIENT)
Dept: CARDIOLOGY | Facility: HOSPITAL | Age: OVER 89
End: 2025-04-24
Attending: STUDENT IN AN ORGANIZED HEALTH CARE EDUCATION/TRAINING PROGRAM
Payer: MEDICARE

## 2025-04-24 LAB
ABSOLUTE EOSINOPHIL (SMH): 0.24 K/UL
ABSOLUTE MONOCYTE (SMH): 0.58 K/UL (ref 0.3–1)
ABSOLUTE NEUTROPHIL COUNT (SMH): 6.1 K/UL (ref 1.8–7.7)
ANION GAP (SMH): 8 MMOL/L (ref 8–16)
AORTIC ROOT ANNULUS: 3 CM
AORTIC VALVE CUSP SEPERATION: 1.2 CM
APICAL FOUR CHAMBER EJECTION FRACTION: 63 %
APICAL TWO CHAMBER EJECTION FRACTION: 63 %
AV INDEX (PROSTH): 0.59
AV MEAN GRADIENT: 8 MMHG
AV PEAK GRADIENT: 14 MMHG
AV VALVE AREA BY VELOCITY RATIO: 1.7 CM²
AV VALVE AREA: 1.8 CM²
AV VELOCITY RATIO: 0.53
BASOPHILS # BLD AUTO: 0.08 K/UL
BASOPHILS NFR BLD AUTO: 0.9 %
BILIRUB UR QL STRIP.AUTO: NEGATIVE
BSA FOR ECHO PROCEDURE: 1.83 M2
BUN SERPL-MCNC: 17 MG/DL (ref 10–30)
CALCIUM SERPL-MCNC: 9.3 MG/DL (ref 8.7–10.5)
CHLORIDE SERPL-SCNC: 107 MMOL/L (ref 95–110)
CHOLEST SERPL-MCNC: 109 MG/DL (ref 120–199)
CHOLEST/HDLC SERPL: 1.9 {RATIO} (ref 2–5)
CLARITY UR: CLEAR
CO2 SERPL-SCNC: 26 MMOL/L (ref 23–29)
COLOR UR AUTO: COLORLESS
CREAT SERPL-MCNC: 0.9 MG/DL (ref 0.5–1.4)
CV ECHO LV RWT: 0.58 CM
DOP CALC AO PEAK VEL: 1.9 M/S
DOP CALC AO VTI: 29.9 CM
DOP CALC LVOT AREA: 3.1 CM2
DOP CALC LVOT DIAMETER: 2 CM
DOP CALC LVOT PEAK VEL: 1 M/S
DOP CALC LVOT STROKE VOLUME: 55.3 CM3
DOP CALC MV VTI: 23.2 CM
DOP CALCLVOT PEAK VEL VTI: 17.6 CM
E WAVE DECELERATION TIME: 169 MSEC
E/A RATIO: 2.03
E/E' RATIO: 19 M/S
EAG (SMH): 120 MG/DL (ref 68–131)
ECHO LV POSTERIOR WALL: 1.4 CM (ref 0.6–1.1)
ERYTHROCYTE [DISTWIDTH] IN BLOOD BY AUTOMATED COUNT: 14.6 % (ref 11.5–14.5)
FRACTIONAL SHORTENING: 29.2 % (ref 28–44)
GFR SERPLBLD CREATININE-BSD FMLA CKD-EPI: 58 ML/MIN/1.73/M2
GLUCOSE SERPL-MCNC: 139 MG/DL (ref 70–110)
GLUCOSE UR QL STRIP: NEGATIVE
HBA1C MFR BLD: 5.8 % (ref 4.5–6.2)
HCT VFR BLD AUTO: 38.1 % (ref 37–48.5)
HDLC SERPL-MCNC: 58 MG/DL (ref 40–75)
HDLC SERPL: 53.2 % (ref 20–50)
HGB BLD-MCNC: 12.5 GM/DL (ref 12–16)
HGB UR QL STRIP: NEGATIVE
IMM GRANULOCYTES # BLD AUTO: 0.04 K/UL (ref 0–0.04)
IMM GRANULOCYTES NFR BLD AUTO: 0.5 % (ref 0–0.5)
INTERVENTRICULAR SEPTUM: 1.2 CM (ref 0.6–1.1)
IVC DIAMETER: 1.8 CM
KETONES UR QL STRIP: NEGATIVE
LDLC SERPL CALC-MCNC: 40 MG/DL (ref 63–159)
LEFT ATRIUM AREA SYSTOLIC (APICAL 4 CHAMBER): 31.7 CM2
LEFT ATRIUM SIZE: 4.9 CM
LEFT INTERNAL DIMENSION IN SYSTOLE: 3.4 CM (ref 2.1–4)
LEFT VENTRICLE DIASTOLIC VOLUME INDEX: 60.34 ML/M2
LEFT VENTRICLE DIASTOLIC VOLUME: 108 ML
LEFT VENTRICLE END DIASTOLIC VOLUME APICAL 2 CHAMBER: 66.3 ML
LEFT VENTRICLE END DIASTOLIC VOLUME APICAL 4 CHAMBER: 41.4 ML
LEFT VENTRICLE END SYSTOLIC VOLUME APICAL 4 CHAMBER: 103 ML
LEFT VENTRICLE MASS INDEX: 137.3 G/M2
LEFT VENTRICLE SYSTOLIC VOLUME INDEX: 26.3 ML/M2
LEFT VENTRICLE SYSTOLIC VOLUME: 47 ML
LEFT VENTRICULAR INTERNAL DIMENSION IN DIASTOLE: 4.8 CM (ref 3.5–6)
LEFT VENTRICULAR MASS: 245.7 G
LEUKOCYTE ESTERASE UR QL STRIP: NEGATIVE
LV LATERAL E/E' RATIO: 17.7 M/S
LV SEPTAL E/E' RATIO: 20.7 M/S
LVED V (TEICH): 108 ML
LVES V (TEICH): 47.4 ML
LVOT MG: 2 MMHG
LVOT MV: 0.67 CM/S
LYMPHOCYTES # BLD AUTO: 1.72 K/UL (ref 1–4.8)
MCH RBC QN AUTO: 30 PG (ref 27–31)
MCHC RBC AUTO-ENTMCNC: 32.8 G/DL (ref 32–36)
MCV RBC AUTO: 92 FL (ref 82–98)
MV MEAN GRADIENT: 3 MMHG
MV PEAK A VEL: 0.61 M/S
MV PEAK E VEL: 1.24 M/S
MV PEAK GRADIENT: 6 MMHG
MV STENOSIS PRESSURE HALF TIME: 87 MS
MV VALVE AREA BY CONTINUITY EQUATION: 2.38 CM2
MV VALVE AREA P 1/2 METHOD: 2.53 CM2
NITRITE UR QL STRIP: NEGATIVE
NONHDLC SERPL-MCNC: 51 MG/DL
NUCLEATED RBC (/100WBC) (SMH): 0 /100 WBC
OHS CV RV/LV RATIO: 0.54 CM
PH UR STRIP: 7 [PH]
PISA MRMAX VEL: 5.66 M/S
PISA TR MAX VEL: 2.7 M/S
PLATELET # BLD AUTO: 199 K/UL (ref 150–450)
PMV BLD AUTO: 11.5 FL (ref 9.2–12.9)
POTASSIUM SERPL-SCNC: 3.5 MMOL/L (ref 3.5–5.1)
PROT UR QL STRIP: NEGATIVE
PV MV: 0.54 M/S
PV PEAK GRADIENT: 3 MMHG
PV PEAK VELOCITY: 0.81 M/S
RA PRESSURE ESTIMATED: 3 MMHG
RBC # BLD AUTO: 4.16 M/UL (ref 4–5.4)
RELATIVE EOSINOPHIL (SMH): 2.7 % (ref 0–8)
RELATIVE LYMPHOCYTE (SMH): 19.6 % (ref 18–48)
RELATIVE MONOCYTE (SMH): 6.6 % (ref 4–15)
RELATIVE NEUTROPHIL (SMH): 69.7 % (ref 38–73)
RIGHT ATRIUM VOLUME AREA LENGTH APICAL 4 CHAMBER: 42.4 ML
RIGHT VENTRICLE DIASTOLIC BASEL DIMENSION: 2.6 CM
RIGHT VENTRICULAR END-DIASTOLIC DIMENSION: 2.6 CM
RV TB RVSP: 6 MMHG
RV TISSUE DOPPLER FREE WALL SYSTOLIC VELOCITY 1 (APICAL 4 CHAMBER VIEW): 13.7 CM/S
SODIUM SERPL-SCNC: 141 MMOL/L (ref 136–145)
SP GR UR STRIP: 1
T4 FREE SERPL-MCNC: 0.98 NG/DL (ref 0.71–1.51)
TDI LATERAL: 0.07 M/S
TDI SEPTAL: 0.06 M/S
TDI: 0.07 M/S
TR MAX PG: 29 MMHG
TRICUSPID ANNULAR PLANE SYSTOLIC EXCURSION: 1 CM
TRIGL SERPL-MCNC: 55 MG/DL (ref 30–150)
TSH SERPL-ACNC: 5.94 UIU/ML (ref 0.34–5.6)
TV REST PULMONARY ARTERY PRESSURE: 32 MMHG
UROBILINOGEN UR STRIP-ACNC: NEGATIVE EU/DL
WBC # BLD AUTO: 8.76 K/UL (ref 3.9–12.7)
Z-SCORE OF LEFT VENTRICULAR DIMENSION IN END DIASTOLE: -0.3
Z-SCORE OF LEFT VENTRICULAR DIMENSION IN END SYSTOLE: 0.84

## 2025-04-24 PROCEDURE — 94761 N-INVAS EAR/PLS OXIMETRY MLT: CPT

## 2025-04-24 PROCEDURE — 97165 OT EVAL LOW COMPLEX 30 MIN: CPT

## 2025-04-24 PROCEDURE — 81003 URINALYSIS AUTO W/O SCOPE: CPT | Performed by: STUDENT IN AN ORGANIZED HEALTH CARE EDUCATION/TRAINING PROGRAM

## 2025-04-24 PROCEDURE — 85025 COMPLETE CBC W/AUTO DIFF WBC: CPT | Performed by: NURSE PRACTITIONER

## 2025-04-24 PROCEDURE — 84443 ASSAY THYROID STIM HORMONE: CPT | Performed by: NURSE PRACTITIONER

## 2025-04-24 PROCEDURE — 21400001 HC TELEMETRY ROOM

## 2025-04-24 PROCEDURE — 99900031 HC PATIENT EDUCATION (STAT)

## 2025-04-24 PROCEDURE — 94799 UNLISTED PULMONARY SVC/PX: CPT

## 2025-04-24 PROCEDURE — 94640 AIRWAY INHALATION TREATMENT: CPT

## 2025-04-24 PROCEDURE — 83036 HEMOGLOBIN GLYCOSYLATED A1C: CPT | Performed by: NURSE PRACTITIONER

## 2025-04-24 PROCEDURE — 25000003 PHARM REV CODE 250: Performed by: FAMILY MEDICINE

## 2025-04-24 PROCEDURE — 25000242 PHARM REV CODE 250 ALT 637 W/ HCPCS: Performed by: NURSE PRACTITIONER

## 2025-04-24 PROCEDURE — 80048 BASIC METABOLIC PNL TOTAL CA: CPT | Performed by: NURSE PRACTITIONER

## 2025-04-24 PROCEDURE — 93306 TTE W/DOPPLER COMPLETE: CPT

## 2025-04-24 PROCEDURE — 25000003 PHARM REV CODE 250: Performed by: STUDENT IN AN ORGANIZED HEALTH CARE EDUCATION/TRAINING PROGRAM

## 2025-04-24 PROCEDURE — 93306 TTE W/DOPPLER COMPLETE: CPT | Mod: 26,,, | Performed by: INTERNAL MEDICINE

## 2025-04-24 PROCEDURE — 25000003 PHARM REV CODE 250: Performed by: NURSE PRACTITIONER

## 2025-04-24 PROCEDURE — 84439 ASSAY OF FREE THYROXINE: CPT | Performed by: NURSE PRACTITIONER

## 2025-04-24 PROCEDURE — 99900035 HC TECH TIME PER 15 MIN (STAT)

## 2025-04-24 PROCEDURE — 63600175 PHARM REV CODE 636 W HCPCS: Performed by: FAMILY MEDICINE

## 2025-04-24 PROCEDURE — 80061 LIPID PANEL: CPT | Performed by: NURSE PRACTITIONER

## 2025-04-24 PROCEDURE — 36415 COLL VENOUS BLD VENIPUNCTURE: CPT | Performed by: NURSE PRACTITIONER

## 2025-04-24 PROCEDURE — 97535 SELF CARE MNGMENT TRAINING: CPT

## 2025-04-24 RX ORDER — FUROSEMIDE 40 MG/1
40 TABLET ORAL DAILY
Status: DISCONTINUED | OUTPATIENT
Start: 2025-04-24 | End: 2025-04-24

## 2025-04-24 RX ORDER — FUROSEMIDE 40 MG/1
40 TABLET ORAL DAILY
Status: DISCONTINUED | OUTPATIENT
Start: 2025-04-25 | End: 2025-04-25

## 2025-04-24 RX ORDER — FUROSEMIDE 40 MG/1
40 TABLET ORAL DAILY
Status: DISCONTINUED | OUTPATIENT
Start: 2025-04-25 | End: 2025-04-24

## 2025-04-24 RX ORDER — ASPIRIN 81 MG/1
81 TABLET ORAL DAILY
Status: DISCONTINUED | OUTPATIENT
Start: 2025-04-25 | End: 2025-04-26 | Stop reason: HOSPADM

## 2025-04-24 RX ORDER — SODIUM,POTASSIUM PHOSPHATES 280-250MG
2 POWDER IN PACKET (EA) ORAL
Status: DISCONTINUED | OUTPATIENT
Start: 2025-04-24 | End: 2025-04-26 | Stop reason: HOSPADM

## 2025-04-24 RX ORDER — DILTIAZEM HYDROCHLORIDE 30 MG/1
30 TABLET, FILM COATED ORAL EVERY 6 HOURS
Status: DISCONTINUED | OUTPATIENT
Start: 2025-04-24 | End: 2025-04-26 | Stop reason: HOSPADM

## 2025-04-24 RX ORDER — DILTIAZEM HYDROCHLORIDE 5 MG/ML
0.25 INJECTION INTRAVENOUS ONCE
Status: COMPLETED | OUTPATIENT
Start: 2025-04-24 | End: 2025-04-24

## 2025-04-24 RX ORDER — LANOLIN ALCOHOL/MO/W.PET/CERES
800 CREAM (GRAM) TOPICAL
Status: DISCONTINUED | OUTPATIENT
Start: 2025-04-24 | End: 2025-04-26 | Stop reason: HOSPADM

## 2025-04-24 RX ORDER — FUROSEMIDE 10 MG/ML
40 INJECTION INTRAMUSCULAR; INTRAVENOUS DAILY
Status: DISCONTINUED | OUTPATIENT
Start: 2025-04-24 | End: 2025-04-24

## 2025-04-24 RX ORDER — DILTIAZEM HYDROCHLORIDE 5 MG/ML
INJECTION INTRAVENOUS
Status: DISPENSED
Start: 2025-04-24 | End: 2025-04-24

## 2025-04-24 RX ADMIN — CARBOXYMETHYLCELLULOSE SODIUM 2 DROP: 0.5 SOLUTION, GEL FORMING / DROPS OPHTHALMIC at 05:04

## 2025-04-24 RX ADMIN — CARBOXYMETHYLCELLULOSE SODIUM 2 DROP: 0.5 SOLUTION, GEL FORMING / DROPS OPHTHALMIC at 02:04

## 2025-04-24 RX ADMIN — DILTIAZEM HYDROCHLORIDE 30 MG: 30 TABLET, FILM COATED ORAL at 12:04

## 2025-04-24 RX ADMIN — BUDESONIDE INHALATION 0.5 MG: 0.5 SUSPENSION RESPIRATORY (INHALATION) at 07:04

## 2025-04-24 RX ADMIN — QUETIAPINE 25 MG: 25 TABLET ORAL at 08:04

## 2025-04-24 RX ADMIN — POLYETHYLENE GLYCOL 3350 17 G: 17 POWDER, FOR SOLUTION ORAL at 09:04

## 2025-04-24 RX ADMIN — ISOSORBIDE DINITRATE 30 MG: 10 TABLET ORAL at 09:04

## 2025-04-24 RX ADMIN — ATORVASTATIN CALCIUM 10 MG: 10 TABLET, FILM COATED ORAL at 08:04

## 2025-04-24 RX ADMIN — CARBOXYMETHYLCELLULOSE SODIUM 2 DROP: 0.5 SOLUTION, GEL FORMING / DROPS OPHTHALMIC at 09:04

## 2025-04-24 RX ADMIN — DILTIAZEM HYDROCHLORIDE 18.5 MG: 5 INJECTION, SOLUTION INTRAVENOUS at 11:04

## 2025-04-24 RX ADMIN — TRAMADOL HYDROCHLORIDE 50 MG: 50 TABLET, COATED ORAL at 08:04

## 2025-04-24 RX ADMIN — METOROPROLOL TARTRATE 5 MG: 5 INJECTION, SOLUTION INTRAVENOUS at 12:04

## 2025-04-24 RX ADMIN — POTASSIUM BICARBONATE 50 MEQ: 978 TABLET, EFFERVESCENT ORAL at 06:04

## 2025-04-24 RX ADMIN — BUDESONIDE INHALATION 0.5 MG: 0.5 SUSPENSION RESPIRATORY (INHALATION) at 10:04

## 2025-04-24 RX ADMIN — DILTIAZEM HYDROCHLORIDE 30 MG: 30 TABLET, FILM COATED ORAL at 05:04

## 2025-04-24 RX ADMIN — FUROSEMIDE 40 MG: 10 INJECTION, SOLUTION INTRAMUSCULAR; INTRAVENOUS at 12:04

## 2025-04-24 RX ADMIN — CARBOXYMETHYLCELLULOSE SODIUM 2 DROP: 0.5 SOLUTION, GEL FORMING / DROPS OPHTHALMIC at 06:04

## 2025-04-24 RX ADMIN — LEVOTHYROXINE SODIUM 50 MCG: 0.03 TABLET ORAL at 06:04

## 2025-04-24 NOTE — PROGRESS NOTES
Attempted echo at 1045, HR was too high.    Second attempt for echo at 12:57pm, patient just received lunch tray and was getting assistance eating lunch. We will try back.

## 2025-04-24 NOTE — ED PROVIDER NOTES
Encounter Date: 4/23/2025       History     Chief Complaint   Patient presents with    Leg Swelling     BILAT LOWER X FEW DAYS, DENIES SOB     HPI  Patient is a 89-year-old female with PMH of AFib, HTN who presents with 5 day history of increasing bilateral lower leg swelling.  Patient denies any orthopnea, shortness of breath, chest pain, cough.  Of note on Easter Sunday she lost balance while transferring from marker to the couch, she fell and hit the left side of her head, no LOC. patient denies any weakness or numbness.  Denies history of heart failure.  Denies any fever, nausea vomiting.    Review of patient's allergies indicates:   Allergen Reactions    Topiramate Nausea And Vomiting     Past Medical History:   Diagnosis Date    Hypertension     Macula lutea degeneration     Paroxysmal atrial fibrillation     Squamous cell carcinoma of skin      Past Surgical History:   Procedure Laterality Date    APPENDECTOMY      BACK SURGERY  1975    HYSTERECTOMY      URETEROSCOPIC REMOVAL OF URETERIC CALCULUS Left 5/28/2024    Procedure: REMOVAL, CALCULUS, URETER, URETEROSCOPIC;  Surgeon: Juhi Mosher Jr., MD;  Location: [facility];  Service: Urology;  Laterality: Left;     Family History   Problem Relation Name Age of Onset    Melanoma Neg Hx      Psoriasis Neg Hx      Lupus Neg Hx      Eczema Neg Hx       Social History[1]  Review of Systems  Pertinent ROS in the HPI  Physical Exam     Initial Vitals [04/23/25 1820]   BP Pulse Resp Temp SpO2   (!) 157/99 (!) 111 20 98.7 °F (37.1 °C) 97 %      MAP       --         Physical Exam    Constitutional: She appears well-developed.   HENT:   Head: Normocephalic and atraumatic.   Eyes: Pupils are equal, round, and reactive to light.   Neck:   Normal range of motion.  Cardiovascular:  Normal rate and regular rhythm.           Pulmonary/Chest:   Lungs clear to auscultation bilaterally no wheezes or rales, not tachypneic   Abdominal: Abdomen is soft. She exhibits no distension.  There is no abdominal tenderness. There is no rebound.   Musculoskeletal:      Cervical back: Normal range of motion.      Comments: 2+ pitting bilateral pedal edema extending to the knees     Neurological: She is alert and oriented to person, place, and time.   Skin: Skin is warm and dry. Capillary refill takes less than 2 seconds.         ED Course   Procedures  Labs Reviewed   COMPREHENSIVE METABOLIC PANEL - Abnormal       Result Value    Sodium 139      Potassium 3.5      Chloride 109      CO2 21 (*)     Glucose 135 (*)     BUN 18      Creatinine 0.8      Calcium 9.0      Protein Total 6.0      Albumin 3.7      Bilirubin Total 0.8      ALP 84      AST 16      ALT 14      Anion Gap 9      eGFR >60     TROPONIN I HIGH SENSITIVITY - Abnormal    Troponin High Sensitive 30.9 (*)    B-TYPE NATRIURETIC PEPTIDE - Abnormal     (*)    CBC WITH DIFFERENTIAL - Abnormal    WBC 7.00      RBC 3.66 (*)     Hgb 10.9 (*)     Hct 33.3 (*)     MCV 91      MCH 29.8      MCHC 32.7      RDW 14.6 (*)     Platelet Count 189      MPV 10.9      Nucleated RBC 0      Neut % 63.7      Lymph % 24.0      Mono % 7.4      Eos % 3.9      Basophil % 0.7      Imm Grans % 0.3      Neut # 4.5      Lymph # 1.68      Mono # 0.52      Eos # 0.27      Baso # 0.05      Imm Grans # 0.02     MAGNESIUM - Normal    Magnesium 2.0     CBC W/ AUTO DIFFERENTIAL    Narrative:     The following orders were created for panel order CBC auto differential.  Procedure                               Abnormality         Status                     ---------                               -----------         ------                     CBC with Differential[[id number]]       Abnormal            Final result                 Please view results for these tests on the individual orders.   URINALYSIS, REFLEX TO URINE CULTURE     EKG Readings: (Independently Interpreted)   AFib, rate of 108, left axis deviation, no ST elevations or depression, no STEMI       Imaging Results               CT Cervical Spine Without Contrast (In process)  Result time 04/23/25 22:27:47                     CT Head Without Contrast (Final result)  Result time 04/23/25 21:21:55      Final result by Bela Villegas MD (04/23/25 21:21:55)                   Impression:      No acute findings.      Electronically signed by: Bela Villegas  Date:    04/23/2025  Time:    21:21               Narrative:    EXAMINATION:  CT HEAD WITHOUT CONTRAST    CLINICAL HISTORY:  Head trauma, minor (Age >= 65y);    TECHNIQUE:  Low dose axial images were obtained through the head.  Coronal and sagittal reformations were also performed. Contrast was not administered.    COMPARISON:  06/27/2024    FINDINGS:  Intracranial compartment:    Ventricles and sulci remain prominent no extra-axial blood or fluid collections.    Mild to moderate chronic microvascular ischemia.  No parenchymal mass, hemorrhage, edema or major vascular distribution infarct.    Skull/extracranial contents (limited evaluation): No fracture. Mastoid air cells and paranasal sinuses are essentially clear.                                       US Lower Extremity Veins Bilateral (Final result)  Result time 04/23/25 20:28:37      Final result by Bela Villegas MD (04/23/25 20:28:37)                   Impression:      No evidence of deep venous thrombosis in either lower extremity.      Electronically signed by: Bela Villegas  Date:    04/23/2025  Time:    20:28               Narrative:    EXAMINATION:  US LOWER EXTREMITY VEINS BILATERAL    CLINICAL HISTORY:  Other specified soft tissue disorders    TECHNIQUE:  Duplex and color flow Doppler and dynamic compression was performed of the bilateral lower extremity veins was performed.    COMPARISON:  None    FINDINGS:  Right thigh veins: The common femoral, femoral, popliteal, upper greater saphenous, and deep femoral veins are patent and free of thrombus. The veins are normally compressible and have  normal phasic flow and augmentation response.    Right calf veins: The visualized calf veins are patent.  Posterior tibial vein not visualized.    Left thigh veins: The common femoral, femoral, popliteal, upper greater saphenous, and deep femoral veins are patent and free of thrombus. The veins are normally compressible and have normal phasic flow and augmentation response.    Left calf veins: The visualized calf veins are patent.  Posterior tibial vein not visualized.    Miscellaneous: Lower extremity edema.                                       X-Ray Chest AP Portable (Final result)  Result time 04/23/25 21:07:57      Final result by Bela Villegas MD (04/23/25 21:07:57)                   Impression:      Enlarged cardiac silhouette with pulmonary vascular congestion.  Bilateral pleural effusions, right greater than left.  Mild bilateral multifocal airspace disease.      Electronically signed by: Bela Villegas  Date:    04/23/2025  Time:    21:07               Narrative:    EXAMINATION:  XR CHEST AP PORTABLE    CLINICAL HISTORY:  Leg Swelling;    TECHNIQUE:  Single frontal view of the chest was performed.    COMPARISON:  None    FINDINGS:  See below                                       Medications   furosemide injection 40 mg (40 mg Intravenous Given 4/23/25 2144)     Medical Decision Making  [facility]  Patient is a 89-year-old female who presents with a day history of bilateral lower leg swelling.  Patient tachycardic.  Considered DVT, however ultrasound was negative.  Concern for CHF exacerbation, BNP is elevated.  Troponin slightly elevated at 30 however she has priors in the 100s from 9 months ago.  No active chest pain and EKG nonischemic, lower concern for ACS.  She is not requiring supplemental oxygen, lungs are clear.  A CMP and CBC with no major abnormalities.  Based on her fall on Easter Sunday and her age CT head was ordered which showed no intracranial hemorrhage or acute fracture.  CT  C-spine still pending.  Anticipate admission to medicine or new onset CHF exacerbation.    Shon Kerr, PGY3  Emergency Medicine      Problems Addressed:  Acute on chronic congestive heart failure, unspecified heart failure type: acute illness or injury  Chest pain: acute illness or injury  Leg swelling: acute illness or injury  Traumatic injury of head, initial encounter: acute illness or injury    Amount and/or Complexity of Data Reviewed  Labs: ordered. Decision-making details documented in ED Course.  Radiology: ordered and independent interpretation performed. Decision-making details documented in ED Course.  ECG/medicine tests: ordered and independent interpretation performed. Decision-making details documented in ED Course.    Risk  OTC drugs.  Prescription drug management.  Decision regarding hospitalization.                                      Clinical Impression:  Final diagnoses:  [R07.9] Chest pain  [M79.89] Leg swelling  [I50.9] Acute on chronic congestive heart failure, unspecified heart failure type (Primary)  [S09.90XA] Traumatic injury of head, initial encounter                     [1]   Social History  Tobacco Use    Smoking status: Former     Current packs/day: 1.00     Average packs/day: 1 pack/day for 20.0 years (20.0 ttl pk-yrs)     Types: Cigarettes    Smokeless tobacco: Never   Substance Use Topics    Alcohol use: No    Drug use: No        Shon Kerr MD  Resident  04/23/25 [id number]

## 2025-04-24 NOTE — HOSPITAL COURSE
Patient with history of hypertension, AFib not on any anticoagulation due to fall risk came in with lower extremity swelling with elevated BNP and admitted for acute on chronic HF.   Patient was given IV Lasix and transitioned to oral.  Discontinued home amlodipine.  Echo ordered.  For a fib with RVR, patient was given diltiazem push followed by oral diltiazem.  Pt refused PT/OT. Furosemide was changed to prn .  Discussed with patient and daughters that she is a high likelihood of recurrent admission.  Recommended to follow-up with PCP and Cardiology

## 2025-04-24 NOTE — ASSESSMENT & PLAN NOTE
Patient has new onset heart failure that is Acute. On presentation their CHF was . Most recent BNP and echo results are listed below.  Recent Labs     04/23/25  1913   *     Latest ECHO  Results for orders placed during the hospital encounter of 07/01/24    Echo    Interpretation Summary    Left Ventricle: The left ventricle is normal in size. Moderately increased wall thickness. There is concentric hypertrophy. There is normal systolic function with a visually estimated ejection fraction of 60 - 65%. Grade I diastolic dysfunction.    Right Ventricle: Normal right ventricular cavity size. Systolic function is normal.    Left Atrium: Left atrium is severely dilated.    Aortic Valve: There is moderate aortic valve sclerosis. Aortic valve area by velocity is 1.58 cm². Aortic valve peak velocity is 2.01 m/s. Mean gradient is 9 mmHg. Aortic Valve peak gradient is 16 mmHg.    Mitral Valve: There is moderate mitral annular calcification present. There is mild regurgitation.    Tricuspid Valve: There is mild to moderate regurgitation. The estimated PA systolic pressure is at least 31 mmHg.    Chest Xray:   Enlarged cardiac silhouette with pulmonary vascular congestion.  Bilateral pleural effusions, right greater than left.  Mild bilateral multifocal airspace disease.       Electronically signed by: Bela Villegas   Date: 04/23/2025   Time: 21:07     Current Heart Failure Medications  furosemide injection 40 mg, Every 12 hours, Intravenous  isosorbide dinitrate tablet 30 mg, Daily, Oral    Plan  - Monitor strict I&Os and daily weights.    - Place on telemetry  - Low sodium diet  - Place on fluid restriction of 1.5 L.   - Cardiology has not been consulted  IV diuresis BID.  Echo ordered

## 2025-04-24 NOTE — SUBJECTIVE & OBJECTIVE
Past Medical History:   Diagnosis Date    Hypertension     Macula lutea degeneration     Paroxysmal atrial fibrillation     Squamous cell carcinoma of skin        Past Surgical History:   Procedure Laterality Date    APPENDECTOMY      BACK SURGERY  1975    HYSTERECTOMY      URETEROSCOPIC REMOVAL OF URETERIC CALCULUS Left 5/28/2024    Procedure: REMOVAL, CALCULUS, URETER, URETEROSCOPIC;  Surgeon: Juhi Mosher Jr., MD;  Location: [facility];  Service: Urology;  Laterality: Left;       Review of patient's allergies indicates:   Allergen Reactions    Topiramate Nausea And Vomiting       Current Facility-Administered Medications on File Prior to Encounter   Medication    triamcinolone acetonide injection 40 mg     Current Outpatient Medications on File Prior to Encounter   Medication Sig    amLODIPine (NORVASC) 2.5 MG tablet Take 2.5 mg by mouth.    aspirin (ECOTRIN) 81 MG EC tablet Take 162 mg by mouth once daily.    atorvastatin (LIPITOR) 10 MG tablet Take 10 mg by mouth once daily.    budesonide (PULMICORT) 0.5 mg/2 mL nebulizer solution Take 2 mLs (0.5 mg total) by nebulization 2 (two) times daily. Controller    isosorbide dinitrate (ISORDIL) 30 MG Tab Take 30 mg by mouth once daily.     levothyroxine (SYNTHROID) 50 MCG tablet Take 1 tablet (50 mcg total) by mouth every morning.    meclizine (ANTIVERT) 12.5 mg tablet Take 2 tablets by mouth Daily.    pantoprazole (PROTONIX) 40 MG tablet Take 1 tablet by mouth daily as needed (Heartburn).    polyethylene glycol (GLYCOLAX) 17 gram/dose powder Take 17 g by mouth every evening.    QUEtiapine (SEROQUEL) 25 MG Tab Take 1 tablet (25 mg total) by mouth every evening.    RESTASIS 0.05 % ophthalmic emulsion 1 drop.    topiramate (TOPAMAX) 25 MG tablet Take 25 mg by mouth once daily.     traMADoL (ULTRAM) 50 mg tablet Take 50 mg by mouth every 12 (twelve) hours as needed.     Family History    None       Tobacco Use    Smoking status: Former     Current packs/day: 1.00      Average packs/day: 1 pack/day for 20.0 years (20.0 ttl pk-yrs)     Types: Cigarettes    Smokeless tobacco: Never   Substance and Sexual Activity    Alcohol use: No    Drug use: No    Sexual activity: Not Currently     Review of Systems   Constitutional: Negative.         Fall   HENT: Negative.     Cardiovascular:  Positive for leg swelling. Negative for chest pain and palpitations.   Gastrointestinal: Negative.    Genitourinary: Negative.    Musculoskeletal: Negative.    Skin: Negative.    Neurological: Negative.    Psychiatric/Behavioral: Negative.       Objective:     Vital Signs (Most Recent):  Temp: 98.7 °F (37.1 °C) (25)  Pulse: (!) 111 (25)  Resp: 20 (25)  BP: (!) 157/99 (25)  SpO2: 97 % (25) Vital Signs (24h Range):  Temp:  [98.7 °F (37.1 °C)] 98.7 °F (37.1 °C)  Pulse:  [111] 111  Resp:  [20] 20  SpO2:  [97 %] 97 %  BP: (157)/(99) 157/99     Weight: 71.2 kg (157 lb)  Body mass index is 26.95 kg/m².     Physical Exam  Vitals reviewed.   Constitutional:       General: She is not in acute distress.     Appearance: Normal appearance. She is not ill-appearing.   HENT:      Head: Normocephalic and atraumatic.      Mouth/Throat:      Mouth: Mucous membranes are moist.   Eyes:      Pupils: Pupils are equal, round, and reactive to light.   Cardiovascular:      Rate and Rhythm: Tachycardia present. Rhythm irregularly irregular.   Pulmonary:      Effort: Pulmonary effort is normal. No respiratory distress.      Breath sounds: No rales.   Abdominal:      General: Bowel sounds are normal.      Tenderness: There is no abdominal tenderness.   Musculoskeletal:         General: Swelling present.      Cervical back: Normal range of motion and neck supple.      Right lower le+ Pitting Edema present.      Left lower le+ Pitting Edema present.   Skin:     General: Skin is warm.      Capillary Refill: Capillary refill takes less than 2 seconds.   Neurological:       "Mental Status: She is alert. Mental status is at baseline.   Psychiatric:         Mood and Affect: Mood normal.              CRANIAL NERVES     CN III, IV, VI   Pupils are equal, round, and reactive to light.       Significant Labs: All pertinent labs within the past 24 hours have been reviewed.  CBC:   Recent Labs   Lab 04/23/25 1913   WBC 7.00   HGB 10.9*   HCT 33.3*        CMP:   Recent Labs   Lab 04/23/25 1913      K 3.5   CO2 21*   BUN 18   CREATININE 0.8   CALCIUM 9.0   ALBUMIN 3.7   BILITOT 0.8   ALKPHOS 84   AST 16   ALT 14     Cardiac Markers:   Recent Labs   Lab 04/23/25 1913   *     Magnesium:   Recent Labs   Lab 04/23/25 1913   MG 2.0     Troponin: No results for input(s): "TROPONINI", "TROPONINIHS" in the last 48 hours.  TSH: No results for input(s): "TSH" in the last 4320 hours.  Urine Studies: No results for input(s): "COLORU", "APPEARANCEUA", "PHUR", "SPECGRAV", "PROTEINUA", "GLUCUA", "KETONESU", "BILIRUBINUA", "OCCULTUA", "NITRITE", "UROBILINOGEN", "LEUKOCYTESUR", "RBCUA", "WBCUA", "BACTERIA", "SQUAMEPITHEL", "HYALINECASTS" in the last 48 hours.    Invalid input(s): "WRIGHTSUR"    Significant Imaging: I have reviewed all pertinent imaging results/findings within the past 24 hours.  Imaging Results              CT Cervical Spine Without Contrast (Final result)  Result time 04/23/25 22:33:52      Final result by Bela Villegas MD (04/23/25 22:33:52)                   Impression:      No acute fracture.      Electronically signed by: Bela Villegas  Date:    04/23/2025  Time:    22:33               Narrative:    EXAMINATION:  CT CERVICAL SPINE WITHOUT CONTRAST    CLINICAL HISTORY:  Neck trauma (Age >= 65y);    TECHNIQUE:  Low dose axial images, sagittal and coronal reformations were performed though the cervical spine.  Contrast was not administered.    COMPARISON:  None    FINDINGS:  Osteopenia.  No acute fracture.  Minimal anterolisthesis C4-5 and C7-T1.  Moderate " mid to lower cervical disc and uncovertebral degeneration.  Moderate multilevel facet arthrosis.  Right pleural effusion                                       CT Head Without Contrast (Final result)  Result time 04/23/25 21:21:55      Final result by Bela Villegas MD (04/23/25 21:21:55)                   Impression:      No acute findings.      Electronically signed by: Bela Villegas  Date:    04/23/2025  Time:    21:21               Narrative:    EXAMINATION:  CT HEAD WITHOUT CONTRAST    CLINICAL HISTORY:  Head trauma, minor (Age >= 65y);    TECHNIQUE:  Low dose axial images were obtained through the head.  Coronal and sagittal reformations were also performed. Contrast was not administered.    COMPARISON:  06/27/2024    FINDINGS:  Intracranial compartment:    Ventricles and sulci remain prominent no extra-axial blood or fluid collections.    Mild to moderate chronic microvascular ischemia.  No parenchymal mass, hemorrhage, edema or major vascular distribution infarct.    Skull/extracranial contents (limited evaluation): No fracture. Mastoid air cells and paranasal sinuses are essentially clear.                                       US Lower Extremity Veins Bilateral (Final result)  Result time 04/23/25 20:28:37      Final result by Bela Villegas MD (04/23/25 20:28:37)                   Impression:      No evidence of deep venous thrombosis in either lower extremity.      Electronically signed by: Bela Villegas  Date:    04/23/2025  Time:    20:28               Narrative:    EXAMINATION:  US LOWER EXTREMITY VEINS BILATERAL    CLINICAL HISTORY:  Other specified soft tissue disorders    TECHNIQUE:  Duplex and color flow Doppler and dynamic compression was performed of the bilateral lower extremity veins was performed.    COMPARISON:  None    FINDINGS:  Right thigh veins: The common femoral, femoral, popliteal, upper greater saphenous, and deep femoral veins are patent and free of thrombus.  The veins are normally compressible and have normal phasic flow and augmentation response.    Right calf veins: The visualized calf veins are patent.  Posterior tibial vein not visualized.    Left thigh veins: The common femoral, femoral, popliteal, upper greater saphenous, and deep femoral veins are patent and free of thrombus. The veins are normally compressible and have normal phasic flow and augmentation response.    Left calf veins: The visualized calf veins are patent.  Posterior tibial vein not visualized.    Miscellaneous: Lower extremity edema.                                       X-Ray Chest AP Portable (Final result)  Result time 04/23/25 21:07:57      Final result by Bela Villegas MD (04/23/25 21:07:57)                   Impression:      Enlarged cardiac silhouette with pulmonary vascular congestion.  Bilateral pleural effusions, right greater than left.  Mild bilateral multifocal airspace disease.      Electronically signed by: Bela Villegas  Date:    04/23/2025  Time:    21:07               Narrative:    EXAMINATION:  XR CHEST AP PORTABLE    CLINICAL HISTORY:  Leg Swelling;    TECHNIQUE:  Single frontal view of the chest was performed.    COMPARISON:  None    FINDINGS:  See below

## 2025-04-24 NOTE — ASSESSMENT & PLAN NOTE
Patient's blood pressure range in the last 24 hours was: BP  Min: 157/99  Max: 157/99.The patient's inpatient anti-hypertensive regimen is listed below:  Current Antihypertensives  furosemide injection 40 mg, Every 12 hours, Intravenous  metoprolol injection 5 mg, Once, Intravenous  amLODIPine tablet 2.5 mg, Daily, Oral  isosorbide dinitrate tablet 30 mg, Daily, Oral    Plan  - BP is controlled, no changes needed to their regimen

## 2025-04-24 NOTE — ASSESSMENT & PLAN NOTE
Patient has paroxysmal (<7 days) atrial fibrillation. Patient is currently in atrial fibrillation. [id number] Score: 4. The patients heart rate in the last 24 hours is as follows:  Pulse  Min: 105  Max: 126     Antiarrhythmics  diltiaZEM (CARDIZEM) 5 mg/mL injection, ,   diltiaZEM tablet 30 mg, Every 6 hours, OralMetoprolol 5 mg IV x 1 dose.     Anticoagulants           Plan  - Replete lytes with a goal of K>4, Mg >2  - Patient is not anticoagulated due to fall risk   - Patient's afib is currently uncontrolled. Will continue current treatment

## 2025-04-24 NOTE — SUBJECTIVE & OBJECTIVE
Interval History:  Patient said she is feeling much better and she would like to go home soon.  For daily update refer to hospital course    Review of Systems   All other systems reviewed and are negative.    Objective:     Vital Signs (Most Recent):  Temp: 97.9 °F (36.6 °C) (04/24/25 1107)  Pulse: (!) 113 (04/24/25 1101)  Resp: (!) 22 (04/24/25 1301)  BP: 115/76 (04/24/25 1301)  SpO2: 96 % (04/24/25 1301) Vital Signs (24h Range):  Temp:  [97.3 °F (36.3 °C)-98.7 °F (37.1 °C)] 97.9 °F (36.6 °C)  Pulse:  [105-126] 113  Resp:  [15-22] 22  SpO2:  [94 %-97 %] 96 %  BP: (115-160)/() 115/76     Weight: 74 kg (163 lb 2.3 oz)  Body mass index is 28 kg/m².    Intake/Output Summary (Last 24 hours) at 4/24/2025 1738  Last data filed at 4/24/2025 1704  Gross per 24 hour   Intake 580 ml   Output 2600 ml   Net -2020 ml         Physical Exam  Cardiovascular:      Rate and Rhythm: Tachycardia present.      Pulses: Normal pulses.      Heart sounds: Murmur heard.   Pulmonary:      Breath sounds: No wheezing or rales.   Musculoskeletal:         General: Swelling present.   Neurological:      Mental Status: She is alert and oriented to person, place, and time.               Significant Labs: All pertinent labs within the past 24 hours have been reviewed.    Significant Imaging: I have reviewed all pertinent imaging results/findings within the past 24 hours.

## 2025-04-24 NOTE — PLAN OF CARE
[facility]  Initial Discharge Assessment       Primary Care Provider: Se Rios MD    Admission Diagnosis: Acute congestive heart failure [I50.9]  Acute on chronic congestive heart failure, unspecified heart failure type [I50.9]    Admission Date: 4/23/2025  Expected Discharge Date:     CM met with patient bedside. Patient's daughter Court was present in the room assisting with assessment. CM verified demographics, insurance, supports, and PCP.  Patient's daughter reported they live together in patient's home. Patient is legally blind, Court patient's daughter provides assistance and takes her to doctors appts and where she needs to go. Patient has home health with [facility]. CM assessed patient's needs. Patient is able to complete ADLs with rollator and bathroom DME. Patient verified at home DME: rollator, shower bench, nebulizer.  Patient verified No Dialysis, No Blood Thinners, and No Oxygen.     Patient verified pharmacy of choice[facility]   Patient confirmed Court Pringle [phone]  will be source of transportation at the time of discharge.     Transition of Care Barriers: None, Unisured    [facility] [employer] [facility] / Plan: New [employer] CHOICES / Product Type: Medicare Advantage /     Extended Emergency Contact Information  Primary Emergency Contact: Court Pringle  Address: [address] [place] [address]  Home Phone: [phone]  Mobile Phone: [phone]  Relation: Daughter  Preferred language: English   needed? No    Discharge Plan A: Home Health  Discharge Plan B: Home with family      [facility] [address] [facility] [place] [facility] [address] [facility] [address] [address]  Phone: [phone] Fax: [phone]    [facility] [place] - [address]  [address]  [place] [address]  Phone: [phone] Fax: [phone]      Initial  Assessment (most recent)       Adult Discharge Assessment - 04/24/25 1008          Discharge Assessment    Assessment Type Discharge Planning Assessment     Confirmed/corrected address, phone number and insurance Yes     Confirmed Demographics Correct on Facesheet     Source of Information patient;family     When was your last doctors appointment? 02/12/25     Communicated ARGELIA with patient/caregiver Yes     Reason For Admission CHF     People in Home child(shanta), adult     Facility Arrived From: home     Do you expect to return to your current living situation? Yes     Do you have help at home or someone to help you manage your care at home? Yes     Who are your caregiver(s) and their phone number(s)? Court Pringle [phone]     Prior to hospitilization cognitive status: Alert/Oriented;No Deficits     Current cognitive status: Alert/Oriented;No Deficits     Walking or Climbing Stairs Difficulty yes     Walking or Climbing Stairs ambulation difficulty, requires equipment     Mobility Management uses rollator in home     Dressing/Bathing Difficulty yes     Dressing/Bathing bathing difficulty, requires equipment     Dressing/Bathing Management uses shower bench     Equipment Currently Used at Home bath bench;rollator;nebulizer     Readmission within 30 days? No     Patient currently being followed by outpatient case management? No     Do you currently have service(s) that help you manage your care at home? Yes     Name and Contact number of agency [facility] [phone]     Is the pt/caregiver preference to resume services with current agency Yes     Do you take prescription medications? Yes     Do you have prescription coverage? Yes     Coverage [employer]     Do you have any problems affording any of your prescribed medications? No     Is the patient taking medications as prescribed? yes     Who is going to help you get home at discharge? Court Pringle [phone]     How do you get to doctors appointments?  family or friend will provide     Are you on dialysis? No     Do you take coumadin? No     Discharge Plan A Home Health     Discharge Plan B Home with family     DME Needed Upon Discharge  none     Discharge Plan discussed with: Patient;Adult children     Transition of Care Barriers None;Unisured

## 2025-04-24 NOTE — HPI
89 year old female with a past medical history of hypertension, atrial fibrillation not anticoagulated due to fall risk who presents to the emergency room with reports of both lower extremity swelling for the last 5 days. Reports on Easter Sunday she lost her balance tranferring from walker to couch, fell and hit the left side of her head. Denies LOC.     In ER, tachycardic at 111, Troponin I high sensitivity 30.9, , H/H with anemia stable. EKG in atrial fibrillation, No ST elevation or depression. CT head nonacute, CT C spine pending, US BLE: negative for DVT, Chest xray with enlarged cardiac silhouette with pulmonary vascular congestion. Bilateral pleural effusions right greater than left, and mild bilateral multifocal airspace disease.   IV lasix 40 mg given in ER    Admit to hospital medicine for new onset congestive heart failure.

## 2025-04-24 NOTE — PROGRESS NOTES
Pharmacist Renal Adjustment Note    Erich Encarnacion is a 89 y.o. female being treated with Enoxaparin.     Patient Data:    Vital Signs (Most Recent):  Temp: 98.7 °F (37.1 °C) (04/23/25 1820)  Pulse: (!) 111 (04/23/25 1820)  Resp: 20 (04/23/25 1820)  BP: (!) 157/99 (04/23/25 1820)  SpO2: 97 % (04/23/25 1820) Vital Signs (72h Range):  Temp:  [98.7 °F (37.1 °C)]   Pulse:  [111]   Resp:  [20]   BP: (157)/(99)   SpO2:  [97 %]      Recent Labs   Lab 04/23/25 1913   CREATININE 0.8     Serum creatinine: 0.8 mg/dL 04/23/25 1913  Estimated creatinine clearance: 38 mL/min  Wt: 71.2 kg (157 lb)    Enoxaparin 70 mg every 24 hours will be changed to Enoxaparin 70 mg every 12 hours per pharmacy protocol.     Pharmacist's Name: Darlene Villarreal  Pharmacist's Extension: [phone]

## 2025-04-24 NOTE — PROGRESS NOTES
[facility] Medicine  Progress Note    Patient Name: Erich Encarnacion  MRN: [id number]  Patient Class: IP- Inpatient   Admission Date: 4/23/2025  Length of Stay: 1 days  Attending Physician: Sharron Mendoza DO  Primary Care Provider: Se Rios MD        Subjective     Principal Problem:Congestive heart failure        HPI:  89 year old female with a past medical history of hypertension, atrial fibrillation not anticoagulated due to fall risk who presents to the emergency room with reports of both lower extremity swelling for the last 5 days. Reports on Easter Sunday she lost her balance tranferring from walker to couch, fell and hit the left side of her head. Denies LOC.     In ER, tachycardic at 111, Troponin I high sensitivity 30.9, , H/H with anemia stable. EKG in atrial fibrillation, No ST elevation or depression. CT head nonacute, CT C spine pending, US BLE: negative for DVT, Chest xray with enlarged cardiac silhouette with pulmonary vascular congestion. Bilateral pleural effusions right greater than left, and mild bilateral multifocal airspace disease.   IV lasix 40 mg given in ER    Admit to hospital medicine for new onset congestive heart failure.     Overview/Hospital Course:  Patient with history of hypertension, AFib not on any anticoagulation due to fall risk came in with lower extremity swelling with elevated BNP and admitted for acute on chronic HF.   Patient was given IV Lasix and transitioned to oral.  Discontinued home amlodipine.  Echo ordered.  For a fib with RVR, patient was given diltiazem push followed by oral diltiazem.    Interval History:  Patient said she is feeling much better and she would like to go home soon.  For daily update refer to hospital course    Review of Systems   All other systems reviewed and are negative.    Objective:     Vital Signs (Most Recent):  Temp: 97.9 °F (36.6 °C) (04/24/25 1107)  Pulse: (!) 113 (04/24/25 1101)  Resp: (!) 22  (04/24/25 1301)  BP: 115/76 (04/24/25 1301)  SpO2: 96 % (04/24/25 1301) Vital Signs (24h Range):  Temp:  [97.3 °F (36.3 °C)-98.7 °F (37.1 °C)] 97.9 °F (36.6 °C)  Pulse:  [105-126] 113  Resp:  [15-22] 22  SpO2:  [94 %-97 %] 96 %  BP: (115-160)/() 115/76     Weight: 74 kg (163 lb 2.3 oz)  Body mass index is 28 kg/m².    Intake/Output Summary (Last 24 hours) at 4/24/2025 1738  Last data filed at 4/24/2025 1704  Gross per 24 hour   Intake 580 ml   Output 2600 ml   Net -2020 ml         Physical Exam  Cardiovascular:      Rate and Rhythm: Tachycardia present.      Pulses: Normal pulses.      Heart sounds: Murmur heard.   Pulmonary:      Breath sounds: No wheezing or rales.   Musculoskeletal:         General: Swelling present.   Neurological:      Mental Status: She is alert and oriented to person, place, and time.               Significant Labs: All pertinent labs within the past 24 hours have been reviewed.    Significant Imaging: I have reviewed all pertinent imaging results/findings within the past 24 hours.      Assessment & Plan  Congestive heart failure  Patient has  new onset  heart failure that is Acute on chronic. On presentation their CHF was . Most recent BNP and echo results are listed below.  Recent Labs     04/23/25  1913   *     Latest ECHO  Results for orders placed during the hospital encounter of 07/01/24    Echo    Interpretation Summary    Left Ventricle: The left ventricle is normal in size. Moderately increased wall thickness. There is concentric hypertrophy. There is normal systolic function with a visually estimated ejection fraction of 60 - 65%. Grade I diastolic dysfunction.    Right Ventricle: Normal right ventricular cavity size. Systolic function is normal.    Left Atrium: Left atrium is severely dilated.    Aortic Valve: There is moderate aortic valve sclerosis. Aortic valve area by velocity is 1.58 cm². Aortic valve peak velocity is 2.01 m/s. Mean gradient is 9 mmHg. Aortic  Valve peak gradient is 16 mmHg.    Mitral Valve: There is moderate mitral annular calcification present. There is mild regurgitation.    Tricuspid Valve: There is mild to moderate regurgitation. The estimated PA systolic pressure is at least 31 mmHg.    Chest Xray:   Enlarged cardiac silhouette with pulmonary vascular congestion.  Bilateral pleural effusions, right greater than left.  Mild bilateral multifocal airspace disease.       Electronically signed by: Bela Villegas   Date: 04/23/2025   Time: 21:07     Current Heart Failure Medications  isosorbide dinitrate tablet 30 mg, Daily, Oral  furosemide tablet 40 mg, Daily, Oral    Plan  - Monitor strict I&Os and daily weights.    - Place on telemetry  - Low sodium diet  - Place on fluid restriction of 1.5 L.   - On diuretic  Echo ordered        Essential hypertension  Patient's blood pressure range in the last 24 hours was: BP  Min: 115/76  Max: 160/97.The patient's inpatient anti-hypertensive regimen is listed below:  Current Antihypertensives  isosorbide dinitrate tablet 30 mg, Daily, Oral  diltiaZEM (CARDIZEM) 5 mg/mL injection, ,   diltiaZEM tablet 30 mg, Every 6 hours, Oral  furosemide tablet 40 mg, Daily, Oral    Plan  - BP is controlled, no changes needed to their regimen    Fall  Consult PT/OT   Fall precautions    Troponin level elevated  Likely demand  Echo ordered  Troponin trended down  Cardiac monitoring    Edema of both lower extremities  US negative for DVT  Lasix  D/C home amlodipine    Atrial fibrillation  Patient has paroxysmal (<7 days) atrial fibrillation. Patient is currently in atrial fibrillation. [id number] Score: 4. The patients heart rate in the last 24 hours is as follows:  Pulse  Min: 105  Max: 126     Antiarrhythmics  diltiaZEM (CARDIZEM) 5 mg/mL injection, ,   diltiaZEM tablet 30 mg, Every 6 hours, OralMetoprolol 5 mg IV x 1 dose.     Anticoagulants           Plan  - Replete lytes with a goal of K>4, Mg >2  - Patient is not  anticoagulated due to fall risk    - Patient's afib is currently uncontrolled. Will continue current treatment        VTE Risk Mitigation (From admission, onward)           Ordered     IP VTE HIGH RISK PATIENT  Once         04/23/25 2247     Place sequential compression device  Until discontinued         04/23/25 2247                    Discharge Planning   ARGELIA: 4/26/2025     Code Status: Full Code   Medical Readiness for Discharge Date:   Discharge Plan A: Home Health                Please place Justification for DME        Sharron Mendoza DO  Department of Hospital Medicine   [facility]

## 2025-04-24 NOTE — H&P
[facility] - Emergency Dept  Hospital Medicine  History & Physical    Patient Name: Erich Encarnacion  MRN: [id number]  Patient Class: IP- Inpatient  Admission Date: 4/23/2025  Attending Physician: Bridget Winkler MD   Primary Care Provider: Se Rios MD         Patient information was obtained from patient, relative(s), past medical records, and ER records.     Subjective:     Principal Problem:Congestive heart failure    Chief Complaint:   Chief Complaint   Patient presents with    Leg Swelling     BILAT LOWER X FEW DAYS, DENIES SOB        HPI: 89 year old female with a past medical history of hypertension, atrial fibrillation not anticoagulated due to fall risk who presents to the emergency room with reports of both lower extremity swelling for the last 5 days. Reports on Easter Sunday she lost her balance tranferring from walker to couch, fell and hit the left side of her head. Denies LOC.     In ER, tachycardic at 111, Troponin I high sensitivity 30.9, , H/H with anemia stable. EKG in atrial fibrillation, No ST elevation or depression. CT head nonacute, CT C spine pending, US BLE: negative for DVT, Chest xray with enlarged cardiac silhouette with pulmonary vascular congestion. Bilateral pleural effusions right greater than left, and mild bilateral multifocal airspace disease.   IV lasix 40 mg given in ER    Admit to hospital medicine for new onset congestive heart failure.     Past Medical History:   Diagnosis Date    Hypertension     Macula lutea degeneration     Paroxysmal atrial fibrillation     Squamous cell carcinoma of skin        Past Surgical History:   Procedure Laterality Date    APPENDECTOMY      BACK SURGERY  1975    HYSTERECTOMY      URETEROSCOPIC REMOVAL OF URETERIC CALCULUS Left 5/28/2024    Procedure: REMOVAL, CALCULUS, URETER, URETEROSCOPIC;  Surgeon: Juhi Mosher Jr., MD;  Location: [facility];  Service: Urology;  Laterality: Left;       Review of patient's  allergies indicates:   Allergen Reactions    Topiramate Nausea And Vomiting       Current Facility-Administered Medications on File Prior to Encounter   Medication    triamcinolone acetonide injection 40 mg     Current Outpatient Medications on File Prior to Encounter   Medication Sig    amLODIPine (NORVASC) 2.5 MG tablet Take 2.5 mg by mouth.    aspirin (ECOTRIN) 81 MG EC tablet Take 162 mg by mouth once daily.    atorvastatin (LIPITOR) 10 MG tablet Take 10 mg by mouth once daily.    budesonide (PULMICORT) 0.5 mg/2 mL nebulizer solution Take 2 mLs (0.5 mg total) by nebulization 2 (two) times daily. Controller    isosorbide dinitrate (ISORDIL) 30 MG Tab Take 30 mg by mouth once daily.     levothyroxine (SYNTHROID) 50 MCG tablet Take 1 tablet (50 mcg total) by mouth every morning.    meclizine (ANTIVERT) 12.5 mg tablet Take 2 tablets by mouth Daily.    pantoprazole (PROTONIX) 40 MG tablet Take 1 tablet by mouth daily as needed (Heartburn).    polyethylene glycol (GLYCOLAX) 17 gram/dose powder Take 17 g by mouth every evening.    QUEtiapine (SEROQUEL) 25 MG Tab Take 1 tablet (25 mg total) by mouth every evening.    RESTASIS 0.05 % ophthalmic emulsion 1 drop.    topiramate (TOPAMAX) 25 MG tablet Take 25 mg by mouth once daily.     traMADoL (ULTRAM) 50 mg tablet Take 50 mg by mouth every 12 (twelve) hours as needed.     Family History    None       Tobacco Use    Smoking status: Former     Current packs/day: 1.00     Average packs/day: 1 pack/day for 20.0 years (20.0 ttl pk-yrs)     Types: Cigarettes    Smokeless tobacco: Never   Substance and Sexual Activity    Alcohol use: No    Drug use: No    Sexual activity: Not Currently     Review of Systems   Constitutional: Negative.         Fall   HENT: Negative.     Cardiovascular:  Positive for leg swelling. Negative for chest pain and palpitations.   Gastrointestinal: Negative.    Genitourinary: Negative.    Musculoskeletal: Negative.    Skin: Negative.    Neurological:  Negative.    Psychiatric/Behavioral: Negative.       Objective:     Vital Signs (Most Recent):  Temp: 98.7 °F (37.1 °C) (25)  Pulse: (!) 111 (25)  Resp: 20 (25)  BP: (!) 157/99 (25)  SpO2: 97 % (25) Vital Signs (24h Range):  Temp:  [98.7 °F (37.1 °C)] 98.7 °F (37.1 °C)  Pulse:  [111] 111  Resp:  [20] 20  SpO2:  [97 %] 97 %  BP: (157)/(99) 157/99     Weight: 71.2 kg (157 lb)  Body mass index is 26.95 kg/m².     Physical Exam  Vitals reviewed.   Constitutional:       General: She is not in acute distress.     Appearance: Normal appearance. She is not ill-appearing.   HENT:      Head: Normocephalic and atraumatic.      Mouth/Throat:      Mouth: Mucous membranes are moist.   Eyes:      Pupils: Pupils are equal, round, and reactive to light.   Cardiovascular:      Rate and Rhythm: Tachycardia present. Rhythm irregularly irregular.   Pulmonary:      Effort: Pulmonary effort is normal. No respiratory distress.      Breath sounds: No rales.   Abdominal:      General: Bowel sounds are normal.      Tenderness: There is no abdominal tenderness.   Musculoskeletal:         General: Swelling present.      Cervical back: Normal range of motion and neck supple.      Right lower le+ Pitting Edema present.      Left lower le+ Pitting Edema present.   Skin:     General: Skin is warm.      Capillary Refill: Capillary refill takes less than 2 seconds.   Neurological:      Mental Status: She is alert. Mental status is at baseline.   Psychiatric:         Mood and Affect: Mood normal.              CRANIAL NERVES     CN III, IV, VI   Pupils are equal, round, and reactive to light.       Significant Labs: All pertinent labs within the past 24 hours have been reviewed.  CBC:   Recent Labs   Lab 25   WBC 7.00   HGB 10.9*   HCT 33.3*        CMP:   Recent Labs   Lab 25      K 3.5   CO2 21*   BUN 18   CREATININE 0.8   CALCIUM 9.0   ALBUMIN 3.7  "  BILITOT 0.8   ALKPHOS 84   AST 16   ALT 14     Cardiac Markers:   Recent Labs   Lab 04/23/25 1913   *     Magnesium:   Recent Labs   Lab 04/23/25 1913   MG 2.0     Troponin: No results for input(s): "TROPONINI", "TROPONINIHS" in the last 48 hours.  TSH: No results for input(s): "TSH" in the last 4320 hours.  Urine Studies: No results for input(s): "COLORU", "APPEARANCEUA", "PHUR", "SPECGRAV", "PROTEINUA", "GLUCUA", "KETONESU", "BILIRUBINUA", "OCCULTUA", "NITRITE", "UROBILINOGEN", "LEUKOCYTESUR", "RBCUA", "WBCUA", "BACTERIA", "SQUAMEPITHEL", "HYALINECASTS" in the last 48 hours.    Invalid input(s): "WRIGHTSUR"    Significant Imaging: I have reviewed all pertinent imaging results/findings within the past 24 hours.  Imaging Results              CT Cervical Spine Without Contrast (Final result)  Result time 04/23/25 22:33:52      Final result by Bela Villegas MD (04/23/25 22:33:52)                   Impression:      No acute fracture.      Electronically signed by: Bela Villegas  Date:    04/23/2025  Time:    22:33               Narrative:    EXAMINATION:  CT CERVICAL SPINE WITHOUT CONTRAST    CLINICAL HISTORY:  Neck trauma (Age >= 65y);    TECHNIQUE:  Low dose axial images, sagittal and coronal reformations were performed though the cervical spine.  Contrast was not administered.    COMPARISON:  None    FINDINGS:  Osteopenia.  No acute fracture.  Minimal anterolisthesis C4-5 and C7-T1.  Moderate mid to lower cervical disc and uncovertebral degeneration.  Moderate multilevel facet arthrosis.  Right pleural effusion                                       CT Head Without Contrast (Final result)  Result time 04/23/25 21:21:55      Final result by Bela Villegas MD (04/23/25 21:21:55)                   Impression:      No acute findings.      Electronically signed by: Bela Villegas  Date:    04/23/2025  Time:    21:21               Narrative:    EXAMINATION:  CT HEAD WITHOUT " CONTRAST    CLINICAL HISTORY:  Head trauma, minor (Age >= 65y);    TECHNIQUE:  Low dose axial images were obtained through the head.  Coronal and sagittal reformations were also performed. Contrast was not administered.    COMPARISON:  06/27/2024    FINDINGS:  Intracranial compartment:    Ventricles and sulci remain prominent no extra-axial blood or fluid collections.    Mild to moderate chronic microvascular ischemia.  No parenchymal mass, hemorrhage, edema or major vascular distribution infarct.    Skull/extracranial contents (limited evaluation): No fracture. Mastoid air cells and paranasal sinuses are essentially clear.                                       US Lower Extremity Veins Bilateral (Final result)  Result time 04/23/25 20:28:37      Final result by Bela Villegas MD (04/23/25 20:28:37)                   Impression:      No evidence of deep venous thrombosis in either lower extremity.      Electronically signed by: Bela Villegas  Date:    04/23/2025  Time:    20:28               Narrative:    EXAMINATION:  US LOWER EXTREMITY VEINS BILATERAL    CLINICAL HISTORY:  Other specified soft tissue disorders    TECHNIQUE:  Duplex and color flow Doppler and dynamic compression was performed of the bilateral lower extremity veins was performed.    COMPARISON:  None    FINDINGS:  Right thigh veins: The common femoral, femoral, popliteal, upper greater saphenous, and deep femoral veins are patent and free of thrombus. The veins are normally compressible and have normal phasic flow and augmentation response.    Right calf veins: The visualized calf veins are patent.  Posterior tibial vein not visualized.    Left thigh veins: The common femoral, femoral, popliteal, upper greater saphenous, and deep femoral veins are patent and free of thrombus. The veins are normally compressible and have normal phasic flow and augmentation response.    Left calf veins: The visualized calf veins are patent.  Posterior  tibial vein not visualized.    Miscellaneous: Lower extremity edema.                                       X-Ray Chest AP Portable (Final result)  Result time 04/23/25 21:07:57      Final result by Bela Villegas MD (04/23/25 21:07:57)                   Impression:      Enlarged cardiac silhouette with pulmonary vascular congestion.  Bilateral pleural effusions, right greater than left.  Mild bilateral multifocal airspace disease.      Electronically signed by: Blea Villegas  Date:    04/23/2025  Time:    21:07               Narrative:    EXAMINATION:  XR CHEST AP PORTABLE    CLINICAL HISTORY:  Leg Swelling;    TECHNIQUE:  Single frontal view of the chest was performed.    COMPARISON:  None    FINDINGS:  See below                                      Assessment/Plan:     Assessment & Plan  Congestive heart failure  Patient has  new onset  heart failure that is Acute. On presentation their CHF was . Most recent BNP and echo results are listed below.  Recent Labs     04/23/25 1913   *     Latest ECHO  Results for orders placed during the hospital encounter of 07/01/24    Echo    Interpretation Summary    Left Ventricle: The left ventricle is normal in size. Moderately increased wall thickness. There is concentric hypertrophy. There is normal systolic function with a visually estimated ejection fraction of 60 - 65%. Grade I diastolic dysfunction.    Right Ventricle: Normal right ventricular cavity size. Systolic function is normal.    Left Atrium: Left atrium is severely dilated.    Aortic Valve: There is moderate aortic valve sclerosis. Aortic valve area by velocity is 1.58 cm². Aortic valve peak velocity is 2.01 m/s. Mean gradient is 9 mmHg. Aortic Valve peak gradient is 16 mmHg.    Mitral Valve: There is moderate mitral annular calcification present. There is mild regurgitation.    Tricuspid Valve: There is mild to moderate regurgitation. The estimated PA systolic pressure is at least 31  mmHg.    Chest Xray:   Enlarged cardiac silhouette with pulmonary vascular congestion.  Bilateral pleural effusions, right greater than left.  Mild bilateral multifocal airspace disease.       Electronically signed by: Bela Villegas   Date: 04/23/2025   Time: 21:07     Current Heart Failure Medications  furosemide injection 40 mg, Every 12 hours, Intravenous  isosorbide dinitrate tablet 30 mg, Daily, Oral    Plan  - Monitor strict I&Os and daily weights.    - Place on telemetry  - Low sodium diet  - Place on fluid restriction of 1.5 L.   - Cardiology has not been consulted  IV diuresis BID.  Echo ordered        Essential hypertension  Patient's blood pressure range in the last 24 hours was: BP  Min: 157/99  Max: 157/99.The patient's inpatient anti-hypertensive regimen is listed below:  Current Antihypertensives  furosemide injection 40 mg, Every 12 hours, Intravenous  metoprolol injection 5 mg, Once, Intravenous  amLODIPine tablet 2.5 mg, Daily, Oral  isosorbide dinitrate tablet 30 mg, Daily, Oral    Plan  - BP is controlled, no changes needed to their regimen    Fall  Consult PT/OT   Fall precautions    Troponin level elevated  Likely from hypertension.  Echo ordered    Trend serial troponin  Cardiac monitoring    Edema of both lower extremities  US negative for DVT  Start IV lasix.     Atrial fibrillation  Patient has paroxysmal (<7 days) atrial fibrillation. Patient is currently in atrial fibrillation. [id number] Score: 4. The patients heart rate in the last 24 hours is as follows:  Pulse  Min: 111  Max: 111     Antiarrhythmics  metoprolol injection 5 mg, Once, IntravenousMetoprolol 5 mg IV x 1 dose.     Anticoagulants  enoxaparin injection 40 mg, Every 24 hours, Subcutaneous        Plan  - Replete lytes with a goal of K>4, Mg >2  - Patient is not anticoagulated due to fall risk    - Patient's afib is currently uncontrolled. Will continue current treatment          VTE Risk Mitigation (From admission,  onward)           Ordered     enoxaparin injection 70 mg  Daily         04/23/25 2304     IP VTE HIGH RISK PATIENT  Once         04/23/25 2247     Place sequential compression device  Until discontinued         04/23/25 2247                                    Trista Peterson NP  Department of Hospital Medicine  [facility] - Emergency Dept

## 2025-04-24 NOTE — CONSULTS
[facility]  Adult Nutrition  Education Short Note      Nutrition Education    Previous education: yes    Diet at home: Low Sodium    Written information provided and explained on  cardiac diet, fluid restriction: 1500 mL, and 2 gram sodium diet diet.     Comments: Pt is a 89 yof ([place] in nationality) who does have dementia and seems to be able to comprehend her instruction well. Her daughter was there also to help with her diet.    Discussed with: patient and daughter    Educational Need?  Refresher    Barriers: none identified    Interventions: Fluid modified diet and Sodium modified diet    Patient and/or family comprehend instructions: yes    Outcome: Verbalizes understanding     Thanks for the consult!    Usha Crews, MINAL 04/24/2025 10:28 AM

## 2025-04-24 NOTE — ASSESSMENT & PLAN NOTE
Patient has paroxysmal (<7 days) atrial fibrillation. Patient is currently in atrial fibrillation. [id number] Score: 4. The patients heart rate in the last 24 hours is as follows:  Pulse  Min: 111  Max: 111     Antiarrhythmics  metoprolol injection 5 mg, Once, IntravenousMetoprolol 5 mg IV x 1 dose.     Anticoagulants  enoxaparin injection 40 mg, Every 24 hours, Subcutaneous        Plan  - Replete lytes with a goal of K>4, Mg >2  - Patient is not anticoagulated due to fall risk   - Patient's afib is currently uncontrolled. Will continue current treatment

## 2025-04-24 NOTE — PLAN OF CARE
Problem: Occupational Therapy  Goal: Occupational Therapy Goal  Description: Goals to be met by: 5/15/2025     Patient will increase functional independence with ADLs by performing:    LE Dressing with Modified Stevens.  Grooming while standing at sink with Modified Stevens.  Toileting from toilet with Modified Stevens for hygiene and clothing management.   Supine to sit with Modified Stevens.  Toilet transfer to toilet with Modified Stevens.    Outcome: Progressing

## 2025-04-24 NOTE — ASSESSMENT & PLAN NOTE
Patient has new onset heart failure that is Acute on chronic. On presentation their CHF was . Most recent BNP and echo results are listed below.  Recent Labs     04/23/25  1913   *     Latest ECHO  Results for orders placed during the hospital encounter of 07/01/24    Echo    Interpretation Summary    Left Ventricle: The left ventricle is normal in size. Moderately increased wall thickness. There is concentric hypertrophy. There is normal systolic function with a visually estimated ejection fraction of 60 - 65%. Grade I diastolic dysfunction.    Right Ventricle: Normal right ventricular cavity size. Systolic function is normal.    Left Atrium: Left atrium is severely dilated.    Aortic Valve: There is moderate aortic valve sclerosis. Aortic valve area by velocity is 1.58 cm². Aortic valve peak velocity is 2.01 m/s. Mean gradient is 9 mmHg. Aortic Valve peak gradient is 16 mmHg.    Mitral Valve: There is moderate mitral annular calcification present. There is mild regurgitation.    Tricuspid Valve: There is mild to moderate regurgitation. The estimated PA systolic pressure is at least 31 mmHg.    Chest Xray:   Enlarged cardiac silhouette with pulmonary vascular congestion.  Bilateral pleural effusions, right greater than left.  Mild bilateral multifocal airspace disease.       Electronically signed by: Bela Villegas   Date: 04/23/2025   Time: 21:07     Current Heart Failure Medications  isosorbide dinitrate tablet 30 mg, Daily, Oral  furosemide tablet 40 mg, Daily, Oral    Plan  - Monitor strict I&Os and daily weights.    - Place on telemetry  - Low sodium diet  - Place on fluid restriction of 1.5 L.   - On diuretic  Echo ordered

## 2025-04-24 NOTE — NURSING
Received report from ER nurse Melodie. Pt POC discussed.  Pt admitted to room 2522 . Arrived via stretcher per staff.   On cardiac monitoring: Rhythm Afib. Answers all questions appropriately. Denies SOB and pain.Belongings near. Patient stated her daughter is aware that she's here and went home for the tonight. She will be back in the AM. See flowsheet in chart for vitals and assessment. Bed alarm on. Bed low and locked. Call light in hand.

## 2025-04-24 NOTE — ASSESSMENT & PLAN NOTE
Patient's blood pressure range in the last 24 hours was: BP  Min: 115/76  Max: 160/97.The patient's inpatient anti-hypertensive regimen is listed below:  Current Antihypertensives  isosorbide dinitrate tablet 30 mg, Daily, Oral  diltiaZEM (CARDIZEM) 5 mg/mL injection, ,   diltiaZEM tablet 30 mg, Every 6 hours, Oral  furosemide tablet 40 mg, Daily, Oral    Plan  - BP is controlled, no changes needed to their regimen

## 2025-04-24 NOTE — PT/OT/SLP EVAL
Occupational Therapy   Evaluation    Name: Erich Encarnacion  MRN: [id number]  Admitting Diagnosis: Congestive heart failure  Recent Surgery: * No surgery found *      Recommendations:     Discharge Recommendations: Low Intensity Therapy  Discharge Equipment Recommendations:  to be determined by next level of care  Barriers to discharge:  None    Assessment:     Erich Encarnacion is a 89 y.o. female with a medical diagnosis of Congestive heart failure.  Patient awake and in bed upon arrival to room. Noted patient went into A-fib during evaluation with HR fluctuating 96 and 118. Patient was able to perform basic grooming tasks seated requiring SBA. Patient ambulated a short distance in room with some unsteadiness requiring Min A for balance. Patient was in good spirits as patient reported a decrease in BLE swelling since admit. Mobility and ADL participation limited due to fluctuating HR.  Performance deficits affecting function: weakness, impaired endurance, impaired self care skills, impaired functional mobility, gait instability, impaired balance, decreased coordination, decreased safety awareness, impaired cardiopulmonary response to activity.      Rehab Prognosis: Good; patient would benefit from acute skilled OT services to address these deficits and reach maximum level of function.       Plan:     Patient to be seen 3 x/week to address the above listed problems via self-care/home management, therapeutic activities, therapeutic exercises  Plan of Care Expires: 05/15/25  Plan of Care Reviewed with: patient, daughter    Subjective     Chief Complaint: none  Patient/Family Comments/goals: none    Occupational Profile:  Living Environment: Patient lives with daughter.   Previous level of function: Patient was Mod I with ADLs. Patient was ambulatory using rollator.   Equipment Used at Home: rollator, bath bench  Assistance upon Discharge: Patient will receive assistance from daughter.     Pain/Comfort:  Pain Rating 1:  0/10  Pain Rating Post-Intervention 1: 0/10    Patients cultural, spiritual, L[employer]-day conflicts given the current situation: no    Objective:     Communicated with: nurse Tiffanie prior to session.  Patient found HOB elevated with peripheral IV, pulse ox (continuous), PureWick, telemetry upon OT entry to room.    General Precautions: Standard, fall  Orthopedic Precautions: N/A  Braces: N/A  Respiratory Status: Room air    Occupational Performance:    Bed Mobility:    Patient completed Scooting/Bridging with stand by assistance  Patient completed Supine to Sit with stand by assistance  Patient completed Sit to Supine with stand by assistance    Functional Mobility/Transfers:  Patient completed Sit <> Stand Transfer with minimum assistance  with  rolling walker     Activities of Daily Living:  Grooming: stand by assistance with all grooming tasks seated EOB   Lower Body Dressing: stand by assistance to don/doff socks/footwear seated EOB  Toileting: Purewick in place    Cognitive/Visual Perceptual:  Cognitive/Psychosocial Skills:     -       Oriented to: Person, Place, and Situation   -       Follows Commands/attention:Follows multistep  commands  -       Communication: clear/fluent  -       Safety awareness/insight to disability: impaired   -       Mood/Affect/Coping skills/emotional control: Appropriate to situation and Cooperative  Visual/Perceptual:      -Intact     Physical Exam:  Postural examination/scapula alignment:    -       Rounded shoulders  -       Forward head  Upper Extremity Range of Motion:     -       Right Upper Extremity: WFL  -       Left Upper Extremity: WFL  Upper Extremity Strength:    -       Right Upper Extremity: WFL  -       Left Upper Extremity: WFL   Strength:    -       Right Upper Extremity: WFL  -       Left Upper Extremity: WFL  Fine Motor Coordination:    -       Intact  Gross motor coordination:   WFL in BUE    AMPAC 6 Click ADL:  AMPAC Total Score: 20    Treatment &  Education:  Pt educated on role of OT/POC, importance of time EOB/OOB, safety with ADLs, importance of intermittent mobility, safe techniques for transfers/ambulation, discharge recommendations/options, and use of call light for assistance and fall prevention.       Patient left HOB elevated with all lines intact, call button in reach, bed alarm on, nurse notified, and daughter present    GOALS:   Multidisciplinary Problems       Occupational Therapy Goals          Problem: Occupational Therapy    Goal Priority Disciplines Outcome Interventions   Occupational Therapy Goal     OT, PT/OT Progressing    Description: Goals to be met by: 5/15/2025     Patient will increase functional independence with ADLs by performing:    LE Dressing with Modified [place].  Grooming while standing at sink with Modified [place].  Toileting from toilet with Modified [place] for hygiene and clothing management.   Supine to sit with Modified [place].  Toilet transfer to toilet with Modified [place].                         DME Justifications:  none    History:     Past Medical History:   Diagnosis Date    Hypertension     Macula lutea degeneration     Paroxysmal atrial fibrillation     Squamous cell carcinoma of skin          Past Surgical History:   Procedure Laterality Date    APPENDECTOMY      BACK SURGERY  1975    HYSTERECTOMY      URETEROSCOPIC REMOVAL OF URETERIC CALCULUS Left 5/28/2024    Procedure: REMOVAL, CALCULUS, URETER, URETEROSCOPIC;  Surgeon: Juhi Mosher Jr., MD;  Location: [facility];  Service: Urology;  Laterality: Left;       Time Tracking:     OT Date of Treatment: 04/24/25  OT Start Time: 1220  OT Stop Time: 1234  OT Total Time (min): 14 min    Billable Minutes:Evaluation 6  Self Care/Home Management 8    4/24/2025

## 2025-04-24 NOTE — PT/OT/SLP PROGRESS
Physical Therapy      Patient Name:  Erich Encarnacion   MRN:  [id number]    Patient not seen today secondary to first attempt patient in afib with HR 120s-130s. Second attempt patient declined participation with PT evaluation. Will follow-up 04/25/25.

## 2025-04-24 NOTE — PLAN OF CARE
XAVIER sent HH referral to [facility] with pt's ARGELIA of 04/26/2025.     04/24/25 2248   Discharge Plan   Discharge Plan [facility]

## 2025-04-25 LAB
ABSOLUTE EOSINOPHIL (SMH): 0.29 K/UL
ABSOLUTE MONOCYTE (SMH): 0.52 K/UL (ref 0.3–1)
ABSOLUTE NEUTROPHIL COUNT (SMH): 3.8 K/UL (ref 1.8–7.7)
ANION GAP (SMH): 6 MMOL/L (ref 8–16)
BASOPHILS # BLD AUTO: 0.07 K/UL
BASOPHILS NFR BLD AUTO: 1.1 %
BUN SERPL-MCNC: 14 MG/DL (ref 10–30)
CALCIUM SERPL-MCNC: 8.6 MG/DL (ref 8.7–10.5)
CHLORIDE SERPL-SCNC: 105 MMOL/L (ref 95–110)
CO2 SERPL-SCNC: 28 MMOL/L (ref 23–29)
CREAT SERPL-MCNC: 0.9 MG/DL (ref 0.5–1.4)
ERYTHROCYTE [DISTWIDTH] IN BLOOD BY AUTOMATED COUNT: 14.6 % (ref 11.5–14.5)
GFR SERPLBLD CREATININE-BSD FMLA CKD-EPI: 58 ML/MIN/1.73/M2
GLUCOSE SERPL-MCNC: 105 MG/DL (ref 70–110)
HCT VFR BLD AUTO: 34.5 % (ref 37–48.5)
HGB BLD-MCNC: 11.2 GM/DL (ref 12–16)
IMM GRANULOCYTES # BLD AUTO: 0.01 K/UL (ref 0–0.04)
IMM GRANULOCYTES NFR BLD AUTO: 0.2 % (ref 0–0.5)
LYMPHOCYTES # BLD AUTO: 1.83 K/UL (ref 1–4.8)
MAGNESIUM SERPL-MCNC: 1.8 MG/DL (ref 1.6–2.6)
MCH RBC QN AUTO: 29.6 PG (ref 27–31)
MCHC RBC AUTO-ENTMCNC: 32.5 G/DL (ref 32–36)
MCV RBC AUTO: 91 FL (ref 82–98)
NUCLEATED RBC (/100WBC) (SMH): 0 /100 WBC
PLATELET # BLD AUTO: 185 K/UL (ref 150–450)
PMV BLD AUTO: 11.2 FL (ref 9.2–12.9)
POCT GLUCOSE: 152 MG/DL (ref 70–110)
POTASSIUM SERPL-SCNC: 3 MMOL/L (ref 3.5–5.1)
RBC # BLD AUTO: 3.78 M/UL (ref 4–5.4)
RELATIVE EOSINOPHIL (SMH): 4.5 % (ref 0–8)
RELATIVE LYMPHOCYTE (SMH): 28.2 % (ref 18–48)
RELATIVE MONOCYTE (SMH): 8 % (ref 4–15)
RELATIVE NEUTROPHIL (SMH): 58 % (ref 38–73)
SODIUM SERPL-SCNC: 139 MMOL/L (ref 136–145)
WBC # BLD AUTO: 6.48 K/UL (ref 3.9–12.7)

## 2025-04-25 PROCEDURE — 36415 COLL VENOUS BLD VENIPUNCTURE: CPT | Performed by: NURSE PRACTITIONER

## 2025-04-25 PROCEDURE — 25000003 PHARM REV CODE 250: Performed by: NURSE PRACTITIONER

## 2025-04-25 PROCEDURE — 25000242 PHARM REV CODE 250 ALT 637 W/ HCPCS: Performed by: NURSE PRACTITIONER

## 2025-04-25 PROCEDURE — 94761 N-INVAS EAR/PLS OXIMETRY MLT: CPT

## 2025-04-25 PROCEDURE — 27000221 HC OXYGEN, UP TO 24 HOURS

## 2025-04-25 PROCEDURE — 80048 BASIC METABOLIC PNL TOTAL CA: CPT | Performed by: NURSE PRACTITIONER

## 2025-04-25 PROCEDURE — 94640 AIRWAY INHALATION TREATMENT: CPT

## 2025-04-25 PROCEDURE — 83735 ASSAY OF MAGNESIUM: CPT | Performed by: FAMILY MEDICINE

## 2025-04-25 PROCEDURE — 25000003 PHARM REV CODE 250: Performed by: FAMILY MEDICINE

## 2025-04-25 PROCEDURE — 25000003 PHARM REV CODE 250: Performed by: STUDENT IN AN ORGANIZED HEALTH CARE EDUCATION/TRAINING PROGRAM

## 2025-04-25 PROCEDURE — 85025 COMPLETE CBC W/AUTO DIFF WBC: CPT | Performed by: NURSE PRACTITIONER

## 2025-04-25 PROCEDURE — 21400001 HC TELEMETRY ROOM

## 2025-04-25 RX ORDER — LANOLIN ALCOHOL/MO/W.PET/CERES
400 CREAM (GRAM) TOPICAL DAILY
Status: DISCONTINUED | OUTPATIENT
Start: 2025-04-25 | End: 2025-04-26 | Stop reason: HOSPADM

## 2025-04-25 RX ORDER — POTASSIUM CHLORIDE 7.45 MG/ML
40 INJECTION INTRAVENOUS ONCE
Status: DISCONTINUED | OUTPATIENT
Start: 2025-04-25 | End: 2025-04-26 | Stop reason: HOSPADM

## 2025-04-25 RX ORDER — FUROSEMIDE 40 MG/1
40 TABLET ORAL DAILY PRN
Status: DISCONTINUED | OUTPATIENT
Start: 2025-04-25 | End: 2025-04-26 | Stop reason: HOSPADM

## 2025-04-25 RX ORDER — POTASSIUM CHLORIDE 7.45 MG/ML
40 INJECTION INTRAVENOUS ONCE
Status: DISCONTINUED | OUTPATIENT
Start: 2025-04-25 | End: 2025-04-25

## 2025-04-25 RX ORDER — ISOSORBIDE DINITRATE 10 MG/1
20 TABLET ORAL DAILY
Status: DISCONTINUED | OUTPATIENT
Start: 2025-04-26 | End: 2025-04-25

## 2025-04-25 RX ORDER — POTASSIUM CHLORIDE 20 MEQ/1
20 TABLET, EXTENDED RELEASE ORAL DAILY
Status: DISCONTINUED | OUTPATIENT
Start: 2025-04-26 | End: 2025-04-26 | Stop reason: HOSPADM

## 2025-04-25 RX ORDER — ISOSORBIDE DINITRATE 10 MG/1
30 TABLET ORAL DAILY
Status: DISCONTINUED | OUTPATIENT
Start: 2025-04-26 | End: 2025-04-26 | Stop reason: HOSPADM

## 2025-04-25 RX ADMIN — CARBOXYMETHYLCELLULOSE SODIUM 2 DROP: 0.5 SOLUTION, GEL FORMING / DROPS OPHTHALMIC at 08:04

## 2025-04-25 RX ADMIN — BUDESONIDE INHALATION 0.5 MG: 0.5 SUSPENSION RESPIRATORY (INHALATION) at 08:04

## 2025-04-25 RX ADMIN — QUETIAPINE 25 MG: 25 TABLET ORAL at 08:04

## 2025-04-25 RX ADMIN — CARBOXYMETHYLCELLULOSE SODIUM 2 DROP: 0.5 SOLUTION, GEL FORMING / DROPS OPHTHALMIC at 02:04

## 2025-04-25 RX ADMIN — CARBOXYMETHYLCELLULOSE SODIUM 2 DROP: 0.5 SOLUTION, GEL FORMING / DROPS OPHTHALMIC at 10:04

## 2025-04-25 RX ADMIN — TRAMADOL HYDROCHLORIDE 50 MG: 50 TABLET, COATED ORAL at 08:04

## 2025-04-25 RX ADMIN — ACETAMINOPHEN 650 MG: 325 TABLET ORAL at 03:04

## 2025-04-25 RX ADMIN — POTASSIUM BICARBONATE 60 MEQ: 782 TABLET, EFFERVESCENT ORAL at 10:04

## 2025-04-25 RX ADMIN — ATORVASTATIN CALCIUM 10 MG: 10 TABLET, FILM COATED ORAL at 08:04

## 2025-04-25 RX ADMIN — LEVOTHYROXINE SODIUM 50 MCG: 0.03 TABLET ORAL at 05:04

## 2025-04-25 RX ADMIN — DILTIAZEM HYDROCHLORIDE 30 MG: 30 TABLET, FILM COATED ORAL at 05:04

## 2025-04-25 RX ADMIN — Medication 400 MG: at 02:04

## 2025-04-25 RX ADMIN — CARBOXYMETHYLCELLULOSE SODIUM 2 DROP: 0.5 SOLUTION, GEL FORMING / DROPS OPHTHALMIC at 06:04

## 2025-04-25 RX ADMIN — DILTIAZEM HYDROCHLORIDE 30 MG: 30 TABLET, FILM COATED ORAL at 12:04

## 2025-04-25 RX ADMIN — DILTIAZEM HYDROCHLORIDE 30 MG: 30 TABLET, FILM COATED ORAL at 11:04

## 2025-04-25 RX ADMIN — ISOSORBIDE DINITRATE 30 MG: 10 TABLET ORAL at 08:04

## 2025-04-25 RX ADMIN — POTASSIUM BICARBONATE 60 MEQ: 782 TABLET, EFFERVESCENT ORAL at 12:04

## 2025-04-25 RX ADMIN — BUDESONIDE INHALATION 0.5 MG: 0.5 SUSPENSION RESPIRATORY (INHALATION) at 07:04

## 2025-04-25 RX ADMIN — DILTIAZEM HYDROCHLORIDE 30 MG: 30 TABLET, FILM COATED ORAL at 06:04

## 2025-04-25 RX ADMIN — ASPIRIN 81 MG: 81 TABLET, COATED ORAL at 08:04

## 2025-04-25 RX ADMIN — CARBOXYMETHYLCELLULOSE SODIUM 2 DROP: 0.5 SOLUTION, GEL FORMING / DROPS OPHTHALMIC at 05:04

## 2025-04-25 RX ADMIN — FUROSEMIDE 40 MG: 40 TABLET ORAL at 08:04

## 2025-04-25 NOTE — PLAN OF CARE
04/25/25 1515   Medicare Message   Important Message from Medicare regarding Discharge Appeal Rights Explained to patient/caregiver;Signed/date by patient/caregiver   Date IMM was signed 04/25/25   Time IMM was signed [id number]

## 2025-04-25 NOTE — ASSESSMENT & PLAN NOTE
Patient has new onset heart failure that is Acute on chronic. On presentation their CHF was . Most recent BNP and echo results are listed below.  Recent Labs     04/23/25  1913   *     Latest ECHO  Results for orders placed during the hospital encounter of 07/01/24    Echo    Interpretation Summary    Left Ventricle: The left ventricle is normal in size. Moderately increased wall thickness. There is concentric hypertrophy. There is normal systolic function with a visually estimated ejection fraction of 60 - 65%. Grade I diastolic dysfunction.    Right Ventricle: Normal right ventricular cavity size. Systolic function is normal.    Left Atrium: Left atrium is severely dilated.    Aortic Valve: There is moderate aortic valve sclerosis. Aortic valve area by velocity is 1.58 cm². Aortic valve peak velocity is 2.01 m/s. Mean gradient is 9 mmHg. Aortic Valve peak gradient is 16 mmHg.    Mitral Valve: There is moderate mitral annular calcification present. There is mild regurgitation.    Tricuspid Valve: There is mild to moderate regurgitation. The estimated PA systolic pressure is at least 31 mmHg.    Chest Xray:   Enlarged cardiac silhouette with pulmonary vascular congestion.  Bilateral pleural effusions, right greater than left.  Mild bilateral multifocal airspace disease.       Electronically signed by: Bela Villegas   Date: 04/23/2025   Time: 21:07     Current Heart Failure Medications  furosemide tablet 40 mg, Daily PRN, Oral  isosorbide dinitrate tablet 30 mg, Daily, Oral    Plan  - Monitor strict I&Os and daily weights.    - Place on telemetry  - Low sodium diet  - Place on fluid restriction of 1.5 L.   - On diuretic PRN  Echo ordered

## 2025-04-25 NOTE — PLAN OF CARE
Patient accepted by [facility] via [facility].       04/25/25 4781   Post-Acute Status   Post-Acute Authorization Home Health   Home Health Status Set-up Complete/Auth obtained

## 2025-04-25 NOTE — PROGRESS NOTES
[facility] Medicine  Progress Note    Patient Name: Erich Encarnacion  MRN: [id number]  Patient Class: IP- Inpatient   Admission Date: 4/23/2025  Length of Stay: 2 days  Attending Physician: Sharron Mendoza DO  Primary Care Provider: Se Rios MD        Subjective     Principal Problem:Congestive heart failure        HPI:  89 year old female with a past medical history of hypertension, atrial fibrillation not anticoagulated due to fall risk who presents to the emergency room with reports of both lower extremity swelling for the last 5 days. Reports on Easter Sunday she lost her balance tranferring from walker to couch, fell and hit the left side of her head. Denies LOC.     In ER, tachycardic at 111, Troponin I high sensitivity 30.9, , H/H with anemia stable. EKG in atrial fibrillation, No ST elevation or depression. CT head nonacute, CT C spine pending, US BLE: negative for DVT, Chest xray with enlarged cardiac silhouette with pulmonary vascular congestion. Bilateral pleural effusions right greater than left, and mild bilateral multifocal airspace disease.   IV lasix 40 mg given in ER    Admit to hospital medicine for new onset congestive heart failure.     Overview/Hospital Course:  Patient with history of hypertension, AFib not on any anticoagulation due to fall risk came in with lower extremity swelling with elevated BNP and admitted for acute on chronic HF.   Patient was given IV Lasix and transitioned to oral.  Discontinued home amlodipine.  Echo ordered.  For a fib with RVR, patient was given diltiazem push followed by oral diltiazem.  Pt refused PT/OT. Furosemide was changed to prn     Interval History:  Patient said she is feeling much better and she would like to go home soon.  For daily update refer to hospital course    Review of Systems   All other systems reviewed and are negative.    Objective:     Vital Signs (Most Recent):  Temp: 97.5 °F (36.4 °C) (04/25/25  1100)  Pulse: 91 (04/25/25 1301)  Resp: (!) 21 (04/25/25 1301)  BP: 94/61 (04/25/25 1301)  SpO2: 96 % (04/25/25 1301) Vital Signs (24h Range):  Temp:  [97.1 °F (36.2 °C)-98 °F (36.7 °C)] 97.5 °F (36.4 °C)  Pulse:  [] 91  Resp:  [13-23] 21  SpO2:  [92 %-98 %] 96 %  BP: ()/(53-86) 94/61     Weight: 74 kg (163 lb 2.3 oz)  Body mass index is 28 kg/m².    Intake/Output Summary (Last 24 hours) at 4/25/2025 1526  Last data filed at 4/25/2025 1233  Gross per 24 hour   Intake --   Output 1800 ml   Net -1800 ml         Physical Exam  Cardiovascular:      Rate and Rhythm: Normal rate.      Pulses: Normal pulses.      Heart sounds: Murmur heard.   Pulmonary:      Breath sounds: No wheezing or rales.   Musculoskeletal:         General: No swelling.   Neurological:      Mental Status: She is alert and oriented to person, place, and time.               Significant Labs: All pertinent labs within the past 24 hours have been reviewed.    Significant Imaging: I have reviewed all pertinent imaging results/findings within the past 24 hours.      Assessment & Plan  Congestive heart failure  Patient has  new onset  heart failure that is Acute on chronic. On presentation their CHF was . Most recent BNP and echo results are listed below.  Recent Labs     04/23/25  1913   *     Latest ECHO  Results for orders placed during the hospital encounter of 07/01/24    Echo    Interpretation Summary    Left Ventricle: The left ventricle is normal in size. Moderately increased wall thickness. There is concentric hypertrophy. There is normal systolic function with a visually estimated ejection fraction of 60 - 65%. Grade I diastolic dysfunction.    Right Ventricle: Normal right ventricular cavity size. Systolic function is normal.    Left Atrium: Left atrium is severely dilated.    Aortic Valve: There is moderate aortic valve sclerosis. Aortic valve area by velocity is 1.58 cm². Aortic valve peak velocity is 2.01 m/s. Mean gradient  is 9 mmHg. Aortic Valve peak gradient is 16 mmHg.    Mitral Valve: There is moderate mitral annular calcification present. There is mild regurgitation.    Tricuspid Valve: There is mild to moderate regurgitation. The estimated PA systolic pressure is at least 31 mmHg.    Chest Xray:   Enlarged cardiac silhouette with pulmonary vascular congestion.  Bilateral pleural effusions, right greater than left.  Mild bilateral multifocal airspace disease.       Electronically signed by: Bela Villegas   Date: 04/23/2025   Time: 21:07     Current Heart Failure Medications  furosemide tablet 40 mg, Daily PRN, Oral  isosorbide dinitrate tablet 30 mg, Daily, Oral    Plan  - Monitor strict I&Os and daily weights.    - Place on telemetry  - Low sodium diet  - Place on fluid restriction of 1.5 L.   - On diuretic PRN  Echo ordered        Essential hypertension  Patient's blood pressure range in the last 24 hours was: BP  Min: 94/61  Max: 151/71.The patient's inpatient anti-hypertensive regimen is listed below:  Current Antihypertensives  diltiaZEM tablet 30 mg, Every 6 hours, Oral  furosemide tablet 40 mg, Daily PRN, Oral  isosorbide dinitrate tablet 30 mg, Daily, Oral    Plan  - BP is controlled, no changes needed to their regimen    Fall  Consult PT/OT   Fall precautions    Troponin level elevated  Likely demand  Echo ordered  Troponin trended down  Cardiac monitoring    Edema of both lower extremities  US negative for DVT  Lasix  D/C home amlodipine    Atrial fibrillation  Patient has paroxysmal (<7 days) atrial fibrillation. Patient is currently in atrial fibrillation. [id number] Score: 4. The patients heart rate in the last 24 hours is as follows:  Pulse  Min: 73  Max: 106     Antiarrhythmics  diltiaZEM tablet 30 mg, Every 6 hours, OralMetoprolol 5 mg IV x 1 dose.     Anticoagulants           Plan  - Replete lytes with a goal of K>4, Mg >2  - Patient is not anticoagulated due to fall risk    - Patient's afib is currently  uncontrolled. Will continue current treatment          VTE Risk Mitigation (From admission, onward)           Ordered     IP VTE HIGH RISK PATIENT  Once         04/23/25 2247     Place sequential compression device  Until discontinued         04/23/25 2247                    Discharge Planning   ARGELIA: 4/26/2025     Code Status: Full Code   Medical Readiness for Discharge Date:   Discharge Plan A: Home Health                        Sharron Mendoza DO  Department of Hospital Medicine   [facility]

## 2025-04-25 NOTE — NURSING
Called to patients room due to family member thinking her mom was having another stroke.  Myself and Charge RN assessed patient.  Patient is oriented, speaking clearly, VSS, pupils equal and reactive.  Patient complains of headache.  Tylenol given.  MD aware.

## 2025-04-25 NOTE — SUBJECTIVE & OBJECTIVE
Interval History:  Patient said she is feeling much better and she would like to go home soon.  For daily update refer to hospital course    Review of Systems   All other systems reviewed and are negative.    Objective:     Vital Signs (Most Recent):  Temp: 97.5 °F (36.4 °C) (04/25/25 1100)  Pulse: 91 (04/25/25 1301)  Resp: (!) 21 (04/25/25 1301)  BP: 94/61 (04/25/25 1301)  SpO2: 96 % (04/25/25 1301) Vital Signs (24h Range):  Temp:  [97.1 °F (36.2 °C)-98 °F (36.7 °C)] 97.5 °F (36.4 °C)  Pulse:  [] 91  Resp:  [13-23] 21  SpO2:  [92 %-98 %] 96 %  BP: ()/(53-86) 94/61     Weight: 74 kg (163 lb 2.3 oz)  Body mass index is 28 kg/m².    Intake/Output Summary (Last 24 hours) at 4/25/2025 1526  Last data filed at 4/25/2025 1233  Gross per 24 hour   Intake --   Output 1800 ml   Net -1800 ml         Physical Exam  Cardiovascular:      Rate and Rhythm: Normal rate.      Pulses: Normal pulses.      Heart sounds: Murmur heard.   Pulmonary:      Breath sounds: No wheezing or rales.   Musculoskeletal:         General: No swelling.   Neurological:      Mental Status: She is alert and oriented to person, place, and time.               Significant Labs: All pertinent labs within the past 24 hours have been reviewed.    Significant Imaging: I have reviewed all pertinent imaging results/findings within the past 24 hours.

## 2025-04-25 NOTE — ASSESSMENT & PLAN NOTE
Patient has paroxysmal (<7 days) atrial fibrillation. Patient is currently in atrial fibrillation. [id number] Score: 4. The patients heart rate in the last 24 hours is as follows:  Pulse  Min: 73  Max: 106     Antiarrhythmics  diltiaZEM tablet 30 mg, Every 6 hours, OralMetoprolol 5 mg IV x 1 dose.     Anticoagulants           Plan  - Replete lytes with a goal of K>4, Mg >2  - Patient is not anticoagulated due to fall risk   - Patient's afib is currently uncontrolled. Will continue current treatment

## 2025-04-25 NOTE — ASSESSMENT & PLAN NOTE
Patient's blood pressure range in the last 24 hours was: BP  Min: 94/61  Max: 151/71.The patient's inpatient anti-hypertensive regimen is listed below:  Current Antihypertensives  diltiaZEM tablet 30 mg, Every 6 hours, Oral  furosemide tablet 40 mg, Daily PRN, Oral  isosorbide dinitrate tablet 30 mg, Daily, Oral    Plan  - BP is controlled, no changes needed to their regimen

## 2025-04-25 NOTE — PT/OT/SLP PROGRESS
"Physical Therapy      Patient Name:  Erich Encarnacion   MRN:  [id number]    Patient not seen today secondary to patient adamantly refused PT evaluation for the second consecutive today. She states "not this afternoon and not tomorrow". He daughter at bedside reports discharge plan is to resume HHPT that patient already has set up. Patient uses rollator for ambulation at baseline. Patient and daughter request PT orders be discontinued. MD and RN notified.    "

## 2025-04-26 VITALS
WEIGHT: 163.13 LBS | OXYGEN SATURATION: 98 % | HEART RATE: 77 BPM | TEMPERATURE: 98 F | BODY MASS INDEX: 27.85 KG/M2 | SYSTOLIC BLOOD PRESSURE: 111 MMHG | DIASTOLIC BLOOD PRESSURE: 66 MMHG | RESPIRATION RATE: 22 BRPM | HEIGHT: 64 IN

## 2025-04-26 LAB
ABSOLUTE EOSINOPHIL (SMH): 0.29 K/UL
ABSOLUTE MONOCYTE (SMH): 0.59 K/UL (ref 0.3–1)
ABSOLUTE NEUTROPHIL COUNT (SMH): 3.5 K/UL (ref 1.8–7.7)
ANION GAP (SMH): 5 MMOL/L (ref 8–16)
BASOPHILS # BLD AUTO: 0.06 K/UL
BASOPHILS NFR BLD AUTO: 1 %
BUN SERPL-MCNC: 21 MG/DL (ref 10–30)
CALCIUM SERPL-MCNC: 8.8 MG/DL (ref 8.7–10.5)
CHLORIDE SERPL-SCNC: 103 MMOL/L (ref 95–110)
CO2 SERPL-SCNC: 31 MMOL/L (ref 23–29)
CREAT SERPL-MCNC: 0.9 MG/DL (ref 0.5–1.4)
ERYTHROCYTE [DISTWIDTH] IN BLOOD BY AUTOMATED COUNT: 14.5 % (ref 11.5–14.5)
GFR SERPLBLD CREATININE-BSD FMLA CKD-EPI: 58 ML/MIN/1.73/M2
GLUCOSE SERPL-MCNC: 118 MG/DL (ref 70–110)
HCT VFR BLD AUTO: 34.3 % (ref 37–48.5)
HGB BLD-MCNC: 11 GM/DL (ref 12–16)
IMM GRANULOCYTES # BLD AUTO: 0.02 K/UL (ref 0–0.04)
IMM GRANULOCYTES NFR BLD AUTO: 0.3 % (ref 0–0.5)
LYMPHOCYTES # BLD AUTO: 1.62 K/UL (ref 1–4.8)
MAGNESIUM SERPL-MCNC: 1.9 MG/DL (ref 1.6–2.6)
MCH RBC QN AUTO: 29.6 PG (ref 27–31)
MCHC RBC AUTO-ENTMCNC: 32.1 G/DL (ref 32–36)
MCV RBC AUTO: 92 FL (ref 82–98)
NUCLEATED RBC (/100WBC) (SMH): 0 /100 WBC
PLATELET # BLD AUTO: 179 K/UL (ref 150–450)
PMV BLD AUTO: 11.6 FL (ref 9.2–12.9)
POTASSIUM SERPL-SCNC: 4.3 MMOL/L (ref 3.5–5.1)
RBC # BLD AUTO: 3.72 M/UL (ref 4–5.4)
RELATIVE EOSINOPHIL (SMH): 4.8 % (ref 0–8)
RELATIVE LYMPHOCYTE (SMH): 26.6 % (ref 18–48)
RELATIVE MONOCYTE (SMH): 9.7 % (ref 4–15)
RELATIVE NEUTROPHIL (SMH): 57.6 % (ref 38–73)
SODIUM SERPL-SCNC: 139 MMOL/L (ref 136–145)
WBC # BLD AUTO: 6.1 K/UL (ref 3.9–12.7)

## 2025-04-26 PROCEDURE — 94761 N-INVAS EAR/PLS OXIMETRY MLT: CPT

## 2025-04-26 PROCEDURE — 94640 AIRWAY INHALATION TREATMENT: CPT

## 2025-04-26 PROCEDURE — 36415 COLL VENOUS BLD VENIPUNCTURE: CPT | Performed by: NURSE PRACTITIONER

## 2025-04-26 PROCEDURE — 85025 COMPLETE CBC W/AUTO DIFF WBC: CPT | Performed by: NURSE PRACTITIONER

## 2025-04-26 PROCEDURE — 25000003 PHARM REV CODE 250: Performed by: NURSE PRACTITIONER

## 2025-04-26 PROCEDURE — 80048 BASIC METABOLIC PNL TOTAL CA: CPT | Performed by: NURSE PRACTITIONER

## 2025-04-26 PROCEDURE — 25000242 PHARM REV CODE 250 ALT 637 W/ HCPCS: Performed by: NURSE PRACTITIONER

## 2025-04-26 PROCEDURE — 83735 ASSAY OF MAGNESIUM: CPT | Performed by: FAMILY MEDICINE

## 2025-04-26 PROCEDURE — 25000003 PHARM REV CODE 250: Performed by: FAMILY MEDICINE

## 2025-04-26 RX ORDER — DILTIAZEM HYDROCHLORIDE 30 MG/1
30 TABLET, FILM COATED ORAL EVERY 6 HOURS
Qty: 360 TABLET | Refills: 0 | Status: SHIPPED | OUTPATIENT
Start: 2025-04-26 | End: 2025-07-25

## 2025-04-26 RX ORDER — FUROSEMIDE 40 MG/1
40 TABLET ORAL DAILY PRN
Qty: 30 TABLET | Refills: 0 | Status: SHIPPED | OUTPATIENT
Start: 2025-04-26 | End: 2025-05-26

## 2025-04-26 RX ORDER — LANOLIN ALCOHOL/MO/W.PET/CERES
400 CREAM (GRAM) TOPICAL DAILY
Qty: 30 TABLET | Refills: 0 | Status: SHIPPED | OUTPATIENT
Start: 2025-04-27 | End: 2025-05-27

## 2025-04-26 RX ADMIN — DILTIAZEM HYDROCHLORIDE 30 MG: 30 TABLET, FILM COATED ORAL at 05:04

## 2025-04-26 RX ADMIN — Medication 400 MG: at 09:04

## 2025-04-26 RX ADMIN — BUDESONIDE INHALATION 0.5 MG: 0.5 SUSPENSION RESPIRATORY (INHALATION) at 07:04

## 2025-04-26 RX ADMIN — DILTIAZEM HYDROCHLORIDE 30 MG: 30 TABLET, FILM COATED ORAL at 11:04

## 2025-04-26 RX ADMIN — ASPIRIN 81 MG: 81 TABLET, COATED ORAL at 09:04

## 2025-04-26 RX ADMIN — ISOSORBIDE DINITRATE 30 MG: 10 TABLET ORAL at 09:04

## 2025-04-26 RX ADMIN — LEVOTHYROXINE SODIUM 50 MCG: 0.03 TABLET ORAL at 05:04

## 2025-04-26 RX ADMIN — POTASSIUM CHLORIDE 20 MEQ: 1500 TABLET, EXTENDED RELEASE ORAL at 09:04

## 2025-04-26 RX ADMIN — CARBOXYMETHYLCELLULOSE SODIUM 2 DROP: 0.5 SOLUTION, GEL FORMING / DROPS OPHTHALMIC at 05:04

## 2025-04-26 RX ADMIN — CARBOXYMETHYLCELLULOSE SODIUM 2 DROP: 0.5 SOLUTION, GEL FORMING / DROPS OPHTHALMIC at 09:04

## 2025-04-26 NOTE — CARE UPDATE
04/25/25 2033   Patient Assessment/Suction   Level of Consciousness (AVPU) alert   Respiratory Effort Normal;Unlabored   Expansion/Accessory Muscles/Retractions no use of accessory muscles;no retractions   All Lung Fields Breath Sounds clear   Rhythm/Pattern, Respiratory unlabored;pattern regular;no shortness of breath reported   Cough Frequency infrequent   PRE-TX-O2   Device (Oxygen Therapy) room air   SpO2 97 %   Pulse Oximetry Type Continuous   Pulse 107   Resp 20   Aerosol Therapy   $ Aerosol Therapy Charges Aerosol Treatment  (pulm)   Daily Review of Necessity (SVN) completed   Respiratory Treatment Status (SVN) given   Treatment Route (SVN) mouthpiece utilized;oxygen   Patient Position HOB elevated   Post Treatment Assessment (SVN) increased aeration   Signs of Intolerance (SVN) none   Breath Sounds Post-Respiratory Treatment   Throughout All Fields Post-Treatment All Fields   Throughout All Fields Post-Treatment aeration increased   Post-treatment Heart Rate (beats/min) 107   Post-treatment Resp Rate (breaths/min) 20

## 2025-04-26 NOTE — DISCHARGE SUMMARY
[facility]  Discharge Summary      Patient Name: Erich Encarnacion  MRN: [id number]  VIANNEY: [id number]  Patient Class: IP- Inpatient  Admission Date: 4/23/2025  Hospital Length of Stay: 3 days  Discharge Date and Time: 04/26/2025 3:49 PM  Attending Physician: No att. providers found   Discharging Provider: Sharron Mendoza DO  Primary Care Provider: Se Rios MD    Primary Care Team: Networked reference to record PCT     HPI:   89 year old female with a past medical history of hypertension, atrial fibrillation not anticoagulated due to fall risk who presents to the emergency room with reports of both lower extremity swelling for the last 5 days. Reports on Easter Sunday she lost her balance tranferring from walker to couch, fell and hit the left side of her head. Denies LOC.     In ER, tachycardic at 111, Troponin I high sensitivity 30.9, , H/H with anemia stable. EKG in atrial fibrillation, No ST elevation or depression. CT head nonacute, CT C spine pending, US BLE: negative for DVT, Chest xray with enlarged cardiac silhouette with pulmonary vascular congestion. Bilateral pleural effusions right greater than left, and mild bilateral multifocal airspace disease.   IV lasix 40 mg given in ER    Admit to hospital medicine for new onset congestive heart failure.     * No surgery found *      Hospital Course:   Patient with history of hypertension, AFib not on any anticoagulation due to fall risk came in with lower extremity swelling with elevated BNP and admitted for acute on chronic HF.   Patient was given IV Lasix and transitioned to oral.  Discontinued home amlodipine.  Echo ordered.  For a fib with RVR, patient was given diltiazem push followed by oral diltiazem.  Pt refused PT/OT. Furosemide was changed to prn .  Discussed with patient and daughters that she is a high likelihood of recurrent admission.  Recommended to follow-up with PCP and Cardiology     Goals of Care  Treatment Preferences:  Code Status: Full Code      SDOH Screening:  The patient was screened for utility difficulties, food insecurity, transport difficulties, housing insecurity, and interpersonal safety and there were no concerns identified this admission.     Consults:   Consults (From admission, onward)          Status Ordering Provider     Inpatient consult to Social Work/Case Management  Once        Provider:  (Not yet assigned)    Acknowledged ALEXANDER MALLORY     Inpatient consult to Registered Dietitian/Nutritionist  Once        Provider:  (Not yet assigned)    Completed ALEXANDER MALLORY            Assessment & Plan  Congestive heart failure  Patient has  new onset  heart failure that is Acute on chronic. On presentation their CHF was . Most recent BNP and echo results are listed below.  Recent Labs     04/23/25  1913   *     Latest ECHO  Results for orders placed during the hospital encounter of 07/01/24    Echo    Interpretation Summary    Left Ventricle: The left ventricle is normal in size. Moderately increased wall thickness. There is concentric hypertrophy. There is normal systolic function with a visually estimated ejection fraction of 60 - 65%. Grade I diastolic dysfunction.    Right Ventricle: Normal right ventricular cavity size. Systolic function is normal.    Left Atrium: Left atrium is severely dilated.    Aortic Valve: There is moderate aortic valve sclerosis. Aortic valve area by velocity is 1.58 cm². Aortic valve peak velocity is 2.01 m/s. Mean gradient is 9 mmHg. Aortic Valve peak gradient is 16 mmHg.    Mitral Valve: There is moderate mitral annular calcification present. There is mild regurgitation.    Tricuspid Valve: There is mild to moderate regurgitation. The estimated PA systolic pressure is at least 31 mmHg.    Chest Xray:   Enlarged cardiac silhouette with pulmonary vascular congestion.  Bilateral pleural effusions, right greater than left.  Mild bilateral multifocal airspace  disease.       Electronically signed by: Bela Villegas   Date: 04/23/2025   Time: 21:07     Current Heart Failure Medications  furosemide tablet 40 mg, Daily PRN, Oral  isosorbide dinitrate tablet 30 mg, Daily, Oral  furosemide (LASIX) tablet, Daily PRN, Oral    Plan  - Monitor strict I&Os and daily weights.    - Place on telemetry  - Low sodium diet  - Place on fluid restriction of 1.5 L.   - On diuretic PRN  Echo ordered        Essential hypertension  Patient's blood pressure range in the last 24 hours was: BP  Min: 94/61  Max: 132/60.The patient's inpatient anti-hypertensive regimen is listed below:  Current Antihypertensives  diltiaZEM tablet 30 mg, Every 6 hours, Oral  furosemide tablet 40 mg, Daily PRN, Oral  isosorbide dinitrate tablet 30 mg, Daily, Oral  diltiaZEM (CARDIZEM) tablet, Every 6 hours, Oral  furosemide (LASIX) tablet, Daily PRN, Oral    Plan  - BP is controlled, no changes needed to their regimen    Fall  Consult PT/OT   Fall precautions    Troponin level elevated  Likely demand  Echo ordered  Troponin trended down  Cardiac monitoring    Edema of both lower extremities  US negative for DVT  Lasix  D/C home amlodipine    Atrial fibrillation  Patient has paroxysmal (<7 days) atrial fibrillation. Patient is currently in atrial fibrillation. [id number] Score: 4. The patients heart rate in the last 24 hours is as follows:  Pulse  Min: 71  Max: 107     Antiarrhythmics  diltiaZEM tablet 30 mg, Every 6 hours, Oral  diltiaZEM (CARDIZEM) tablet, Every 6 hours, OralMetoprolol 5 mg IV x 1 dose.     Anticoagulants           Plan  - Replete lytes with a goal of K>4, Mg >2  - Patient is not anticoagulated due to fall risk    - Patient's afib is currently uncontrolled. Will continue current treatment        Final Active Diagnoses:    Diagnosis Date Noted POA    PRINCIPAL PROBLEM:  Congestive heart failure [I50.9] 04/23/2025 Yes    Edema of both lower extremities [R60.0] 04/23/2025 Yes    Atrial  fibrillation [I48.91] 04/23/2025 Yes    Troponin level elevated [R79.89] 06/27/2024 Yes    Fall [W19.XXXA] 06/27/2024 Yes    Essential hypertension [I10] 10/17/2021 Yes      Problems Resolved During this Admission:       Discharged Condition: poor    Disposition: Home or Self Care    Follow Up:   Contact information for follow-up providers       Se Rios MD Follow up.    Specialty: Family Medicine  Why: Please call and schedule a 1 week hospital f/u  Contact information:  [address]  [place] [address]  [phone]               Alejandro Ureña Jr., MD Follow up.    Specialty: Cardiology  Why: Please call and schedule a hospital f/u  Contact information:  [address]  [place] [address]  [phone]                       Contact information for after-discharge care       Home Medical Care       [facility]    Service: Home Nursing  Why: Home Health- Will call you with a start of care  Contact information:  [address], [address]  [facility] [id number] [phone]                                 Patient Instructions:      SUBSEQUENT HOME HEALTH ORDERS   Order Comments: Resume home health after inpatient stay    PCP to sign all subsequent home health orders     Order Specific Question Answer Comments   What Home Health Agency is the patient currently using? Other/External        Significant Diagnostic Studies: Labs: BMP:   Recent Labs   Lab 04/25/25  0459 04/26/25 0454    139   K 3.0* 4.3   CO2 28 31*   BUN 14 21   CREATININE 0.9 0.9   CALCIUM 8.6* 8.8   MG 1.8 1.9   , CMP   Recent Labs   Lab 04/25/25  0459 04/26/25  0454    139   K 3.0* 4.3   CO2 28 31*   BUN 14 21   CREATININE 0.9 0.9   CALCIUM 8.6* 8.8   , and CBC   Recent Labs   Lab 04/25/25  0459 04/26/25 0454   WBC 6.48 6.10   HGB 11.2* 11.0*   HCT 34.5* 34.3*    179       Pending Diagnostic Studies:       None           Medications:  Reconciled Home Medications:      Medication List         START taking these medications      diltiaZEM 30 MG tablet  Commonly known as: CARDIZEM  Take 1 tablet (30 mg total) by mouth every 6 (six) hours.     furosemide 40 MG tablet  Commonly known as: LASIX  Take 1 tablet (40 mg total) by mouth daily as needed (If increase in shortness of breath or leg swelling or increase in weight gain 2-3 lb in 2 days).     magnesium oxide 400 mg (241.3 mg magnesium) tablet  Commonly known as: MAG-OX  Take 1 tablet (400 mg total) by mouth once daily.  Start taking on: April 27, 2025            CONTINUE taking these medications      aspirin 81 MG EC tablet  Commonly known as: ECOTRIN  Take 162 mg by mouth once daily.     atorvastatin 10 MG tablet  Commonly known as: LIPITOR  Take 10 mg by mouth once daily.     budesonide 0.5 mg/2 mL nebulizer solution  Commonly known as: PULMICORT  Take 2 mLs (0.5 mg total) by nebulization 2 (two) times daily. Controller     isosorbide dinitrate 30 MG Tab  Commonly known as: ISORDIL  Take 30 mg by mouth once daily.     levothyroxine 50 MCG tablet  Commonly known as: SYNTHROID  Take 1 tablet (50 mcg total) by mouth every morning.     meclizine 12.5 mg tablet  Commonly known as: ANTIVERT  Take 2 tablets by mouth daily as needed for Dizziness.     polyethylene glycol 17 gram/dose powder  Commonly known as: GLYCOLAX  Take 17 g by mouth every evening.     QUEtiapine 25 MG Tab  Commonly known as: SEROQUEL  Take 1 tablet (25 mg total) by mouth every evening.     RESTASIS 0.05 % ophthalmic emulsion  Generic drug: cycloSPORINE  Place 1 drop into both eyes 2 (two) times daily.     topiramate 25 MG tablet  Commonly known as: TOPAMAX  Take 25 mg by mouth once daily.     traMADoL 50 mg tablet  Commonly known as: ULTRAM  Take 50 mg by mouth nightly.            STOP taking these medications      amLODIPine 2.5 MG tablet  Commonly known as: NORVASC              Indwelling Lines/Drains at time of discharge:   Lines/Drains/Airways       None                   Time  spent on the discharge of patient: 32 minutes         Sharron Mendoza DO  Department of Hospital Medicine  [facility]

## 2025-04-26 NOTE — ASSESSMENT & PLAN NOTE
Patient has paroxysmal (<7 days) atrial fibrillation. Patient is currently in atrial fibrillation. [id number] Score: 4. The patients heart rate in the last 24 hours is as follows:  Pulse  Min: 71  Max: 107     Antiarrhythmics  diltiaZEM tablet 30 mg, Every 6 hours, Oral  diltiaZEM (CARDIZEM) tablet, Every 6 hours, OralMetoprolol 5 mg IV x 1 dose.     Anticoagulants           Plan  - Replete lytes with a goal of K>4, Mg >2  - Patient is not anticoagulated due to fall risk   - Patient's afib is currently uncontrolled. Will continue current treatment

## 2025-04-26 NOTE — PLAN OF CARE
Charts and orders reviewed. Pt to discharge home with [facility] home [facility] (SW sent orders and a message via Epic verifying pt was d/c today).  was not able to schedule pt's PCP and Cardio f/u due to her d/c on a Saturday.  put instructions on pt's AVS to schedule hospital f/u appts. Pt has no other needs to be addressed by case management. Pt cleared to discharge from case management standpoint.    Pt's family will be transporting pt home from [facility].       04/26/25 1250   Final Note   Assessment Type Final Discharge Note   Anticipated Discharge Disposition Home-Health   What phone number can be called within the next 1-3 days to see how you are doing after discharge? [phone]   Hospital Resources/Appts/Education Provided Appointments scheduled and added to AVS;Post-Acute resouces added to AVS   Post-Acute Status   Post-Acute Authorization Home Health   Home Health Status Set-up Complete/Auth obtained   [facility] CHOICES   Patient choice form signed by patient/caregiver List with quality metrics by geographic area provided   Discharge Delays None known at this time

## 2025-04-26 NOTE — ASSESSMENT & PLAN NOTE
Patient's blood pressure range in the last 24 hours was: BP  Min: 94/61  Max: 132/60.The patient's inpatient anti-hypertensive regimen is listed below:  Current Antihypertensives  diltiaZEM tablet 30 mg, Every 6 hours, Oral  furosemide tablet 40 mg, Daily PRN, Oral  isosorbide dinitrate tablet 30 mg, Daily, Oral  diltiaZEM (CARDIZEM) tablet, Every 6 hours, Oral  furosemide (LASIX) tablet, Daily PRN, Oral    Plan  - BP is controlled, no changes needed to their regimen

## 2025-04-26 NOTE — NURSING
Patient discharged to home via WC with family and personal belongings. Discharge instructions provided and reviewed with patient and daughter, denies any questions. VSS and NAD at time of discharge.

## 2025-04-26 NOTE — ASSESSMENT & PLAN NOTE
Patient has new onset heart failure that is Acute on chronic. On presentation their CHF was . Most recent BNP and echo results are listed below.  Recent Labs     04/23/25  1913   *     Latest ECHO  Results for orders placed during the hospital encounter of 07/01/24    Echo    Interpretation Summary    Left Ventricle: The left ventricle is normal in size. Moderately increased wall thickness. There is concentric hypertrophy. There is normal systolic function with a visually estimated ejection fraction of 60 - 65%. Grade I diastolic dysfunction.    Right Ventricle: Normal right ventricular cavity size. Systolic function is normal.    Left Atrium: Left atrium is severely dilated.    Aortic Valve: There is moderate aortic valve sclerosis. Aortic valve area by velocity is 1.58 cm². Aortic valve peak velocity is 2.01 m/s. Mean gradient is 9 mmHg. Aortic Valve peak gradient is 16 mmHg.    Mitral Valve: There is moderate mitral annular calcification present. There is mild regurgitation.    Tricuspid Valve: There is mild to moderate regurgitation. The estimated PA systolic pressure is at least 31 mmHg.    Chest Xray:   Enlarged cardiac silhouette with pulmonary vascular congestion.  Bilateral pleural effusions, right greater than left.  Mild bilateral multifocal airspace disease.       Electronically signed by: Bela Villegas   Date: 04/23/2025   Time: 21:07     Current Heart Failure Medications  furosemide tablet 40 mg, Daily PRN, Oral  isosorbide dinitrate tablet 30 mg, Daily, Oral  furosemide (LASIX) tablet, Daily PRN, Oral    Plan  - Monitor strict I&Os and daily weights.    - Place on telemetry  - Low sodium diet  - Place on fluid restriction of 1.5 L.   - On diuretic PRN  Echo ordered

## 2025-04-30 LAB
OHS QRS DURATION: 86 MS
OHS QTC CALCULATION: 493 MS

## 2025-05-05 NOTE — PHYSICIAN QUERY
Please clarify the conflicting documentation and please provide a type of CHF in relation to the clinical  findings noted within the query   Other (please specify): see note

## 2025-05-07 NOTE — PHYSICIAN QUERY
Please clarify the conflicting documentation and please provide a type of CHF in relation to the clinical  findings noted within the query   Acute on chronic diastolic heart failure (HFpEF)

## 2025-05-28 ENCOUNTER — OFFICE VISIT (OUTPATIENT)
Dept: PULMONOLOGY | Facility: CLINIC | Age: OVER 89
End: 2025-05-28
Payer: MEDICARE

## 2025-05-28 VITALS
DIASTOLIC BLOOD PRESSURE: 68 MMHG | HEART RATE: 113 BPM | HEIGHT: 64 IN | OXYGEN SATURATION: 94 % | SYSTOLIC BLOOD PRESSURE: 110 MMHG | WEIGHT: 153.81 LBS | BODY MASS INDEX: 26.26 KG/M2

## 2025-05-28 DIAGNOSIS — I50.9 CONGESTIVE HEART FAILURE, UNSPECIFIED HF CHRONICITY, UNSPECIFIED HEART FAILURE TYPE: ICD-10-CM

## 2025-05-28 DIAGNOSIS — J42 CHRONIC BRONCHITIS, UNSPECIFIED CHRONIC BRONCHITIS TYPE: ICD-10-CM

## 2025-05-28 DIAGNOSIS — I48.91 ATRIAL FIBRILLATION, UNSPECIFIED TYPE: ICD-10-CM

## 2025-05-28 DIAGNOSIS — R54 ADVANCED AGE: Primary | ICD-10-CM

## 2025-05-28 PROCEDURE — 99999 PR PBB SHADOW E&M-EST. PATIENT-LVL III: CPT | Mod: PBBFAC,,, | Performed by: NURSE PRACTITIONER

## 2025-05-28 PROCEDURE — 99213 OFFICE O/P EST LOW 20 MIN: CPT | Mod: S$GLB,,, | Performed by: NURSE PRACTITIONER

## 2025-05-28 PROCEDURE — 1125F AMNT PAIN NOTED PAIN PRSNT: CPT | Mod: CPTII,S$GLB,, | Performed by: NURSE PRACTITIONER

## 2025-05-28 PROCEDURE — 1159F MED LIST DOCD IN RCRD: CPT | Mod: CPTII,S$GLB,, | Performed by: NURSE PRACTITIONER

## 2025-05-28 PROCEDURE — 3288F FALL RISK ASSESSMENT DOCD: CPT | Mod: CPTII,S$GLB,, | Performed by: NURSE PRACTITIONER

## 2025-05-28 PROCEDURE — 1101F PT FALLS ASSESS-DOCD LE1/YR: CPT | Mod: CPTII,S$GLB,, | Performed by: NURSE PRACTITIONER

## 2025-05-28 NOTE — PROGRESS NOTES
SUBJECTIVE:    Patient ID: Erich Encarnacion is a 89 y.o. female.    Chief Complaint: Follow-up (Follow up Bronchitis)    Follow-up      Patient here today with her daughter.  She is still coughing up lots of mucous in the mornings. She is not using the Budesonide twice a day nor taking the protonix.  She was hospitalized earlier this month for atrial fib and heart failure.  She is sleeping elevated.  She is aware that the heart failure can also be causing a cough. She suffers with dysphagia with pills at times.        Past Medical History:   Diagnosis Date    Hypertension     Macula lutea degeneration     Paroxysmal atrial fibrillation     Squamous cell carcinoma of skin      Past Surgical History:   Procedure Laterality Date    APPENDECTOMY      BACK SURGERY  1975    HYSTERECTOMY      URETEROSCOPIC REMOVAL OF URETERIC CALCULUS Left 5/28/2024    Procedure: REMOVAL, CALCULUS, URETER, URETEROSCOPIC;  Surgeon: Juhi Mosher Jr., MD;  Location: [facility];  Service: Urology;  Laterality: Left;     Family History   Problem Relation Name Age of Onset    Melanoma Neg Hx      Psoriasis Neg Hx      Lupus Neg Hx      Eczema Neg Hx          Social History:   Marital Status:   Occupation: Data Unavailable  Alcohol History:  reports no history of alcohol use.  Tobacco History:  reports that she has quit smoking. Her smoking use included cigarettes. She has a 20 pack-year smoking history. She has never used smokeless tobacco.  Drug History:  reports no history of drug use.    Review of patient's allergies indicates:   Allergen Reactions    Topiramate Nausea And Vomiting       Current Medications[1]    Last chest xray Impression: 04/2025     Mild cardiomegaly with improvement of the pulmonary vascular congestion and resolution of the pleural effusions    Review of Systems  General: Feeling Well.  Eyes: Vision is good.  ENT:  No sinusitis or pharyngitis.   Heart:: No chest pain or palpitations.  Lungs: chronic bronchitis in  "the morning for 2 years   GI: gerd, dysphagia   : No dysuria, hesitancy, or nocturia.  Musculoskeletal: chronic neck pain   Skin: No lesions or rashes.  Neuro: No headaches or neuropathy.  Lymph: No edema or adenopathy.  Psych: No anxiety or depression.  Endo: No weight change.    OBJECTIVE:      /68   Pulse (!) 113   Ht 5' 4" (1.626 m)   Wt 69.8 kg (153 lb 12.8 oz)   SpO2 (!) 94%   BMI 26.40 kg/m²     Physical Exam  GENERAL: Older patient in no distress.  HEENT: Pupils equal and reactive. Extraocular movements intact. Nose intact.      Pharynx moist.  NECK: Supple.   HEART: Regular rate and rhythm.  murmur   LUNGS: clear  ABDOMEN: Bowel sounds present. Non-tender, no masses palpated.  EXTREMITIES: Normal muscle tone and joint movement, no cyanosis or clubbing. walker  LYMPHATICS: No adenopathy palpated, no edema.  SKIN: Dry, intact, no lesions.   NEURO: Cranial nerves II-XII intact. Motor strength 5/5 bilaterally, upper and lower extremities.  PSYCH: Appropriate affect.    Assessment:       1. Advanced age    2. Chronic bronchitis, unspecified chronic bronchitis type    3. Congestive heart failure, unspecified HF chronicity, unspecified heart failure type    4. Atrial fibrillation, unspecified type            Plan:       Advanced age    Chronic bronchitis, unspecified chronic bronchitis type    Congestive heart failure, unspecified HF chronicity, unspecified heart failure type    Atrial fibrillation, unspecified type           No follow-ups on file.      Use the Budesonide twice a day in nebulizer, rinse after you use it    Take the Pantoprazole in the morning before she eats or drinks anything  Sleep elevated  No eating or drinking at least 2hours before bed   Follow up with cardiologist         [1]   Current Outpatient Medications   Medication Sig Dispense Refill    aspirin (ECOTRIN) 81 MG EC tablet Take 162 mg by mouth once daily.      atorvastatin (LIPITOR) 10 MG tablet Take 10 mg by mouth once " daily.      budesonide (PULMICORT) 0.5 mg/2 mL nebulizer solution Take 2 mLs (0.5 mg total) by nebulization 2 (two) times daily. Controller 120 mL 5    diltiaZEM (CARDIZEM) 30 MG tablet Take 1 tablet (30 mg total) by mouth every 6 (six) hours. 360 tablet 0    furosemide (LASIX) 40 MG tablet Take 1 tablet (40 mg total) by mouth daily as needed (If increase in shortness of breath or leg swelling or increase in weight gain 2-3 lb in 2 days). 30 tablet 0    isosorbide dinitrate (ISORDIL) 30 MG Tab Take 30 mg by mouth once daily.       levothyroxine (SYNTHROID) 50 MCG tablet Take 1 tablet (50 mcg total) by mouth every morning. 30 tablet 11    magnesium oxide (MAG-OX) 400 mg (241.3 mg magnesium) tablet Take 1 tablet (400 mg total) by mouth once daily. 30 tablet 0    meclizine (ANTIVERT) 12.5 mg tablet Take 2 tablets by mouth daily as needed for Dizziness.      polyethylene glycol (GLYCOLAX) 17 gram/dose powder Take 17 g by mouth every evening.      QUEtiapine (SEROQUEL) 25 MG Tab Take 1 tablet (25 mg total) by mouth every evening. 30 tablet 3    topiramate (TOPAMAX) 25 MG tablet Take 25 mg by mouth once daily.   0    traMADoL (ULTRAM) 50 mg tablet Take 50 mg by mouth nightly.      RESTASIS 0.05 % ophthalmic emulsion Place 1 drop into both eyes 2 (two) times daily. (Patient not taking: Reported on 5/28/2025)       No current facility-administered medications for this visit.     Facility-Administered Medications Ordered in Other Visits   Medication Dose Route Frequency Provider Last Rate Last Admin    triamcinolone acetonide injection 40 mg  40 mg Intra-articular  Kenneth Nevarez MD   40 mg at 06/03/22 9336

## 2025-06-28 ENCOUNTER — HOSPITAL ENCOUNTER (INPATIENT)
Facility: HOSPITAL | Age: OVER 89
LOS: 3 days | Discharge: HOME HEALTH | DRG: 291 | End: 2025-07-01
Attending: STUDENT IN AN ORGANIZED HEALTH CARE EDUCATION/TRAINING PROGRAM | Admitting: INTERNAL MEDICINE
Payer: MEDICARE

## 2025-06-28 DIAGNOSIS — I50.9 CONGESTIVE HEART FAILURE, UNSPECIFIED HF CHRONICITY, UNSPECIFIED HEART FAILURE TYPE: ICD-10-CM

## 2025-06-28 DIAGNOSIS — M79.89 LEG SWELLING: Primary | ICD-10-CM

## 2025-06-28 DIAGNOSIS — I50.9 ACUTE EXACERBATION OF CHF (CONGESTIVE HEART FAILURE): ICD-10-CM

## 2025-06-28 PROBLEM — I50.33 ACUTE ON CHRONIC DIASTOLIC CONGESTIVE HEART FAILURE: Status: ACTIVE | Noted: 2025-06-28

## 2025-06-28 PROBLEM — E03.9 HYPOTHYROIDISM: Status: ACTIVE | Noted: 2025-06-28

## 2025-06-28 LAB
ABSOLUTE EOSINOPHIL (SMH): 0.2 K/UL
ABSOLUTE MONOCYTE (SMH): 0.47 K/UL (ref 0.3–1)
ABSOLUTE NEUTROPHIL COUNT (SMH): 4.8 K/UL (ref 1.8–7.7)
ALBUMIN SERPL-MCNC: 3.9 G/DL (ref 3.5–5.2)
ALP SERPL-CCNC: 99 UNIT/L (ref 55–135)
ALT SERPL-CCNC: 19 UNIT/L (ref 10–44)
ANION GAP (SMH): 8 MMOL/L (ref 8–16)
AST SERPL-CCNC: 17 UNIT/L (ref 10–40)
BASOPHILS # BLD AUTO: 0.05 K/UL
BASOPHILS NFR BLD AUTO: 0.7 %
BILIRUB SERPL-MCNC: 0.6 MG/DL (ref 0.1–1)
BNP SERPL-MCNC: 366 PG/ML
BUN SERPL-MCNC: 33 MG/DL (ref 10–30)
CALCIUM SERPL-MCNC: 9.3 MG/DL (ref 8.7–10.5)
CHLORIDE SERPL-SCNC: 109 MMOL/L (ref 95–110)
CO2 SERPL-SCNC: 26 MMOL/L (ref 23–29)
CREAT SERPL-MCNC: 1 MG/DL (ref 0.5–1.4)
ERYTHROCYTE [DISTWIDTH] IN BLOOD BY AUTOMATED COUNT: 14.5 % (ref 11.5–14.5)
GFR SERPLBLD CREATININE-BSD FMLA CKD-EPI: 51 ML/MIN/1.73/M2
GLUCOSE SERPL-MCNC: 125 MG/DL (ref 70–110)
HCT VFR BLD AUTO: 36.2 % (ref 37–48.5)
HGB BLD-MCNC: 11.6 GM/DL (ref 12–16)
IMM GRANULOCYTES # BLD AUTO: 0.03 K/UL (ref 0–0.04)
IMM GRANULOCYTES NFR BLD AUTO: 0.4 % (ref 0–0.5)
LYMPHOCYTES # BLD AUTO: 1.64 K/UL (ref 1–4.8)
MCH RBC QN AUTO: 28.9 PG (ref 27–31)
MCHC RBC AUTO-ENTMCNC: 32 G/DL (ref 32–36)
MCV RBC AUTO: 90 FL (ref 82–98)
NUCLEATED RBC (/100WBC) (SMH): 0 /100 WBC
PLATELET # BLD AUTO: 176 K/UL (ref 150–450)
PMV BLD AUTO: 11.3 FL (ref 9.2–12.9)
POTASSIUM SERPL-SCNC: 3.4 MMOL/L (ref 3.5–5.1)
PROT SERPL-MCNC: 6.6 GM/DL (ref 6–8.4)
RBC # BLD AUTO: 4.02 M/UL (ref 4–5.4)
RELATIVE EOSINOPHIL (SMH): 2.8 % (ref 0–8)
RELATIVE LYMPHOCYTE (SMH): 22.8 % (ref 18–48)
RELATIVE MONOCYTE (SMH): 6.5 % (ref 4–15)
RELATIVE NEUTROPHIL (SMH): 66.8 % (ref 38–73)
SODIUM SERPL-SCNC: 143 MMOL/L (ref 136–145)
TROPONIN HIGH SENSITIVE (SMH): 34.2 PG/ML
TROPONIN HIGH SENSITIVE (SMH): 35.5 PG/ML
WBC # BLD AUTO: 7.19 K/UL (ref 3.9–12.7)

## 2025-06-28 PROCEDURE — 25000003 PHARM REV CODE 250: Performed by: STUDENT IN AN ORGANIZED HEALTH CARE EDUCATION/TRAINING PROGRAM

## 2025-06-28 PROCEDURE — 96374 THER/PROPH/DIAG INJ IV PUSH: CPT

## 2025-06-28 PROCEDURE — 83880 ASSAY OF NATRIURETIC PEPTIDE: CPT | Performed by: EMERGENCY MEDICINE

## 2025-06-28 PROCEDURE — 99285 EMERGENCY DEPT VISIT HI MDM: CPT | Mod: 25

## 2025-06-28 PROCEDURE — 84484 ASSAY OF TROPONIN QUANT: CPT | Performed by: EMERGENCY MEDICINE

## 2025-06-28 PROCEDURE — 11000001 HC ACUTE MED/SURG PRIVATE ROOM

## 2025-06-28 PROCEDURE — 93005 ELECTROCARDIOGRAM TRACING: CPT | Performed by: GENERAL PRACTICE

## 2025-06-28 PROCEDURE — 84484 ASSAY OF TROPONIN QUANT: CPT | Performed by: STUDENT IN AN ORGANIZED HEALTH CARE EDUCATION/TRAINING PROGRAM

## 2025-06-28 PROCEDURE — 63600175 PHARM REV CODE 636 W HCPCS: Performed by: STUDENT IN AN ORGANIZED HEALTH CARE EDUCATION/TRAINING PROGRAM

## 2025-06-28 PROCEDURE — 93010 ELECTROCARDIOGRAM REPORT: CPT | Mod: ,,, | Performed by: GENERAL PRACTICE

## 2025-06-28 PROCEDURE — 80053 COMPREHEN METABOLIC PANEL: CPT | Performed by: EMERGENCY MEDICINE

## 2025-06-28 PROCEDURE — 85025 COMPLETE CBC W/AUTO DIFF WBC: CPT | Performed by: EMERGENCY MEDICINE

## 2025-06-28 RX ORDER — FUROSEMIDE 10 MG/ML
40 INJECTION INTRAMUSCULAR; INTRAVENOUS
Status: COMPLETED | OUTPATIENT
Start: 2025-06-28 | End: 2025-06-28

## 2025-06-28 RX ORDER — ASPIRIN 81 MG/1
162 TABLET ORAL DAILY
Status: DISCONTINUED | OUTPATIENT
Start: 2025-06-29 | End: 2025-07-01 | Stop reason: HOSPADM

## 2025-06-28 RX ORDER — QUETIAPINE FUMARATE 25 MG/1
25 TABLET, FILM COATED ORAL NIGHTLY
Status: DISCONTINUED | OUTPATIENT
Start: 2025-06-29 | End: 2025-07-01 | Stop reason: HOSPADM

## 2025-06-28 RX ORDER — BUDESONIDE 0.5 MG/2ML
0.5 INHALANT ORAL 2 TIMES DAILY
Status: DISCONTINUED | OUTPATIENT
Start: 2025-06-29 | End: 2025-06-29

## 2025-06-28 RX ORDER — TOPIRAMATE 25 MG/1
25 TABLET, FILM COATED ORAL DAILY
Status: DISCONTINUED | OUTPATIENT
Start: 2025-06-29 | End: 2025-07-01 | Stop reason: HOSPADM

## 2025-06-28 RX ORDER — FUROSEMIDE 10 MG/ML
40 INJECTION INTRAMUSCULAR; INTRAVENOUS EVERY 12 HOURS
Status: DISCONTINUED | OUTPATIENT
Start: 2025-06-29 | End: 2025-06-30

## 2025-06-28 RX ORDER — LEVOTHYROXINE SODIUM 25 UG/1
50 TABLET ORAL EVERY MORNING
Status: DISCONTINUED | OUTPATIENT
Start: 2025-06-29 | End: 2025-07-01 | Stop reason: HOSPADM

## 2025-06-28 RX ORDER — HYDRALAZINE HYDROCHLORIDE 20 MG/ML
10 INJECTION INTRAMUSCULAR; INTRAVENOUS EVERY 4 HOURS PRN
Status: DISCONTINUED | OUTPATIENT
Start: 2025-06-29 | End: 2025-07-01 | Stop reason: HOSPADM

## 2025-06-28 RX ORDER — ATORVASTATIN CALCIUM 10 MG/1
10 TABLET, FILM COATED ORAL DAILY
Status: DISCONTINUED | OUTPATIENT
Start: 2025-06-29 | End: 2025-07-01 | Stop reason: HOSPADM

## 2025-06-28 RX ORDER — POLYETHYLENE GLYCOL 3350 17 G/17G
17 POWDER, FOR SOLUTION ORAL NIGHTLY
Status: DISCONTINUED | OUTPATIENT
Start: 2025-06-29 | End: 2025-07-01 | Stop reason: HOSPADM

## 2025-06-28 RX ORDER — SODIUM CHLORIDE 0.9 % (FLUSH) 0.9 %
10 SYRINGE (ML) INJECTION
Status: DISCONTINUED | OUTPATIENT
Start: 2025-06-29 | End: 2025-07-01 | Stop reason: HOSPADM

## 2025-06-28 RX ORDER — ASPIRIN 325 MG
325 TABLET ORAL
Status: COMPLETED | OUTPATIENT
Start: 2025-06-28 | End: 2025-06-28

## 2025-06-28 RX ORDER — DILTIAZEM HYDROCHLORIDE 30 MG/1
30 TABLET, FILM COATED ORAL EVERY 6 HOURS
Status: DISCONTINUED | OUTPATIENT
Start: 2025-06-29 | End: 2025-07-01 | Stop reason: HOSPADM

## 2025-06-28 RX ORDER — ISOSORBIDE DINITRATE 10 MG/1
30 TABLET ORAL DAILY
Status: DISCONTINUED | OUTPATIENT
Start: 2025-06-29 | End: 2025-07-01 | Stop reason: HOSPADM

## 2025-06-28 RX ADMIN — ASPIRIN 325 MG: 325 TABLET ORAL at 10:06

## 2025-06-28 RX ADMIN — FUROSEMIDE 40 MG: 10 INJECTION, SOLUTION INTRAMUSCULAR; INTRAVENOUS at 08:06

## 2025-06-29 ENCOUNTER — CLINICAL SUPPORT (OUTPATIENT)
Dept: CARDIOLOGY | Facility: HOSPITAL | Age: OVER 89
End: 2025-06-29
Attending: INTERNAL MEDICINE
Payer: MEDICARE

## 2025-06-29 VITALS — HEIGHT: 64 IN | WEIGHT: 154.13 LBS | BODY MASS INDEX: 26.31 KG/M2

## 2025-06-29 LAB
ANION GAP (SMH): 10 MMOL/L (ref 8–16)
ANION GAP (SMH): 10 MMOL/L (ref 8–16)
AORTIC SIZE INDEX (SOV): 1.9 CM/M2
AORTIC SIZE INDEX: 2.1 CM/M2
ASCENDING AORTA: 3.6 CM
AV INDEX (PROSTH): 0.53
AV MEAN GRADIENT: 6 MMHG
AV PEAK GRADIENT: 12 MMHG
AV VALVE AREA BY VELOCITY RATIO: 1.5 CM²
AV VALVE AREA: 1.7 CM²
AV VELOCITY RATIO: 0.47
BSA FOR ECHO PROCEDURE: 1.78 M2
BUN SERPL-MCNC: 29 MG/DL (ref 10–30)
BUN SERPL-MCNC: 30 MG/DL (ref 10–30)
CALCIUM SERPL-MCNC: 9.1 MG/DL (ref 8.7–10.5)
CALCIUM SERPL-MCNC: 9.3 MG/DL (ref 8.7–10.5)
CHLORIDE SERPL-SCNC: 103 MMOL/L (ref 95–110)
CHLORIDE SERPL-SCNC: 105 MMOL/L (ref 95–110)
CO2 SERPL-SCNC: 26 MMOL/L (ref 23–29)
CO2 SERPL-SCNC: 27 MMOL/L (ref 23–29)
CREAT SERPL-MCNC: 0.8 MG/DL (ref 0.5–1.4)
CREAT SERPL-MCNC: 0.8 MG/DL (ref 0.5–1.4)
CV ECHO LV RWT: 0.98 CM
DOP CALC AO PEAK VEL: 1.7 M/S
DOP CALC AO VTI: 25.3 CM
DOP CALC LVOT AREA: 3.1 CM2
DOP CALC LVOT DIAMETER: 2 CM
DOP CALC LVOT PEAK VEL: 0.8 M/S
DOP CALC LVOT STROKE VOLUME: 42.4 CM3
DOP CALC MV VTI: 19.1 CM
DOP CALCLVOT PEAK VEL VTI: 13.5 CM
E WAVE DECELERATION TIME: 208 MSEC
E/A RATIO: 2.48
E/E' RATIO: 15 M/S
EAG (SMH): 117 MG/DL (ref 68–131)
ECHO LV POSTERIOR WALL: 2 CM (ref 0.6–1.1)
ERYTHROCYTE [DISTWIDTH] IN BLOOD BY AUTOMATED COUNT: 14.3 % (ref 11.5–14.5)
FRACTIONAL SHORTENING: 22 % (ref 28–44)
GFR SERPLBLD CREATININE-BSD FMLA CKD-EPI: >60 ML/MIN/1.73/M2
GFR SERPLBLD CREATININE-BSD FMLA CKD-EPI: >60 ML/MIN/1.73/M2
GLUCOSE SERPL-MCNC: 138 MG/DL (ref 70–110)
GLUCOSE SERPL-MCNC: 153 MG/DL (ref 70–110)
HBA1C MFR BLD: 5.7 % (ref 4.5–6.2)
HCT VFR BLD AUTO: 38.6 % (ref 37–48.5)
HGB BLD-MCNC: 12.8 GM/DL (ref 12–16)
INTERVENTRICULAR SEPTUM: 1.7 CM (ref 0.6–1.1)
LEFT ATRIUM AREA SYSTOLIC (APICAL 4 CHAMBER): 36.4 CM2
LEFT ATRIUM SIZE: 4.6 CM
LEFT INTERNAL DIMENSION IN SYSTOLE: 3.2 CM (ref 2.1–4)
LEFT VENTRICLE DIASTOLIC VOLUME INDEX: 43.43 ML/M2
LEFT VENTRICLE DIASTOLIC VOLUME: 76 ML
LEFT VENTRICLE END SYSTOLIC VOLUME APICAL 4 CHAMBER: 133 ML
LEFT VENTRICLE MASS INDEX: 193.2 G/M2
LEFT VENTRICLE SYSTOLIC VOLUME INDEX: 24 ML/M2
LEFT VENTRICLE SYSTOLIC VOLUME: 42 ML
LEFT VENTRICULAR INTERNAL DIMENSION IN DIASTOLE: 4.1 CM (ref 3.5–6)
LEFT VENTRICULAR MASS: 338.1 G
LV LATERAL E/E' RATIO: 13.6 M/S
LV SEPTAL E/E' RATIO: 15.6 M/S
LVED V (TEICH): 75.9 ML
LVES V (TEICH): 41.6 ML
LVOT MG: 2 MMHG
LVOT MV: 0.54 CM/S
MAGNESIUM SERPL-MCNC: 2 MG/DL (ref 1.6–2.6)
MAGNESIUM SERPL-MCNC: 2 MG/DL (ref 1.6–2.6)
MCH RBC QN AUTO: 28.6 PG (ref 27–31)
MCHC RBC AUTO-ENTMCNC: 33.2 G/DL (ref 32–36)
MCV RBC AUTO: 86 FL (ref 82–98)
MV MEAN GRADIENT: 2 MMHG
MV PEAK A VEL: 0.44 M/S
MV PEAK E VEL: 1.09 M/S
MV PEAK GRADIENT: 4 MMHG
MV STENOSIS PRESSURE HALF TIME: 47 MS
MV VALVE AREA BY CONTINUITY EQUATION: 2.22 CM2
MV VALVE AREA P 1/2 METHOD: 4.68 CM2
PISA MRMAX VEL: 5.27 M/S
PISA TR MAX VEL: 2.9 M/S
PLATELET # BLD AUTO: 189 K/UL (ref 150–450)
PMV BLD AUTO: 11.5 FL (ref 9.2–12.9)
POTASSIUM SERPL-SCNC: 2.8 MMOL/L (ref 3.5–5.1)
POTASSIUM SERPL-SCNC: 3.4 MMOL/L (ref 3.5–5.1)
PV MV: 0.71 M/S
PV PEAK GRADIENT: 4 MMHG
PV PEAK VELOCITY: 1 M/S
RA PRESSURE ESTIMATED: 3 MMHG
RBC # BLD AUTO: 4.47 M/UL (ref 4–5.4)
RIGHT ATRIUM END SYSTOLIC VOLUME APICAL 4 CHAMBER INDEX BSA: 23.66 ML/M2
RIGHT ATRIUM VOLUME AREA LENGTH APICAL 4 CHAMBER: 41.4 ML
RV TB RVSP: 6 MMHG
RV TISSUE DOPPLER FREE WALL SYSTOLIC VELOCITY 1 (APICAL 4 CHAMBER VIEW): 8.81 CM/S
SINUS: 3.3 CM
SODIUM SERPL-SCNC: 140 MMOL/L (ref 136–145)
SODIUM SERPL-SCNC: 141 MMOL/L (ref 136–145)
TDI LATERAL: 0.08 M/S
TDI SEPTAL: 0.07 M/S
TDI: 0.08 M/S
TR MAX PG: 33 MMHG
TRICUSPID ANNULAR PLANE SYSTOLIC EXCURSION: 1.2 CM
TSH SERPL-ACNC: 2.22 UIU/ML (ref 0.34–5.6)
TV REST PULMONARY ARTERY PRESSURE: 37 MMHG
WBC # BLD AUTO: 9.28 K/UL (ref 3.9–12.7)
Z-SCORE OF LEFT VENTRICULAR DIMENSION IN END DIASTOLE: -1.66
Z-SCORE OF LEFT VENTRICULAR DIMENSION IN END SYSTOLE: 0.52

## 2025-06-29 PROCEDURE — 83735 ASSAY OF MAGNESIUM: CPT | Performed by: INTERNAL MEDICINE

## 2025-06-29 PROCEDURE — 93306 TTE W/DOPPLER COMPLETE: CPT

## 2025-06-29 PROCEDURE — 83036 HEMOGLOBIN GLYCOSYLATED A1C: CPT | Performed by: INTERNAL MEDICINE

## 2025-06-29 PROCEDURE — 25000003 PHARM REV CODE 250

## 2025-06-29 PROCEDURE — 36415 COLL VENOUS BLD VENIPUNCTURE: CPT

## 2025-06-29 PROCEDURE — 99900031 HC PATIENT EDUCATION (STAT)

## 2025-06-29 PROCEDURE — 25000003 PHARM REV CODE 250: Performed by: NURSE PRACTITIONER

## 2025-06-29 PROCEDURE — 25000242 PHARM REV CODE 250 ALT 637 W/ HCPCS: Performed by: STUDENT IN AN ORGANIZED HEALTH CARE EDUCATION/TRAINING PROGRAM

## 2025-06-29 PROCEDURE — 94799 UNLISTED PULMONARY SVC/PX: CPT

## 2025-06-29 PROCEDURE — 99900035 HC TECH TIME PER 15 MIN (STAT)

## 2025-06-29 PROCEDURE — 80048 BASIC METABOLIC PNL TOTAL CA: CPT | Performed by: INTERNAL MEDICINE

## 2025-06-29 PROCEDURE — 25000003 PHARM REV CODE 250: Performed by: INTERNAL MEDICINE

## 2025-06-29 PROCEDURE — 36415 COLL VENOUS BLD VENIPUNCTURE: CPT | Performed by: INTERNAL MEDICINE

## 2025-06-29 PROCEDURE — 84443 ASSAY THYROID STIM HORMONE: CPT | Performed by: INTERNAL MEDICINE

## 2025-06-29 PROCEDURE — 85027 COMPLETE CBC AUTOMATED: CPT | Performed by: INTERNAL MEDICINE

## 2025-06-29 PROCEDURE — 21400001 HC TELEMETRY ROOM

## 2025-06-29 PROCEDURE — 94640 AIRWAY INHALATION TREATMENT: CPT

## 2025-06-29 PROCEDURE — 94761 N-INVAS EAR/PLS OXIMETRY MLT: CPT

## 2025-06-29 PROCEDURE — 93306 TTE W/DOPPLER COMPLETE: CPT | Mod: 26,,, | Performed by: INTERNAL MEDICINE

## 2025-06-29 PROCEDURE — 99223 1ST HOSP IP/OBS HIGH 75: CPT | Mod: 25,GC,, | Performed by: INTERNAL MEDICINE

## 2025-06-29 PROCEDURE — 80048 BASIC METABOLIC PNL TOTAL CA: CPT

## 2025-06-29 PROCEDURE — 63600175 PHARM REV CODE 636 W HCPCS: Performed by: INTERNAL MEDICINE

## 2025-06-29 RX ORDER — SODIUM,POTASSIUM PHOSPHATES 280-250MG
2 POWDER IN PACKET (EA) ORAL
Status: DISCONTINUED | OUTPATIENT
Start: 2025-06-29 | End: 2025-07-01 | Stop reason: HOSPADM

## 2025-06-29 RX ORDER — LANOLIN ALCOHOL/MO/W.PET/CERES
800 CREAM (GRAM) TOPICAL
Status: DISCONTINUED | OUTPATIENT
Start: 2025-06-29 | End: 2025-07-01 | Stop reason: HOSPADM

## 2025-06-29 RX ORDER — QUETIAPINE FUMARATE 50 MG/1
50 TABLET, FILM COATED ORAL NIGHTLY
COMMUNITY
Start: 2025-06-11

## 2025-06-29 RX ORDER — BUDESONIDE 0.5 MG/2ML
0.5 INHALANT ORAL 2 TIMES DAILY
Status: DISCONTINUED | OUTPATIENT
Start: 2025-06-29 | End: 2025-07-01 | Stop reason: HOSPADM

## 2025-06-29 RX ORDER — CYCLOBENZAPRINE HCL 10 MG
10 TABLET ORAL 3 TIMES DAILY PRN
Status: DISCONTINUED | OUTPATIENT
Start: 2025-06-29 | End: 2025-07-01 | Stop reason: HOSPADM

## 2025-06-29 RX ORDER — POTASSIUM CHLORIDE 7.45 MG/ML
30 INJECTION INTRAVENOUS ONCE
Status: DISCONTINUED | OUTPATIENT
Start: 2025-06-29 | End: 2025-07-01 | Stop reason: HOSPADM

## 2025-06-29 RX ORDER — POTASSIUM CHLORIDE 20 MEQ/1
40 TABLET, EXTENDED RELEASE ORAL ONCE
Status: COMPLETED | OUTPATIENT
Start: 2025-06-29 | End: 2025-06-29

## 2025-06-29 RX ORDER — POTASSIUM CHLORIDE 20 MEQ/1
20 TABLET, EXTENDED RELEASE ORAL 2 TIMES DAILY
Status: DISCONTINUED | OUTPATIENT
Start: 2025-06-29 | End: 2025-06-30

## 2025-06-29 RX ORDER — LANOLIN ALCOHOL/MO/W.PET/CERES
1 CREAM (GRAM) TOPICAL DAILY
COMMUNITY
Start: 2025-04-26

## 2025-06-29 RX ORDER — ONDANSETRON 8 MG/1
8 TABLET, FILM COATED ORAL EVERY 12 HOURS PRN
Status: DISCONTINUED | OUTPATIENT
Start: 2025-06-29 | End: 2025-07-01 | Stop reason: HOSPADM

## 2025-06-29 RX ADMIN — ATORVASTATIN CALCIUM 10 MG: 10 TABLET, FILM COATED ORAL at 08:06

## 2025-06-29 RX ADMIN — CYCLOBENZAPRINE 10 MG: 10 TABLET, FILM COATED ORAL at 01:06

## 2025-06-29 RX ADMIN — ONDANSETRON HYDROCHLORIDE 8 MG: 8 TABLET, FILM COATED ORAL at 04:06

## 2025-06-29 RX ADMIN — LEVOTHYROXINE SODIUM 50 MCG: 0.03 TABLET ORAL at 05:06

## 2025-06-29 RX ADMIN — POLYETHYLENE GLYCOL 3350 17 G: 17 POWDER, FOR SOLUTION ORAL at 08:06

## 2025-06-29 RX ADMIN — POTASSIUM BICARBONATE 60 MEQ: 782 TABLET, EFFERVESCENT ORAL at 11:06

## 2025-06-29 RX ADMIN — FUROSEMIDE 40 MG: 10 INJECTION, SOLUTION INTRAMUSCULAR; INTRAVENOUS at 08:06

## 2025-06-29 RX ADMIN — DILTIAZEM HYDROCHLORIDE 30 MG: 30 TABLET, FILM COATED ORAL at 05:06

## 2025-06-29 RX ADMIN — POTASSIUM CHLORIDE 20 MEQ: 1500 TABLET, EXTENDED RELEASE ORAL at 08:06

## 2025-06-29 RX ADMIN — DILTIAZEM HYDROCHLORIDE 30 MG: 30 TABLET, FILM COATED ORAL at 11:06

## 2025-06-29 RX ADMIN — ISOSORBIDE DINITRATE 30 MG: 10 TABLET ORAL at 08:06

## 2025-06-29 RX ADMIN — BUDESONIDE INHALATION 0.5 MG: 0.5 SUSPENSION RESPIRATORY (INHALATION) at 08:06

## 2025-06-29 RX ADMIN — BUDESONIDE INHALATION 0.5 MG: 0.5 SUSPENSION RESPIRATORY (INHALATION) at 06:06

## 2025-06-29 RX ADMIN — QUETIAPINE 25 MG: 25 TABLET ORAL at 08:06

## 2025-06-29 RX ADMIN — Medication 2 PACKET: at 11:06

## 2025-06-29 RX ADMIN — POTASSIUM CHLORIDE 40 MEQ: 1500 TABLET, EXTENDED RELEASE ORAL at 08:06

## 2025-06-29 RX ADMIN — TOPIRAMATE 25 MG: 25 TABLET, FILM COATED ORAL at 08:06

## 2025-06-29 NOTE — HPI
"-Patient is a 89-year-old  female with a history of atrial fibrillation (no AC due to fall risk) and hypertension; she has no known history of congestive heart failure  -She was recently admitted (in April of this year) for acute on chronic heart failure exacerbation as well as AFib/RVR  -Echo performed at that time showed an EF of 55-60% but diastolic function could not be assessed at that time due to her atrial fibrillation-> she had no pulmonary hypertension  -For 3 days, patient has had leg swelling but denies any chest pain in his had "not much" shortness of breaths  -She has prn Lasix and took 40 mg daily for 3 days in a row but despite this, she did not get better  -Patient afebrile and hemodynamically stable although hypertensive with SBP in the 150s-160s  -CBC/CMP unremarkable except for potassium of 3.4  - and initial troponin 35.5 (had been 30.9 in April of this year)  -EKG showed, per my interpretation, AFib with 91 beats per minute with a QTC of 447  -Chest x-ray:  Cardiomegaly and moderate right pleural effusion" per radiologist Dr. Veloz  -Patient is status post aspirin and Lasix 40 mg IV x1 dose (around 8:49 p.m.); she is admitted for further diagnosis, treatment, and care  "

## 2025-06-29 NOTE — CARE UPDATE
06/29/25 0850   Patient Assessment/Suction   Level of Consciousness (AVPU) alert   Respiratory Effort Normal;Unlabored   Expansion/Accessory Muscles/Retractions no use of accessory muscles   All Lung Fields Breath Sounds crackles  (few)   Rhythm/Pattern, Respiratory unlabored   PRE-TX-O2   Device (Oxygen Therapy) room air   SpO2 (!) 94 %   Pulse Oximetry Type Continuous   $ Pulse Oximetry - Multiple Charge Pulse Oximetry - Multiple   Pulse 99   Resp 17   Aerosol Therapy   $ Aerosol Therapy Charges Aerosol Treatment   Daily Review of Necessity (SVN) completed   Respiratory Treatment Status (SVN) given   Treatment Route (SVN) mask   Patient Position HOB elevated   Post Treatment Assessment (SVN) breath sounds unchanged   Signs of Intolerance (SVN) none   Breath Sounds Post-Respiratory Treatment   Throughout All Fields Post-Treatment All Fields   Throughout All Fields Post-Treatment no change   Post-treatment Heart Rate (beats/min) 96   Post-treatment Resp Rate (breaths/min) 16   Education   $ Education Bronchodilator

## 2025-06-29 NOTE — ASSESSMENT & PLAN NOTE
Suboptimally controlled; patient's blood pressure range in the last 24 hours was: BP  Min: 124/90  Max: 164/84.T  Resume home medications:    diltiaZEM tablet 30 mg, Every 6 hours, Oral  isosorbide dinitrate tablet 30 mg, Daily, Oral  furosemide injection 40 mg, Every 12 hours, Intravenous  hydrALAZINE injection 10 mg, Every 4 hours PRN, Intravenous    Prn IV Hydralazine

## 2025-06-29 NOTE — SUBJECTIVE & OBJECTIVE
Interval History: Pt evaluated at bedside. NAD. LE edema improved. Family at bedside. Questions and concerns addressed. Awaiting cardiology eval.    Review of Systems   Constitutional:  Negative for chills and fever.   HENT:  Negative for rhinorrhea and sore throat.    Respiratory:  Positive for shortness of breath (mild).    Cardiovascular:  Positive for leg swelling. Negative for chest pain and palpitations.   Gastrointestinal:  Negative for blood in stool, constipation, diarrhea, nausea and vomiting.   Genitourinary:  Negative for dysuria.   Musculoskeletal:  Negative for myalgias.   Skin:  Negative for rash.   Neurological:  Negative for numbness.   Hematological:  Negative for adenopathy.   Psychiatric/Behavioral:  Positive for sleep disturbance (Occasionally snores).      Objective:     Vital Signs (Most Recent):  Temp: 97.8 °F (36.6 °C) (06/29/25 0030)  Pulse: 99 (06/29/25 0850)  Resp: 17 (06/29/25 0850)  BP: (!) 124/90 (06/29/25 0030)  SpO2: (!) 94 % (06/29/25 0850) Vital Signs (24h Range):  Temp:  [97.8 °F (36.6 °C)-98.5 °F (36.9 °C)] 97.8 °F (36.6 °C)  Pulse:  [83-99] 99  Resp:  [17-24] 17  SpO2:  [92 %-95 %] 94 %  BP: (124-160)/(90-96) 124/90     Weight: 69.9 kg (154 lb)  Body mass index is 26.43 kg/m².    Intake/Output Summary (Last 24 hours) at 6/29/2025 1154  Last data filed at 6/29/2025 0730  Gross per 24 hour   Intake --   Output 1900 ml   Net -1900 ml         Physical Exam  Constitutional:       General: She is not in acute distress.     Appearance: Normal appearance. She is not ill-appearing, toxic-appearing or diaphoretic.      Comments: Appears younger than stated age   HENT:      Head: Normocephalic and atraumatic.   Eyes:      General: No scleral icterus.  Neck:      Comments: No JVD/retractions  Cardiovascular:      Rate and Rhythm: Rhythm irregular.      Heart sounds: Murmur (3/6 systolic murmur) heard.      No friction rub. No gallop.   Pulmonary:      Effort: Pulmonary effort is normal. No  respiratory distress.      Breath sounds: Rales (Left base) present.   Abdominal:      General: Bowel sounds are normal. There is no distension.      Palpations: Abdomen is soft. There is no mass.      Tenderness: There is no abdominal tenderness.   Musculoskeletal:      Right lower leg: Edema present.      Left lower leg: Edema present.      Comments: Non-pitting edema bilateral calves   Skin:     General: Skin is warm and dry.      Coloration: Skin is not jaundiced.   Neurological:      Mental Status: She is alert.      Comments: Oriented to self only   Psychiatric:         Behavior: Behavior normal.               Significant Labs: All pertinent labs within the past 24 hours have been reviewed.    Significant Imaging: I have reviewed all pertinent imaging results/findings within the past 24 hours.

## 2025-06-29 NOTE — ASSESSMENT & PLAN NOTE
Patient with a acute on chronic diastolic congestive heart failure exacerbation; home Lasix did not help; for now:  Diurese per pathway: Lasix 40 mg IV q.12 hours  2 g sodium/1.5 L fluid restriction diet  Follow daily weights, strict Is&Os, and clinical exam  Daily BMP to monitor renal function during diuresis  Trend troponins  ECHO pending  Telemetry monitoring  Cardiology consult

## 2025-06-29 NOTE — ASSESSMENT & PLAN NOTE
Patient's most recent potassium results are listed below.   Recent Labs     06/28/25  2041 06/29/25  0907 06/29/25  1613   K 3.4* 2.8* 3.4*     Plan  - Replete potassium per protocol  - Monitor potassium Daily

## 2025-06-29 NOTE — HOSPITAL COURSE
Patient is a 89-year-old female admitted to [facility] medicine for treatment of acute on chronic diastolic congestive heart failure.  Patient was monitored on telemetry and Cardiology was consulted.  Echocardiogram was performed and is pending results.  Patient was treated with supplemental oxygen p.r.n., IV diuresis, sodium restricted diet, and her home medications. Lower extremity edema and respiratory function improved.  Potassium noted to be low and was replenished. IV diuresis changed to oral lasix. Planned to observe patient for any change in respiratory status.  She remained stable and was discharged home in stable condition.

## 2025-06-29 NOTE — H&P
"  [facility] - Emergency Dept  Hospital Medicine  History & Physical    Patient Name: Erich Encarnacion  MRN: [id number]  Patient Class: Emergency  Admission Date: 6/28/2025  Attending Physician: Evelin Davila MD  Primary Care Provider: Se Rios MD    Patient seen at 11:16 p.m. on 06/28/2025.  History is obtained from the patient/daughter at bedside/ER physician/records  Subjective:     Principal Problem:Acute on chronic diastolic congestive heart failure    Chief Complaint:   Chief Complaint   Patient presents with    Leg Swelling     Worsening bilateral edema to lower extremities and pain to lower legs, not improving with home lasix. +3 pitting edema to below knee/feet      HPI:   -Patient is a 89-year-old  female with a history of atrial fibrillation (on no AC due to fall risk) and hypertension; she reports no known history of congestive heart failure  -She was recently admitted (in April of this year) for acute on chronic heart failure exacerbation as well as AFib/RVR  -Echo performed at that time showed an EF of 55-60% but diastolic function could not be assessed at that time due to her atrial fibrillation-> she had no pulmonary hypertension  -For 3 days, patient has had leg swelling but denies any chest pain and has had "not much" shortness of breath  -She has prn Lasix and took 40 mg daily for 3 days in a row but despite this, she did not get better  -Patient afebrile and hemodynamically stable although hypertensive with SBP in the 150s-160s  -CBC/CMP unremarkable except for a potassium of 3.4  - and initial troponin 35.5 (had been 30.9 in April of this year)  -EKG showed, per my interpretation, AFib with 91 beats per minute with a QTC of 447  -Chest x-ray:  Cardiomegaly and moderate right pleural effusion" per radiologist Dr. Veloz  -Patient is status post aspirin and Lasix 40 mg IV x1 dose (around 8:49 p.m.); she is admitted for further diagnosis, treatment, and " care    Past Medical History:   Diagnosis Date    Hypertension     Macula lutea degeneration     Paroxysmal atrial fibrillation     Squamous cell carcinoma of skin        Past Surgical History:   Procedure Laterality Date    APPENDECTOMY      BACK SURGERY  1975    HYSTERECTOMY      URETEROSCOPIC REMOVAL OF URETERIC CALCULUS Left 5/28/2024    Procedure: REMOVAL, CALCULUS, URETER, URETEROSCOPIC;  Surgeon: Juhi Mosher Jr., MD;  Location: [facility];  Service: Urology;  Laterality: Left;       Review of patient's allergies indicates:   Allergen Reactions    Topiramate Nausea And Vomiting       Current Facility-Administered Medications on File Prior to Encounter   Medication    triamcinolone acetonide injection 40 mg     Current Outpatient Medications on File Prior to Encounter   Medication Sig    aspirin (ECOTRIN) 81 MG EC tablet Take 162 mg by mouth once daily.    atorvastatin (LIPITOR) 10 MG tablet Take 10 mg by mouth once daily.    budesonide (PULMICORT) 0.5 mg/2 mL nebulizer solution Take 2 mLs (0.5 mg total) by nebulization 2 (two) times daily. Controller    diltiaZEM (CARDIZEM) 30 MG tablet Take 1 tablet (30 mg total) by mouth every 6 (six) hours.    furosemide (LASIX) 40 MG tablet Take 1 tablet (40 mg total) by mouth daily as needed (If increase in shortness of breath or leg swelling or increase in weight gain 2-3 lb in 2 days).    isosorbide dinitrate (ISORDIL) 30 MG Tab Take 30 mg by mouth once daily.     levothyroxine (SYNTHROID) 50 MCG tablet Take 1 tablet (50 mcg total) by mouth every morning.    meclizine (ANTIVERT) 12.5 mg tablet Take 2 tablets by mouth daily as needed for Dizziness.    polyethylene glycol (GLYCOLAX) 17 gram/dose powder Take 17 g by mouth every evening.    QUEtiapine (SEROQUEL) 25 MG Tab Take 1 tablet (25 mg total) by mouth every evening.    RESTASIS 0.05 % ophthalmic emulsion Place 1 drop into both eyes 2 (two) times daily. (Patient not taking: Reported on 5/28/2025)    topiramate  (TOPAMAX) 25 MG tablet Take 25 mg by mouth once daily.     traMADoL (ULTRAM) 50 mg tablet Take 50 mg by mouth nightly.     Family History    None       Tobacco Use    Smoking status: Former     Current packs/day: 1.00     Average packs/day: 1 pack/day for 20.0 years (20.0 ttl pk-yrs)     Types: Cigarettes    Smokeless tobacco: Never   Substance and Sexual Activity    Alcohol use: No    Drug use: No    Sexual activity: Not Currently     Review of Systems   Constitutional:  Negative for chills and fever.   HENT:  Negative for rhinorrhea and sore throat.    Respiratory:  Positive for shortness of breath (mild).    Cardiovascular:  Positive for leg swelling. Negative for chest pain and palpitations.   Gastrointestinal:  Negative for blood in stool, constipation, diarrhea, nausea and vomiting.   Genitourinary:  Negative for dysuria.   Musculoskeletal:  Negative for myalgias.   Skin:  Negative for rash.   Neurological:  Negative for numbness.   Hematological:  Negative for adenopathy.   Psychiatric/Behavioral:  Positive for sleep disturbance (Occasionally snores).      Objective:     Vital Signs (Most Recent):  Temp: 98.5 °F (36.9 °C) (06/28/25 1926)  Pulse: 90 (06/28/25 2156)  Resp: 20 (06/28/25 1926)  BP: (!) 160/90 (06/28/25 2156)  SpO2: 95 % (06/28/25 2156) Vital Signs (24h Range):  Temp:  [98.5 °F (36.9 °C)] 98.5 °F (36.9 °C)  Pulse:  [83-90] 90  Resp:  [20] 20  SpO2:  [95 %] 95 %  BP: (156-160)/(90-96) 160/90     Weight: 69.9 kg (154 lb)  Body mass index is 26.43 kg/m².     Physical Exam  Constitutional:       General: She is not in acute distress.     Appearance: Normal appearance. She is not ill-appearing, toxic-appearing or diaphoretic.      Comments: Appears younger than stated age   HENT:      Head: Normocephalic and atraumatic.   Eyes:      General: No scleral icterus.  Neck:      Comments: No JVD/retractions  Cardiovascular:      Rate and Rhythm: Rhythm irregular.      Heart sounds: Murmur (3/6 systolic  murmur) heard.      No friction rub. No gallop.   Pulmonary:      Effort: Pulmonary effort is normal. No respiratory distress.      Breath sounds: Rales (Left base) present.   Abdominal:      General: Bowel sounds are normal. There is no distension.      Palpations: Abdomen is soft. There is no mass.      Tenderness: There is no abdominal tenderness.   Musculoskeletal:      Right lower leg: Edema present.      Left lower leg: Edema present.      Comments: 2+ edema bilateral calves   Skin:     General: Skin is warm and dry.      Coloration: Skin is not jaundiced.   Neurological:      Mental Status: She is alert.      Comments: Oriented to self only   Psychiatric:         Behavior: Behavior normal.      Significant studies: Reviewed.  Assessment/Plan:     Assessment & Plan    Acute on chronic diastolic congestive heart failure    -Patient with a acute on chronic diastolic congestive heart failure exacerbation; home Lasix did not help; for now:  -Diurese per pathway: Lasix 40 mg IV q.12 hours  -2 g sodium/1.5 L fluid restriction diet  -Follow daily weights, strict Is&Os, and clinical exam  -Daily BMP to monitor renal function during diuresis  -Trend troponins  -ECHO pending  -Telemetry monitoring  -Cardiology consult    Atrial fibrillation    -Rate controlled; CHADS-VASc score 4  -Not an anticoagulation candidate, due to fall risk  -Home Cardizem resumed  -Telemetry monitoring    Essential hypertension    -Suboptimally controlled; patient's blood pressure range in the last 24 hours was: BP  Min: 156/96  Max: 160/90.T  -Resume home medications:    diltiaZEM tablet 30 mg, Every 6 hours, Oral  isosorbide dinitrate tablet 30 mg, Daily, Oral    -Prn IV Hydralazine    Hypothyroidism    -TSH nonsuppressed at 5.942 in April-> recheck  -Home Synthroid resumed       Evelin Davila MD  Department of Hospital Medicine  [facility] - Emergency Dept

## 2025-06-29 NOTE — PROGRESS NOTES
"[facility] - Emergency Dept  Hospital Medicine  Progress Note    Patient Name: Erich Encarnacion  MRN: [id number]  Patient Class: IP- Inpatient   Admission Date: 6/28/2025  Length of Stay: 1 days  Attending Physician: Leeanna Phillips MD  Primary Care Provider: Se Rios MD        Subjective     Principal Problem:Acute on chronic diastolic congestive heart failure        HPI:  -Patient is a 89-year-old  female with a history of atrial fibrillation (no AC due to fall risk) and hypertension; she has no known history of congestive heart failure  -She was recently admitted (in April of this year) for acute on chronic heart failure exacerbation as well as AFib/RVR  -Echo performed at that time showed an EF of 55-60% but diastolic function could not be assessed at that time due to her atrial fibrillation-> she had no pulmonary hypertension  -For 3 days, patient has had leg swelling but denies any chest pain in his had "not much" shortness of breaths  -She has prn Lasix and took 40 mg daily for 3 days in a row but despite this, she did not get better  -Patient afebrile and hemodynamically stable although hypertensive with SBP in the 150s-160s  -CBC/CMP unremarkable except for potassium of 3.4  - and initial troponin 35.5 (had been 30.9 in April of this year)  -EKG showed, per my interpretation, AFib with 91 beats per minute with a QTC of 447  -Chest x-ray:  Cardiomegaly and moderate right pleural effusion" per radiologist Dr. Veloz  -Patient is status post aspirin and Lasix 40 mg IV x1 dose (around 8:49 p.m.); she is admitted for further diagnosis, treatment, and care    Overview/Hospital Course:  Patient is a 89-year-old female admitted to hospital medicine for treatment of acute on chronic diastolic congestive heart failure.  Patient was monitored on telemetry and Cardiology was consulted.  Echocardiogram was performed and is pending results.  Patient was treated with " supplemental oxygen p.r.n., IV diuresis, sodium restricted diet, and her home medications. Lower extremity edema and respiratory function is improving.  Potassium noted to be low and was replenished.    Interval History: Pt evaluated at bedside. NAD. LE edema improved. Family at bedside. Questions and concerns addressed. Awaiting cardiology eval.    Review of Systems   Constitutional:  Negative for chills and fever.   HENT:  Negative for rhinorrhea and sore throat.    Respiratory:  Positive for shortness of breath (mild).    Cardiovascular:  Positive for leg swelling. Negative for chest pain and palpitations.   Gastrointestinal:  Negative for blood in stool, constipation, diarrhea, nausea and vomiting.   Genitourinary:  Negative for dysuria.   Musculoskeletal:  Negative for myalgias.   Skin:  Negative for rash.   Neurological:  Negative for numbness.   Hematological:  Negative for adenopathy.   Psychiatric/Behavioral:  Positive for sleep disturbance (Occasionally snores).      Objective:     Vital Signs (Most Recent):  Temp: 97.8 °F (36.6 °C) (06/29/25 0030)  Pulse: 99 (06/29/25 0850)  Resp: 17 (06/29/25 0850)  BP: (!) 124/90 (06/29/25 0030)  SpO2: (!) 94 % (06/29/25 0850) Vital Signs (24h Range):  Temp:  [97.8 °F (36.6 °C)-98.5 °F (36.9 °C)] 97.8 °F (36.6 °C)  Pulse:  [83-99] 99  Resp:  [17-24] 17  SpO2:  [92 %-95 %] 94 %  BP: (124-160)/(90-96) 124/90     Weight: 69.9 kg (154 lb)  Body mass index is 26.43 kg/m².    Intake/Output Summary (Last 24 hours) at 6/29/2025 1154  Last data filed at 6/29/2025 0730  Gross per 24 hour   Intake --   Output 1900 ml   Net -1900 ml         Physical Exam  Constitutional:       General: She is not in acute distress.     Appearance: Normal appearance. She is not ill-appearing, toxic-appearing or diaphoretic.      Comments: Appears younger than stated age   HENT:      Head: Normocephalic and atraumatic.   Eyes:      General: No scleral icterus.  Neck:      Comments: No  JVD/retractions  Cardiovascular:      Rate and Rhythm: Rhythm irregular.      Heart sounds: Murmur (3/6 systolic murmur) heard.      No friction rub. No gallop.   Pulmonary:      Effort: Pulmonary effort is normal. No respiratory distress.      Breath sounds: Rales (Left base) present.   Abdominal:      General: Bowel sounds are normal. There is no distension.      Palpations: Abdomen is soft. There is no mass.      Tenderness: There is no abdominal tenderness.   Musculoskeletal:      Right lower leg: Edema present.      Left lower leg: Edema present.      Comments: Non-pitting edema bilateral calves   Skin:     General: Skin is warm and dry.      Coloration: Skin is not jaundiced.   Neurological:      Mental Status: She is alert.      Comments: Oriented to self only   Psychiatric:         Behavior: Behavior normal.               Significant Labs: All pertinent labs within the past 24 hours have been reviewed.    Significant Imaging: I have reviewed all pertinent imaging results/findings within the past 24 hours.      Assessment & Plan  Acute on chronic diastolic congestive heart failure  Patient with a acute on chronic diastolic congestive heart failure exacerbation; home Lasix did not help; for now:  Diurese per pathway: Lasix 40 mg IV q.12 hours  2 g sodium/1.5 L fluid restriction diet  Follow daily weights, strict Is&Os, and clinical exam  Daily BMP to monitor renal function during diuresis  Trend troponins  ECHO pending  Telemetry monitoring  Cardiology consult    Hypokalemia  Patient's most recent potassium results are listed below.   Recent Labs     06/28/25  2041 06/29/25  0907 06/29/25  1613   K 3.4* 2.8* 3.4*     Plan  - Replete potassium per protocol  - Monitor potassium Daily    Essential hypertension  Suboptimally controlled; patient's blood pressure range in the last 24 hours was: BP  Min: 124/90  Max: 164/84.T  Resume home medications:    diltiaZEM tablet 30 mg, Every 6 hours, Oral  isosorbide  dinitrate tablet 30 mg, Daily, Oral  furosemide injection 40 mg, Every 12 hours, Intravenous  hydrALAZINE injection 10 mg, Every 4 hours PRN, Intravenous    Prn IV Hydralazine  Atrial fibrillation  Rate controlled; CHADS-VASc score 4  Not an anticoagulation candidate, doing fall risk  Home Cardizem resumed  Telemetry monitoring  Hypothyroidism    TSH nonsuppressed at 5.942 in April-> recheck  Home Synthroid resumed  VTE Risk Mitigation (From admission, onward)           Ordered     IP VTE HIGH RISK PATIENT  Once         06/28/25 2352     Place sequential compression device  Until discontinued         06/28/25 2352                    Discharge Planning   ARGELIA:   06/30/2025    Code Status: Full Code   Medical Readiness for Discharge Date:                            Meir Mcfarland NP  Department of Hospital Medicine   [facility] - Emergency Dept

## 2025-06-29 NOTE — SUBJECTIVE & OBJECTIVE
Past Medical History:   Diagnosis Date    Hypertension     Macula lutea degeneration     Paroxysmal atrial fibrillation     Squamous cell carcinoma of skin        Past Surgical History:   Procedure Laterality Date    APPENDECTOMY      BACK SURGERY  1975    HYSTERECTOMY      URETEROSCOPIC REMOVAL OF URETERIC CALCULUS Left 5/28/2024    Procedure: REMOVAL, CALCULUS, URETER, URETEROSCOPIC;  Surgeon: Juhi Mosher Jr., MD;  Location: [facility];  Service: Urology;  Laterality: Left;       Review of patient's allergies indicates:   Allergen Reactions    Topiramate Nausea And Vomiting       Current Facility-Administered Medications on File Prior to Encounter   Medication    triamcinolone acetonide injection 40 mg     Current Outpatient Medications on File Prior to Encounter   Medication Sig    aspirin (ECOTRIN) 81 MG EC tablet Take 162 mg by mouth once daily.    atorvastatin (LIPITOR) 10 MG tablet Take 10 mg by mouth once daily.    budesonide (PULMICORT) 0.5 mg/2 mL nebulizer solution Take 2 mLs (0.5 mg total) by nebulization 2 (two) times daily. Controller    diltiaZEM (CARDIZEM) 30 MG tablet Take 1 tablet (30 mg total) by mouth every 6 (six) hours.    furosemide (LASIX) 40 MG tablet Take 1 tablet (40 mg total) by mouth daily as needed (If increase in shortness of breath or leg swelling or increase in weight gain 2-3 lb in 2 days).    isosorbide dinitrate (ISORDIL) 30 MG Tab Take 30 mg by mouth once daily.     levothyroxine (SYNTHROID) 50 MCG tablet Take 1 tablet (50 mcg total) by mouth every morning.    meclizine (ANTIVERT) 12.5 mg tablet Take 2 tablets by mouth daily as needed for Dizziness.    polyethylene glycol (GLYCOLAX) 17 gram/dose powder Take 17 g by mouth every evening.    QUEtiapine (SEROQUEL) 25 MG Tab Take 1 tablet (25 mg total) by mouth every evening.    RESTASIS 0.05 % ophthalmic emulsion Place 1 drop into both eyes 2 (two) times daily. (Patient not taking: Reported on 5/28/2025)    topiramate (TOPAMAX) 25  MG tablet Take 25 mg by mouth once daily.     traMADoL (ULTRAM) 50 mg tablet Take 50 mg by mouth nightly.     Family History    None       Tobacco Use    Smoking status: Former     Current packs/day: 1.00     Average packs/day: 1 pack/day for 20.0 years (20.0 ttl pk-yrs)     Types: Cigarettes    Smokeless tobacco: Never   Substance and Sexual Activity    Alcohol use: No    Drug use: No    Sexual activity: Not Currently     Review of Systems   Constitutional:  Negative for chills and fever.   HENT:  Negative for rhinorrhea and sore throat.    Respiratory:  Positive for shortness of breath (mild).    Cardiovascular:  Positive for leg swelling. Negative for chest pain and palpitations.   Gastrointestinal:  Negative for blood in stool, constipation, diarrhea, nausea and vomiting.   Genitourinary:  Negative for dysuria.   Musculoskeletal:  Negative for myalgias.   Skin:  Negative for rash.   Neurological:  Negative for numbness.   Hematological:  Negative for adenopathy.   Psychiatric/Behavioral:  Positive for sleep disturbance (Occasionally snores).      Objective:     Vital Signs (Most Recent):  Temp: 98.5 °F (36.9 °C) (06/28/25 1926)  Pulse: 90 (06/28/25 2156)  Resp: 20 (06/28/25 1926)  BP: (!) 160/90 (06/28/25 2156)  SpO2: 95 % (06/28/25 2156) Vital Signs (24h Range):  Temp:  [98.5 °F (36.9 °C)] 98.5 °F (36.9 °C)  Pulse:  [83-90] 90  Resp:  [20] 20  SpO2:  [95 %] 95 %  BP: (156-160)/(90-96) 160/90     Weight: 69.9 kg (154 lb)  Body mass index is 26.43 kg/m².     Physical Exam  Constitutional:       General: She is not in acute distress.     Appearance: Normal appearance. She is not ill-appearing, toxic-appearing or diaphoretic.      Comments: Appears younger than stated age   HENT:      Head: Normocephalic and atraumatic.   Eyes:      General: No scleral icterus.  Neck:      Comments: No JVD/retractions  Cardiovascular:      Rate and Rhythm: Rhythm irregular.      Heart sounds: Murmur (3/6 systolic murmur) heard.       No friction rub. No gallop.   Pulmonary:      Effort: Pulmonary effort is normal. No respiratory distress.      Breath sounds: Rales (Left base) present.   Abdominal:      General: Bowel sounds are normal. There is no distension.      Palpations: Abdomen is soft. There is no mass.      Tenderness: There is no abdominal tenderness.   Musculoskeletal:      Right lower leg: Edema present.      Left lower leg: Edema present.      Comments: 2+ edema bilateral calves   Skin:     General: Skin is warm and dry.      Coloration: Skin is not jaundiced.   Neurological:      Mental Status: She is alert.      Comments: Oriented to self only   Psychiatric:         Behavior: Behavior normal.                Significant studies: Reviewed.

## 2025-06-29 NOTE — ED PROVIDER NOTES
Encounter Date: 6/28/2025       History     Chief Complaint   Patient presents with    Leg Swelling     Worsening bilateral edema to lower extremities and pain to lower legs, not improving with home lasix. +3 pitting edema to below knee/feet     HPI  89-year-old woman with a history of AFib and heart failure presents for evaluation of bilateral lower extremity edema that is been going on for 3 days.  She said she has been taking the Lasix at home without improvement.  She denies fever chills.  She reports cough.  She denies chest pain or shortness of breath she denies belly pain nausea or vomiting change in bowel or bladder habits.  She does not feel that she is making more urine with the Lasix.  Review of patient's allergies indicates:   Allergen Reactions    Topiramate Nausea And Vomiting     Past Medical History:   Diagnosis Date    Hypertension     Macula lutea degeneration     Paroxysmal atrial fibrillation     Squamous cell carcinoma of skin      Past Surgical History:   Procedure Laterality Date    APPENDECTOMY      BACK SURGERY  1975    HYSTERECTOMY      URETEROSCOPIC REMOVAL OF URETERIC CALCULUS Left 5/28/2024    Procedure: REMOVAL, CALCULUS, URETER, URETEROSCOPIC;  Surgeon: Juhi Mosher Jr., MD;  Location: [facility];  Service: Urology;  Laterality: Left;     Family History   Problem Relation Name Age of Onset    Melanoma Neg Hx      Psoriasis Neg Hx      Lupus Neg Hx      Eczema Neg Hx       Social History[1]  Review of Systems   All other systems reviewed and are negative.      Physical Exam     Initial Vitals [06/28/25 1926]   BP Pulse Resp Temp SpO2   (!) 156/96 83 20 98.5 °F (36.9 °C) 95 %      MAP       --         Physical Exam    Nursing note and vitals reviewed.  Constitutional: She appears well-developed. She is not diaphoretic.   HENT:   Head: Normocephalic and atraumatic.   Eyes: Right eye exhibits no discharge. Left eye exhibits no discharge.   Neck: No tracheal deviation present.    Cardiovascular:  Normal rate, regular rhythm and intact distal pulses.           Pulmonary/Chest: Breath sounds normal. No stridor. No respiratory distress.   Abdominal: Abdomen is soft. There is no abdominal tenderness.   Musculoskeletal:         General: Edema present.      Comments: Pitting edema to the shins without redness     Neurological: She is alert and oriented to person, place, and time.   Skin: Skin is warm and dry.         ED Course   Procedures  Labs Reviewed   COMPREHENSIVE METABOLIC PANEL - Abnormal       Result Value    Sodium 143      Potassium 3.4 (*)     Chloride 109      CO2 26      Glucose 125 (*)     BUN 33 (*)     Creatinine 1.0      Calcium 9.3      Protein Total 6.6      Albumin 3.9      Bilirubin Total 0.6      ALP 99      AST 17      ALT 19      Anion Gap 8      eGFR 51 (*)    TROPONIN I HIGH SENSITIVITY - Abnormal    Troponin High Sensitive 35.5 (*)    B-TYPE NATRIURETIC PEPTIDE - Abnormal     (*)    CBC WITH DIFFERENTIAL - Abnormal    WBC 7.19      RBC 4.02      Hgb 11.6 (*)     Hct 36.2 (*)     MCV 90      MCH 28.9      MCHC 32.0      RDW 14.5      Platelet Count 176      MPV 11.3      Nucleated RBC 0      Neut % 66.8      Lymph % 22.8      Mono % 6.5      Eos % 2.8      Basophil % 0.7      Imm Grans % 0.4      Neut # 4.8      Lymph # 1.64      Mono # 0.47      Eos # 0.20      Baso # 0.05      Imm Grans # 0.03     TROPONIN I HIGH SENSITIVITY - Abnormal    Troponin High Sensitive 34.2 (*)    CBC W/ AUTO DIFFERENTIAL    Narrative:     The following orders were created for panel order CBC auto differential.  Procedure                               Abnormality         Status                     ---------                               -----------         ------                     CBC with Differential[[id number]]       Abnormal            Final result                 Please view results for these tests on the individual orders.   HEMOGLOBIN A1C   MAGNESIUM   TSH          Imaging  Results              X-Ray Chest PA And Lateral (Final result)  Result time 06/28/25 20:49:39   Procedure changed from X-Ray Chest AP Portable     Final result by Sukhjinder Veloz MD (06/28/25 20:49:39)                   Impression:      Cardiomegaly and moderate right pleural effusion.      Electronically signed by: Sukhjinder Veloz  Date:    06/28/2025  Time:    20:49               Narrative:    EXAMINATION:  XR CHEST PA AND LATERAL    CLINICAL HISTORY:  CHF;    TECHNIQUE:  PA and lateral views of the chest were performed.    COMPARISON:  Radiograph of the chest from 04/24/2025    FINDINGS:  Heart size is enlarged.  Pulmonary vasculature is within normal limits.  Atherosclerotic calcification is present involving the thoracic aorta.  A moderate right pleural effusion is present with associated right basilar volume loss.  No evidence of pneumothorax or focal consolidation.  No evidence of acute osseous process.                                       Medications   aspirin EC tablet 162 mg (has no administration in time range)   atorvastatin tablet 10 mg (has no administration in time range)   budesonide nebulizer solution 0.5 mg (has no administration in time range)   diltiaZEM tablet 30 mg (has no administration in time range)   isosorbide dinitrate tablet 30 mg (has no administration in time range)   levothyroxine tablet 50 mcg (has no administration in time range)   polyethylene glycol packet 17 g (has no administration in time range)   topiramate tablet 25 mg (has no administration in time range)   QUEtiapine tablet 25 mg (has no administration in time range)   sodium chloride 0.9% flush 10 mL (has no administration in time range)   furosemide injection 40 mg (has no administration in time range)   hydrALAZINE injection 10 mg (has no administration in time range)   furosemide injection 40 mg (40 mg Intravenous Given 6/28/25 2049)   aspirin tablet 325 mg (325 mg Oral Given 6/28/25 2247)     Medical Decision  Making  89-year-old lady with leg swelling systolic blood pressure in the 150s otherwise vitals reassuring she is well-appearing on exam she does have pitting edema to her shins.  We will get cardiac workup.  Her story does not sound like a pneumonia.  Do not think she is suffering pulmonary embolism or aortic dissection.  With her age I will plan to admit her to the hospital and give her iv lasix    I discussed with hospital medicine they are in agreement with the plan.  I ordered a repeat troponin which was down trending    Amount and/or Complexity of Data Reviewed  Independent Historian: caregiver     Details: Reports she has been taking the Lasix in the morning without relief  Labs: ordered. Decision-making details documented in ED Course.  Radiology: ordered and independent interpretation performed. Decision-making details documented in ED Course.  ECG/medicine tests: ordered and independent interpretation performed. Decision-making details documented in ED Course.    Risk  OTC drugs.  Prescription drug management.  Decision regarding hospitalization.    Dictation #1  MRN:[id number]  CSN:[id number]            ED Course as of 06/29/25 0034   Sat Jun 28, 2025 2021 Chest x-ray on my independent interpretation with bibasilar right worse than left opacities [IC]   2143 Troponin I High Sensitivity(!): 35.5  It was 30.9 2 months ago [IC]   2143 BNP(!): 366  Similar to prior [IC]   2209 EKG on my independent interpretation irregular heart rate without discernible P waves before every QRS consistent with atrial fibrillation.  Left axis, no STEMI, when compared to prior from 04/23/2025 it appears similar [IC]      ED Course User Index  [IC] Raymond Mccray MD                           Clinical Impression:  Final diagnoses:  [M79.89] Leg swelling (Primary)  [I50.9] Acute exacerbation of CHF (congestive heart failure)          ED Disposition Condition    Admit                       [1]   Social History  Tobacco Use     Smoking status: Former     Current packs/day: 1.00     Average packs/day: 1 pack/day for 20.0 years (20.0 ttl pk-yrs)     Types: Cigarettes    Smokeless tobacco: Never   Substance Use Topics    Alcohol use: No    Drug use: No        Raymond Mccray MD  06/29/25 0035

## 2025-06-29 NOTE — PLAN OF CARE
[facility] - Emergency Dept  Initial Discharge Assessment     Assessment was completed at via phone with daughter. Pt was alert but not oriented. Cm verified demographic, insurance and pharmacy. Daughter denies , dialysis or coumadin clinics. Pt has [place] .  It was reported  she is independent with most  ADL's . Pt is a full code.  Daughter is emergency contact Pt plans to discharge home. Pt has home health with [facility]   Primary Care Provider: Se Rios MD    Admission Diagnosis: Acute exacerbation of CHF (congestive heart failure) [I50.9]    Admission Date: 6/28/2025  Expected Discharge Date: 6/30/2025         Payor: [employer] [facility] / Plan: [employer] / Product Type: Medicare Advantage /     Extended Emergency Contact Information  Primary Emergency Contact: Court Pringle  Address: [address] [place] [address] [id number] [place] [address]  Home Phone: [phone]  Mobile Phone: [phone]  Relation: Daughter  Preferred language: English   needed? No    Discharge Plan A: (P) Home Health, Home with family  Discharge Plan B: (P) Home, Home Health      [address]  [address] [facility] [address]  Phone: [phone] Fax: [phone]    [facility] - [place], [place] - [address]  [address]  [facility] [address]  Phone: [phone] Fax: [phone]      Initial Assessment (most recent)       Adult Discharge Assessment - 06/29/25 1712          Discharge Assessment    Assessment Type Discharge Planning Assessment (P)      Confirmed/corrected address, phone number and insurance Yes (P)      Confirmed Demographics Correct on Facesheet (P)      Source of Information family (P)      People in Home child(shanta), adult (P)      Name(s) of People in Home GAVIOTAse Pazjohnny [phone] (P)      Do you expect to return to your current living situation? Yes (P)       Do you have help at home or someone to help you manage your care at home? Yes (P)      Who are your caregiver(s) and their phone number(s)? Neisha Looney [phone] (P)      Prior to hospitilization cognitive status: Not Oriented to Place;Not Oriented to Time (P)      Current cognitive status: Not Oriented to Place;Not Oriented to Time (P)      Walking or Climbing Stairs Difficulty yes (P)      Mobility Management walks with walker and rolling walker (P)      Dressing/Bathing Difficulty yes (P)      Home Accessibility wheelchair accessible (P)      Home Layout Able to live on 1st floor (P)      Equipment Currently Used at Home bedside commode;walker, standard;walker, rolling (P)    bed assist    Readmission within 30 days? No (P)      Patient currently being followed by outpatient case management? No (P)      Do you currently have service(s) that help you manage your care at home? Yes (P)      Name and Contact number of agency [facility] (P)      Is the pt/caregiver preference to resume services with current agency No (P)      Do you take prescription medications? Yes (P)      Do you have prescription coverage? Yes (P)      Do you have any problems affording any of your prescribed medications? No (P)      Is the patient taking medications as prescribed? yes (P)      Who is going to help you get home at discharge? Neisha Looney [phone] (P)      How do you get to doctors appointments? family or friend will provide (P)      Are you on dialysis? No (P)      Do you take coumadin? No (P)      Discharge Plan A Home Health;Home with family (P)      Discharge Plan B Home;Home Health (P)      DME Needed Upon Discharge  none (P)      Discharge Plan discussed with: Adult children (P)

## 2025-06-29 NOTE — ASSESSMENT & PLAN NOTE
Rate controlled; CHADS-VASc score 4  Not an anticoagulation candidate, doing fall risk  Home Cardizem resumed  Telemetry monitoring

## 2025-06-30 LAB
ANION GAP (SMH): 6 MMOL/L (ref 8–16)
BUN SERPL-MCNC: 28 MG/DL (ref 10–30)
CALCIUM SERPL-MCNC: 9.2 MG/DL (ref 8.7–10.5)
CHLORIDE SERPL-SCNC: 106 MMOL/L (ref 95–110)
CO2 SERPL-SCNC: 30 MMOL/L (ref 23–29)
CREAT SERPL-MCNC: 0.9 MG/DL (ref 0.5–1.4)
GFR SERPLBLD CREATININE-BSD FMLA CKD-EPI: 58 ML/MIN/1.73/M2
GLUCOSE SERPL-MCNC: 130 MG/DL (ref 70–110)
POTASSIUM SERPL-SCNC: 3.9 MMOL/L (ref 3.5–5.1)
SODIUM SERPL-SCNC: 142 MMOL/L (ref 136–145)

## 2025-06-30 PROCEDURE — 99232 SBSQ HOSP IP/OBS MODERATE 35: CPT | Mod: ,,, | Performed by: INTERNAL MEDICINE

## 2025-06-30 PROCEDURE — 25000003 PHARM REV CODE 250: Performed by: INTERNAL MEDICINE

## 2025-06-30 PROCEDURE — 36415 COLL VENOUS BLD VENIPUNCTURE: CPT | Performed by: INTERNAL MEDICINE

## 2025-06-30 PROCEDURE — 99900031 HC PATIENT EDUCATION (STAT)

## 2025-06-30 PROCEDURE — 99900035 HC TECH TIME PER 15 MIN (STAT)

## 2025-06-30 PROCEDURE — 80048 BASIC METABOLIC PNL TOTAL CA: CPT | Performed by: INTERNAL MEDICINE

## 2025-06-30 PROCEDURE — 25000242 PHARM REV CODE 250 ALT 637 W/ HCPCS: Performed by: STUDENT IN AN ORGANIZED HEALTH CARE EDUCATION/TRAINING PROGRAM

## 2025-06-30 PROCEDURE — 25000003 PHARM REV CODE 250: Performed by: NURSE PRACTITIONER

## 2025-06-30 PROCEDURE — 94761 N-INVAS EAR/PLS OXIMETRY MLT: CPT

## 2025-06-30 PROCEDURE — 97162 PT EVAL MOD COMPLEX 30 MIN: CPT

## 2025-06-30 PROCEDURE — 97116 GAIT TRAINING THERAPY: CPT

## 2025-06-30 PROCEDURE — 94640 AIRWAY INHALATION TREATMENT: CPT

## 2025-06-30 PROCEDURE — 21400001 HC TELEMETRY ROOM

## 2025-06-30 RX ORDER — ACETAMINOPHEN 325 MG/1
650 TABLET ORAL EVERY 6 HOURS PRN
Status: DISCONTINUED | OUTPATIENT
Start: 2025-06-30 | End: 2025-07-01 | Stop reason: HOSPADM

## 2025-06-30 RX ORDER — FUROSEMIDE 20 MG/1
20 TABLET ORAL DAILY
Status: DISCONTINUED | OUTPATIENT
Start: 2025-07-01 | End: 2025-07-01 | Stop reason: HOSPADM

## 2025-06-30 RX ORDER — POTASSIUM CHLORIDE 750 MG/1
10 CAPSULE, EXTENDED RELEASE ORAL DAILY
Status: DISCONTINUED | OUTPATIENT
Start: 2025-07-01 | End: 2025-07-01 | Stop reason: HOSPADM

## 2025-06-30 RX ORDER — FUROSEMIDE 40 MG/1
40 TABLET ORAL DAILY
Status: DISCONTINUED | OUTPATIENT
Start: 2025-06-30 | End: 2025-06-30

## 2025-06-30 RX ADMIN — FUROSEMIDE 40 MG: 40 TABLET ORAL at 09:06

## 2025-06-30 RX ADMIN — QUETIAPINE 25 MG: 25 TABLET ORAL at 09:06

## 2025-06-30 RX ADMIN — BUDESONIDE INHALATION 0.5 MG: 0.5 SUSPENSION RESPIRATORY (INHALATION) at 07:06

## 2025-06-30 RX ADMIN — POLYETHYLENE GLYCOL 3350 17 G: 17 POWDER, FOR SOLUTION ORAL at 09:06

## 2025-06-30 RX ADMIN — DILTIAZEM HYDROCHLORIDE 30 MG: 30 TABLET, FILM COATED ORAL at 12:06

## 2025-06-30 RX ADMIN — ASPIRIN 162 MG: 81 TABLET ORAL at 09:06

## 2025-06-30 RX ADMIN — DILTIAZEM HYDROCHLORIDE 30 MG: 30 TABLET, FILM COATED ORAL at 06:06

## 2025-06-30 RX ADMIN — ISOSORBIDE DINITRATE 30 MG: 10 TABLET ORAL at 09:06

## 2025-06-30 RX ADMIN — ATORVASTATIN CALCIUM 10 MG: 10 TABLET, FILM COATED ORAL at 09:06

## 2025-06-30 RX ADMIN — LEVOTHYROXINE SODIUM 50 MCG: 0.03 TABLET ORAL at 06:06

## 2025-06-30 RX ADMIN — TOPIRAMATE 25 MG: 25 TABLET, FILM COATED ORAL at 09:06

## 2025-06-30 RX ADMIN — POTASSIUM CHLORIDE 20 MEQ: 1500 TABLET, EXTENDED RELEASE ORAL at 09:06

## 2025-06-30 NOTE — ASSESSMENT & PLAN NOTE
Suboptimally controlled; patient's blood pressure range in the last 24 hours was: BP  Min: 108/56  Max: 175/77.T  Resume home medications:    diltiaZEM tablet 30 mg, Every 6 hours, Oral  isosorbide dinitrate tablet 30 mg, Daily, Oral  hydrALAZINE injection 10 mg, Every 4 hours PRN, Intravenous  furosemide tablet 40 mg, Daily, Oral    Prn IV Hydralazine

## 2025-06-30 NOTE — CONSULTS
[facility]  Department of Cardiology  Consult Note      PATIENT NAME: Erich Encarnacion    MRN: [id number]  TODAY'S DATE: 06/29/2025  ADMIT DATE: 6/28/2025                          CONSULT REQUESTED BY: Leeanna Phillips MD    SUBJECTIVE     PRINCIPAL PROBLEM: Acute on chronic diastolic congestive heart failure    HPI:   89-year-old female with a history of atrial fibrillation (on no AC due to fall risk), HTN, and recurrent episodes of acute on chronic heart failure with preserved EF.  Patient admitted with shortness of breath and lower extremity edema.  Patient is a poor historian.  No other complaints. Cardiology consulted to assist with management.        REASON FOR CONSULT:  From Hospitalist H&P:  Acute on chronic heart failure with preserved EF.       Review of patient's allergies indicates:  No Known Allergies    Past Medical History:   Diagnosis Date    Hypertension     Macula lutea degeneration     Paroxysmal atrial fibrillation     Squamous cell carcinoma of skin      Past Surgical History:   Procedure Laterality Date    APPENDECTOMY      BACK SURGERY  1975    HYSTERECTOMY      URETEROSCOPIC REMOVAL OF URETERIC CALCULUS Left 5/28/2024    Procedure: REMOVAL, CALCULUS, URETER, URETEROSCOPIC;  Surgeon: Juhi Mosher Jr., MD;  Location: [facility];  Service: Urology;  Laterality: Left;     Social History[1]     REVIEW OF SYSTEMS  Per HPI    OBJECTIVE     VITAL SIGNS (Most Recent)  Temp: 98 °F (36.7 °C) (06/29/25 1900)  Pulse: (!) 119 (06/29/25 1928)  Resp: 18 (06/29/25 1900)  BP: (!) 151/76 (06/29/25 1900)  SpO2: 97 % (06/29/25 1900)    VENTILATION STATUS  Resp: 18 (06/29/25 1900)  SpO2: 97 % (06/29/25 1900)           I & O (Last 24H):  Intake/Output Summary (Last 24 hours) at 6/29/2025 2351  Last data filed at 6/29/2025 0730  Gross per 24 hour   Intake --   Output 1900 ml   Net -1900 ml       WEIGHTS  Wt Readings from Last 1 Encounters:   06/29/25 1947 67.5 kg (148 lb 13 oz)   06/29/25 1257 69.9 kg  "(154 lb 1.6 oz)   06/28/25 1926 69.9 kg (154 lb)       PHYSICAL EXAM  CONSTITUTIONAL: No fever, no chills  HEENT: Normocephalic, atraumatic,pupils reactive to light                 NECK:  No JVD no carotid bruit  CVS: Irregular, 3/6 DIAN  LUNGS: Clear  ABDOMEN: Soft, NT, BS+  EXTREMITIES: No cyanosis, 1+ edema     HOME MEDICATIONS:Medications Ordered Prior to Encounter[2]    SCHEDULED MEDS:   aspirin  162 mg Oral Daily    atorvastatin  10 mg Oral Daily    budesonide  0.5 mg Nebulization BID    diltiaZEM  30 mg Oral Q6H    furosemide (LASIX) injection  40 mg Intravenous Q12H    isosorbide dinitrate  30 mg Oral Daily    levothyroxine  50 mcg Oral QAM    polyethylene glycol  17 g Oral QHS    potassium chloride  30 mEq Intravenous Once    potassium chloride  20 mEq Oral BID    QUEtiapine  25 mg Oral QHS    topiramate  25 mg Oral Daily       CONTINUOUS INFUSIONS:    PRN MEDS:  Current Facility-Administered Medications:     cyclobenzaprine, 10 mg, Oral, TID PRN    hydrALAZINE, 10 mg, Intravenous, Q4H PRN    magnesium oxide, 800 mg, Oral, PRN    magnesium oxide, 800 mg, Oral, PRN    ondansetron, 8 mg, Oral, Q12H PRN    potassium bicarbonate, 35 mEq, Oral, PRN    potassium bicarbonate, 50 mEq, Oral, PRN    potassium bicarbonate, 60 mEq, Oral, PRN    potassium, sodium phosphates, 2 packet, Oral, PRN    potassium, sodium phosphates, 2 packet, Oral, PRN    potassium, sodium phosphates, 2 packet, Oral, PRN    sodium chloride 0.9%, 10 mL, Intravenous, PRN    LABS AND DIAGNOSTICS     CBC LAST 3 DAYS  Recent Labs   Lab 06/28/25 2041 06/29/25  0907   WBC 7.19 9.28   RBC 4.02 4.47   HGB 11.6* 12.8   HCT 36.2* 38.6   MCV 90 86   MCH 28.9 28.6   MCHC 32.0 33.2   RDW 14.5 14.3    189   MPV 11.3 11.5   NRBC 0  --        COAGULATION LAST 3 DAYS  No results for input(s): "LABPT", "INR", "APTT" in the last 168 hours.    CHEMISTRY LAST 3 DAYS  Recent Labs   Lab 06/28/25 2041 06/29/25  0016 06/29/25  0907 06/29/25  1613     --  " "141 140   K 3.4*  --  2.8* 3.4*     --  105 103   CO2 26  --  26 27   ANIONGAP 8  --  10 10   BUN 33*  --  29 30   CREATININE 1.0  --  0.8 0.8   *  --  153* 138*   CALCIUM 9.3  --  9.1 9.3   MG  --  2.0 2.0  --    ALBUMIN 3.9  --   --   --    PROT 6.6  --   --   --    ALKPHOS 99  --   --   --    ALT 19  --   --   --    AST 17  --   --   --    BILITOT 0.6  --   --   --        CARDIAC PROFILE LAST 3 DAYS  Recent Labs   Lab 06/28/25  2041   *       ENDOCRINE LAST 3 DAYS  Recent Labs   Lab 06/29/25  0016   TSH 2.216       LAST ARTERIAL BLOOD GAS  ABG  No results for input(s): "PH", "PO2", "PCO2", "HCO3", "BE" in the last 168 hours.    LAST 7 DAYS MICROBIOLOGY   Microbiology Results (last 7 days)       ** No results found for the last 168 hours. **            MOST RECENT IMAGING  Echo Saline Bubble? No    Left Ventricle: The left ventricle is normal in size. Severely   increased wall thickness. There is concentric hypertrophy. There is normal   systolic function with a visually estimated ejection fraction of 55 - 60%.    Right Ventricle: The right ventricle is normal in size Systolic   function is moderately reduced.    Left Atrium: The left atrium is moderately dilated    Aortic Valve: Moderately calcified cusps. Mildly restricted motion.   There is mild stenosis. Aortic valve area by VTI is 1.7 cm². Aortic valve   peak velocity is 1.7 m/s. Mean gradient is 6 mmHg. The dimensionless index   is 0.53.    Mitral Valve: There is severe mitral annular calcification. There is   mild to moderate regurgitation.    Tricuspid Valve: There is mild regurgitation.    Pulmonary Artery: The estimated pulmonary artery systolic pressure is   37 mmHg.    Pericardium: There is a small effusion. No indication of cardiac   tamponade.      ECHOCARDIOGRAM RESULTS (last 5)  Results for orders placed during the hospital encounter of 06/28/25    Echo Saline Bubble? No    Interpretation Summary    Left Ventricle: The left " ventricle is normal in size. Severely increased wall thickness. There is concentric hypertrophy. There is normal systolic function with a visually estimated ejection fraction of 55 - 60%.    Right Ventricle: The right ventricle is normal in size Systolic function is moderately reduced.    Left Atrium: The left atrium is moderately dilated    Aortic Valve: Moderately calcified cusps. Mildly restricted motion. There is mild stenosis. Aortic valve area by VTI is 1.7 cm². Aortic valve peak velocity is 1.7 m/s. Mean gradient is 6 mmHg. The dimensionless index is 0.53.    Mitral Valve: There is severe mitral annular calcification. There is mild to moderate regurgitation.    Tricuspid Valve: There is mild regurgitation.    Pulmonary Artery: The estimated pulmonary artery systolic pressure is 37 mmHg.    Pericardium: There is a small effusion. No indication of cardiac tamponade.      Results for orders placed during the hospital encounter of 04/23/25    Echo    Interpretation Summary    Left Ventricle: The left ventricle is normal in size. Mildly increased wall thickness. There is mild concentric hypertrophy. Normal wall motion. There is normal systolic function with a visually estimated ejection fraction of 55 - 60%. Unable to assess diastolic function due to atrial fibrillation.    Right Ventricle: The right ventricle is normal in size. Wall thickness is normal. Systolic function is normal.    Left Atrium: Moderately dilated    Aortic Valve: The aortic valve is a trileaflet valve. There is moderate aortic valve sclerosis. Mildly restricted motion. There is mild stenosis. Aortic valve area by VTI is 1.8 cm². Aortic valve peak velocity is 1.9 m/s. Mean gradient is 8 mmHg. The dimensionless index is 0.59.    Mitral Valve: There is mild to moderate regurgitation with a centrally directed jet.    Tricuspid Valve: There is mild regurgitation.    Pulmonary Artery: No pulmonary hypertension. The estimated pulmonary artery  systolic pressure is 32 mmHg.    IVC/SVC: Normal venous pressure at 3 mmHg.      Results for orders placed during the hospital encounter of 07/01/24    Echo    Interpretation Summary    Left Ventricle: The left ventricle is normal in size. Moderately increased wall thickness. There is concentric hypertrophy. There is normal systolic function with a visually estimated ejection fraction of 60 - 65%. Grade I diastolic dysfunction.    Right Ventricle: Normal right ventricular cavity size. Systolic function is normal.    Left Atrium: Left atrium is severely dilated.    Aortic Valve: There is moderate aortic valve sclerosis. Aortic valve area by velocity is 1.58 cm². Aortic valve peak velocity is 2.01 m/s. Mean gradient is 9 mmHg. Aortic Valve peak gradient is 16 mmHg.    Mitral Valve: There is moderate mitral annular calcification present. There is mild regurgitation.    Tricuspid Valve: There is mild to moderate regurgitation. The estimated PA systolic pressure is at least 31 mmHg.      Results for orders placed during the hospital encounter of 10/17/21    Echo    Interpretation Summary  · Concentric hypertrophy and normal systolic function.  · The estimated ejection fraction is 65%.  · Grade I left ventricular diastolic dysfunction.  · Normal right ventricular size with normal right ventricular systolic function.  · The aortic root is mildly dilated.  · Trivial pericardial effusion.      CURRENT/PREVIOUS VISIT EKG  Results for orders placed or performed during the hospital encounter of 06/28/25   EKG 12-lead    Collection Time: 06/28/25 10:03 PM   Result Value Ref Range    QRS Duration 90 ms    [facility] QTC Calculation 447 ms    Narrative    Test Reason : R06.02,    Vent. Rate :  91 BPM     Atrial Rate :    BPM     P-R Int :    ms          QRS Dur :  90 ms      QT Int : 364 ms       P-R-T Axes :    -56  15 degrees    QTcB Int : 447 ms    Atrial fibrillation with premature ventricular or aberrantly conducted  complexes  Left  axis deviation  Moderate voltage criteria for LVH, may be normal variant ( R in aVL ,  [employer] product )  Anterior infarct (cited on or before 23-Apr-2025)  Abnormal ECG  When compared with ECG of 23-Apr-2025 20:40,  No significant change was found    Referred By: AAAREFERRAL SELF           Confirmed By:            ASSESSMENT/PLAN:     Active Hospital Problems    Diagnosis    *Acute on chronic diastolic congestive heart failure    Hypothyroidism    Atrial fibrillation    Essential hypertension    Hypokalemia       ASSESSMENT & PLAN:   - Afib - rate controlled. No on anticoagulation given fall risk.   - Acute on chronic heart failure with preserved EF - mild volume overload. Continue gentle diuresis.       Allie Lozoya MD  Date of Service: 06/29/2025  11:51 PM       [1]   Social History  Tobacco Use    Smoking status: Former     Current packs/day: 1.00     Average packs/day: 1 pack/day for 20.0 years (20.0 ttl pk-yrs)     Types: Cigarettes    Smokeless tobacco: Never   Substance Use Topics    Alcohol use: No    Drug use: No   [2]   Current Facility-Administered Medications on File Prior to Encounter   Medication Dose Route Frequency Provider Last Rate Last Admin    triamcinolone acetonide injection 40 mg  40 mg Intra-articular  Kenneth Nevarez MD   40 mg at 06/03/22 1030     Current Outpatient Medications on File Prior to Encounter   Medication Sig Dispense Refill    aspirin (ECOTRIN) 81 MG EC tablet Take 162 mg by mouth once daily.      atorvastatin (LIPITOR) 10 MG tablet Take 10 mg by mouth once daily.      diltiaZEM (CARDIZEM) 30 MG tablet Take 1 tablet (30 mg total) by mouth every 6 (six) hours. 360 tablet 0    furosemide (LASIX) 40 MG tablet Take 1 tablet (40 mg total) by mouth daily as needed (If increase in shortness of breath or leg swelling or increase in weight gain 2-3 lb in 2 days). 30 tablet 0    isosorbide dinitrate (ISORDIL) 30 MG Tab Take 30 mg by mouth once daily.       levothyroxine  (SYNTHROID) 50 MCG tablet Take 1 tablet (50 mcg total) by mouth every morning. 30 tablet 11    magnesium oxide (MAG-OX) 400 mg (241.3 mg magnesium) tablet Take 1 tablet by mouth once daily.      polyethylene glycol (GLYCOLAX) 17 gram/dose powder Take 17 g by mouth every evening.      QUEtiapine (SEROQUEL) 50 MG tablet Take 50 mg by mouth every evening.      topiramate (TOPAMAX) 25 MG tablet Take 25 mg by mouth once daily.   0    traMADoL (ULTRAM) 50 mg tablet Take 50 mg by mouth every 12 (twelve) hours as needed.      budesonide (PULMICORT) 0.5 mg/2 mL nebulizer solution Take 2 mLs (0.5 mg total) by nebulization 2 (two) times daily. Controller 120 mL 5    meclizine (ANTIVERT) 12.5 mg tablet Take 2 tablets by mouth daily as needed for Dizziness.      [DISCONTINUED] QUEtiapine (SEROQUEL) 25 MG Tab Take 1 tablet (25 mg total) by mouth every evening. 30 tablet 3    [DISCONTINUED] RESTASIS 0.05 % ophthalmic emulsion Place 1 drop into both eyes 2 (two) times daily. (Patient not taking: Reported on 5/28/2025)

## 2025-06-30 NOTE — PT/OT/SLP EVAL
"Physical Therapy Evaluation    Patient Name:  Erich Encarnacion   MRN:  [id number]    Recommendations:     Discharge Recommendations: Low Intensity Therapy   Discharge Equipment Recommendations: none   Barriers to discharge: None    Assessment:     Erich Encarnacion is a 89 y.o. female admitted with a medical diagnosis of Acute on chronic diastolic congestive heart failure.  She presents with the following impairments/functional limitations: weakness, impaired endurance, impaired functional mobility, gait instability, impaired cardiopulmonary response to activity.    Rehab Prognosis: Good; patient would benefit from acute skilled PT services to address these deficits and reach maximum level of function.    Recent Surgery: * No surgery found *      Plan:     During this hospitalization, patient to be seen 5 x/week to address the identified rehab impairments via gait training, therapeutic activities, therapeutic exercises and progress toward the following goals:      Subjective     Chief Complaint: fatigue after gait in the room  Patient/Family Comments/goals: Go home "today or tomorrow."  Pain/Comfort:  Pain Rating 1: 0/10    Patients cultural, spiritual, Anabaptist conflicts given the current situation:      Living Environment:  Pt lives with her daughter in a [facility]  Prior to admission, patients level of function was Supervision level with rollator.  Equipment used at home: bedside commode, rollator, wheelchair.  DME owned (not currently used): none.  Upon discharge, patient will have assistance from her daughter.    Objective:     Communicated with nurse prior to session.  Patient found supine with bed alarm, pulse ox (continuous), oxygen, PureWick  upon PT entry to room.    General Precautions: Standard, fall  Orthopedic Precautions:    Braces:    Respiratory Status: Nasal cannula, flow 3 L/min    Exams:  Cognitive Exam:  Patient is oriented to Person, Place, and Situation  RLE ROM: WFL  RLE Strength: WFL  LLE ROM: " WFL  LLE Strength: WFL    Functional Mobility:  Bed Mobility:     Supine to Sit: stand by assistance  Sit to Supine: stand by assistance  Transfers:     Sit to Stand:  contact guard assistance with rolling walker  Gait: 12 ft x2 RW and CGA       AM-PAC 6 CLICK MOBILITY  Total Score:        Treatment & Education:  Pt was educated on safety, use of call light. Pt POC/DC recommendation    Patient left supine with all lines intact, call button in reach, bed alarm on, and daughter present.    GOALS:   Multidisciplinary Problems       Physical Therapy Goals          Problem: Physical Therapy    Goal Priority Disciplines Outcome Interventions   Physical Therapy Goal     PT, PT/OT     Description: Goals to be met by: 2025     Patient will increase functional independence with mobility by performin. Supine to sit with Modified [place]  2. Sit to stand transfer with Modified [place]  3. Gait  x 100  feet with Stand-by Assistance using Rolling Walker.                          DME Justifications:  No DME recommended requiring DME justifications    History:     Past Medical History:   Diagnosis Date    Hypertension     Macula lutea degeneration     Paroxysmal atrial fibrillation     Squamous cell carcinoma of skin        Past Surgical History:   Procedure Laterality Date    APPENDECTOMY      BACK SURGERY      HYSTERECTOMY      URETEROSCOPIC REMOVAL OF URETERIC CALCULUS Left 2024    Procedure: REMOVAL, CALCULUS, URETER, URETEROSCOPIC;  Surgeon: Juhi Mosher Jr., MD;  Location: [facility];  Service: Urology;  Laterality: Left;       Time Tracking:     PT Received On: 25  PT Start Time: 1514     PT Stop Time: 1530  PT Total Time (min): 16 min     Billable Minutes: Evaluation 8 minutes  and Gait Training 8 minutes      2025

## 2025-06-30 NOTE — PROGRESS NOTES
"[facility] Medicine  Progress Note    Patient Name: Erich Encarnacion  MRN: [id number]  Patient Class: IP- Inpatient   Admission Date: 6/28/2025  Length of Stay: 2 days  Attending Physician: Deana Sexton MD  Primary Care Provider: Se Rios MD        Subjective     Principal Problem:Acute on chronic diastolic congestive heart failure        HPI:  -Patient is a 89-year-old  female with a history of atrial fibrillation (no AC due to fall risk) and hypertension; she has no known history of congestive heart failure  -She was recently admitted (in April of this year) for acute on chronic heart failure exacerbation as well as AFib/RVR  -Echo performed at that time showed an EF of 55-60% but diastolic function could not be assessed at that time due to her atrial fibrillation-> she had no pulmonary hypertension  -For 3 days, patient has had leg swelling but denies any chest pain in his had "not much" shortness of breaths  -She has prn Lasix and took 40 mg daily for 3 days in a row but despite this, she did not get better  -Patient afebrile and hemodynamically stable although hypertensive with SBP in the 150s-160s  -CBC/CMP unremarkable except for potassium of 3.4  - and initial troponin 35.5 (had been 30.9 in April of this year)  -EKG showed, per my interpretation, AFib with 91 beats per minute with a QTC of 447  -Chest x-ray:  Cardiomegaly and moderate right pleural effusion" per radiologist Dr. Veloz  -Patient is status post aspirin and Lasix 40 mg IV x1 dose (around 8:49 p.m.); she is admitted for further diagnosis, treatment, and care    Overview/Hospital Course:  Patient is a 89-year-old female admitted to hospital medicine for treatment of acute on chronic diastolic congestive heart failure.  Patient was monitored on telemetry and Cardiology was consulted.  Echocardiogram was performed and is pending results.  Patient was treated with supplemental oxygen p.r.n., " IV diuresis, sodium restricted diet, and her home medications. Lower extremity edema and respiratory function improved.  Potassium noted to be low and was replenished. IV diuresis changed to oral lasix. Planned to observe patient for any change in respiratory status and plan d/c 07/01.    Interval History: Pt evaluated at bedside. NAD. LE edema improved. Family at bedside. Questions and concerns addressed.     Review of Systems   Constitutional:  Negative for chills and fever.   HENT:  Negative for rhinorrhea and sore throat.    Respiratory:  Positive for shortness of breath (mild).    Cardiovascular:  Positive for leg swelling. Negative for chest pain and palpitations.   Gastrointestinal:  Negative for blood in stool, constipation, diarrhea, nausea and vomiting.   Genitourinary:  Negative for dysuria.   Musculoskeletal:  Negative for myalgias.   Skin:  Negative for rash.   Neurological:  Negative for numbness.   Hematological:  Negative for adenopathy.   Psychiatric/Behavioral:  Positive for sleep disturbance (Occasionally snores).      Objective:     Vital Signs (Most Recent):  Temp: 97.5 °F (36.4 °C) (06/30/25 1157)  Pulse: 92 (06/30/25 1157)  Resp: 18 (06/30/25 1157)  BP: 110/66 (06/30/25 1157)  SpO2: (!) 90 % (06/30/25 1157) Vital Signs (24h Range):  Temp:  [97.3 °F (36.3 °C)-98 °F (36.7 °C)] 97.5 °F (36.4 °C)  Pulse:  [] 92  Resp:  [16-18] 18  SpO2:  [90 %-97 %] 90 %  BP: (108-175)/(56-79) 110/66     Weight: 67.5 kg (148 lb 13 oz)  Body mass index is 25.54 kg/m².    Intake/Output Summary (Last 24 hours) at 6/30/2025 1215  Last data filed at 6/30/2025 0858  Gross per 24 hour   Intake 240 ml   Output 500 ml   Net -260 ml         Physical Exam  Constitutional:       General: She is not in acute distress.     Appearance: Normal appearance. She is not ill-appearing, toxic-appearing or diaphoretic.      Comments: Appears younger than stated age   HENT:      Head: Normocephalic and atraumatic.   Eyes:       General: No scleral icterus.  Neck:      Comments: No JVD/retractions  Cardiovascular:      Rate and Rhythm: Rhythm irregular.      Heart sounds: Murmur (3/6 systolic murmur) heard.      No friction rub. No gallop.   Pulmonary:      Effort: Pulmonary effort is normal. No respiratory distress.      Breath sounds: Rales (Left base) present.   Abdominal:      General: Bowel sounds are normal. There is no distension.      Palpations: Abdomen is soft. There is no mass.      Tenderness: There is no abdominal tenderness.   Musculoskeletal:      Right lower leg: Edema present.      Left lower leg: Edema present.      Comments: Non-pitting edema bilateral calves   Skin:     General: Skin is warm and dry.      Coloration: Skin is not jaundiced.   Neurological:      Mental Status: She is alert.      Comments: Oriented to self only   Psychiatric:         Behavior: Behavior normal.               Significant Labs: All pertinent labs within the past 24 hours have been reviewed.    Significant Imaging: I have reviewed all pertinent imaging results/findings within the past 24 hours.      Assessment & Plan  Acute on chronic diastolic congestive heart failure  Patient with a acute on chronic diastolic congestive heart failure exacerbation; home Lasix did not help; for now:  Diurese per pathway: Lasix 40 mg IV q.12 hours  2 g sodium/1.5 L fluid restriction diet  Follow daily weights, strict Is&Os, and clinical exam  Daily BMP to monitor renal function during diuresis  ECHO shows normal systolic function with concentric hypertrophy  Telemetry monitoring  Cardiology consulted    Hypokalemia  Patient's most recent potassium results are listed below.   Recent Labs     06/29/25  0907 06/29/25  1613 06/30/25  0431   K 2.8* 3.4* 3.9     Plan  - Replete potassium per protocol  - Monitor potassium Daily    Essential hypertension  Suboptimally controlled; patient's blood pressure range in the last 24 hours was: BP  Min: 108/56  Max:  175/77.T  Resume home medications:    diltiaZEM tablet 30 mg, Every 6 hours, Oral  isosorbide dinitrate tablet 30 mg, Daily, Oral  hydrALAZINE injection 10 mg, Every 4 hours PRN, Intravenous  furosemide tablet 40 mg, Daily, Oral    Prn IV Hydralazine  Atrial fibrillation  Rate controlled; CHADS-VASc score 4  Not an anticoagulation candidate, due to fall risk  Home Cardizem resumed  Telemetry monitoring  Hypothyroidism    TSH nonsuppressed at 5.942 in April-> recheck  Home Synthroid resumed  VTE Risk Mitigation (From admission, onward)           Ordered     IP VTE HIGH RISK PATIENT  Once         06/28/25 2352     Place sequential compression device  Until discontinued         06/28/25 2352                    Discharge Planning   ARGELIA: 7/1/2025     Code Status: Full Code   Medical Readiness for Discharge Date:   Discharge Plan A: Home Health, Home with family                        Deana Sexton MD, MD  Department of Hospital Medicine   [facility]

## 2025-06-30 NOTE — SUBJECTIVE & OBJECTIVE
Interval History: Pt evaluated at bedside. NAD. LE edema improved. Family at bedside. Questions and concerns addressed.     Review of Systems   Constitutional:  Negative for chills and fever.   HENT:  Negative for rhinorrhea and sore throat.    Respiratory:  Positive for shortness of breath (mild).    Cardiovascular:  Positive for leg swelling. Negative for chest pain and palpitations.   Gastrointestinal:  Negative for blood in stool, constipation, diarrhea, nausea and vomiting.   Genitourinary:  Negative for dysuria.   Musculoskeletal:  Negative for myalgias.   Skin:  Negative for rash.   Neurological:  Negative for numbness.   Hematological:  Negative for adenopathy.   Psychiatric/Behavioral:  Positive for sleep disturbance (Occasionally snores).      Objective:     Vital Signs (Most Recent):  Temp: 97.5 °F (36.4 °C) (06/30/25 1157)  Pulse: 92 (06/30/25 1157)  Resp: 18 (06/30/25 1157)  BP: 110/66 (06/30/25 1157)  SpO2: (!) 90 % (06/30/25 1157) Vital Signs (24h Range):  Temp:  [97.3 °F (36.3 °C)-98 °F (36.7 °C)] 97.5 °F (36.4 °C)  Pulse:  [] 92  Resp:  [16-18] 18  SpO2:  [90 %-97 %] 90 %  BP: (108-175)/(56-79) 110/66     Weight: 67.5 kg (148 lb 13 oz)  Body mass index is 25.54 kg/m².    Intake/Output Summary (Last 24 hours) at 6/30/2025 1215  Last data filed at 6/30/2025 0858  Gross per 24 hour   Intake 240 ml   Output 500 ml   Net -260 ml         Physical Exam  Constitutional:       General: She is not in acute distress.     Appearance: Normal appearance. She is not ill-appearing, toxic-appearing or diaphoretic.      Comments: Appears younger than stated age   HENT:      Head: Normocephalic and atraumatic.   Eyes:      General: No scleral icterus.  Neck:      Comments: No JVD/retractions  Cardiovascular:      Rate and Rhythm: Rhythm irregular.      Heart sounds: Murmur (3/6 systolic murmur) heard.      No friction rub. No gallop.   Pulmonary:      Effort: Pulmonary effort is normal. No respiratory distress.       Breath sounds: Rales (Left base) present.   Abdominal:      General: Bowel sounds are normal. There is no distension.      Palpations: Abdomen is soft. There is no mass.      Tenderness: There is no abdominal tenderness.   Musculoskeletal:      Right lower leg: Edema present.      Left lower leg: Edema present.      Comments: Non-pitting edema bilateral calves   Skin:     General: Skin is warm and dry.      Coloration: Skin is not jaundiced.   Neurological:      Mental Status: She is alert.      Comments: Oriented to self only   Psychiatric:         Behavior: Behavior normal.               Significant Labs: All pertinent labs within the past 24 hours have been reviewed.    Significant Imaging: I have reviewed all pertinent imaging results/findings within the past 24 hours.

## 2025-06-30 NOTE — CARE UPDATE
06/30/25 0758   Patient Assessment/Suction   Level of Consciousness (AVPU) alert   Respiratory Effort Normal;Unlabored   Expansion/Accessory Muscles/Retractions no use of accessory muscles   All Lung Fields Breath Sounds diminished   Rhythm/Pattern, Respiratory unlabored   Cough Frequency no cough   PRE-TX-O2   Device (Oxygen Therapy) room air   SpO2 95 %   Pulse Oximetry Type Intermittent   $ Pulse Oximetry - Multiple Charge Pulse Oximetry - Multiple   Pulse 107   Resp 18   Aerosol Therapy   $ Aerosol Therapy Charges Aerosol Treatment   Daily Review of Necessity (SVN) completed   Respiratory Treatment Status (SVN) given   Treatment Route (SVN) mask;oxygen   Patient Position HOB elevated   Post Treatment Assessment (SVN) breath sounds unchanged   Signs of Intolerance (SVN) none   Breath Sounds Post-Respiratory Treatment   Throughout All Fields Post-Treatment All Fields   Throughout All Fields Post-Treatment no change   Post-treatment Heart Rate (beats/min) 101   Post-treatment Resp Rate (breaths/min) 18   Education   $ Education Bronchodilator;15 min

## 2025-06-30 NOTE — PLAN OF CARE
Per CM, [facility] [place] [place] to [facility] for [facility] via EP[employer].

## 2025-06-30 NOTE — PROGRESS NOTES
[facility]  Department of Cardiology  Progress Note      PATIENT NAME: Erich Encarnacion    MRN: [id number]  TODAY'S DATE: 06/30/2025  ADMIT DATE: 6/28/2025                          CONSULT REQUESTED BY: Deana Sexton MD    SUBJECTIVE     PRINCIPAL PROBLEM: Acute on chronic diastolic congestive heart failure    Interval history:  06/30/2025  Patient denies complaints. Daughter at bedside reports patient was fluid overloaded for a few days prior to coming to the hospital and that she has juts started giving her home PRN Lasix.  Continues with O2.    HPI:   89-year-old female with a history of atrial fibrillation (on no AC due to fall risk), HTN, and recurrent episodes of acute on chronic heart failure with preserved EF.  Patient admitted with shortness of breath and lower extremity edema.  Patient is a poor historian.  No other complaints. Cardiology consulted to assist with management.        REASON FOR CONSULT:  From Hospitalist H&P:  Acute on chronic heart failure with preserved EF.       Review of patient's allergies indicates:  No Known Allergies    Past Medical History:   Diagnosis Date    Hypertension     Macula lutea degeneration     Paroxysmal atrial fibrillation     Squamous cell carcinoma of skin      Past Surgical History:   Procedure Laterality Date    APPENDECTOMY      BACK SURGERY  1975    HYSTERECTOMY      URETEROSCOPIC REMOVAL OF URETERIC CALCULUS Left 5/28/2024    Procedure: REMOVAL, CALCULUS, URETER, URETEROSCOPIC;  Surgeon: Juhi Mosher Jr., MD;  Location: [facility];  Service: Urology;  Laterality: Left;     Social History[1]     REVIEW OF SYSTEMS  Per HPI    OBJECTIVE     VITAL SIGNS (Most Recent)  Temp: 97.5 °F (36.4 °C) (06/30/25 1157)  Pulse: 92 (06/30/25 1157)  Resp: 18 (06/30/25 1157)  BP: 110/66 (06/30/25 1157)  SpO2: (!) 90 % (06/30/25 1157)    VENTILATION STATUS  Resp: 18 (06/30/25 1157)  SpO2: (!) 90 % (06/30/25 1157)           I & O (Last 24H):  Intake/Output Summary  (Last 24 hours) at 6/30/2025 1501  Last data filed at 6/30/2025 0858  Gross per 24 hour   Intake 240 ml   Output 500 ml   Net -260 ml       WEIGHTS  Wt Readings from Last 1 Encounters:   06/29/25 1947 67.5 kg (148 lb 13 oz)   06/29/25 1257 69.9 kg (154 lb 1.6 oz)   06/28/25 1926 69.9 kg (154 lb)       PHYSICAL EXAM  CONSTITUTIONAL: No fever, no chills  HEENT: Normocephalic, atraumatic,pupils reactive to light                 NECK:  No JVD no carotid bruit  CVS: Irregular, 2/6 DIAN  LUNGS: Clear, on O2  ABDOMEN: Soft, NT, BS+  EXTREMITIES: No cyanosis, trace BLE edema     HOME MEDICATIONS:Medications Ordered Prior to Encounter[2]    SCHEDULED MEDS:   aspirin  162 mg Oral Daily    atorvastatin  10 mg Oral Daily    budesonide  0.5 mg Nebulization BID    diltiaZEM  30 mg Oral Q6H    [START ON 7/1/2025] furosemide  20 mg Oral Daily    isosorbide dinitrate  30 mg Oral Daily    levothyroxine  50 mcg Oral QAM    polyethylene glycol  17 g Oral QHS    potassium chloride  30 mEq Intravenous Once    [START ON 7/1/2025] potassium chloride  10 mEq Oral Daily    QUEtiapine  25 mg Oral QHS    topiramate  25 mg Oral Daily       CONTINUOUS INFUSIONS:    PRN MEDS:  Current Facility-Administered Medications:     acetaminophen, 650 mg, Oral, Q6H PRN    cyclobenzaprine, 10 mg, Oral, TID PRN    hydrALAZINE, 10 mg, Intravenous, Q4H PRN    magnesium oxide, 800 mg, Oral, PRN    magnesium oxide, 800 mg, Oral, PRN    ondansetron, 8 mg, Oral, Q12H PRN    potassium bicarbonate, 35 mEq, Oral, PRN    potassium bicarbonate, 50 mEq, Oral, PRN    potassium bicarbonate, 60 mEq, Oral, PRN    potassium, sodium phosphates, 2 packet, Oral, PRN    potassium, sodium phosphates, 2 packet, Oral, PRN    potassium, sodium phosphates, 2 packet, Oral, PRN    sodium chloride 0.9%, 10 mL, Intravenous, PRN    LABS AND DIAGNOSTICS     CBC LAST 3 DAYS  Recent Labs   Lab 06/28/25  2041 06/29/25  0907   WBC 7.19 9.28   RBC 4.02 4.47   HGB 11.6* 12.8   HCT 36.2* 38.6  "  MCV 90 86   MCH 28.9 28.6   MCHC 32.0 33.2   RDW 14.5 14.3    189   MPV 11.3 11.5   NRBC 0  --        COAGULATION LAST 3 DAYS  No results for input(s): "LABPT", "INR", "APTT" in the last 168 hours.    CHEMISTRY LAST 3 DAYS  Recent Labs   Lab 06/28/25  2041 06/29/25  0016 06/29/25  0907 06/29/25  1613 06/30/25  0431     --  141 140 142   K 3.4*  --  2.8* 3.4* 3.9     --  105 103 106   CO2 26  --  26 27 30*   ANIONGAP 8  --  10 10 6*   BUN 33*  --  29 30 28   CREATININE 1.0  --  0.8 0.8 0.9   *  --  153* 138* 130*   CALCIUM 9.3  --  9.1 9.3 9.2   MG  --  2.0 2.0  --   --    ALBUMIN 3.9  --   --   --   --    PROT 6.6  --   --   --   --    ALKPHOS 99  --   --   --   --    ALT 19  --   --   --   --    AST 17  --   --   --   --    BILITOT 0.6  --   --   --   --        CARDIAC PROFILE LAST 3 DAYS  Recent Labs   Lab 06/28/25 2041   *       ENDOCRINE LAST 3 DAYS  Recent Labs   Lab 06/29/25  0016   TSH 2.216       LAST ARTERIAL BLOOD GAS  ABG  No results for input(s): "PH", "PO2", "PCO2", "HCO3", "BE" in the last 168 hours.    LAST 7 DAYS MICROBIOLOGY   Microbiology Results (last 7 days)       ** No results found for the last 168 hours. **            MOST RECENT IMAGING  Echo Saline Bubble? No    Left Ventricle: The left ventricle is normal in size. Severely   increased wall thickness. There is concentric hypertrophy. There is normal   systolic function with a visually estimated ejection fraction of 55 - 60%.    Right Ventricle: The right ventricle is normal in size Systolic   function is moderately reduced.    Left Atrium: The left atrium is moderately dilated    Aortic Valve: Moderately calcified cusps. Mildly restricted motion.   There is mild stenosis. Aortic valve area by VTI is 1.7 cm². Aortic valve   peak velocity is 1.7 m/s. Mean gradient is 6 mmHg. The dimensionless index   is 0.53.    Mitral Valve: There is severe mitral annular calcification. There is   mild to moderate " regurgitation.    Tricuspid Valve: There is mild regurgitation.    Pulmonary Artery: The estimated pulmonary artery systolic pressure is   37 mmHg.    Pericardium: There is a small effusion. No indication of cardiac   tamponade.      ECHOCARDIOGRAM RESULTS (last 5)  Results for orders placed during the hospital encounter of 06/28/25    Echo Saline Bubble? No    Interpretation Summary    Left Ventricle: The left ventricle is normal in size. Severely increased wall thickness. There is concentric hypertrophy. There is normal systolic function with a visually estimated ejection fraction of 55 - 60%.    Right Ventricle: The right ventricle is normal in size Systolic function is moderately reduced.    Left Atrium: The left atrium is moderately dilated    Aortic Valve: Moderately calcified cusps. Mildly restricted motion. There is mild stenosis. Aortic valve area by VTI is 1.7 cm². Aortic valve peak velocity is 1.7 m/s. Mean gradient is 6 mmHg. The dimensionless index is 0.53.    Mitral Valve: There is severe mitral annular calcification. There is mild to moderate regurgitation.    Tricuspid Valve: There is mild regurgitation.    Pulmonary Artery: The estimated pulmonary artery systolic pressure is 37 mmHg.    Pericardium: There is a small effusion. No indication of cardiac tamponade.      Results for orders placed during the hospital encounter of 04/23/25    Echo    Interpretation Summary    Left Ventricle: The left ventricle is normal in size. Mildly increased wall thickness. There is mild concentric hypertrophy. Normal wall motion. There is normal systolic function with a visually estimated ejection fraction of 55 - 60%. Unable to assess diastolic function due to atrial fibrillation.    Right Ventricle: The right ventricle is normal in size. Wall thickness is normal. Systolic function is normal.    Left Atrium: Moderately dilated    Aortic Valve: The aortic valve is a trileaflet valve. There is moderate aortic valve  sclerosis. Mildly restricted motion. There is mild stenosis. Aortic valve area by VTI is 1.8 cm². Aortic valve peak velocity is 1.9 m/s. Mean gradient is 8 mmHg. The dimensionless index is 0.59.    Mitral Valve: There is mild to moderate regurgitation with a centrally directed jet.    Tricuspid Valve: There is mild regurgitation.    Pulmonary Artery: No pulmonary hypertension. The estimated pulmonary artery systolic pressure is 32 mmHg.    IVC/SVC: Normal venous pressure at 3 mmHg.      Results for orders placed during the hospital encounter of 07/01/24    Echo    Interpretation Summary    Left Ventricle: The left ventricle is normal in size. Moderately increased wall thickness. There is concentric hypertrophy. There is normal systolic function with a visually estimated ejection fraction of 60 - 65%. Grade I diastolic dysfunction.    Right Ventricle: Normal right ventricular cavity size. Systolic function is normal.    Left Atrium: Left atrium is severely dilated.    Aortic Valve: There is moderate aortic valve sclerosis. Aortic valve area by velocity is 1.58 cm². Aortic valve peak velocity is 2.01 m/s. Mean gradient is 9 mmHg. Aortic Valve peak gradient is 16 mmHg.    Mitral Valve: There is moderate mitral annular calcification present. There is mild regurgitation.    Tricuspid Valve: There is mild to moderate regurgitation. The estimated PA systolic pressure is at least 31 mmHg.      Results for orders placed during the hospital encounter of 10/17/21    Echo    Interpretation Summary  · Concentric hypertrophy and normal systolic function.  · The estimated ejection fraction is 65%.  · Grade I left ventricular diastolic dysfunction.  · Normal right ventricular size with normal right ventricular systolic function.  · The aortic root is mildly dilated.  · Trivial pericardial effusion.      CURRENT/PREVIOUS VISIT EKG  Results for orders placed or performed during the hospital encounter of 06/28/25   EKG 12-lead     Collection Time: 06/28/25 10:03 PM   Result Value Ref Range    QRS Duration 90 ms    [facility] QTC Calculation 447 ms    Narrative    Test Reason : R06.02,    Vent. Rate :  91 BPM     Atrial Rate :    BPM     P-R Int :    ms          QRS Dur :  90 ms      QT Int : 364 ms       P-R-T Axes :    -56  15 degrees    QTcB Int : 447 ms    Atrial fibrillation with premature ventricular or aberrantly conducted  complexes  Left axis deviation  Moderate voltage criteria for LVH, may be normal variant ( R in aVL ,  Ángel product )  Anterior infarct (cited on or before 23-Apr-2025)  Abnormal ECG  When compared with ECG of 23-Apr-2025 20:40,  No significant change was found    Referred By: AAAREFERRAL SELF           Confirmed By:            ASSESSMENT/PLAN:     Active Hospital Problems    Diagnosis    *Acute on chronic diastolic congestive heart failure    Hypothyroidism    Atrial fibrillation    Essential hypertension    Hypokalemia       ASSESSMENT & PLAN:   - Telemetry reviewed showing mostly rate-controlled Afib. Not on anticoagulation given fall risk.   - Continue diltiazem 30 mg every 6 hours, Imdur 30 mg daily  - Continue Synthroid.  - Acute on chronic heart failure with preserved EF - mild volume overload.   - Lungs are clear on exam. Start oral Lasix 20 mg daily with potassium chloride 10 mEq daily.Recommend BMP in 1 week to assess electrolytes and renal function.   - Recommend follow up with Dr. Niranjan Soto, [facility]  Date of Service: 06/30/2025  11:51 PM      I have personally interviewed and examined the patient, I have reviewed the Nurse Practitioner's history and physical, assessment, and plan. I have personally evaluated the patient at bedside and agree with the findings and made appropriate changes as necessary in recommendations.  All pertinent recent labs, imaging and EKGs independently reviewed and interpreted.     Susana Arriaga MD [facility]  Department of Cardiology  [facility]  [facility]  6/30/25             [1]   Social History  Tobacco Use    Smoking status: Former     Current packs/day: 1.00     Average packs/day: 1 pack/day for 20.0 years (20.0 ttl pk-yrs)     Types: Cigarettes    Smokeless tobacco: Never   Substance Use Topics    Alcohol use: No    Drug use: No   [2]   Current Facility-Administered Medications on File Prior to Encounter   Medication Dose Route Frequency Provider Last Rate Last Admin    triamcinolone acetonide injection 40 mg  40 mg Intra-articular  Kenneth Nevarez MD   40 mg at 06/03/22 1030     Current Outpatient Medications on File Prior to Encounter   Medication Sig Dispense Refill    aspirin (ECOTRIN) 81 MG EC tablet Take 162 mg by mouth once daily.      atorvastatin (LIPITOR) 10 MG tablet Take 10 mg by mouth once daily.      diltiaZEM (CARDIZEM) 30 MG tablet Take 1 tablet (30 mg total) by mouth every 6 (six) hours. 360 tablet 0    furosemide (LASIX) 40 MG tablet Take 1 tablet (40 mg total) by mouth daily as needed (If increase in shortness of breath or leg swelling or increase in weight gain 2-3 lb in 2 days). 30 tablet 0    isosorbide dinitrate (ISORDIL) 30 MG Tab Take 30 mg by mouth once daily.       levothyroxine (SYNTHROID) 50 MCG tablet Take 1 tablet (50 mcg total) by mouth every morning. 30 tablet 11    magnesium oxide (MAG-OX) 400 mg (241.3 mg magnesium) tablet Take 1 tablet by mouth once daily.      polyethylene glycol (GLYCOLAX) 17 gram/dose powder Take 17 g by mouth every evening.      QUEtiapine (SEROQUEL) 50 MG tablet Take 50 mg by mouth every evening.      topiramate (TOPAMAX) 25 MG tablet Take 25 mg by mouth once daily.   0    traMADoL (ULTRAM) 50 mg tablet Take 50 mg by mouth every 12 (twelve) hours as needed.      budesonide (PULMICORT) 0.5 mg/2 mL nebulizer solution Take 2 mLs (0.5 mg total) by nebulization 2 (two) times daily. Controller 120 mL 5    meclizine (ANTIVERT) 12.5 mg tablet Take 2 tablets by mouth daily as needed for Dizziness.

## 2025-06-30 NOTE — ASSESSMENT & PLAN NOTE
Rate controlled; CHADS-VASc score 4  Not an anticoagulation candidate, due to fall risk  Home Cardizem resumed  Telemetry monitoring

## 2025-06-30 NOTE — ASSESSMENT & PLAN NOTE
Patient with a acute on chronic diastolic congestive heart failure exacerbation; home Lasix did not help; for now:  Diurese per pathway: Lasix 40 mg IV q.12 hours  2 g sodium/1.5 L fluid restriction diet  Follow daily weights, strict Is&Os, and clinical exam  Daily BMP to monitor renal function during diuresis  ECHO shows normal systolic function with concentric hypertrophy  Telemetry monitoring  Cardiology consulted

## 2025-06-30 NOTE — ASSESSMENT & PLAN NOTE
Patient's most recent potassium results are listed below.   Recent Labs     06/29/25  0907 06/29/25  1613 06/30/25  0431   K 2.8* 3.4* 3.9     Plan  - Replete potassium per protocol  - Monitor potassium Daily

## 2025-06-30 NOTE — CONSULTS
[facility]  Adult Nutrition  Education Short Note    Admit Date: 6/28/2025   Total duration of encounter: 2 days   Patient Age: 89 y.o.    Nutrition Education    Previous education: no    Diet at home: Regular    Handouts provided: Low Sodium Nutrition Therapy, Fluid-Restricted Nutrition Therapy      Comments: Discussed importance of a low sodium diet. Reviewed high sodium foods that should be avoided. Food labels, salt free seasonings, and recommended sodium intake reviewed. Encouraged healthy, fresh foods that are low in sodium that are good for consumption. Fluid intake and conversions discussed. Foods considered fluids were reviewed and encouraged to monitor. General healthy diet encouraged. All questions/concerns were addressed.    Comments:   89 yo F admitted with CHF. Pr reports good appetite. Denies any wt loss.     Discussed with: patient    Educational Need? yes    Barriers: none identified    Interventions: Fluid modified diet and Sodium modified diet    Patient and/or family comprehend instructions: yes    Outcome: Verbalizes understanding     Thanks for the consult!    Tho Dhillon RD 06/30/2025 11:58 AM

## 2025-07-01 VITALS
WEIGHT: 148.81 LBS | HEIGHT: 64 IN | TEMPERATURE: 97 F | SYSTOLIC BLOOD PRESSURE: 102 MMHG | RESPIRATION RATE: 18 BRPM | DIASTOLIC BLOOD PRESSURE: 70 MMHG | HEART RATE: 94 BPM | BODY MASS INDEX: 25.41 KG/M2 | OXYGEN SATURATION: 94 %

## 2025-07-01 LAB
ANION GAP (SMH): 5 MMOL/L (ref 8–16)
BUN SERPL-MCNC: 33 MG/DL (ref 10–30)
CALCIUM SERPL-MCNC: 9.3 MG/DL (ref 8.7–10.5)
CHLORIDE SERPL-SCNC: 105 MMOL/L (ref 95–110)
CO2 SERPL-SCNC: 31 MMOL/L (ref 23–29)
CREAT SERPL-MCNC: 1.1 MG/DL (ref 0.5–1.4)
GFR SERPLBLD CREATININE-BSD FMLA CKD-EPI: 46 ML/MIN/1.73/M2
GLUCOSE SERPL-MCNC: 116 MG/DL (ref 70–110)
POTASSIUM SERPL-SCNC: 4.4 MMOL/L (ref 3.5–5.1)
SODIUM SERPL-SCNC: 141 MMOL/L (ref 136–145)

## 2025-07-01 PROCEDURE — 94640 AIRWAY INHALATION TREATMENT: CPT

## 2025-07-01 PROCEDURE — 27000221 HC OXYGEN, UP TO 24 HOURS

## 2025-07-01 PROCEDURE — 97166 OT EVAL MOD COMPLEX 45 MIN: CPT

## 2025-07-01 PROCEDURE — 82310 ASSAY OF CALCIUM: CPT | Performed by: INTERNAL MEDICINE

## 2025-07-01 PROCEDURE — 99900031 HC PATIENT EDUCATION (STAT)

## 2025-07-01 PROCEDURE — 99900035 HC TECH TIME PER 15 MIN (STAT)

## 2025-07-01 PROCEDURE — 25000003 PHARM REV CODE 250

## 2025-07-01 PROCEDURE — 25000242 PHARM REV CODE 250 ALT 637 W/ HCPCS: Performed by: STUDENT IN AN ORGANIZED HEALTH CARE EDUCATION/TRAINING PROGRAM

## 2025-07-01 PROCEDURE — 36415 COLL VENOUS BLD VENIPUNCTURE: CPT | Performed by: INTERNAL MEDICINE

## 2025-07-01 PROCEDURE — 94761 N-INVAS EAR/PLS OXIMETRY MLT: CPT

## 2025-07-01 PROCEDURE — 94618 PULMONARY STRESS TESTING: CPT

## 2025-07-01 PROCEDURE — 97535 SELF CARE MNGMENT TRAINING: CPT

## 2025-07-01 PROCEDURE — 25000003 PHARM REV CODE 250: Performed by: INTERNAL MEDICINE

## 2025-07-01 RX ORDER — FUROSEMIDE 20 MG/1
20 TABLET ORAL DAILY
Qty: 30 TABLET | Refills: 0 | Status: SHIPPED | OUTPATIENT
Start: 2025-07-01 | End: 2025-07-31

## 2025-07-01 RX ADMIN — DILTIAZEM HYDROCHLORIDE 30 MG: 30 TABLET, FILM COATED ORAL at 12:07

## 2025-07-01 RX ADMIN — ISOSORBIDE DINITRATE 30 MG: 10 TABLET ORAL at 08:07

## 2025-07-01 RX ADMIN — BUDESONIDE INHALATION 0.5 MG: 0.5 SUSPENSION RESPIRATORY (INHALATION) at 07:07

## 2025-07-01 RX ADMIN — LEVOTHYROXINE SODIUM 50 MCG: 0.03 TABLET ORAL at 05:07

## 2025-07-01 RX ADMIN — ATORVASTATIN CALCIUM 10 MG: 10 TABLET, FILM COATED ORAL at 08:07

## 2025-07-01 RX ADMIN — ASPIRIN 162 MG: 81 TABLET ORAL at 08:07

## 2025-07-01 RX ADMIN — TOPIRAMATE 25 MG: 25 TABLET, FILM COATED ORAL at 08:07

## 2025-07-01 RX ADMIN — POTASSIUM CHLORIDE 10 MEQ: 750 CAPSULE, EXTENDED RELEASE ORAL at 08:07

## 2025-07-01 RX ADMIN — FUROSEMIDE 20 MG: 20 TABLET ORAL at 08:07

## 2025-07-01 RX ADMIN — DILTIAZEM HYDROCHLORIDE 30 MG: 30 TABLET, FILM COATED ORAL at 05:07

## 2025-07-01 NOTE — PLAN OF CARE
Patient qualified for home oxygen, order placed and referral sent to [facility].     Approved by Cass Dietz for e-tank setup, Sebastian [employer] notified for delivery. E-tank delivered to patient and her daughter Court.    Respiratory communication ordered       07/01/25 1016   Post-Acute Status   Post-Acute Authorization E   E Status Referrals Sent

## 2025-07-01 NOTE — PT/OT/SLP PROGRESS
Physical Therapy      Patient Name:  Erich Encarnacion   MRN:  [id number]    Patient not seen today secondary to d/c prior to tx

## 2025-07-01 NOTE — NURSING
1430-Pt discharged. Pt went home with daughter. She was brought down by wheelchair with no signs of distress. She was given verbal and written instructions for upcoming appointments and updated prescriptions. Iv removed and tele monitor off. Pt discharged.

## 2025-07-01 NOTE — PLAN OF CARE
PCP made and added to AVS       07/01/25 1102   Post-Acute Status   Post-Acute Authorization Other   Hospital Resources/Appts/Education Provided Appointments scheduled and added to AVS;Post-Acute resouces added to AVS

## 2025-07-01 NOTE — ASSESSMENT & PLAN NOTE
Rate controlled; CHADS-VASc score 4  Not an anticoagulation candidate, due to fall risk  Home Cardizem

## 2025-07-01 NOTE — DISCHARGE SUMMARY
"Critical access hospital Medicine  Discharge Summary      Patient Name: Erich Encarnacion  MRN: [id number]  VIANNEY: [id number]  Patient Class: IP- Inpatient  Admission Date: 6/28/2025  Hospital Length of Stay: 3 days  Discharge Date and Time: 07/01/2025 10:25 AM  Attending Physician: Deana Sexton MD   Discharging Provider: Deana Sexton MD, MD  Primary Care Provider: Se Rios MD    Primary Care Team: Networked reference to record PCT     HPI:   -Patient is a 89-year-old  female with a history of atrial fibrillation (no AC due to fall risk) and hypertension; she has no known history of congestive heart failure  -She was recently admitted (in April of this year) for acute on chronic heart failure exacerbation as well as AFib/RVR  -Echo performed at that time showed an EF of 55-60% but diastolic function could not be assessed at that time due to her atrial fibrillation-> she had no pulmonary hypertension  -For 3 days, patient has had leg swelling but denies any chest pain in his had "not much" shortness of breaths  -She has prn Lasix and took 40 mg daily for 3 days in a row but despite this, she did not get better  -Patient afebrile and hemodynamically stable although hypertensive with SBP in the 150s-160s  -CBC/CMP unremarkable except for potassium of 3.4  - and initial troponin 35.5 (had been 30.9 in April of this year)  -EKG showed, per my interpretation, AFib with 91 beats per minute with a QTC of 447  -Chest x-ray:  Cardiomegaly and moderate right pleural effusion" per radiologist Dr. Veloz  -Patient is status post aspirin and Lasix 40 mg IV x1 dose (around 8:49 p.m.); she is admitted for further diagnosis, treatment, and care    * No surgery found *      Hospital Course:   Patient is a 89-year-old female admitted to hospital medicine for treatment of acute on chronic diastolic congestive heart failure.  Patient was monitored on telemetry and Cardiology was consulted.  " Echocardiogram was performed and is pending results.  Patient was treated with supplemental oxygen p.r.n., IV diuresis, sodium restricted diet, and her home medications. Lower extremity edema and respiratory function improved.  Potassium noted to be low and was replenished. IV diuresis changed to oral lasix. Planned to observe patient for any change in respiratory status.  She remained stable and was discharged home in stable condition.     Goals of Care Treatment Preferences:  Code Status: Full Code         Consults:   Consults (From admission, onward)          Status Ordering Provider     Inpatient consult to Cardiology  Once        Provider:  Allie Lozoya MD    Completed ANT MADSEN     Inpatient consult to Social Work/Case Management  Once        Provider:  (Not yet assigned)    Acknowledged ANT MADSEN     Inpatient consult to Registered Dietitian/Nutritionist  Once        Provider:  (Not yet assigned)    Completed ANT MADSEN            Assessment & Plan  Acute on chronic diastolic congestive heart failure  Improved, f/u outpatient    Hypokalemia  Resolved    Essential hypertension  F/U outpatient  Atrial fibrillation  Rate controlled; CHADS-VASc score 4  Not an anticoagulation candidate, due to fall risk  Home Cardizem     Hypothyroidism  F/U outpatient  Final Active Diagnoses:    Diagnosis Date Noted POA    PRINCIPAL PROBLEM:  Acute on chronic diastolic congestive heart failure [I50.33] 06/28/2025 Yes    Hypothyroidism [E03.9] 06/28/2025 Yes    Atrial fibrillation [I48.91] 04/23/2025 Yes    Essential hypertension [I10] 10/17/2021 Yes    Hypokalemia [E87.6] 10/17/2021 Yes      Problems Resolved During this Admission:       Discharged Condition: stable    Disposition: Home or Self Care    Follow Up:   Follow-up Information       Se Rios MD Follow up.    Specialty: Family Medicine  Contact information:  [address] [place] [id number]  [phone]               Alejandro Ureña Jr., MD  "Follow up on 7/10/2025.    Specialty: Cardiology  Why: 11:15 am previously scheduled appt.  Contact information:  [place] [address] [phone]                           Patient Instructions:      OXYGEN FOR HOME USE     Order Specific Question Answer Comments   Liter Flow 2    Duration Continuous    Qualifying Test Performed at: Activity    Oxygen saturation at rest 92    Oxygen saturation with activity 86    Oxygen saturation with activity on oxygen 93    Portable mode: continuous    Route nasal cannula    Device: home concentrator with portable tanks    Length of need (in months): 99 mos    Patient condition with qualifying saturation CHF    Height: 5' 4" (1.626 m)    Weight: 67.5 kg (148 lb 13 oz)    Alternative treatment measures have been tried or considered and deemed clinically ineffective. Yes      Diet Cardiac     Activity as tolerated       Significant Diagnostic Studies: N/A    Pending Diagnostic Studies:       Procedure Component Value Units Date/Time    EXTRA TUBES [[id number]] Collected: 07/01/25 0417    Order Status: Sent Lab Status: In process Updated: 07/01/25 0442    Specimen: Blood, Venous     Narrative:      The following orders were created for panel order EXTRA TUBES.  Procedure                               Abnormality         Status                     ---------                               -----------         ------                     Lavender Top Hold[[id number]]                               In process                   Please view results for these tests on the individual orders.    EXTRA TUBES [[id number]] Collected: 06/30/25 0431    Order Status: Sent Lab Status: In process Updated: 06/30/25 0452    Specimen: Blood, Venous     Narrative:      The following orders were created for panel order EXTRA TUBES.  Procedure                               Abnormality         Status                     ---------                               -----------         ------           "           Joannaender Top Hold[[id number]]                               In process                   Please view results for these tests on the individual orders.           Medications:  Reconciled Home Medications:      Medication List        CHANGE how you take these medications      furosemide 20 MG tablet  Commonly known as: LASIX  Take 1 tablet (20 mg total) by mouth once daily.  What changed:   medication strength  how much to take  when to take this  reasons to take this            CONTINUE taking these medications      aspirin 81 MG EC tablet  Commonly known as: ECOTRIN  Take 162 mg by mouth once daily.     atorvastatin 10 MG tablet  Commonly known as: LIPITOR  Take 10 mg by mouth once daily.     budesonide 0.5 mg/2 mL nebulizer solution  Commonly known as: PULMICORT  Take 2 mLs (0.5 mg total) by nebulization 2 (two) times daily. Controller     diltiaZEM 30 MG tablet  Commonly known as: CARDIZEM  Take 1 tablet (30 mg total) by mouth every 6 (six) hours.     isosorbide dinitrate 30 MG Tab  Commonly known as: ISORDIL  Take 30 mg by mouth once daily.     levothyroxine 50 MCG tablet  Commonly known as: SYNTHROID  Take 1 tablet (50 mcg total) by mouth every morning.     magnesium oxide 400 mg (241.3 mg magnesium) tablet  Commonly known as: MAG-OX  Take 1 tablet by mouth once daily.     meclizine 12.5 mg tablet  Commonly known as: ANTIVERT  Take 2 tablets by mouth daily as needed for Dizziness.     polyethylene glycol 17 gram/dose powder  Commonly known as: GLYCOLAX  Take 17 g by mouth every evening.     QUEtiapine 50 MG tablet  Commonly known as: SEROQUEL  Take 50 mg by mouth every evening.     topiramate 25 MG tablet  Commonly known as: TOPAMAX  Take 25 mg by mouth once daily.     traMADoL 50 mg tablet  Commonly known as: ULTRAM  Take 50 mg by mouth every 12 (twelve) hours as needed.              Indwelling Lines/Drains at time of discharge:   Lines/Drains/Airways       None                       Time spent on the  discharge of patient: 35 minutes         Deana Sexton MD, MD  Department of Hospital Medicine  [facility]

## 2025-07-01 NOTE — NURSING
Zaira Anderson  and myself rounding on Mrs Encarnacion, plan of care discussed all questions answered. Patient to discharge today after home O2 evaluation.

## 2025-07-01 NOTE — PLAN OF CARE
Goals to be met by: 8/1/2025     Patient will increase functional independence with ADLs by performing:    UE Dressing with Set-up Assistance.  LE Dressing with Stand-by Assistance.  Grooming while standing at sink with Stand-by Assistance.  Toileting from toilet with Stand-by Assistance for hygiene and clothing management.   Toilet transfer to toilet with Contact Guard Assistance.  Upper extremity exercise program x15 reps per handout, with assistance as needed.

## 2025-07-01 NOTE — PLAN OF CARE
Pt clear for DC from case management standpoint. Discharging to home with home health.    Patient will resume home health with [facility] , [place] date: 7/2/25    Patient will be transported home by her daughterCourt at bedside for ride home       07/01/25 1141   Final Note   Assessment Type Final Discharge Note   Anticipated Discharge Disposition Home-Health   Hospital Resources/Appts/Education Provided Appointments scheduled and added to AVS;Post-Acute resouces added to AVS

## 2025-07-01 NOTE — PT/OT/SLP EVAL
Occupational Therapy   Evaluation    Name: Erich Encarnacion  MRN: [id number]  Admitting Diagnosis: Acute on chronic diastolic congestive heart failure  Recent Surgery: * No surgery found *      Recommendations:     Discharge Recommendations: No Therapy Indicated  Discharge Equipment Recommendations:  none  Barriers to discharge:  None    Assessment:     Erich Encarnacion is a 89 y.o. female with a medical diagnosis of Acute on chronic diastolic congestive heart failure. Performance deficits affecting function: weakness, impaired self care skills, impaired functional mobility, gait instability, impaired balance, impaired cognition, decreased safety awareness, impaired cardiopulmonary response to activity.      Rehab Prognosis: Good; patient would benefit from acute skilled OT services to address these deficits and reach maximum level of function.       Plan:     Patient to be seen 3 x/week to address the above listed problems via self-care/home management, therapeutic activities, therapeutic exercises  Plan of Care Expires: 08/01/25  Plan of Care Reviewed with: patient, daughter    Subjective     Chief Complaint: I get dizzy sometimes  Patient/Family Comments/goals: daughter states no need for HH therapy at discharge.     Occupational Profile:  Living Environment: patient lives with her daughter in a [facility]  Previous level of function: ambulates with rollator, daughter assists her with ADL's as needed. She has a grab bar on her bed, safety rail device for toilet as well as grab bars in the shower.   Equipment Used at Home: bedside commode, rollator, wheelchair, grab bar  Assistance upon Discharge: patient will have assistance from her daughter upon discharge    Pain/Comfort:  Pain Rating 1: 0/10    Patients cultural, spiritual, Jew conflicts given the current situation:      Objective:     Communicated with: nurse prior to session.  Patient found HOB elevated with bed alarm, pulse ox (continuous), oxygen, PureWick  upon OT entry to room.    General Precautions: Standard, fall  Orthopedic Precautions: N/A  Braces: N/A  Respiratory Status: Nasal cannula, flow 3 L/min    Occupational Performance:    Bed Mobility:    Patient completed Supine to Sit with contact guard assistance  Patient completed Sit to Supine with contact guard assistance    Functional Mobility/Transfers:  Patient completed Sit <> Stand Transfer with contact guard assistance  with  rolling walker   Functional Mobility: ambulated to bathroom with RW CGA    Activities of Daily Living:  Grooming: contact guard assistance for washing hands standing at sink  Lower Body Dressing: moderate assistance for socks and pajama pants  Toileting: minimum assistance for hygiene and clothing management    Cognitive/Visual Perceptual:  Cognitive/Psychosocial Skills:     -       Oriented to: Person, Place,not time or situation  -       Follows Commands/attention:Follows onestep  commands  -       Communication: clear/fluent  -       Memory: deficits present  -       Safety awareness/insight to disability: impaired  -       Mood/Affect/Coping skills/emotional control: Appropriate to situation, daughter reports sundowning in the evenings      Physical Exam:  Upper Extremity Range of Motion:     -       Right Upper Extremity: WFL  -       Left Upper Extremity: WFL  Upper Extremity Strength:    -       Right Upper Extremity: 4/5  -       Left Upper Extremity: 4/5   Strength:    -       Right Upper Extremity: WFL  -       Left Upper Extremity: WFL  Fine Motor Coordination:    -       Intact      AMPAC 6 Click ADL:  AMPAC Total Score:      Treatment & Education:  Therapist provided facilitation and instruction of proper body mechanics and fall prevention strategies during tasks listed above.   Instructed patient to sit in bedside chair as tolerated daily to increase OOB/activity tolerance.   Instructed patient to use call light to have nursing staff assist with needs/transfers.    Discussed OT POC and answered all questions within OT scope of practice.        Patient left HOB elevated with all lines intact, call button in reach, bed alarm on, and daughter present    GOALS:   Multidisciplinary Problems       Occupational Therapy Goals          Problem: Occupational Therapy    Goal Priority Disciplines Outcome Interventions   Occupational Therapy Goal     OT, PT/OT     Description: Goals to be met by: 8/1/2025     Patient will increase functional independence with ADLs by performing:    UE Dressing with Set-up Assistance.  LE Dressing with Stand-by Assistance.  Grooming while standing at sink with Stand-by Assistance.  Toileting from toilet with Stand-by Assistance for hygiene and clothing management.   Toilet transfer to toilet with Contact Guard Assistance.  Upper extremity exercise program x15 reps per handout, with assistance as needed.                           History:     Past Medical History:   Diagnosis Date    Hypertension     Macula lutea degeneration     Paroxysmal atrial fibrillation     Squamous cell carcinoma of skin          Past Surgical History:   Procedure Laterality Date    APPENDECTOMY      BACK SURGERY  1975    HYSTERECTOMY      URETEROSCOPIC REMOVAL OF URETERIC CALCULUS Left 5/28/2024    Procedure: REMOVAL, CALCULUS, URETER, URETEROSCOPIC;  Surgeon: Juhi Mosher Jr., MD;  Location: [facility];  Service: Urology;  Laterality: Left;       Time Tracking:     OT Date of Treatment: 07/01/25  OT Start Time: 1049  OT Stop Time: 1113  OT Total Time (min): 24 min    Billable Minutes:Evaluation 10  Self Care/Home Management 14    7/1/2025

## 2025-07-01 NOTE — CARE UPDATE
07/01/25 1007   Home Oxygen Qualification   $ Home O2 Qualification Pulmonary Stress Test/6 min walk;Tech time 15 minutes   Room Air SpO2 At Rest 92 %   Room Air SpO2 During Ambulation (!) 86 %   SpO2 During Ambulation on O2 93 %   Heart Rate on O2 131 bpm   Ambulation O2 LPM 2 LPM   SpO2 Post Ambulation 96 %   Post Ambulation Heart Rate 104 bpm   Post Ambulation O2 LPM 2 LPM   Home O2 Eval Comments Paatient needs home oxygen.

## 2025-07-01 NOTE — PLAN OF CARE
Per  Cass    with [facility], okay to pull  1 E Tank Setup  .   [facility]  delivered      to patient's bedside.  Patient/Family signed delivery ticket and/or rental agreement.  [facility] information and equipment instructions provided to patient/family.  Completed/Signed paperwork returned to DME closet completed tray.

## 2025-07-01 NOTE — CARE UPDATE
07/01/25 0736   Patient Assessment/Suction   Level of Consciousness (AVPU) alert   Respiratory Effort Normal;Unlabored   Expansion/Accessory Muscles/Retractions no use of accessory muscles   All Lung Fields Breath Sounds diminished   Rhythm/Pattern, Respiratory unlabored   Cough Frequency no cough   Skin Integrity   $ Wound Care Tech Time 15 min   Area Observed Left;Behind ear   Skin Appearance without discoloration   PRE-TX-O2   Device (Oxygen Therapy) nasal cannula  (Put on 2L due to sats 88%.)   $ Is the patient on Low Flow Oxygen? Yes   Flow (L/min) (Oxygen Therapy) 2   SpO2 95 %   Pulse Oximetry Type Intermittent   $ Pulse Oximetry - Multiple Charge Pulse Oximetry - Multiple   Pulse 104   Resp 17   Aerosol Therapy   $ Aerosol Therapy Charges Aerosol Treatment   Daily Review of Necessity (SVN) completed   Respiratory Treatment Status (SVN) given   Treatment Route (SVN) mask;oxygen   Patient Position HOB elevated   Post Treatment Assessment (SVN) breath sounds unchanged   Signs of Intolerance (SVN) none   Breath Sounds Post-Respiratory Treatment   Throughout All Fields Post-Treatment All Fields   Throughout All Fields Post-Treatment no change   Post-treatment Heart Rate (beats/min) 101   Post-treatment Resp Rate (breaths/min) 8   Education   $ Education Bronchodilator;Oxygen;15 min

## 2025-07-05 LAB
OHS QRS DURATION: 90 MS
OHS QTC CALCULATION: 447 MS

## 2025-08-12 ENCOUNTER — HOSPITAL ENCOUNTER (INPATIENT)
Facility: HOSPITAL | Age: OVER 89
LOS: 2 days | Discharge: HOSPICE | DRG: 291 | End: 2025-08-15
Attending: STUDENT IN AN ORGANIZED HEALTH CARE EDUCATION/TRAINING PROGRAM | Admitting: INTERNAL MEDICINE
Payer: MEDICARE

## 2025-08-12 DIAGNOSIS — R60.0 BILATERAL LEG EDEMA: ICD-10-CM

## 2025-08-12 DIAGNOSIS — I50.9 CHF (CONGESTIVE HEART FAILURE): ICD-10-CM

## 2025-08-12 DIAGNOSIS — I10 ESSENTIAL HYPERTENSION: Primary | ICD-10-CM

## 2025-08-12 DIAGNOSIS — M79.89 LEG SWELLING: ICD-10-CM

## 2025-08-12 LAB
ABSOLUTE EOSINOPHIL (SMH): 0.22 K/UL
ABSOLUTE MONOCYTE (SMH): 0.51 K/UL (ref 0.3–1)
ABSOLUTE NEUTROPHIL COUNT (SMH): 4.5 K/UL (ref 1.8–7.7)
ALBUMIN SERPL-MCNC: 4 G/DL (ref 3.5–5.2)
ALP SERPL-CCNC: 116 UNIT/L (ref 55–135)
ALT SERPL-CCNC: 43 UNIT/L (ref 10–44)
ANION GAP (SMH): 7 MMOL/L (ref 8–16)
AST SERPL-CCNC: 36 UNIT/L (ref 10–40)
BASOPHILS # BLD AUTO: 0.06 K/UL
BASOPHILS NFR BLD AUTO: 0.9 %
BILIRUB SERPL-MCNC: 0.6 MG/DL (ref 0.1–1)
BNP SERPL-MCNC: 470 PG/ML
BUN SERPL-MCNC: 25 MG/DL (ref 10–30)
CALCIUM SERPL-MCNC: 9.2 MG/DL (ref 8.7–10.5)
CHLORIDE SERPL-SCNC: 109 MMOL/L (ref 95–110)
CO2 SERPL-SCNC: 26 MMOL/L (ref 23–29)
CREAT SERPL-MCNC: 1 MG/DL (ref 0.5–1.4)
ERYTHROCYTE [DISTWIDTH] IN BLOOD BY AUTOMATED COUNT: 15.1 % (ref 11.5–14.5)
GFR SERPLBLD CREATININE-BSD FMLA CKD-EPI: 51 ML/MIN/1.73/M2
GLUCOSE SERPL-MCNC: 127 MG/DL (ref 70–110)
HCT VFR BLD AUTO: 37.4 % (ref 37–48.5)
HGB BLD-MCNC: 12 GM/DL (ref 12–16)
IMM GRANULOCYTES # BLD AUTO: 0.02 K/UL (ref 0–0.04)
IMM GRANULOCYTES NFR BLD AUTO: 0.3 % (ref 0–0.5)
LYMPHOCYTES # BLD AUTO: 1.62 K/UL (ref 1–4.8)
MCH RBC QN AUTO: 28.8 PG (ref 27–31)
MCHC RBC AUTO-ENTMCNC: 32.1 G/DL (ref 32–36)
MCV RBC AUTO: 90 FL (ref 82–98)
NUCLEATED RBC (/100WBC) (SMH): 0 /100 WBC
PLATELET # BLD AUTO: 162 K/UL (ref 150–450)
PMV BLD AUTO: 11.7 FL (ref 9.2–12.9)
POTASSIUM SERPL-SCNC: 3.4 MMOL/L (ref 3.5–5.1)
PROT SERPL-MCNC: 6.6 GM/DL (ref 6–8.4)
RBC # BLD AUTO: 4.17 M/UL (ref 4–5.4)
RELATIVE EOSINOPHIL (SMH): 3.2 % (ref 0–8)
RELATIVE LYMPHOCYTE (SMH): 23.5 % (ref 18–48)
RELATIVE MONOCYTE (SMH): 7.4 % (ref 4–15)
RELATIVE NEUTROPHIL (SMH): 64.7 % (ref 38–73)
SODIUM SERPL-SCNC: 142 MMOL/L (ref 136–145)
TROPONIN HIGH SENSITIVE (SMH): 34.5 PG/ML
WBC # BLD AUTO: 6.89 K/UL (ref 3.9–12.7)

## 2025-08-12 PROCEDURE — 93010 ELECTROCARDIOGRAM REPORT: CPT | Mod: ,,, | Performed by: INTERNAL MEDICINE

## 2025-08-12 PROCEDURE — 83880 ASSAY OF NATRIURETIC PEPTIDE: CPT | Performed by: NURSE PRACTITIONER

## 2025-08-12 PROCEDURE — 63600175 PHARM REV CODE 636 W HCPCS

## 2025-08-12 PROCEDURE — 94761 N-INVAS EAR/PLS OXIMETRY MLT: CPT

## 2025-08-12 PROCEDURE — 94640 AIRWAY INHALATION TREATMENT: CPT

## 2025-08-12 PROCEDURE — 80053 COMPREHEN METABOLIC PANEL: CPT | Performed by: NURSE PRACTITIONER

## 2025-08-12 PROCEDURE — 93005 ELECTROCARDIOGRAM TRACING: CPT | Performed by: INTERNAL MEDICINE

## 2025-08-12 PROCEDURE — 94799 UNLISTED PULMONARY SVC/PX: CPT

## 2025-08-12 PROCEDURE — 83735 ASSAY OF MAGNESIUM: CPT | Performed by: INTERNAL MEDICINE

## 2025-08-12 PROCEDURE — 99285 EMERGENCY DEPT VISIT HI MDM: CPT | Mod: 25

## 2025-08-12 PROCEDURE — 99900035 HC TECH TIME PER 15 MIN (STAT)

## 2025-08-12 PROCEDURE — 96374 THER/PROPH/DIAG INJ IV PUSH: CPT

## 2025-08-12 PROCEDURE — 84484 ASSAY OF TROPONIN QUANT: CPT | Performed by: STUDENT IN AN ORGANIZED HEALTH CARE EDUCATION/TRAINING PROGRAM

## 2025-08-12 PROCEDURE — 93010 ELECTROCARDIOGRAM REPORT: CPT | Mod: 76,,, | Performed by: INTERNAL MEDICINE

## 2025-08-12 PROCEDURE — 25000242 PHARM REV CODE 250 ALT 637 W/ HCPCS

## 2025-08-12 PROCEDURE — 99900031 HC PATIENT EDUCATION (STAT)

## 2025-08-12 PROCEDURE — 25000003 PHARM REV CODE 250

## 2025-08-12 PROCEDURE — 85025 COMPLETE CBC W/AUTO DIFF WBC: CPT | Performed by: NURSE PRACTITIONER

## 2025-08-12 PROCEDURE — 96375 TX/PRO/DX INJ NEW DRUG ADDON: CPT

## 2025-08-12 PROCEDURE — 25000003 PHARM REV CODE 250: Performed by: STUDENT IN AN ORGANIZED HEALTH CARE EDUCATION/TRAINING PROGRAM

## 2025-08-12 RX ORDER — QUETIAPINE FUMARATE 25 MG/1
75 TABLET, FILM COATED ORAL NIGHTLY
COMMUNITY
Start: 2025-07-29

## 2025-08-12 RX ORDER — IPRATROPIUM BROMIDE AND ALBUTEROL SULFATE 2.5; .5 MG/3ML; MG/3ML
3 SOLUTION RESPIRATORY (INHALATION)
Status: COMPLETED | OUTPATIENT
Start: 2025-08-12 | End: 2025-08-12

## 2025-08-12 RX ORDER — ACETAMINOPHEN 500 MG
1000 TABLET ORAL
Status: COMPLETED | OUTPATIENT
Start: 2025-08-12 | End: 2025-08-12

## 2025-08-12 RX ORDER — FUROSEMIDE 10 MG/ML
40 INJECTION INTRAMUSCULAR; INTRAVENOUS
Status: COMPLETED | OUTPATIENT
Start: 2025-08-12 | End: 2025-08-12

## 2025-08-12 RX ORDER — DILTIAZEM HYDROCHLORIDE 5 MG/ML
10 INJECTION INTRAVENOUS
Status: COMPLETED | OUTPATIENT
Start: 2025-08-12 | End: 2025-08-12

## 2025-08-12 RX ADMIN — FUROSEMIDE 40 MG: 10 INJECTION, SOLUTION INTRAMUSCULAR; INTRAVENOUS at 08:08

## 2025-08-12 RX ADMIN — IPRATROPIUM BROMIDE AND ALBUTEROL SULFATE 3 ML: 2.5; .5 SOLUTION RESPIRATORY (INHALATION) at 09:08

## 2025-08-12 RX ADMIN — DILTIAZEM HYDROCHLORIDE 10 MG: 5 INJECTION INTRAVENOUS at 11:08

## 2025-08-12 RX ADMIN — ACETAMINOPHEN 1000 MG: 500 TABLET, FILM COATED ORAL at 10:08

## 2025-08-12 RX ADMIN — POTASSIUM BICARBONATE 20 MEQ: 782 TABLET, EFFERVESCENT ORAL at 10:08

## 2025-08-13 LAB
ANION GAP (SMH): 7 MMOL/L (ref 8–16)
BUN SERPL-MCNC: 25 MG/DL (ref 10–30)
CALCIUM SERPL-MCNC: 9.1 MG/DL (ref 8.7–10.5)
CHLORIDE SERPL-SCNC: 105 MMOL/L (ref 95–110)
CO2 SERPL-SCNC: 29 MMOL/L (ref 23–29)
CREAT SERPL-MCNC: 1 MG/DL (ref 0.5–1.4)
EAG (SMH): 134 MG/DL (ref 68–131)
ERYTHROCYTE [DISTWIDTH] IN BLOOD BY AUTOMATED COUNT: 15.1 % (ref 11.5–14.5)
GFR SERPLBLD CREATININE-BSD FMLA CKD-EPI: 51 ML/MIN/1.73/M2
GLUCOSE SERPL-MCNC: 147 MG/DL (ref 70–110)
HBA1C MFR BLD: 6.3 % (ref 4.5–6.2)
HCT VFR BLD AUTO: 38 % (ref 37–48.5)
HGB BLD-MCNC: 12.3 GM/DL (ref 12–16)
MAGNESIUM SERPL-MCNC: 2.3 MG/DL (ref 1.6–2.6)
MCH RBC QN AUTO: 29.2 PG (ref 27–31)
MCHC RBC AUTO-ENTMCNC: 32.4 G/DL (ref 32–36)
MCV RBC AUTO: 90 FL (ref 82–98)
PLATELET # BLD AUTO: 148 K/UL (ref 150–450)
PMV BLD AUTO: 11.1 FL (ref 9.2–12.9)
POTASSIUM SERPL-SCNC: 3.4 MMOL/L (ref 3.5–5.1)
PROCALCITONIN SERPL-MCNC: <0.05 NG/ML
RBC # BLD AUTO: 4.21 M/UL (ref 4–5.4)
SODIUM SERPL-SCNC: 141 MMOL/L (ref 136–145)
TROPONIN HIGH SENSITIVE (SMH): 33.8 PG/ML
WBC # BLD AUTO: 7.35 K/UL (ref 3.9–12.7)

## 2025-08-13 PROCEDURE — 11000001 HC ACUTE MED/SURG PRIVATE ROOM

## 2025-08-13 PROCEDURE — 94761 N-INVAS EAR/PLS OXIMETRY MLT: CPT

## 2025-08-13 PROCEDURE — 84145 PROCALCITONIN (PCT): CPT | Performed by: INTERNAL MEDICINE

## 2025-08-13 PROCEDURE — 83036 HEMOGLOBIN GLYCOSYLATED A1C: CPT | Performed by: INTERNAL MEDICINE

## 2025-08-13 PROCEDURE — 80048 BASIC METABOLIC PNL TOTAL CA: CPT

## 2025-08-13 PROCEDURE — 36415 COLL VENOUS BLD VENIPUNCTURE: CPT

## 2025-08-13 PROCEDURE — 25000003 PHARM REV CODE 250

## 2025-08-13 PROCEDURE — 63600175 PHARM REV CODE 636 W HCPCS: Performed by: INTERNAL MEDICINE

## 2025-08-13 PROCEDURE — 25000003 PHARM REV CODE 250: Performed by: INTERNAL MEDICINE

## 2025-08-13 PROCEDURE — 25000242 PHARM REV CODE 250 ALT 637 W/ HCPCS: Performed by: INTERNAL MEDICINE

## 2025-08-13 PROCEDURE — 99223 1ST HOSP IP/OBS HIGH 75: CPT | Mod: ,,, | Performed by: STUDENT IN AN ORGANIZED HEALTH CARE EDUCATION/TRAINING PROGRAM

## 2025-08-13 PROCEDURE — 94640 AIRWAY INHALATION TREATMENT: CPT

## 2025-08-13 PROCEDURE — 84484 ASSAY OF TROPONIN QUANT: CPT | Performed by: INTERNAL MEDICINE

## 2025-08-13 PROCEDURE — 99900031 HC PATIENT EDUCATION (STAT)

## 2025-08-13 PROCEDURE — 85027 COMPLETE CBC AUTOMATED: CPT | Performed by: INTERNAL MEDICINE

## 2025-08-13 PROCEDURE — 36415 COLL VENOUS BLD VENIPUNCTURE: CPT | Performed by: INTERNAL MEDICINE

## 2025-08-13 RX ORDER — ATORVASTATIN CALCIUM 10 MG/1
10 TABLET, FILM COATED ORAL DAILY
Status: DISCONTINUED | OUTPATIENT
Start: 2025-08-13 | End: 2025-08-15 | Stop reason: HOSPADM

## 2025-08-13 RX ORDER — TOPIRAMATE 25 MG/1
25 TABLET, FILM COATED ORAL DAILY
Status: DISCONTINUED | OUTPATIENT
Start: 2025-08-13 | End: 2025-08-15 | Stop reason: HOSPADM

## 2025-08-13 RX ORDER — POTASSIUM CHLORIDE 20 MEQ/1
40 TABLET, EXTENDED RELEASE ORAL ONCE
Status: COMPLETED | OUTPATIENT
Start: 2025-08-13 | End: 2025-08-13

## 2025-08-13 RX ORDER — SODIUM,POTASSIUM PHOSPHATES 280-250MG
2 POWDER IN PACKET (EA) ORAL
Status: DISCONTINUED | OUTPATIENT
Start: 2025-08-13 | End: 2025-08-15 | Stop reason: HOSPADM

## 2025-08-13 RX ORDER — LANOLIN ALCOHOL/MO/W.PET/CERES
400 CREAM (GRAM) TOPICAL DAILY
Status: DISCONTINUED | OUTPATIENT
Start: 2025-08-13 | End: 2025-08-15 | Stop reason: HOSPADM

## 2025-08-13 RX ORDER — LEVOTHYROXINE SODIUM 25 UG/1
50 TABLET ORAL EVERY MORNING
Status: DISCONTINUED | OUTPATIENT
Start: 2025-08-13 | End: 2025-08-15 | Stop reason: HOSPADM

## 2025-08-13 RX ORDER — SODIUM CHLORIDE 0.9 % (FLUSH) 0.9 %
10 SYRINGE (ML) INJECTION
Status: DISCONTINUED | OUTPATIENT
Start: 2025-08-13 | End: 2025-08-15 | Stop reason: HOSPADM

## 2025-08-13 RX ORDER — ONDANSETRON HYDROCHLORIDE 2 MG/ML
4 INJECTION, SOLUTION INTRAVENOUS EVERY 6 HOURS PRN
Status: DISCONTINUED | OUTPATIENT
Start: 2025-08-13 | End: 2025-08-15 | Stop reason: HOSPADM

## 2025-08-13 RX ORDER — ASPIRIN 81 MG/1
162 TABLET ORAL DAILY
Status: DISCONTINUED | OUTPATIENT
Start: 2025-08-13 | End: 2025-08-15 | Stop reason: HOSPADM

## 2025-08-13 RX ORDER — BUDESONIDE 0.5 MG/2ML
0.5 INHALANT ORAL 2 TIMES DAILY
Status: DISCONTINUED | OUTPATIENT
Start: 2025-08-13 | End: 2025-08-15 | Stop reason: HOSPADM

## 2025-08-13 RX ORDER — QUETIAPINE FUMARATE 25 MG/1
75 TABLET, FILM COATED ORAL NIGHTLY
Status: DISCONTINUED | OUTPATIENT
Start: 2025-08-13 | End: 2025-08-15 | Stop reason: HOSPADM

## 2025-08-13 RX ORDER — ISOSORBIDE DINITRATE 10 MG/1
30 TABLET ORAL DAILY
Status: DISCONTINUED | OUTPATIENT
Start: 2025-08-13 | End: 2025-08-15 | Stop reason: HOSPADM

## 2025-08-13 RX ORDER — FUROSEMIDE 10 MG/ML
20 INJECTION INTRAMUSCULAR; INTRAVENOUS EVERY 12 HOURS
Status: DISCONTINUED | OUTPATIENT
Start: 2025-08-13 | End: 2025-08-14

## 2025-08-13 RX ORDER — BUDESONIDE 0.5 MG/2ML
0.5 INHALANT ORAL 2 TIMES DAILY
Status: DISCONTINUED | OUTPATIENT
Start: 2025-08-13 | End: 2025-08-13

## 2025-08-13 RX ORDER — POLYETHYLENE GLYCOL 3350 17 G/17G
17 POWDER, FOR SOLUTION ORAL NIGHTLY
Status: DISCONTINUED | OUTPATIENT
Start: 2025-08-13 | End: 2025-08-15 | Stop reason: HOSPADM

## 2025-08-13 RX ORDER — DILTIAZEM HYDROCHLORIDE 30 MG/1
30 TABLET, FILM COATED ORAL EVERY 6 HOURS
Status: DISCONTINUED | OUTPATIENT
Start: 2025-08-13 | End: 2025-08-15 | Stop reason: HOSPADM

## 2025-08-13 RX ORDER — LANOLIN ALCOHOL/MO/W.PET/CERES
800 CREAM (GRAM) TOPICAL
Status: DISCONTINUED | OUTPATIENT
Start: 2025-08-13 | End: 2025-08-15 | Stop reason: HOSPADM

## 2025-08-13 RX ORDER — HYDRALAZINE HYDROCHLORIDE 20 MG/ML
10 INJECTION INTRAMUSCULAR; INTRAVENOUS EVERY 4 HOURS PRN
Status: DISCONTINUED | OUTPATIENT
Start: 2025-08-13 | End: 2025-08-15

## 2025-08-13 RX ADMIN — ATORVASTATIN CALCIUM 10 MG: 10 TABLET, FILM COATED ORAL at 08:08

## 2025-08-13 RX ADMIN — FUROSEMIDE 20 MG: 10 INJECTION, SOLUTION INTRAMUSCULAR; INTRAVENOUS at 08:08

## 2025-08-13 RX ADMIN — Medication 400 MG: at 08:08

## 2025-08-13 RX ADMIN — POTASSIUM CHLORIDE 40 MEQ: 1500 TABLET, EXTENDED RELEASE ORAL at 06:08

## 2025-08-13 RX ADMIN — DILTIAZEM HYDROCHLORIDE 30 MG: 30 TABLET, FILM COATED ORAL at 01:08

## 2025-08-13 RX ADMIN — LEVOTHYROXINE SODIUM 50 MCG: 0.03 TABLET ORAL at 07:08

## 2025-08-13 RX ADMIN — DILTIAZEM HYDROCHLORIDE 30 MG: 30 TABLET, FILM COATED ORAL at 06:08

## 2025-08-13 RX ADMIN — ISOSORBIDE DINITRATE 30 MG: 10 TABLET ORAL at 08:08

## 2025-08-13 RX ADMIN — QUETIAPINE 75 MG: 25 TABLET ORAL at 01:08

## 2025-08-13 RX ADMIN — DILTIAZEM HYDROCHLORIDE 30 MG: 30 TABLET, FILM COATED ORAL at 07:08

## 2025-08-13 RX ADMIN — QUETIAPINE 75 MG: 25 TABLET ORAL at 08:08

## 2025-08-13 RX ADMIN — TOPIRAMATE 25 MG: 25 TABLET, FILM COATED ORAL at 08:08

## 2025-08-13 RX ADMIN — BUDESONIDE INHALATION 0.5 MG: 0.5 SUSPENSION RESPIRATORY (INHALATION) at 07:08

## 2025-08-13 RX ADMIN — ASPIRIN 162 MG: 81 TABLET, DELAYED RELEASE ORAL at 08:08

## 2025-08-14 LAB
ABSOLUTE EOSINOPHIL (SMH): 0.28 K/UL
ABSOLUTE MONOCYTE (SMH): 0.45 K/UL (ref 0.3–1)
ABSOLUTE NEUTROPHIL COUNT (SMH): 4.1 K/UL (ref 1.8–7.7)
ANION GAP (SMH): 5 MMOL/L (ref 8–16)
BASOPHILS # BLD AUTO: 0.04 K/UL
BASOPHILS NFR BLD AUTO: 0.7 %
BUN SERPL-MCNC: 22 MG/DL (ref 10–30)
CALCIUM SERPL-MCNC: 8.9 MG/DL (ref 8.7–10.5)
CHLORIDE SERPL-SCNC: 107 MMOL/L (ref 95–110)
CO2 SERPL-SCNC: 29 MMOL/L (ref 23–29)
CREAT SERPL-MCNC: 0.9 MG/DL (ref 0.5–1.4)
ERYTHROCYTE [DISTWIDTH] IN BLOOD BY AUTOMATED COUNT: 15.1 % (ref 11.5–14.5)
GFR SERPLBLD CREATININE-BSD FMLA CKD-EPI: 58 ML/MIN/1.73/M2
GLUCOSE SERPL-MCNC: 123 MG/DL (ref 70–110)
HCT VFR BLD AUTO: 36.2 % (ref 37–48.5)
HGB BLD-MCNC: 11.6 GM/DL (ref 12–16)
IMM GRANULOCYTES # BLD AUTO: 0.03 K/UL (ref 0–0.04)
IMM GRANULOCYTES NFR BLD AUTO: 0.5 % (ref 0–0.5)
LYMPHOCYTES # BLD AUTO: 1.28 K/UL (ref 1–4.8)
MCH RBC QN AUTO: 28.4 PG (ref 27–31)
MCHC RBC AUTO-ENTMCNC: 32 G/DL (ref 32–36)
MCV RBC AUTO: 89 FL (ref 82–98)
NUCLEATED RBC (/100WBC) (SMH): 0 /100 WBC
OHS QRS DURATION: 104 MS
OHS QRS DURATION: 106 MS
OHS QRS DURATION: 106 MS
OHS QTC CALCULATION: 429 MS
OHS QTC CALCULATION: 462 MS
OHS QTC CALCULATION: 465 MS
PLATELET # BLD AUTO: 157 K/UL (ref 150–450)
PMV BLD AUTO: 11.5 FL (ref 9.2–12.9)
POTASSIUM SERPL-SCNC: 3.5 MMOL/L (ref 3.5–5.1)
RBC # BLD AUTO: 4.09 M/UL (ref 4–5.4)
RELATIVE EOSINOPHIL (SMH): 4.6 % (ref 0–8)
RELATIVE LYMPHOCYTE (SMH): 20.8 % (ref 18–48)
RELATIVE MONOCYTE (SMH): 7.3 % (ref 4–15)
RELATIVE NEUTROPHIL (SMH): 66.1 % (ref 38–73)
SODIUM SERPL-SCNC: 141 MMOL/L (ref 136–145)
WBC # BLD AUTO: 6.15 K/UL (ref 3.9–12.7)

## 2025-08-14 PROCEDURE — 99900031 HC PATIENT EDUCATION (STAT)

## 2025-08-14 PROCEDURE — 11000001 HC ACUTE MED/SURG PRIVATE ROOM

## 2025-08-14 PROCEDURE — 25000003 PHARM REV CODE 250: Performed by: INTERNAL MEDICINE

## 2025-08-14 PROCEDURE — 63600175 PHARM REV CODE 636 W HCPCS: Performed by: INTERNAL MEDICINE

## 2025-08-14 PROCEDURE — 80048 BASIC METABOLIC PNL TOTAL CA: CPT | Performed by: INTERNAL MEDICINE

## 2025-08-14 PROCEDURE — 25000242 PHARM REV CODE 250 ALT 637 W/ HCPCS: Performed by: INTERNAL MEDICINE

## 2025-08-14 PROCEDURE — 36415 COLL VENOUS BLD VENIPUNCTURE: CPT

## 2025-08-14 PROCEDURE — 85025 COMPLETE CBC W/AUTO DIFF WBC: CPT

## 2025-08-14 PROCEDURE — 94761 N-INVAS EAR/PLS OXIMETRY MLT: CPT

## 2025-08-14 PROCEDURE — 97165 OT EVAL LOW COMPLEX 30 MIN: CPT

## 2025-08-14 PROCEDURE — 97161 PT EVAL LOW COMPLEX 20 MIN: CPT

## 2025-08-14 PROCEDURE — 97535 SELF CARE MNGMENT TRAINING: CPT

## 2025-08-14 PROCEDURE — 94640 AIRWAY INHALATION TREATMENT: CPT

## 2025-08-14 RX ORDER — FUROSEMIDE 20 MG/1
20 TABLET ORAL
Status: DISCONTINUED | OUTPATIENT
Start: 2025-08-15 | End: 2025-08-15 | Stop reason: HOSPADM

## 2025-08-14 RX ORDER — FUROSEMIDE 20 MG/1
20 TABLET ORAL DAILY
Status: DISCONTINUED | OUTPATIENT
Start: 2025-08-14 | End: 2025-08-14

## 2025-08-14 RX ORDER — FUROSEMIDE 10 MG/ML
20 INJECTION INTRAMUSCULAR; INTRAVENOUS ONCE
Status: COMPLETED | OUTPATIENT
Start: 2025-08-14 | End: 2025-08-14

## 2025-08-14 RX ADMIN — BUDESONIDE INHALATION 0.5 MG: 0.5 SUSPENSION RESPIRATORY (INHALATION) at 07:08

## 2025-08-14 RX ADMIN — BUDESONIDE INHALATION 0.5 MG: 0.5 SUSPENSION RESPIRATORY (INHALATION) at 06:08

## 2025-08-14 RX ADMIN — DILTIAZEM HYDROCHLORIDE 30 MG: 30 TABLET, FILM COATED ORAL at 12:08

## 2025-08-14 RX ADMIN — ISOSORBIDE DINITRATE 30 MG: 10 TABLET ORAL at 08:08

## 2025-08-14 RX ADMIN — TOPIRAMATE 25 MG: 25 TABLET, FILM COATED ORAL at 08:08

## 2025-08-14 RX ADMIN — DILTIAZEM HYDROCHLORIDE 30 MG: 30 TABLET, FILM COATED ORAL at 07:08

## 2025-08-14 RX ADMIN — POTASSIUM BICARBONATE 30 MEQ: 391 TABLET, EFFERVESCENT ORAL at 07:08

## 2025-08-14 RX ADMIN — Medication 400 MG: at 08:08

## 2025-08-14 RX ADMIN — LEVOTHYROXINE SODIUM 50 MCG: 0.03 TABLET ORAL at 07:08

## 2025-08-14 RX ADMIN — ATORVASTATIN CALCIUM 10 MG: 10 TABLET, FILM COATED ORAL at 08:08

## 2025-08-14 RX ADMIN — POTASSIUM BICARBONATE 30 MEQ: 391 TABLET, EFFERVESCENT ORAL at 12:08

## 2025-08-14 RX ADMIN — FUROSEMIDE 20 MG: 20 TABLET ORAL at 08:08

## 2025-08-14 RX ADMIN — QUETIAPINE 75 MG: 25 TABLET ORAL at 09:08

## 2025-08-14 RX ADMIN — FUROSEMIDE 20 MG: 10 INJECTION, SOLUTION INTRAMUSCULAR; INTRAVENOUS at 03:08

## 2025-08-15 VITALS
WEIGHT: 156.75 LBS | BODY MASS INDEX: 26.76 KG/M2 | OXYGEN SATURATION: 95 % | DIASTOLIC BLOOD PRESSURE: 65 MMHG | RESPIRATION RATE: 18 BRPM | TEMPERATURE: 98 F | SYSTOLIC BLOOD PRESSURE: 115 MMHG | HEART RATE: 71 BPM | HEIGHT: 64 IN

## 2025-08-15 PROBLEM — E87.6 HYPOKALEMIA: Status: RESOLVED | Noted: 2021-10-17 | Resolved: 2025-08-15

## 2025-08-15 LAB
ANION GAP (SMH): 5 MMOL/L (ref 8–16)
BUN SERPL-MCNC: 22 MG/DL (ref 10–30)
CALCIUM SERPL-MCNC: 8.9 MG/DL (ref 8.7–10.5)
CHLORIDE SERPL-SCNC: 107 MMOL/L (ref 95–110)
CO2 SERPL-SCNC: 28 MMOL/L (ref 23–29)
CREAT SERPL-MCNC: 0.9 MG/DL (ref 0.5–1.4)
GFR SERPLBLD CREATININE-BSD FMLA CKD-EPI: 58 ML/MIN/1.73/M2
GLUCOSE SERPL-MCNC: 115 MG/DL (ref 70–110)
MAGNESIUM SERPL-MCNC: 2.3 MG/DL (ref 1.6–2.6)
POTASSIUM SERPL-SCNC: 3.6 MMOL/L (ref 3.5–5.1)
SODIUM SERPL-SCNC: 140 MMOL/L (ref 136–145)

## 2025-08-15 PROCEDURE — 25000003 PHARM REV CODE 250: Performed by: INTERNAL MEDICINE

## 2025-08-15 PROCEDURE — 83735 ASSAY OF MAGNESIUM: CPT | Performed by: INTERNAL MEDICINE

## 2025-08-15 PROCEDURE — 80048 BASIC METABOLIC PNL TOTAL CA: CPT | Performed by: INTERNAL MEDICINE

## 2025-08-15 PROCEDURE — 63600175 PHARM REV CODE 636 W HCPCS: Performed by: INTERNAL MEDICINE

## 2025-08-15 PROCEDURE — 25000242 PHARM REV CODE 250 ALT 637 W/ HCPCS: Performed by: INTERNAL MEDICINE

## 2025-08-15 PROCEDURE — 99900031 HC PATIENT EDUCATION (STAT)

## 2025-08-15 PROCEDURE — 94761 N-INVAS EAR/PLS OXIMETRY MLT: CPT

## 2025-08-15 PROCEDURE — 94640 AIRWAY INHALATION TREATMENT: CPT

## 2025-08-15 PROCEDURE — 36415 COLL VENOUS BLD VENIPUNCTURE: CPT | Performed by: INTERNAL MEDICINE

## 2025-08-15 RX ORDER — FUROSEMIDE 20 MG/1
20 TABLET ORAL 2 TIMES DAILY
Qty: 60 TABLET | Refills: 0 | Status: SHIPPED | OUTPATIENT
Start: 2025-08-15 | End: 2025-09-14

## 2025-08-15 RX ADMIN — DILTIAZEM HYDROCHLORIDE 30 MG: 30 TABLET, FILM COATED ORAL at 06:08

## 2025-08-15 RX ADMIN — DILTIAZEM HYDROCHLORIDE 30 MG: 30 TABLET, FILM COATED ORAL at 01:08

## 2025-08-15 RX ADMIN — HYDRALAZINE HYDROCHLORIDE 10 MG: 20 INJECTION INTRAMUSCULAR; INTRAVENOUS at 07:08

## 2025-08-15 RX ADMIN — ISOSORBIDE DINITRATE 30 MG: 10 TABLET ORAL at 07:08

## 2025-08-15 RX ADMIN — Medication 400 MG: at 07:08

## 2025-08-15 RX ADMIN — LEVOTHYROXINE SODIUM 50 MCG: 0.03 TABLET ORAL at 06:08

## 2025-08-15 RX ADMIN — ASPIRIN 162 MG: 81 TABLET, DELAYED RELEASE ORAL at 07:08

## 2025-08-15 RX ADMIN — TOPIRAMATE 25 MG: 25 TABLET, FILM COATED ORAL at 07:08

## 2025-08-15 RX ADMIN — BUDESONIDE INHALATION 0.5 MG: 0.5 SUSPENSION RESPIRATORY (INHALATION) at 06:08

## 2025-08-15 RX ADMIN — ATORVASTATIN CALCIUM 10 MG: 10 TABLET, FILM COATED ORAL at 07:08

## 2025-08-15 RX ADMIN — FUROSEMIDE 20 MG: 20 TABLET ORAL at 06:08
